# Patient Record
Sex: MALE | Race: BLACK OR AFRICAN AMERICAN | Employment: OTHER | URBAN - METROPOLITAN AREA
[De-identification: names, ages, dates, MRNs, and addresses within clinical notes are randomized per-mention and may not be internally consistent; named-entity substitution may affect disease eponyms.]

---

## 2018-01-01 ENCOUNTER — APPOINTMENT (OUTPATIENT)
Dept: GENERAL RADIOLOGY | Age: 78
DRG: 070 | End: 2018-01-01
Attending: INTERNAL MEDICINE
Payer: MEDICARE

## 2018-01-01 ENCOUNTER — APPOINTMENT (OUTPATIENT)
Dept: GENERAL RADIOLOGY | Age: 78
DRG: 056 | End: 2018-01-01
Attending: INTERNAL MEDICINE
Payer: MEDICARE

## 2018-01-01 ENCOUNTER — APPOINTMENT (OUTPATIENT)
Dept: CT IMAGING | Age: 78
DRG: 056 | End: 2018-01-01
Attending: EMERGENCY MEDICINE
Payer: MEDICARE

## 2018-01-01 ENCOUNTER — APPOINTMENT (OUTPATIENT)
Dept: GENERAL RADIOLOGY | Age: 78
End: 2018-01-01
Attending: EMERGENCY MEDICINE
Payer: MEDICARE

## 2018-01-01 ENCOUNTER — HOSPITAL ENCOUNTER (EMERGENCY)
Age: 78
Discharge: HOME | End: 2018-06-10
Attending: EMERGENCY MEDICINE
Payer: MEDICARE

## 2018-01-01 ENCOUNTER — HOSPITAL ENCOUNTER (EMERGENCY)
Age: 78
Discharge: HOME | End: 2018-04-11
Attending: EMERGENCY MEDICINE | Admitting: EMERGENCY MEDICINE
Payer: MEDICARE

## 2018-01-01 ENCOUNTER — APPOINTMENT (OUTPATIENT)
Dept: MRI IMAGING | Age: 78
DRG: 070 | End: 2018-01-01
Attending: NURSE PRACTITIONER
Payer: MEDICARE

## 2018-01-01 ENCOUNTER — HOSPITAL ENCOUNTER (INPATIENT)
Age: 78
LOS: 4 days | Discharge: HOME HEALTH | DRG: 056 | End: 2018-03-01
Attending: EMERGENCY MEDICINE | Admitting: INTERNAL MEDICINE
Payer: MEDICARE

## 2018-01-01 ENCOUNTER — APPOINTMENT (OUTPATIENT)
Dept: MRI IMAGING | Age: 78
DRG: 070 | End: 2018-01-01
Attending: INTERNAL MEDICINE
Payer: MEDICARE

## 2018-01-01 ENCOUNTER — APPOINTMENT (OUTPATIENT)
Dept: GENERAL RADIOLOGY | Age: 78
DRG: 070 | End: 2018-01-01
Attending: PHYSICIAN ASSISTANT
Payer: MEDICARE

## 2018-01-01 ENCOUNTER — HOSPICE ADMISSION (OUTPATIENT)
Dept: HOSPICE | Facility: HOSPICE | Age: 78
End: 2018-01-01
Payer: MEDICARE

## 2018-01-01 ENCOUNTER — HOSPITAL ENCOUNTER (EMERGENCY)
Age: 78
Discharge: HOME | End: 2018-08-27
Attending: EMERGENCY MEDICINE
Payer: MEDICARE

## 2018-01-01 ENCOUNTER — HOSPITAL ENCOUNTER (INPATIENT)
Age: 78
LOS: 14 days | DRG: 951 | End: 2018-12-17
Attending: FAMILY MEDICINE | Admitting: FAMILY MEDICINE
Payer: OTHER MISCELLANEOUS

## 2018-01-01 ENCOUNTER — APPOINTMENT (OUTPATIENT)
Dept: CT IMAGING | Age: 78
DRG: 070 | End: 2018-01-01
Attending: INTERNAL MEDICINE
Payer: MEDICARE

## 2018-01-01 ENCOUNTER — HOME CARE VISIT (OUTPATIENT)
Dept: HOSPICE | Facility: HOSPICE | Age: 78
End: 2018-01-01
Payer: MEDICARE

## 2018-01-01 ENCOUNTER — APPOINTMENT (OUTPATIENT)
Dept: CT IMAGING | Age: 78
End: 2018-01-01
Attending: EMERGENCY MEDICINE
Payer: MEDICARE

## 2018-01-01 ENCOUNTER — HOSPITAL ENCOUNTER (INPATIENT)
Age: 78
LOS: 15 days | Discharge: HOSPICE | DRG: 070 | End: 2018-12-03
Attending: STUDENT IN AN ORGANIZED HEALTH CARE EDUCATION/TRAINING PROGRAM | Admitting: FAMILY MEDICINE
Payer: MEDICARE

## 2018-01-01 ENCOUNTER — APPOINTMENT (OUTPATIENT)
Dept: MRI IMAGING | Age: 78
DRG: 056 | End: 2018-01-01
Attending: INTERNAL MEDICINE
Payer: MEDICARE

## 2018-01-01 ENCOUNTER — APPOINTMENT (OUTPATIENT)
Dept: GENERAL RADIOLOGY | Age: 78
DRG: 056 | End: 2018-01-01
Attending: EMERGENCY MEDICINE
Payer: MEDICARE

## 2018-01-01 ENCOUNTER — HOSPITAL ENCOUNTER (EMERGENCY)
Age: 78
Discharge: HOME | End: 2018-08-15
Attending: EMERGENCY MEDICINE
Payer: MEDICARE

## 2018-01-01 ENCOUNTER — HOSPITAL ENCOUNTER (EMERGENCY)
Age: 78
Discharge: HOME | End: 2018-07-01
Attending: EMERGENCY MEDICINE | Admitting: EMERGENCY MEDICINE
Payer: MEDICARE

## 2018-01-01 ENCOUNTER — APPOINTMENT (OUTPATIENT)
Dept: CT IMAGING | Age: 78
DRG: 070 | End: 2018-01-01
Attending: PHYSICIAN ASSISTANT
Payer: MEDICARE

## 2018-01-01 VITALS
DIASTOLIC BLOOD PRESSURE: 65 MMHG | OXYGEN SATURATION: 96 % | BODY MASS INDEX: 17.58 KG/M2 | HEART RATE: 75 BPM | SYSTOLIC BLOOD PRESSURE: 125 MMHG | TEMPERATURE: 98.9 F | WEIGHT: 122.8 LBS | RESPIRATION RATE: 16 BRPM | HEIGHT: 70 IN

## 2018-01-01 VITALS
HEART RATE: 71 BPM | TEMPERATURE: 97.6 F | DIASTOLIC BLOOD PRESSURE: 87 MMHG | RESPIRATION RATE: 13 BRPM | WEIGHT: 122 LBS | OXYGEN SATURATION: 96 % | SYSTOLIC BLOOD PRESSURE: 145 MMHG | BODY MASS INDEX: 19.61 KG/M2 | HEIGHT: 66 IN

## 2018-01-01 VITALS
HEART RATE: 87 BPM | HEIGHT: 66 IN | TEMPERATURE: 97.6 F | RESPIRATION RATE: 20 BRPM | WEIGHT: 132 LBS | BODY MASS INDEX: 21.21 KG/M2 | DIASTOLIC BLOOD PRESSURE: 93 MMHG | OXYGEN SATURATION: 99 % | SYSTOLIC BLOOD PRESSURE: 177 MMHG

## 2018-01-01 VITALS
DIASTOLIC BLOOD PRESSURE: 80 MMHG | BODY MASS INDEX: 19.61 KG/M2 | HEIGHT: 66 IN | SYSTOLIC BLOOD PRESSURE: 151 MMHG | HEART RATE: 61 BPM | WEIGHT: 122 LBS | OXYGEN SATURATION: 96 % | RESPIRATION RATE: 16 BRPM | TEMPERATURE: 97.4 F

## 2018-01-01 VITALS
HEART RATE: 73 BPM | SYSTOLIC BLOOD PRESSURE: 160 MMHG | TEMPERATURE: 98.6 F | OXYGEN SATURATION: 93 % | HEIGHT: 70 IN | DIASTOLIC BLOOD PRESSURE: 86 MMHG | WEIGHT: 143 LBS | BODY MASS INDEX: 20.47 KG/M2 | RESPIRATION RATE: 20 BRPM

## 2018-01-01 VITALS
HEART RATE: 65 BPM | TEMPERATURE: 97.5 F | SYSTOLIC BLOOD PRESSURE: 140 MMHG | BODY MASS INDEX: 19.69 KG/M2 | DIASTOLIC BLOOD PRESSURE: 75 MMHG | OXYGEN SATURATION: 92 % | WEIGHT: 122 LBS | RESPIRATION RATE: 16 BRPM

## 2018-01-01 VITALS
BODY MASS INDEX: 21.21 KG/M2 | OXYGEN SATURATION: 97 % | DIASTOLIC BLOOD PRESSURE: 106 MMHG | TEMPERATURE: 97.8 F | SYSTOLIC BLOOD PRESSURE: 158 MMHG | HEART RATE: 79 BPM | WEIGHT: 132 LBS | HEIGHT: 66 IN | RESPIRATION RATE: 12 BRPM

## 2018-01-01 VITALS — RESPIRATION RATE: 14 BRPM | SYSTOLIC BLOOD PRESSURE: 132 MMHG | DIASTOLIC BLOOD PRESSURE: 72 MMHG | HEART RATE: 88 BPM

## 2018-01-01 DIAGNOSIS — N39.0 URINARY TRACT INFECTION WITHOUT HEMATURIA, SITE UNSPECIFIED: ICD-10-CM

## 2018-01-01 DIAGNOSIS — E86.0 DEHYDRATION: ICD-10-CM

## 2018-01-01 DIAGNOSIS — Z71.89 GOALS OF CARE, COUNSELING/DISCUSSION: ICD-10-CM

## 2018-01-01 DIAGNOSIS — J39.8 INCREASED TRACHEAL SECRETIONS: ICD-10-CM

## 2018-01-01 DIAGNOSIS — R41.82 ALTERED MENTAL STATUS, UNSPECIFIED ALTERED MENTAL STATUS TYPE: Primary | ICD-10-CM

## 2018-01-01 DIAGNOSIS — R13.10 DYSPHAGIA, UNSPECIFIED TYPE: ICD-10-CM

## 2018-01-01 DIAGNOSIS — R41.0 DELIRIUM: ICD-10-CM

## 2018-01-01 DIAGNOSIS — I63.9 CEREBROVASCULAR ACCIDENT (CVA), UNSPECIFIED MECHANISM (HCC): Primary | ICD-10-CM

## 2018-01-01 DIAGNOSIS — G93.40 ACUTE ENCEPHALOPATHY: ICD-10-CM

## 2018-01-01 DIAGNOSIS — R53.81 DEBILITY: ICD-10-CM

## 2018-01-01 DIAGNOSIS — J18.9 COMMUNITY ACQUIRED PNEUMONIA OF LEFT LUNG, UNSPECIFIED PART OF LUNG: Primary | ICD-10-CM

## 2018-01-01 DIAGNOSIS — R41.0 CONFUSION: Primary | ICD-10-CM

## 2018-01-01 DIAGNOSIS — F02.80 LATE ONSET ALZHEIMER'S DISEASE WITHOUT BEHAVIORAL DISTURBANCE (HCC): Chronic | ICD-10-CM

## 2018-01-01 DIAGNOSIS — G20 PARKINSON DISEASE (HCC): Chronic | ICD-10-CM

## 2018-01-01 DIAGNOSIS — R06.02 SOB (SHORTNESS OF BREATH): ICD-10-CM

## 2018-01-01 DIAGNOSIS — R31.9 URINARY TRACT INFECTION WITH HEMATURIA, SITE UNSPECIFIED: Primary | ICD-10-CM

## 2018-01-01 DIAGNOSIS — N30.00 ACUTE CYSTITIS WITHOUT HEMATURIA: Primary | ICD-10-CM

## 2018-01-01 DIAGNOSIS — J96.00 ACUTE RESPIRATORY FAILURE, UNSPECIFIED WHETHER WITH HYPOXIA OR HYPERCAPNIA (HCC): ICD-10-CM

## 2018-01-01 DIAGNOSIS — G45.9 TRANSIENT CEREBRAL ISCHEMIA, UNSPECIFIED TYPE: ICD-10-CM

## 2018-01-01 DIAGNOSIS — N39.0 URINARY TRACT INFECTION WITH HEMATURIA, SITE UNSPECIFIED: Primary | ICD-10-CM

## 2018-01-01 DIAGNOSIS — F02.80 LATE ONSET ALZHEIMER'S DISEASE WITHOUT BEHAVIORAL DISTURBANCE (HCC): ICD-10-CM

## 2018-01-01 DIAGNOSIS — R62.51 FAILURE TO THRIVE (0-17): Chronic | ICD-10-CM

## 2018-01-01 DIAGNOSIS — R41.0 DISORIENTATION: ICD-10-CM

## 2018-01-01 DIAGNOSIS — F03.91 DEMENTIA WITH BEHAVIORAL DISTURBANCE, UNSPECIFIED DEMENTIA TYPE: ICD-10-CM

## 2018-01-01 DIAGNOSIS — G30.1 LATE ONSET ALZHEIMER'S DISEASE WITHOUT BEHAVIORAL DISTURBANCE (HCC): ICD-10-CM

## 2018-01-01 DIAGNOSIS — I71.20 THORACIC AORTIC ANEURYSM WITHOUT RUPTURE: ICD-10-CM

## 2018-01-01 DIAGNOSIS — Z71.89 DNR (DO NOT RESUSCITATE) DISCUSSION: ICD-10-CM

## 2018-01-01 DIAGNOSIS — G30.1 LATE ONSET ALZHEIMER'S DISEASE WITHOUT BEHAVIORAL DISTURBANCE (HCC): Chronic | ICD-10-CM

## 2018-01-01 DIAGNOSIS — R45.1 RESTLESSNESS AND AGITATION: ICD-10-CM

## 2018-01-01 LAB
ALBUMIN SERPL-MCNC: 2.2 G/DL (ref 3.5–5)
ALBUMIN SERPL-MCNC: 2.2 G/DL (ref 3.5–5)
ALBUMIN SERPL-MCNC: 2.7 G/DL (ref 3.5–5)
ALBUMIN SERPL-MCNC: 2.9 G/DL (ref 3.5–5)
ALBUMIN SERPL-MCNC: 2.9 G/DL (ref 3.5–5)
ALBUMIN SERPL-MCNC: 3.2 G/DL (ref 3.5–5)
ALBUMIN SERPL-MCNC: 3.3 G/DL (ref 3.5–5)
ALBUMIN SERPL-MCNC: 3.5 G/DL (ref 3.5–5)
ALBUMIN SERPL-MCNC: 3.7 G/DL (ref 3.5–5)
ALBUMIN/GLOB SERPL: 0.6 {RATIO} (ref 1.1–2.2)
ALBUMIN/GLOB SERPL: 0.6 {RATIO} (ref 1.1–2.2)
ALBUMIN/GLOB SERPL: 0.7 {RATIO} (ref 1.1–2.2)
ALBUMIN/GLOB SERPL: 0.9 {RATIO} (ref 1.1–2.2)
ALBUMIN/GLOB SERPL: 1 {RATIO} (ref 1.1–2.2)
ALP SERPL-CCNC: 120 U/L (ref 45–117)
ALP SERPL-CCNC: 122 U/L (ref 45–117)
ALP SERPL-CCNC: 52 U/L (ref 45–117)
ALP SERPL-CCNC: 53 U/L (ref 45–117)
ALP SERPL-CCNC: 66 U/L (ref 45–117)
ALP SERPL-CCNC: 66 U/L (ref 45–117)
ALP SERPL-CCNC: 73 U/L (ref 45–117)
ALP SERPL-CCNC: 77 U/L (ref 45–117)
ALP SERPL-CCNC: 87 U/L (ref 45–117)
ALP SERPL-CCNC: 88 U/L (ref 45–117)
ALP SERPL-CCNC: 92 U/L (ref 45–117)
ALT SERPL-CCNC: 11 U/L (ref 12–78)
ALT SERPL-CCNC: 12 U/L (ref 12–78)
ALT SERPL-CCNC: 6 U/L (ref 12–78)
ALT SERPL-CCNC: 7 U/L (ref 12–78)
ALT SERPL-CCNC: 7 U/L (ref 12–78)
ALT SERPL-CCNC: 8 U/L (ref 12–78)
ALT SERPL-CCNC: 8 U/L (ref 12–78)
ALT SERPL-CCNC: <6 U/L (ref 12–78)
AMMONIA PLAS-SCNC: 11 UMOL/L
AMMONIA PLAS-SCNC: 12 UMOL/L
AMMONIA PLAS-SCNC: 16 UMOL/L
AMMONIA PLAS-SCNC: 18 UMOL/L
AMMONIA PLAS-SCNC: <10 UMOL/L
AMMONIA PLAS-SCNC: <10 UMOL/L
ANA SER QL: NEGATIVE
ANION GAP SERPL CALC-SCNC: 10 MMOL/L (ref 5–15)
ANION GAP SERPL CALC-SCNC: 10 MMOL/L (ref 5–15)
ANION GAP SERPL CALC-SCNC: 11 MMOL/L (ref 5–15)
ANION GAP SERPL CALC-SCNC: 11 MMOL/L (ref 5–15)
ANION GAP SERPL CALC-SCNC: 4 MMOL/L (ref 5–15)
ANION GAP SERPL CALC-SCNC: 4 MMOL/L (ref 5–15)
ANION GAP SERPL CALC-SCNC: 5 MMOL/L (ref 5–15)
ANION GAP SERPL CALC-SCNC: 6 MMOL/L (ref 5–15)
ANION GAP SERPL CALC-SCNC: 7 MMOL/L (ref 5–15)
ANION GAP SERPL CALC-SCNC: 8 MMOL/L (ref 5–15)
ANION GAP SERPL CALC-SCNC: 9 MMOL/L (ref 5–15)
ANION GAP SERPL CALC-SCNC: 9 MMOL/L (ref 5–15)
APPEARANCE UR: ABNORMAL
APPEARANCE UR: CLEAR
AST SERPL-CCNC: 12 U/L (ref 15–37)
AST SERPL-CCNC: 12 U/L (ref 15–37)
AST SERPL-CCNC: 13 U/L (ref 15–37)
AST SERPL-CCNC: 13 U/L (ref 15–37)
AST SERPL-CCNC: 14 U/L (ref 15–37)
AST SERPL-CCNC: 17 U/L (ref 15–37)
AST SERPL-CCNC: 18 U/L (ref 15–37)
AST SERPL-CCNC: 18 U/L (ref 15–37)
AST SERPL-CCNC: 22 U/L (ref 15–37)
ATRIAL RATE: 57 BPM
ATRIAL RATE: 61 BPM
ATRIAL RATE: 62 BPM
ATRIAL RATE: 67 BPM
ATRIAL RATE: 71 BPM
ATRIAL RATE: 83 BPM
BACTERIA SPEC CULT: ABNORMAL
BACTERIA SPEC CULT: NORMAL
BACTERIA URNS QL MICRO: ABNORMAL /HPF
BACTERIA URNS QL MICRO: NEGATIVE /HPF
BASOPHILS # BLD: 0 K/UL (ref 0–0.1)
BASOPHILS NFR BLD: 0 % (ref 0–1)
BASOPHILS NFR BLD: 1 % (ref 0–1)
BASOPHILS NFR BLD: 1 % (ref 0–1)
BILIRUB DIRECT SERPL-MCNC: 0.2 MG/DL (ref 0–0.2)
BILIRUB DIRECT SERPL-MCNC: <0.1 MG/DL (ref 0–0.2)
BILIRUB SERPL-MCNC: 0.6 MG/DL (ref 0.2–1)
BILIRUB SERPL-MCNC: 0.7 MG/DL (ref 0.2–1)
BILIRUB SERPL-MCNC: 0.9 MG/DL (ref 0.2–1)
BILIRUB SERPL-MCNC: 0.9 MG/DL (ref 0.2–1)
BILIRUB SERPL-MCNC: 1 MG/DL (ref 0.2–1)
BILIRUB SERPL-MCNC: 1 MG/DL (ref 0.2–1)
BILIRUB SERPL-MCNC: 1.1 MG/DL (ref 0.2–1)
BILIRUB SERPL-MCNC: 1.4 MG/DL (ref 0.2–1)
BILIRUB SERPL-MCNC: 1.4 MG/DL (ref 0.2–1)
BILIRUB UR QL CFM: NEGATIVE
BILIRUB UR QL: NEGATIVE
BILIRUB UR QL: NEGATIVE
BUN SERPL-MCNC: 10 MG/DL (ref 6–20)
BUN SERPL-MCNC: 12 MG/DL (ref 6–20)
BUN SERPL-MCNC: 12 MG/DL (ref 6–20)
BUN SERPL-MCNC: 13 MG/DL (ref 6–20)
BUN SERPL-MCNC: 14 MG/DL (ref 6–20)
BUN SERPL-MCNC: 14 MG/DL (ref 6–20)
BUN SERPL-MCNC: 15 MG/DL (ref 6–20)
BUN SERPL-MCNC: 17 MG/DL (ref 6–20)
BUN SERPL-MCNC: 18 MG/DL (ref 6–20)
BUN SERPL-MCNC: 19 MG/DL (ref 6–20)
BUN SERPL-MCNC: 19 MG/DL (ref 6–20)
BUN SERPL-MCNC: 5 MG/DL (ref 6–20)
BUN SERPL-MCNC: 7 MG/DL (ref 6–20)
BUN SERPL-MCNC: 8 MG/DL (ref 6–20)
BUN SERPL-MCNC: 9 MG/DL (ref 6–20)
BUN SERPL-MCNC: 9 MG/DL (ref 6–20)
BUN/CREAT SERPL: 12 (ref 12–20)
BUN/CREAT SERPL: 13 (ref 12–20)
BUN/CREAT SERPL: 14 (ref 12–20)
BUN/CREAT SERPL: 14 (ref 12–20)
BUN/CREAT SERPL: 16 (ref 12–20)
BUN/CREAT SERPL: 17 (ref 12–20)
BUN/CREAT SERPL: 17 (ref 12–20)
BUN/CREAT SERPL: 18 (ref 12–20)
BUN/CREAT SERPL: 19 (ref 12–20)
BUN/CREAT SERPL: 21 (ref 12–20)
BUN/CREAT SERPL: 22 (ref 12–20)
BUN/CREAT SERPL: 23 (ref 12–20)
BUN/CREAT SERPL: 23 (ref 12–20)
BUN/CREAT SERPL: 26 (ref 12–20)
BUN/CREAT SERPL: 26 (ref 12–20)
BUN/CREAT SERPL: 28 (ref 12–20)
BUN/CREAT SERPL: 8 (ref 12–20)
BUN/CREAT SERPL: 9 (ref 12–20)
CALCIUM SERPL-MCNC: 7.8 MG/DL (ref 8.5–10.1)
CALCIUM SERPL-MCNC: 7.9 MG/DL (ref 8.5–10.1)
CALCIUM SERPL-MCNC: 8 MG/DL (ref 8.5–10.1)
CALCIUM SERPL-MCNC: 8 MG/DL (ref 8.5–10.1)
CALCIUM SERPL-MCNC: 8.1 MG/DL (ref 8.5–10.1)
CALCIUM SERPL-MCNC: 8.1 MG/DL (ref 8.5–10.1)
CALCIUM SERPL-MCNC: 8.2 MG/DL (ref 8.5–10.1)
CALCIUM SERPL-MCNC: 8.3 MG/DL (ref 8.5–10.1)
CALCIUM SERPL-MCNC: 8.4 MG/DL (ref 8.5–10.1)
CALCIUM SERPL-MCNC: 8.4 MG/DL (ref 8.5–10.1)
CALCIUM SERPL-MCNC: 8.5 MG/DL (ref 8.5–10.1)
CALCIUM SERPL-MCNC: 8.6 MG/DL (ref 8.5–10.1)
CALCIUM SERPL-MCNC: 8.7 MG/DL (ref 8.5–10.1)
CALCIUM SERPL-MCNC: 8.7 MG/DL (ref 8.5–10.1)
CALCIUM SERPL-MCNC: 8.8 MG/DL (ref 8.5–10.1)
CALCIUM SERPL-MCNC: 8.8 MG/DL (ref 8.5–10.1)
CALCIUM SERPL-MCNC: 9.3 MG/DL (ref 8.5–10.1)
CALCULATED P AXIS, ECG09: 15 DEGREES
CALCULATED P AXIS, ECG09: 22 DEGREES
CALCULATED P AXIS, ECG09: 30 DEGREES
CALCULATED P AXIS, ECG09: 30 DEGREES
CALCULATED P AXIS, ECG09: 36 DEGREES
CALCULATED P AXIS, ECG09: 42 DEGREES
CALCULATED R AXIS, ECG10: -10 DEGREES
CALCULATED R AXIS, ECG10: -20 DEGREES
CALCULATED R AXIS, ECG10: -20 DEGREES
CALCULATED R AXIS, ECG10: -23 DEGREES
CALCULATED R AXIS, ECG10: -24 DEGREES
CALCULATED R AXIS, ECG10: -24 DEGREES
CALCULATED T AXIS, ECG11: -155 DEGREES
CALCULATED T AXIS, ECG11: 123 DEGREES
CALCULATED T AXIS, ECG11: 139 DEGREES
CALCULATED T AXIS, ECG11: 140 DEGREES
CALCULATED T AXIS, ECG11: 150 DEGREES
CALCULATED T AXIS, ECG11: 163 DEGREES
CC UR VC: ABNORMAL
CC UR VC: ABNORMAL
CC UR VC: NORMAL
CC UR VC: NORMAL
CHLORIDE SERPL-SCNC: 101 MMOL/L (ref 97–108)
CHLORIDE SERPL-SCNC: 102 MMOL/L (ref 97–108)
CHLORIDE SERPL-SCNC: 103 MMOL/L (ref 97–108)
CHLORIDE SERPL-SCNC: 104 MMOL/L (ref 97–108)
CHLORIDE SERPL-SCNC: 105 MMOL/L (ref 97–108)
CHLORIDE SERPL-SCNC: 106 MMOL/L (ref 97–108)
CHLORIDE SERPL-SCNC: 106 MMOL/L (ref 97–108)
CHLORIDE SERPL-SCNC: 107 MMOL/L (ref 97–108)
CHOLEST SERPL-MCNC: 105 MG/DL
CHOLEST SERPL-MCNC: 153 MG/DL
CO2 SERPL-SCNC: 25 MMOL/L (ref 21–32)
CO2 SERPL-SCNC: 27 MMOL/L (ref 21–32)
CO2 SERPL-SCNC: 28 MMOL/L (ref 21–32)
CO2 SERPL-SCNC: 29 MMOL/L (ref 21–32)
CO2 SERPL-SCNC: 30 MMOL/L (ref 21–32)
CO2 SERPL-SCNC: 31 MMOL/L (ref 21–32)
CO2 SERPL-SCNC: 33 MMOL/L (ref 21–32)
COLOR UR: ABNORMAL
COMMENT, HOLDF: NORMAL
CREAT SERPL-MCNC: 0.57 MG/DL (ref 0.7–1.3)
CREAT SERPL-MCNC: 0.57 MG/DL (ref 0.7–1.3)
CREAT SERPL-MCNC: 0.64 MG/DL (ref 0.7–1.3)
CREAT SERPL-MCNC: 0.66 MG/DL (ref 0.7–1.3)
CREAT SERPL-MCNC: 0.66 MG/DL (ref 0.7–1.3)
CREAT SERPL-MCNC: 0.67 MG/DL (ref 0.7–1.3)
CREAT SERPL-MCNC: 0.68 MG/DL (ref 0.7–1.3)
CREAT SERPL-MCNC: 0.69 MG/DL (ref 0.7–1.3)
CREAT SERPL-MCNC: 0.69 MG/DL (ref 0.7–1.3)
CREAT SERPL-MCNC: 0.72 MG/DL (ref 0.7–1.3)
CREAT SERPL-MCNC: 0.73 MG/DL (ref 0.7–1.3)
CREAT SERPL-MCNC: 0.74 MG/DL (ref 0.7–1.3)
CREAT SERPL-MCNC: 0.76 MG/DL (ref 0.7–1.3)
CREAT SERPL-MCNC: 0.83 MG/DL (ref 0.7–1.3)
CREAT SERPL-MCNC: 0.85 MG/DL (ref 0.7–1.3)
CREAT SERPL-MCNC: 0.86 MG/DL (ref 0.7–1.3)
CREAT SERPL-MCNC: 0.9 MG/DL (ref 0.7–1.3)
CREAT SERPL-MCNC: 0.92 MG/DL (ref 0.7–1.3)
CREAT SERPL-MCNC: 0.95 MG/DL (ref 0.7–1.3)
CRP SERPL HS-MCNC: >9.5 MG/L
DIAGNOSIS, 93000: NORMAL
DIFFERENTIAL METHOD BLD: ABNORMAL
DIFFERENTIAL METHOD BLD: NORMAL
EOSINOPHIL # BLD: 0 K/UL (ref 0–0.4)
EOSINOPHIL # BLD: 0.1 K/UL (ref 0–0.4)
EOSINOPHIL # BLD: 0.2 K/UL (ref 0–0.4)
EOSINOPHIL NFR BLD: 0 % (ref 0–7)
EOSINOPHIL NFR BLD: 0 % (ref 0–7)
EOSINOPHIL NFR BLD: 1 % (ref 0–7)
EOSINOPHIL NFR BLD: 2 % (ref 0–7)
EPITH CASTS URNS QL MICRO: ABNORMAL /LPF
ERYTHROCYTE [DISTWIDTH] IN BLOOD BY AUTOMATED COUNT: 12.9 % (ref 11.5–14.5)
ERYTHROCYTE [DISTWIDTH] IN BLOOD BY AUTOMATED COUNT: 13.2 % (ref 11.5–14.5)
ERYTHROCYTE [DISTWIDTH] IN BLOOD BY AUTOMATED COUNT: 13.3 % (ref 11.5–14.5)
ERYTHROCYTE [DISTWIDTH] IN BLOOD BY AUTOMATED COUNT: 13.4 % (ref 11.5–14.5)
ERYTHROCYTE [DISTWIDTH] IN BLOOD BY AUTOMATED COUNT: 13.6 % (ref 11.5–14.5)
ERYTHROCYTE [DISTWIDTH] IN BLOOD BY AUTOMATED COUNT: 13.6 % (ref 11.5–14.5)
ERYTHROCYTE [DISTWIDTH] IN BLOOD BY AUTOMATED COUNT: 13.7 % (ref 11.5–14.5)
ERYTHROCYTE [DISTWIDTH] IN BLOOD BY AUTOMATED COUNT: 13.7 % (ref 11.5–14.5)
ERYTHROCYTE [DISTWIDTH] IN BLOOD BY AUTOMATED COUNT: 13.8 % (ref 11.5–14.5)
ERYTHROCYTE [SEDIMENTATION RATE] IN BLOOD: 7 MM/HR (ref 0–20)
EST. AVERAGE GLUCOSE BLD GHB EST-MCNC: 108 MG/DL
EST. AVERAGE GLUCOSE BLD GHB EST-MCNC: 134 MG/DL
GLOBULIN SER CALC-MCNC: 3.3 G/DL (ref 2–4)
GLOBULIN SER CALC-MCNC: 3.3 G/DL (ref 2–4)
GLOBULIN SER CALC-MCNC: 3.4 G/DL (ref 2–4)
GLOBULIN SER CALC-MCNC: 3.5 G/DL (ref 2–4)
GLOBULIN SER CALC-MCNC: 3.5 G/DL (ref 2–4)
GLOBULIN SER CALC-MCNC: 3.6 G/DL (ref 2–4)
GLOBULIN SER CALC-MCNC: 3.7 G/DL (ref 2–4)
GLOBULIN SER CALC-MCNC: 3.7 G/DL (ref 2–4)
GLOBULIN SER CALC-MCNC: 3.9 G/DL (ref 2–4)
GLUCOSE BLD STRIP.AUTO-MCNC: 100 MG/DL (ref 65–100)
GLUCOSE BLD STRIP.AUTO-MCNC: 104 MG/DL (ref 65–100)
GLUCOSE BLD STRIP.AUTO-MCNC: 105 MG/DL (ref 65–100)
GLUCOSE BLD STRIP.AUTO-MCNC: 106 MG/DL (ref 65–100)
GLUCOSE BLD STRIP.AUTO-MCNC: 107 MG/DL (ref 65–100)
GLUCOSE BLD STRIP.AUTO-MCNC: 118 MG/DL (ref 65–100)
GLUCOSE BLD STRIP.AUTO-MCNC: 118 MG/DL (ref 65–100)
GLUCOSE BLD STRIP.AUTO-MCNC: 120 MG/DL (ref 65–100)
GLUCOSE BLD STRIP.AUTO-MCNC: 121 MG/DL (ref 65–100)
GLUCOSE BLD STRIP.AUTO-MCNC: 122 MG/DL (ref 65–100)
GLUCOSE BLD STRIP.AUTO-MCNC: 123 MG/DL (ref 65–100)
GLUCOSE BLD STRIP.AUTO-MCNC: 124 MG/DL (ref 65–100)
GLUCOSE BLD STRIP.AUTO-MCNC: 124 MG/DL (ref 65–100)
GLUCOSE BLD STRIP.AUTO-MCNC: 125 MG/DL (ref 65–100)
GLUCOSE BLD STRIP.AUTO-MCNC: 126 MG/DL (ref 65–100)
GLUCOSE BLD STRIP.AUTO-MCNC: 128 MG/DL (ref 65–100)
GLUCOSE BLD STRIP.AUTO-MCNC: 128 MG/DL (ref 65–100)
GLUCOSE BLD STRIP.AUTO-MCNC: 130 MG/DL (ref 65–100)
GLUCOSE BLD STRIP.AUTO-MCNC: 132 MG/DL (ref 65–100)
GLUCOSE BLD STRIP.AUTO-MCNC: 133 MG/DL (ref 65–100)
GLUCOSE BLD STRIP.AUTO-MCNC: 133 MG/DL (ref 65–100)
GLUCOSE BLD STRIP.AUTO-MCNC: 134 MG/DL (ref 65–100)
GLUCOSE BLD STRIP.AUTO-MCNC: 136 MG/DL (ref 65–100)
GLUCOSE BLD STRIP.AUTO-MCNC: 138 MG/DL (ref 65–100)
GLUCOSE BLD STRIP.AUTO-MCNC: 140 MG/DL (ref 65–100)
GLUCOSE BLD STRIP.AUTO-MCNC: 140 MG/DL (ref 65–100)
GLUCOSE BLD STRIP.AUTO-MCNC: 141 MG/DL (ref 65–100)
GLUCOSE BLD STRIP.AUTO-MCNC: 143 MG/DL (ref 65–100)
GLUCOSE BLD STRIP.AUTO-MCNC: 143 MG/DL (ref 65–100)
GLUCOSE BLD STRIP.AUTO-MCNC: 151 MG/DL (ref 65–100)
GLUCOSE BLD STRIP.AUTO-MCNC: 155 MG/DL (ref 65–100)
GLUCOSE BLD STRIP.AUTO-MCNC: 155 MG/DL (ref 65–100)
GLUCOSE BLD STRIP.AUTO-MCNC: 156 MG/DL (ref 65–100)
GLUCOSE BLD STRIP.AUTO-MCNC: 157 MG/DL (ref 65–100)
GLUCOSE BLD STRIP.AUTO-MCNC: 161 MG/DL (ref 65–100)
GLUCOSE BLD STRIP.AUTO-MCNC: 166 MG/DL (ref 65–100)
GLUCOSE BLD STRIP.AUTO-MCNC: 170 MG/DL (ref 65–100)
GLUCOSE BLD STRIP.AUTO-MCNC: 171 MG/DL (ref 65–100)
GLUCOSE BLD STRIP.AUTO-MCNC: 172 MG/DL (ref 65–100)
GLUCOSE BLD STRIP.AUTO-MCNC: 180 MG/DL (ref 65–100)
GLUCOSE BLD STRIP.AUTO-MCNC: 191 MG/DL (ref 65–100)
GLUCOSE BLD STRIP.AUTO-MCNC: 191 MG/DL (ref 65–100)
GLUCOSE BLD STRIP.AUTO-MCNC: 194 MG/DL (ref 65–100)
GLUCOSE BLD STRIP.AUTO-MCNC: 198 MG/DL (ref 65–100)
GLUCOSE BLD STRIP.AUTO-MCNC: 198 MG/DL (ref 65–100)
GLUCOSE BLD STRIP.AUTO-MCNC: 201 MG/DL (ref 65–100)
GLUCOSE BLD STRIP.AUTO-MCNC: 209 MG/DL (ref 65–100)
GLUCOSE BLD STRIP.AUTO-MCNC: 214 MG/DL (ref 65–100)
GLUCOSE BLD STRIP.AUTO-MCNC: 217 MG/DL (ref 65–100)
GLUCOSE BLD STRIP.AUTO-MCNC: 226 MG/DL (ref 65–100)
GLUCOSE BLD STRIP.AUTO-MCNC: 227 MG/DL (ref 65–100)
GLUCOSE BLD STRIP.AUTO-MCNC: 230 MG/DL (ref 65–100)
GLUCOSE BLD STRIP.AUTO-MCNC: 232 MG/DL (ref 65–100)
GLUCOSE BLD STRIP.AUTO-MCNC: 254 MG/DL (ref 65–100)
GLUCOSE BLD STRIP.AUTO-MCNC: 326 MG/DL (ref 65–100)
GLUCOSE BLD STRIP.AUTO-MCNC: 69 MG/DL (ref 65–100)
GLUCOSE BLD STRIP.AUTO-MCNC: 78 MG/DL (ref 65–100)
GLUCOSE BLD STRIP.AUTO-MCNC: 80 MG/DL (ref 65–100)
GLUCOSE BLD STRIP.AUTO-MCNC: 84 MG/DL (ref 65–100)
GLUCOSE BLD STRIP.AUTO-MCNC: 87 MG/DL (ref 65–100)
GLUCOSE BLD STRIP.AUTO-MCNC: 88 MG/DL (ref 65–100)
GLUCOSE BLD STRIP.AUTO-MCNC: 89 MG/DL (ref 65–100)
GLUCOSE BLD STRIP.AUTO-MCNC: 90 MG/DL (ref 65–100)
GLUCOSE BLD STRIP.AUTO-MCNC: 91 MG/DL (ref 65–100)
GLUCOSE BLD STRIP.AUTO-MCNC: 93 MG/DL (ref 65–100)
GLUCOSE BLD STRIP.AUTO-MCNC: 93 MG/DL (ref 65–100)
GLUCOSE BLD STRIP.AUTO-MCNC: 94 MG/DL (ref 65–100)
GLUCOSE BLD STRIP.AUTO-MCNC: 95 MG/DL (ref 65–100)
GLUCOSE BLD STRIP.AUTO-MCNC: 96 MG/DL (ref 65–100)
GLUCOSE BLD STRIP.AUTO-MCNC: 96 MG/DL (ref 65–100)
GLUCOSE BLD STRIP.AUTO-MCNC: 97 MG/DL (ref 65–100)
GLUCOSE BLD STRIP.AUTO-MCNC: 97 MG/DL (ref 65–100)
GLUCOSE BLD STRIP.AUTO-MCNC: 98 MG/DL (ref 65–100)
GLUCOSE BLD STRIP.AUTO-MCNC: 98 MG/DL (ref 65–100)
GLUCOSE BLD STRIP.AUTO-MCNC: 99 MG/DL (ref 65–100)
GLUCOSE SERPL-MCNC: 100 MG/DL (ref 65–100)
GLUCOSE SERPL-MCNC: 109 MG/DL (ref 65–100)
GLUCOSE SERPL-MCNC: 111 MG/DL (ref 65–100)
GLUCOSE SERPL-MCNC: 117 MG/DL (ref 65–100)
GLUCOSE SERPL-MCNC: 127 MG/DL (ref 65–100)
GLUCOSE SERPL-MCNC: 128 MG/DL (ref 65–100)
GLUCOSE SERPL-MCNC: 139 MG/DL (ref 65–100)
GLUCOSE SERPL-MCNC: 144 MG/DL (ref 65–100)
GLUCOSE SERPL-MCNC: 152 MG/DL (ref 65–100)
GLUCOSE SERPL-MCNC: 173 MG/DL (ref 65–100)
GLUCOSE SERPL-MCNC: 220 MG/DL (ref 65–100)
GLUCOSE SERPL-MCNC: 228 MG/DL (ref 65–100)
GLUCOSE SERPL-MCNC: 238 MG/DL (ref 65–100)
GLUCOSE SERPL-MCNC: 246 MG/DL (ref 65–100)
GLUCOSE SERPL-MCNC: 83 MG/DL (ref 65–100)
GLUCOSE SERPL-MCNC: 85 MG/DL (ref 65–100)
GLUCOSE SERPL-MCNC: 93 MG/DL (ref 65–100)
GLUCOSE SERPL-MCNC: 95 MG/DL (ref 65–100)
GLUCOSE SERPL-MCNC: 96 MG/DL (ref 65–100)
GLUCOSE SERPL-MCNC: 97 MG/DL (ref 65–100)
GLUCOSE SERPL-MCNC: 99 MG/DL (ref 65–100)
GLUCOSE UR STRIP.AUTO-MCNC: NEGATIVE MG/DL
HBA1C MFR BLD: 5.4 % (ref 4.2–6.3)
HBA1C MFR BLD: 6.3 % (ref 4.2–6.3)
HCT VFR BLD AUTO: 31.6 % (ref 36.6–50.3)
HCT VFR BLD AUTO: 37.9 % (ref 36.6–50.3)
HCT VFR BLD AUTO: 38.3 % (ref 36.6–50.3)
HCT VFR BLD AUTO: 38.9 % (ref 36.6–50.3)
HCT VFR BLD AUTO: 40.3 % (ref 36.6–50.3)
HCT VFR BLD AUTO: 40.4 % (ref 36.6–50.3)
HCT VFR BLD AUTO: 41.3 % (ref 36.6–50.3)
HCT VFR BLD AUTO: 41.6 % (ref 36.6–50.3)
HCT VFR BLD AUTO: 41.6 % (ref 36.6–50.3)
HCT VFR BLD AUTO: 41.8 % (ref 36.6–50.3)
HCT VFR BLD AUTO: 42.4 % (ref 36.6–50.3)
HCT VFR BLD AUTO: 43.4 % (ref 36.6–50.3)
HCT VFR BLD AUTO: 44.2 % (ref 36.6–50.3)
HDLC SERPL-MCNC: 53 MG/DL
HDLC SERPL-MCNC: 63 MG/DL
HDLC SERPL: 2 {RATIO} (ref 0–5)
HDLC SERPL: 2.4 {RATIO} (ref 0–5)
HGB BLD-MCNC: 10.2 G/DL (ref 12.1–17)
HGB BLD-MCNC: 12.5 G/DL (ref 12.1–17)
HGB BLD-MCNC: 12.7 G/DL (ref 12.1–17)
HGB BLD-MCNC: 12.9 G/DL (ref 12.1–17)
HGB BLD-MCNC: 13 G/DL (ref 12.1–17)
HGB BLD-MCNC: 13.5 G/DL (ref 12.1–17)
HGB BLD-MCNC: 13.8 G/DL (ref 12.1–17)
HGB BLD-MCNC: 13.8 G/DL (ref 12.1–17)
HGB BLD-MCNC: 13.9 G/DL (ref 12.1–17)
HGB BLD-MCNC: 13.9 G/DL (ref 12.1–17)
HGB BLD-MCNC: 14 G/DL (ref 12.1–17)
HGB BLD-MCNC: 14.1 G/DL (ref 12.1–17)
HGB BLD-MCNC: 14.3 G/DL (ref 12.1–17)
HGB UR QL STRIP: ABNORMAL
HGB UR QL STRIP: NEGATIVE
HYALINE CASTS URNS QL MICRO: ABNORMAL /LPF (ref 0–5)
HYALINE CASTS URNS QL MICRO: ABNORMAL /LPF (ref 0–5)
IMM GRANULOCYTES # BLD: 0 K/UL (ref 0–0.04)
IMM GRANULOCYTES NFR BLD AUTO: 0 % (ref 0–0.5)
INR PPP: 1.1 (ref 0.9–1.1)
KETONES UR QL STRIP.AUTO: 15 MG/DL
KETONES UR QL STRIP.AUTO: 15 MG/DL
KETONES UR QL STRIP.AUTO: ABNORMAL MG/DL
KETONES UR QL STRIP.AUTO: NEGATIVE MG/DL
KETONES UR QL STRIP.AUTO: NEGATIVE MG/DL
LACTATE BLD-SCNC: 0.96 MMOL/L (ref 0.4–2)
LACTATE SERPL-SCNC: 1 MMOL/L (ref 0.4–2)
LACTATE SERPL-SCNC: 1.2 MMOL/L (ref 0.4–2)
LDLC SERPL CALC-MCNC: 42.2 MG/DL (ref 0–100)
LDLC SERPL CALC-MCNC: 75.8 MG/DL (ref 0–100)
LEUKOCYTE ESTERASE UR QL STRIP.AUTO: ABNORMAL
LEUKOCYTE ESTERASE UR QL STRIP.AUTO: ABNORMAL
LEUKOCYTE ESTERASE UR QL STRIP.AUTO: NEGATIVE
LIPASE SERPL-CCNC: 197 U/L (ref 73–393)
LIPID PROFILE,FLP: NORMAL
LIPID PROFILE,FLP: NORMAL
LYMPHOCYTES # BLD: 1 K/UL (ref 0.8–3.5)
LYMPHOCYTES # BLD: 1.1 K/UL (ref 0.8–3.5)
LYMPHOCYTES # BLD: 1.4 K/UL (ref 0.8–3.5)
LYMPHOCYTES # BLD: 1.5 K/UL (ref 0.8–3.5)
LYMPHOCYTES # BLD: 1.7 K/UL (ref 0.8–3.5)
LYMPHOCYTES # BLD: 1.9 K/UL (ref 0.8–3.5)
LYMPHOCYTES # BLD: 2 K/UL (ref 0.8–3.5)
LYMPHOCYTES # BLD: 2.4 K/UL (ref 0.8–3.5)
LYMPHOCYTES NFR BLD: 10 % (ref 12–49)
LYMPHOCYTES NFR BLD: 20 % (ref 12–49)
LYMPHOCYTES NFR BLD: 23 % (ref 12–49)
LYMPHOCYTES NFR BLD: 24 % (ref 12–49)
LYMPHOCYTES NFR BLD: 32 % (ref 12–49)
LYMPHOCYTES NFR BLD: 33 % (ref 12–49)
LYMPHOCYTES NFR BLD: 33 % (ref 12–49)
LYMPHOCYTES NFR BLD: 9 % (ref 12–49)
MAGNESIUM SERPL-MCNC: 1.6 MG/DL (ref 1.6–2.4)
MAGNESIUM SERPL-MCNC: 1.6 MG/DL (ref 1.6–2.4)
MAGNESIUM SERPL-MCNC: 1.7 MG/DL (ref 1.6–2.4)
MAGNESIUM SERPL-MCNC: 1.8 MG/DL (ref 1.6–2.4)
MAGNESIUM SERPL-MCNC: 1.9 MG/DL (ref 1.6–2.4)
MAGNESIUM SERPL-MCNC: 1.9 MG/DL (ref 1.6–2.4)
MCH RBC QN AUTO: 30.7 PG (ref 26–34)
MCH RBC QN AUTO: 31.1 PG (ref 26–34)
MCH RBC QN AUTO: 31.3 PG (ref 26–34)
MCH RBC QN AUTO: 31.4 PG (ref 26–34)
MCH RBC QN AUTO: 31.5 PG (ref 26–34)
MCH RBC QN AUTO: 31.7 PG (ref 26–34)
MCH RBC QN AUTO: 31.8 PG (ref 26–34)
MCHC RBC AUTO-ENTMCNC: 31.9 G/DL (ref 30–36.5)
MCHC RBC AUTO-ENTMCNC: 32.3 G/DL (ref 30–36.5)
MCHC RBC AUTO-ENTMCNC: 32.3 G/DL (ref 30–36.5)
MCHC RBC AUTO-ENTMCNC: 32.5 G/DL (ref 30–36.5)
MCHC RBC AUTO-ENTMCNC: 32.9 G/DL (ref 30–36.5)
MCHC RBC AUTO-ENTMCNC: 33 G/DL (ref 30–36.5)
MCHC RBC AUTO-ENTMCNC: 33.2 G/DL (ref 30–36.5)
MCHC RBC AUTO-ENTMCNC: 33.3 G/DL (ref 30–36.5)
MCHC RBC AUTO-ENTMCNC: 33.4 G/DL (ref 30–36.5)
MCHC RBC AUTO-ENTMCNC: 33.7 G/DL (ref 30–36.5)
MCHC RBC AUTO-ENTMCNC: 33.7 G/DL (ref 30–36.5)
MCV RBC AUTO: 92.6 FL (ref 80–99)
MCV RBC AUTO: 92.9 FL (ref 80–99)
MCV RBC AUTO: 93.5 FL (ref 80–99)
MCV RBC AUTO: 93.7 FL (ref 80–99)
MCV RBC AUTO: 94.3 FL (ref 80–99)
MCV RBC AUTO: 94.4 FL (ref 80–99)
MCV RBC AUTO: 94.8 FL (ref 80–99)
MCV RBC AUTO: 95 FL (ref 80–99)
MCV RBC AUTO: 95.1 FL (ref 80–99)
MCV RBC AUTO: 95.2 FL (ref 80–99)
MCV RBC AUTO: 96.2 FL (ref 80–99)
MCV RBC AUTO: 96.3 FL (ref 80–99)
MCV RBC AUTO: 96.9 FL (ref 80–99)
MONOCYTES # BLD: 0.3 K/UL (ref 0–1)
MONOCYTES # BLD: 0.4 K/UL (ref 0–1)
MONOCYTES # BLD: 0.5 K/UL (ref 0–1)
MONOCYTES NFR BLD: 5 % (ref 5–13)
MONOCYTES NFR BLD: 6 % (ref 5–13)
MONOCYTES NFR BLD: 7 % (ref 5–13)
MONOCYTES NFR BLD: 7 % (ref 5–13)
MUCOUS THREADS URNS QL MICRO: ABNORMAL /LPF
NEUTS SEG # BLD: 10 K/UL (ref 1.8–8)
NEUTS SEG # BLD: 3 K/UL (ref 1.8–8)
NEUTS SEG # BLD: 3.8 K/UL (ref 1.8–8)
NEUTS SEG # BLD: 4 K/UL (ref 1.8–8)
NEUTS SEG # BLD: 4.3 K/UL (ref 1.8–8)
NEUTS SEG # BLD: 4.5 K/UL (ref 1.8–8)
NEUTS SEG # BLD: 7.1 K/UL (ref 1.8–8)
NEUTS SEG # BLD: 8.8 K/UL (ref 1.8–8)
NEUTS SEG NFR BLD: 57 % (ref 32–75)
NEUTS SEG NFR BLD: 59 % (ref 32–75)
NEUTS SEG NFR BLD: 59 % (ref 32–75)
NEUTS SEG NFR BLD: 68 % (ref 32–75)
NEUTS SEG NFR BLD: 71 % (ref 32–75)
NEUTS SEG NFR BLD: 74 % (ref 32–75)
NEUTS SEG NFR BLD: 84 % (ref 32–75)
NEUTS SEG NFR BLD: 87 % (ref 32–75)
NITRITE UR QL STRIP.AUTO: NEGATIVE
NRBC # BLD: 0 K/UL (ref 0–0.01)
NRBC BLD-RTO: 0 PER 100 WBC
OTHER,OTHU: ABNORMAL
P-R INTERVAL, ECG05: 162 MS
P-R INTERVAL, ECG05: 164 MS
P-R INTERVAL, ECG05: 164 MS
P-R INTERVAL, ECG05: 168 MS
P-R INTERVAL, ECG05: 170 MS
P-R INTERVAL, ECG05: 174 MS
PH UR STRIP: 6 [PH] (ref 5–8)
PH UR STRIP: 7 [PH] (ref 5–8)
PH UR STRIP: 8 [PH] (ref 5–8)
PHOSPHATE SERPL-MCNC: 2.4 MG/DL (ref 2.6–4.7)
PHOSPHATE SERPL-MCNC: 2.4 MG/DL (ref 2.6–4.7)
PHOSPHATE SERPL-MCNC: 2.5 MG/DL (ref 2.6–4.7)
PHOSPHATE SERPL-MCNC: 2.7 MG/DL (ref 2.6–4.7)
PHOSPHATE SERPL-MCNC: 2.7 MG/DL (ref 2.6–4.7)
PHOSPHATE SERPL-MCNC: 2.8 MG/DL (ref 2.6–4.7)
PHOSPHATE SERPL-MCNC: 2.9 MG/DL (ref 2.6–4.7)
PHOSPHATE SERPL-MCNC: 3.1 MG/DL (ref 2.6–4.7)
PHOSPHATE SERPL-MCNC: 3.1 MG/DL (ref 2.6–4.7)
PHOSPHATE SERPL-MCNC: 3.2 MG/DL (ref 2.6–4.7)
PHOSPHATE SERPL-MCNC: 3.4 MG/DL (ref 2.6–4.7)
PLATELET # BLD AUTO: 163 K/UL (ref 150–400)
PLATELET # BLD AUTO: 172 K/UL (ref 150–400)
PLATELET # BLD AUTO: 174 K/UL (ref 150–400)
PLATELET # BLD AUTO: 178 K/UL (ref 150–400)
PLATELET # BLD AUTO: 189 K/UL (ref 150–400)
PLATELET # BLD AUTO: 193 K/UL (ref 150–400)
PLATELET # BLD AUTO: 198 K/UL (ref 150–400)
PLATELET # BLD AUTO: 199 K/UL (ref 150–400)
PLATELET # BLD AUTO: 204 K/UL (ref 150–400)
PLATELET # BLD AUTO: 205 K/UL (ref 150–400)
PLATELET # BLD AUTO: 212 K/UL (ref 150–400)
PLATELET # BLD AUTO: 237 K/UL (ref 150–400)
PLATELET # BLD AUTO: 243 K/UL (ref 150–400)
PMV BLD AUTO: 10 FL (ref 8.9–12.9)
PMV BLD AUTO: 10 FL (ref 8.9–12.9)
PMV BLD AUTO: 10.1 FL (ref 8.9–12.9)
PMV BLD AUTO: 10.1 FL (ref 8.9–12.9)
PMV BLD AUTO: 10.5 FL (ref 8.9–12.9)
PMV BLD AUTO: 9.1 FL (ref 8.9–12.9)
PMV BLD AUTO: 9.6 FL (ref 8.9–12.9)
PMV BLD AUTO: 9.6 FL (ref 8.9–12.9)
PMV BLD AUTO: 9.7 FL (ref 8.9–12.9)
PMV BLD AUTO: 9.7 FL (ref 8.9–12.9)
PMV BLD AUTO: 9.8 FL (ref 8.9–12.9)
PMV BLD AUTO: 9.8 FL (ref 8.9–12.9)
PMV BLD AUTO: 9.9 FL (ref 8.9–12.9)
POTASSIUM SERPL-SCNC: 3.1 MMOL/L (ref 3.5–5.1)
POTASSIUM SERPL-SCNC: 3.2 MMOL/L (ref 3.5–5.1)
POTASSIUM SERPL-SCNC: 3.2 MMOL/L (ref 3.5–5.1)
POTASSIUM SERPL-SCNC: 3.3 MMOL/L (ref 3.5–5.1)
POTASSIUM SERPL-SCNC: 3.3 MMOL/L (ref 3.5–5.1)
POTASSIUM SERPL-SCNC: 3.4 MMOL/L (ref 3.5–5.1)
POTASSIUM SERPL-SCNC: 3.4 MMOL/L (ref 3.5–5.1)
POTASSIUM SERPL-SCNC: 3.5 MMOL/L (ref 3.5–5.1)
POTASSIUM SERPL-SCNC: 3.6 MMOL/L (ref 3.5–5.1)
POTASSIUM SERPL-SCNC: 3.6 MMOL/L (ref 3.5–5.1)
POTASSIUM SERPL-SCNC: 3.7 MMOL/L (ref 3.5–5.1)
POTASSIUM SERPL-SCNC: 3.7 MMOL/L (ref 3.5–5.1)
POTASSIUM SERPL-SCNC: 3.8 MMOL/L (ref 3.5–5.1)
POTASSIUM SERPL-SCNC: 4 MMOL/L (ref 3.5–5.1)
POTASSIUM SERPL-SCNC: 4.1 MMOL/L (ref 3.5–5.1)
POTASSIUM SERPL-SCNC: 4.2 MMOL/L (ref 3.5–5.1)
POTASSIUM SERPL-SCNC: 4.5 MMOL/L (ref 3.5–5.1)
POTASSIUM SERPL-SCNC: 5 MMOL/L (ref 3.5–5.1)
PREALB SERPL-MCNC: 8 MG/DL (ref 20–40)
PROT SERPL-MCNC: 5.7 G/DL (ref 6.4–8.2)
PROT SERPL-MCNC: 5.7 G/DL (ref 6.4–8.2)
PROT SERPL-MCNC: 6.2 G/DL (ref 6.4–8.2)
PROT SERPL-MCNC: 6.3 G/DL (ref 6.4–8.2)
PROT SERPL-MCNC: 6.6 G/DL (ref 6.4–8.2)
PROT SERPL-MCNC: 6.6 G/DL (ref 6.4–8.2)
PROT SERPL-MCNC: 6.7 G/DL (ref 6.4–8.2)
PROT SERPL-MCNC: 6.9 G/DL (ref 6.4–8.2)
PROT SERPL-MCNC: 6.9 G/DL (ref 6.4–8.2)
PROT SERPL-MCNC: 7 G/DL (ref 6.4–8.2)
PROT SERPL-MCNC: 7.3 G/DL (ref 6.4–8.2)
PROT UR STRIP-MCNC: 100 MG/DL
PROT UR STRIP-MCNC: 30 MG/DL
PROT UR STRIP-MCNC: ABNORMAL MG/DL
PROT UR STRIP-MCNC: NEGATIVE MG/DL
PROT UR STRIP-MCNC: NEGATIVE MG/DL
PROTHROMBIN TIME: 11 SEC (ref 9–11.1)
Q-T INTERVAL, ECG07: 400 MS
Q-T INTERVAL, ECG07: 402 MS
Q-T INTERVAL, ECG07: 448 MS
Q-T INTERVAL, ECG07: 450 MS
Q-T INTERVAL, ECG07: 468 MS
Q-T INTERVAL, ECG07: 474 MS
QRS DURATION, ECG06: 76 MS
QRS DURATION, ECG06: 80 MS
QRS DURATION, ECG06: 82 MS
QRS DURATION, ECG06: 82 MS
QRS DURATION, ECG06: 84 MS
QRS DURATION, ECG06: 84 MS
QTC CALCULATION (BEZET), ECG08: 434 MS
QTC CALCULATION (BEZET), ECG08: 453 MS
QTC CALCULATION (BEZET), ECG08: 461 MS
QTC CALCULATION (BEZET), ECG08: 472 MS
QTC CALCULATION (BEZET), ECG08: 473 MS
QTC CALCULATION (BEZET), ECG08: 475 MS
RBC # BLD AUTO: 3.32 M/UL (ref 4.1–5.7)
RBC # BLD AUTO: 3.94 M/UL (ref 4.1–5.7)
RBC # BLD AUTO: 4.06 M/UL (ref 4.1–5.7)
RBC # BLD AUTO: 4.16 M/UL (ref 4.1–5.7)
RBC # BLD AUTO: 4.2 M/UL (ref 4.1–5.7)
RBC # BLD AUTO: 4.25 M/UL (ref 4.1–5.7)
RBC # BLD AUTO: 4.39 M/UL (ref 4.1–5.7)
RBC # BLD AUTO: 4.41 M/UL (ref 4.1–5.7)
RBC # BLD AUTO: 4.47 M/UL (ref 4.1–5.7)
RBC # BLD AUTO: 4.48 M/UL (ref 4.1–5.7)
RBC # BLD AUTO: 4.49 M/UL (ref 4.1–5.7)
RBC # BLD AUTO: 4.57 M/UL (ref 4.1–5.7)
RBC # BLD AUTO: 4.59 M/UL (ref 4.1–5.7)
RBC #/AREA URNS HPF: ABNORMAL /HPF (ref 0–5)
RBC MORPH BLD: ABNORMAL
SAMPLES BEING HELD,HOLD: NORMAL
SERVICE CMNT-IMP: ABNORMAL
SERVICE CMNT-IMP: NORMAL
SODIUM SERPL-SCNC: 135 MMOL/L (ref 136–145)
SODIUM SERPL-SCNC: 135 MMOL/L (ref 136–145)
SODIUM SERPL-SCNC: 137 MMOL/L (ref 136–145)
SODIUM SERPL-SCNC: 137 MMOL/L (ref 136–145)
SODIUM SERPL-SCNC: 138 MMOL/L (ref 136–145)
SODIUM SERPL-SCNC: 139 MMOL/L (ref 136–145)
SODIUM SERPL-SCNC: 140 MMOL/L (ref 136–145)
SODIUM SERPL-SCNC: 141 MMOL/L (ref 136–145)
SODIUM SERPL-SCNC: 142 MMOL/L (ref 136–145)
SODIUM SERPL-SCNC: 142 MMOL/L (ref 136–145)
SODIUM SERPL-SCNC: 143 MMOL/L (ref 136–145)
SODIUM SERPL-SCNC: 144 MMOL/L (ref 136–145)
SODIUM SERPL-SCNC: 145 MMOL/L (ref 136–145)
SP GR UR REFRACTOMETRY: 1.02 (ref 1–1.03)
SP GR UR REFRACTOMETRY: 1.03 (ref 1–1.03)
TRIGL SERPL-MCNC: 49 MG/DL (ref ?–150)
TRIGL SERPL-MCNC: 69 MG/DL (ref ?–150)
TRIGL SERPL-MCNC: 71 MG/DL (ref ?–150)
TROPONIN I SERPL-MCNC: 0.04 NG/ML
TROPONIN I SERPL-MCNC: 0.05 NG/ML
TROPONIN I SERPL-MCNC: 0.06 NG/ML
TROPONIN I SERPL-MCNC: 0.06 NG/ML
TROPONIN I SERPL-MCNC: <0.04 NG/ML
TROPONIN I SERPL-MCNC: <0.05 NG/ML
TSH SERPL DL<=0.05 MIU/L-ACNC: 1.42 UIU/ML (ref 0.36–3.74)
UA: UC IF INDICATED,UAUC: ABNORMAL
UR CULT HOLD, URHOLD: NORMAL
UROBILINOGEN UR QL STRIP.AUTO: 1 EU/DL (ref 0.2–1)
VENTRICULAR RATE, ECG03: 57 BPM
VENTRICULAR RATE, ECG03: 61 BPM
VENTRICULAR RATE, ECG03: 62 BPM
VENTRICULAR RATE, ECG03: 67 BPM
VENTRICULAR RATE, ECG03: 71 BPM
VENTRICULAR RATE, ECG03: 83 BPM
VIT B12 SERPL-MCNC: 205 PG/ML (ref 193–986)
VLDLC SERPL CALC-MCNC: 14.2 MG/DL
VLDLC SERPL CALC-MCNC: 9.8 MG/DL
WBC # BLD AUTO: 10.5 K/UL (ref 4.1–11.1)
WBC # BLD AUTO: 11.5 K/UL (ref 4.1–11.1)
WBC # BLD AUTO: 12.6 K/UL (ref 4.1–11.1)
WBC # BLD AUTO: 5.1 K/UL (ref 4.1–11.1)
WBC # BLD AUTO: 5.8 K/UL (ref 4.1–11.1)
WBC # BLD AUTO: 6.4 K/UL (ref 4.1–11.1)
WBC # BLD AUTO: 6.4 K/UL (ref 4.1–11.1)
WBC # BLD AUTO: 6.7 K/UL (ref 4.1–11.1)
WBC # BLD AUTO: 7.4 K/UL (ref 4.1–11.1)
WBC # BLD AUTO: 7.5 K/UL (ref 4.1–11.1)
WBC # BLD AUTO: 7.9 K/UL (ref 4.1–11.1)
WBC # BLD AUTO: 9.4 K/UL (ref 4.1–11.1)
WBC # BLD AUTO: 9.6 K/UL (ref 4.1–11.1)
WBC URNS QL MICRO: >100 /HPF (ref 0–4)
WBC URNS QL MICRO: ABNORMAL /HPF (ref 0–4)

## 2018-01-01 PROCEDURE — 81001 URINALYSIS AUTO W/SCOPE: CPT | Performed by: EMERGENCY MEDICINE

## 2018-01-01 PROCEDURE — 65270000029 HC RM PRIVATE

## 2018-01-01 PROCEDURE — 74011000250 HC RX REV CODE- 250: Performed by: FAMILY MEDICINE

## 2018-01-01 PROCEDURE — 80061 LIPID PANEL: CPT

## 2018-01-01 PROCEDURE — 80048 BASIC METABOLIC PNL TOTAL CA: CPT

## 2018-01-01 PROCEDURE — 97168 OT RE-EVAL EST PLAN CARE: CPT

## 2018-01-01 PROCEDURE — 74011250637 HC RX REV CODE- 250/637: Performed by: FAMILY MEDICINE

## 2018-01-01 PROCEDURE — 74011636637 HC RX REV CODE- 636/637: Performed by: INTERNAL MEDICINE

## 2018-01-01 PROCEDURE — 74011250636 HC RX REV CODE- 250/636: Performed by: INTERNAL MEDICINE

## 2018-01-01 PROCEDURE — 65660000000 HC RM CCU STEPDOWN

## 2018-01-01 PROCEDURE — 74011250637 HC RX REV CODE- 250/637: Performed by: INTERNAL MEDICINE

## 2018-01-01 PROCEDURE — 70450 CT HEAD/BRAIN W/O DYE: CPT

## 2018-01-01 PROCEDURE — 87077 CULTURE AEROBIC IDENTIFY: CPT | Performed by: EMERGENCY MEDICINE

## 2018-01-01 PROCEDURE — 94640 AIRWAY INHALATION TREATMENT: CPT

## 2018-01-01 PROCEDURE — 77030020186 HC BOOT HL PROTCT SAGE -B

## 2018-01-01 PROCEDURE — 84100 ASSAY OF PHOSPHORUS: CPT

## 2018-01-01 PROCEDURE — 74011250637 HC RX REV CODE- 250/637: Performed by: PSYCHIATRY & NEUROLOGY

## 2018-01-01 PROCEDURE — 36415 COLL VENOUS BLD VENIPUNCTURE: CPT | Performed by: EMERGENCY MEDICINE

## 2018-01-01 PROCEDURE — 94760 N-INVAS EAR/PLS OXIMETRY 1: CPT

## 2018-01-01 PROCEDURE — 0656 HSPC GENERAL INPATIENT

## 2018-01-01 PROCEDURE — 74011000250 HC RX REV CODE- 250: Performed by: INTERNAL MEDICINE

## 2018-01-01 PROCEDURE — 77010033678 HC OXYGEN DAILY

## 2018-01-01 PROCEDURE — 80053 COMPREHEN METABOLIC PANEL: CPT | Performed by: EMERGENCY MEDICINE

## 2018-01-01 PROCEDURE — 99232 SBSQ HOSP IP/OBS MODERATE 35: CPT | Performed by: FAMILY MEDICINE

## 2018-01-01 PROCEDURE — 77030011943

## 2018-01-01 PROCEDURE — 0651 HSPC ROUTINE HOME CARE

## 2018-01-01 PROCEDURE — 83735 ASSAY OF MAGNESIUM: CPT | Performed by: INTERNAL MEDICINE

## 2018-01-01 PROCEDURE — 74011000258 HC RX REV CODE- 258: Performed by: INTERNAL MEDICINE

## 2018-01-01 PROCEDURE — 71045 X-RAY EXAM CHEST 1 VIEW: CPT

## 2018-01-01 PROCEDURE — 97164 PT RE-EVAL EST PLAN CARE: CPT

## 2018-01-01 PROCEDURE — 82962 GLUCOSE BLOOD TEST: CPT

## 2018-01-01 PROCEDURE — 83735 ASSAY OF MAGNESIUM: CPT

## 2018-01-01 PROCEDURE — 85027 COMPLETE CBC AUTOMATED: CPT | Performed by: INTERNAL MEDICINE

## 2018-01-01 PROCEDURE — 77030011256 HC DRSG MEPILEX <16IN NO BORD MOLN -A

## 2018-01-01 PROCEDURE — 85027 COMPLETE CBC AUTOMATED: CPT

## 2018-01-01 PROCEDURE — 80053 COMPREHEN METABOLIC PANEL: CPT

## 2018-01-01 PROCEDURE — 36415 COLL VENOUS BLD VENIPUNCTURE: CPT

## 2018-01-01 PROCEDURE — 97530 THERAPEUTIC ACTIVITIES: CPT

## 2018-01-01 PROCEDURE — 99285 EMERGENCY DEPT VISIT HI MDM: CPT

## 2018-01-01 PROCEDURE — 74011636320 HC RX REV CODE- 636/320: Performed by: RADIOLOGY

## 2018-01-01 PROCEDURE — 99233 SBSQ HOSP IP/OBS HIGH 50: CPT | Performed by: FAMILY MEDICINE

## 2018-01-01 PROCEDURE — C1758 CATHETER, URETERAL: HCPCS

## 2018-01-01 PROCEDURE — 97167 OT EVAL HIGH COMPLEX 60 MIN: CPT

## 2018-01-01 PROCEDURE — 74011000250 HC RX REV CODE- 250: Performed by: PHYSICIAN ASSISTANT

## 2018-01-01 PROCEDURE — 82140 ASSAY OF AMMONIA: CPT

## 2018-01-01 PROCEDURE — 99231 SBSQ HOSP IP/OBS SF/LOW 25: CPT | Performed by: FAMILY MEDICINE

## 2018-01-01 PROCEDURE — 76450000000

## 2018-01-01 PROCEDURE — 83605 ASSAY OF LACTIC ACID: CPT | Performed by: EMERGENCY MEDICINE

## 2018-01-01 PROCEDURE — 80061 LIPID PANEL: CPT | Performed by: INTERNAL MEDICINE

## 2018-01-01 PROCEDURE — 93005 ELECTROCARDIOGRAM TRACING: CPT

## 2018-01-01 PROCEDURE — 74011250637 HC RX REV CODE- 250/637: Performed by: EMERGENCY MEDICINE

## 2018-01-01 PROCEDURE — 85025 COMPLETE CBC W/AUTO DIFF WBC: CPT | Performed by: EMERGENCY MEDICINE

## 2018-01-01 PROCEDURE — 96375 TX/PRO/DX INJ NEW DRUG ADDON: CPT

## 2018-01-01 PROCEDURE — 83605 ASSAY OF LACTIC ACID: CPT

## 2018-01-01 PROCEDURE — 70551 MRI BRAIN STEM W/O DYE: CPT

## 2018-01-01 PROCEDURE — 74011250636 HC RX REV CODE- 250/636: Performed by: EMERGENCY MEDICINE

## 2018-01-01 PROCEDURE — 74011250636 HC RX REV CODE- 250/636: Performed by: FAMILY MEDICINE

## 2018-01-01 PROCEDURE — 83036 HEMOGLOBIN GLYCOSYLATED A1C: CPT | Performed by: INTERNAL MEDICINE

## 2018-01-01 PROCEDURE — 86038 ANTINUCLEAR ANTIBODIES: CPT

## 2018-01-01 PROCEDURE — 80076 HEPATIC FUNCTION PANEL: CPT

## 2018-01-01 PROCEDURE — 84484 ASSAY OF TROPONIN QUANT: CPT

## 2018-01-01 PROCEDURE — 70496 CT ANGIOGRAPHY HEAD: CPT

## 2018-01-01 PROCEDURE — 92610 EVALUATE SWALLOWING FUNCTION: CPT

## 2018-01-01 PROCEDURE — 82140 ASSAY OF AMMONIA: CPT | Performed by: EMERGENCY MEDICINE

## 2018-01-01 PROCEDURE — 82140 ASSAY OF AMMONIA: CPT | Performed by: INTERNAL MEDICINE

## 2018-01-01 PROCEDURE — C1751 CATH, INF, PER/CENT/MIDLINE: HCPCS

## 2018-01-01 PROCEDURE — 96360 HYDRATION IV INFUSION INIT: CPT

## 2018-01-01 PROCEDURE — 87086 URINE CULTURE/COLONY COUNT: CPT | Performed by: EMERGENCY MEDICINE

## 2018-01-01 PROCEDURE — 70498 CT ANGIOGRAPHY NECK: CPT

## 2018-01-01 PROCEDURE — 92611 MOTION FLUOROSCOPY/SWALLOW: CPT

## 2018-01-01 PROCEDURE — 87186 SC STD MICRODIL/AGAR DIL: CPT | Performed by: EMERGENCY MEDICINE

## 2018-01-01 PROCEDURE — 70544 MR ANGIOGRAPHY HEAD W/O DYE: CPT

## 2018-01-01 PROCEDURE — 77030010547 HC BG URIN W/UMETER -A

## 2018-01-01 PROCEDURE — 02HV33Z INSERTION OF INFUSION DEVICE INTO SUPERIOR VENA CAVA, PERCUTANEOUS APPROACH: ICD-10-PCS | Performed by: INTERNAL MEDICINE

## 2018-01-01 PROCEDURE — 96365 THER/PROPH/DIAG IV INF INIT: CPT

## 2018-01-01 PROCEDURE — 84484 ASSAY OF TROPONIN QUANT: CPT | Performed by: EMERGENCY MEDICINE

## 2018-01-01 PROCEDURE — 77030013140 HC MSK NEB VYRM -A

## 2018-01-01 PROCEDURE — 92526 ORAL FUNCTION THERAPY: CPT

## 2018-01-01 PROCEDURE — 71250 CT THORAX DX C-: CPT

## 2018-01-01 PROCEDURE — 36415 COLL VENOUS BLD VENIPUNCTURE: CPT | Performed by: INTERNAL MEDICINE

## 2018-01-01 PROCEDURE — 87040 BLOOD CULTURE FOR BACTERIA: CPT

## 2018-01-01 PROCEDURE — 80048 BASIC METABOLIC PNL TOTAL CA: CPT | Performed by: INTERNAL MEDICINE

## 2018-01-01 PROCEDURE — 76937 US GUIDE VASCULAR ACCESS: CPT

## 2018-01-01 PROCEDURE — 3336500001 HSPC ELECTION

## 2018-01-01 PROCEDURE — 84443 ASSAY THYROID STIM HORMONE: CPT

## 2018-01-01 PROCEDURE — 85025 COMPLETE CBC W/AUTO DIFF WBC: CPT

## 2018-01-01 PROCEDURE — 99233 SBSQ HOSP IP/OBS HIGH 50: CPT | Performed by: INTERNAL MEDICINE

## 2018-01-01 PROCEDURE — 95816 EEG AWAKE AND DROWSY: CPT | Performed by: NURSE PRACTITIONER

## 2018-01-01 PROCEDURE — 74011250636 HC RX REV CODE- 250/636: Performed by: NURSE PRACTITIONER

## 2018-01-01 PROCEDURE — 36600 WITHDRAWAL OF ARTERIAL BLOOD: CPT

## 2018-01-01 PROCEDURE — G0299 HHS/HOSPICE OF RN EA 15 MIN: HCPCS

## 2018-01-01 PROCEDURE — 77030012856

## 2018-01-01 PROCEDURE — 85610 PROTHROMBIN TIME: CPT | Performed by: EMERGENCY MEDICINE

## 2018-01-01 PROCEDURE — 93880 EXTRACRANIAL BILAT STUDY: CPT

## 2018-01-01 PROCEDURE — 84100 ASSAY OF PHOSPHORUS: CPT | Performed by: INTERNAL MEDICINE

## 2018-01-01 PROCEDURE — 83690 ASSAY OF LIPASE: CPT | Performed by: EMERGENCY MEDICINE

## 2018-01-01 PROCEDURE — 74230 X-RAY XM SWLNG FUNCJ C+: CPT

## 2018-01-01 PROCEDURE — 74011250636 HC RX REV CODE- 250/636: Performed by: PHYSICIAN ASSISTANT

## 2018-01-01 PROCEDURE — 84134 ASSAY OF PREALBUMIN: CPT

## 2018-01-01 PROCEDURE — 93306 TTE W/DOPPLER COMPLETE: CPT

## 2018-01-01 PROCEDURE — 97161 PT EVAL LOW COMPLEX 20 MIN: CPT

## 2018-01-01 PROCEDURE — 87086 URINE CULTURE/COLONY COUNT: CPT

## 2018-01-01 PROCEDURE — 85652 RBC SED RATE AUTOMATED: CPT

## 2018-01-01 PROCEDURE — 97162 PT EVAL MOD COMPLEX 30 MIN: CPT

## 2018-01-01 PROCEDURE — 97165 OT EVAL LOW COMPLEX 30 MIN: CPT

## 2018-01-01 PROCEDURE — 86141 C-REACTIVE PROTEIN HS: CPT

## 2018-01-01 PROCEDURE — 95816 EEG AWAKE AND DROWSY: CPT | Performed by: INTERNAL MEDICINE

## 2018-01-01 PROCEDURE — 81001 URINALYSIS AUTO W/SCOPE: CPT

## 2018-01-01 PROCEDURE — 94762 N-INVAS EAR/PLS OXIMTRY CONT: CPT

## 2018-01-01 PROCEDURE — 97535 SELF CARE MNGMENT TRAINING: CPT

## 2018-01-01 PROCEDURE — 84478 ASSAY OF TRIGLYCERIDES: CPT

## 2018-01-01 PROCEDURE — 99284 EMERGENCY DEPT VISIT MOD MDM: CPT

## 2018-01-01 PROCEDURE — 83036 HEMOGLOBIN GLYCOSYLATED A1C: CPT

## 2018-01-01 PROCEDURE — 77030010545

## 2018-01-01 PROCEDURE — 77030018786 HC NDL GD F/USND BARD -B

## 2018-01-01 PROCEDURE — 36569 INSJ PICC 5 YR+ W/O IMAGING: CPT | Performed by: INTERNAL MEDICINE

## 2018-01-01 PROCEDURE — 71046 X-RAY EXAM CHEST 2 VIEWS: CPT

## 2018-01-01 PROCEDURE — 82607 VITAMIN B-12: CPT

## 2018-01-01 PROCEDURE — 96361 HYDRATE IV INFUSION ADD-ON: CPT

## 2018-01-01 PROCEDURE — 77030018719 HC DRSG PTCH ANTIMIC J&J -A

## 2018-01-01 RX ORDER — FACIAL-BODY WIPES
10 EACH TOPICAL DAILY PRN
Status: DISCONTINUED | OUTPATIENT
Start: 2018-01-01 | End: 2018-12-18 | Stop reason: HOSPADM

## 2018-01-01 RX ORDER — SODIUM CHLORIDE 0.9 % (FLUSH) 0.9 %
10 SYRINGE (ML) INJECTION AS NEEDED
Status: DISCONTINUED | OUTPATIENT
Start: 2018-01-01 | End: 2018-01-01

## 2018-01-01 RX ORDER — LISINOPRIL 20 MG/1
10 TABLET ORAL DAILY
COMMUNITY

## 2018-01-01 RX ORDER — CEPHALEXIN 500 MG/1
500 CAPSULE ORAL 3 TIMES DAILY
Qty: 21 CAP | Refills: 0 | Status: SHIPPED | OUTPATIENT
Start: 2018-01-01 | End: 2018-01-01

## 2018-01-01 RX ORDER — PRAVASTATIN SODIUM 20 MG/1
20 TABLET ORAL
COMMUNITY
End: 2018-01-01

## 2018-01-01 RX ORDER — ASPIRIN 81 MG/1
81 TABLET ORAL DAILY
COMMUNITY

## 2018-01-01 RX ORDER — SODIUM CHLORIDE 9 MG/ML
75 INJECTION, SOLUTION INTRAVENOUS CONTINUOUS
Status: DISCONTINUED | OUTPATIENT
Start: 2018-01-01 | End: 2018-01-01 | Stop reason: HOSPADM

## 2018-01-01 RX ORDER — DEXTROSE MONOHYDRATE AND SODIUM CHLORIDE 5; .9 G/100ML; G/100ML
75 INJECTION, SOLUTION INTRAVENOUS CONTINUOUS
Status: DISCONTINUED | OUTPATIENT
Start: 2018-01-01 | End: 2018-01-01

## 2018-01-01 RX ORDER — KETOROLAC TROMETHAMINE 30 MG/ML
30 INJECTION, SOLUTION INTRAMUSCULAR; INTRAVENOUS
Status: DISCONTINUED | OUTPATIENT
Start: 2018-01-01 | End: 2018-01-01

## 2018-01-01 RX ORDER — DEXTROSE MONOHYDRATE AND SODIUM CHLORIDE 5; .45 G/100ML; G/100ML
50 INJECTION, SOLUTION INTRAVENOUS CONTINUOUS
Status: DISCONTINUED | OUTPATIENT
Start: 2018-01-01 | End: 2018-01-01

## 2018-01-01 RX ORDER — DONEPEZIL HYDROCHLORIDE 5 MG/1
10 TABLET, FILM COATED ORAL
Status: DISCONTINUED | OUTPATIENT
Start: 2018-01-01 | End: 2018-01-01

## 2018-01-01 RX ORDER — ACETAMINOPHEN 325 MG/1
650 TABLET ORAL
Status: DISCONTINUED | OUTPATIENT
Start: 2018-01-01 | End: 2018-01-01 | Stop reason: HOSPADM

## 2018-01-01 RX ORDER — CLONIDINE 0.1 MG/24H
1 PATCH, EXTENDED RELEASE TRANSDERMAL
Status: DISCONTINUED | OUTPATIENT
Start: 2018-01-01 | End: 2018-01-01 | Stop reason: HOSPADM

## 2018-01-01 RX ORDER — AMLODIPINE BESYLATE 5 MG/1
5 TABLET ORAL ONCE
Status: DISCONTINUED | OUTPATIENT
Start: 2018-01-01 | End: 2018-01-01

## 2018-01-01 RX ORDER — SODIUM CHLORIDE 0.9 % (FLUSH) 0.9 %
10-40 SYRINGE (ML) INJECTION EVERY 8 HOURS
Status: DISCONTINUED | OUTPATIENT
Start: 2018-01-01 | End: 2018-01-01

## 2018-01-01 RX ORDER — HYDRALAZINE HYDROCHLORIDE 20 MG/ML
20 INJECTION INTRAMUSCULAR; INTRAVENOUS
Status: DISCONTINUED | OUTPATIENT
Start: 2018-01-01 | End: 2018-01-01

## 2018-01-01 RX ORDER — SCOLOPAMINE TRANSDERMAL SYSTEM 1 MG/1
1 PATCH, EXTENDED RELEASE TRANSDERMAL
Status: DISCONTINUED | OUTPATIENT
Start: 2018-01-01 | End: 2018-01-01 | Stop reason: SDUPTHER

## 2018-01-01 RX ORDER — SODIUM CHLORIDE 9 MG/ML
500 INJECTION, SOLUTION INTRAVENOUS CONTINUOUS
Status: DISPENSED | OUTPATIENT
Start: 2018-01-01 | End: 2018-01-01

## 2018-01-01 RX ORDER — MORPHINE SULFATE 4 MG/ML
1 INJECTION INTRAVENOUS EVERY 8 HOURS
Status: DISCONTINUED | OUTPATIENT
Start: 2018-01-01 | End: 2018-01-01

## 2018-01-01 RX ORDER — ASPIRIN 81 MG/1
81 TABLET ORAL DAILY
Status: DISCONTINUED | OUTPATIENT
Start: 2018-01-01 | End: 2018-01-01

## 2018-01-01 RX ORDER — ACETAMINOPHEN 650 MG/1
650 SUPPOSITORY RECTAL
Status: DISCONTINUED | OUTPATIENT
Start: 2018-01-01 | End: 2018-01-01 | Stop reason: HOSPADM

## 2018-01-01 RX ORDER — SCOLOPAMINE TRANSDERMAL SYSTEM 1 MG/1
1 PATCH, EXTENDED RELEASE TRANSDERMAL
Status: DISCONTINUED | OUTPATIENT
Start: 2018-01-01 | End: 2018-12-18 | Stop reason: HOSPADM

## 2018-01-01 RX ORDER — POTASSIUM CHLORIDE 7.45 MG/ML
10 INJECTION INTRAVENOUS
Status: COMPLETED | OUTPATIENT
Start: 2018-01-01 | End: 2018-01-01

## 2018-01-01 RX ORDER — AMOXICILLIN AND CLAVULANATE POTASSIUM 875; 125 MG/1; MG/1
1 TABLET, FILM COATED ORAL EVERY 12 HOURS
Status: DISCONTINUED | OUTPATIENT
Start: 2018-01-01 | End: 2018-01-01

## 2018-01-01 RX ORDER — CIPROFLOXACIN 500 MG/1
250 TABLET ORAL 2 TIMES DAILY
Qty: 10 TAB | Refills: 0 | Status: SHIPPED | OUTPATIENT
Start: 2018-01-01 | End: 2018-01-01

## 2018-01-01 RX ORDER — MAGNESIUM SULFATE 100 %
4 CRYSTALS MISCELLANEOUS AS NEEDED
Status: DISCONTINUED | OUTPATIENT
Start: 2018-01-01 | End: 2018-01-01 | Stop reason: HOSPADM

## 2018-01-01 RX ORDER — CEPHALEXIN 250 MG/1
250 CAPSULE ORAL 3 TIMES DAILY
Qty: 21 CAP | Refills: 0 | Status: SHIPPED | OUTPATIENT
Start: 2018-01-01 | End: 2018-01-01

## 2018-01-01 RX ORDER — ATROPINE SULFATE 10 MG/ML
2 SOLUTION/ DROPS OPHTHALMIC EVERY 4 HOURS
Status: DISCONTINUED | OUTPATIENT
Start: 2018-01-01 | End: 2018-01-01

## 2018-01-01 RX ORDER — MORPHINE SULFATE 4 MG/ML
2 INJECTION INTRAVENOUS EVERY 6 HOURS
Status: DISCONTINUED | OUTPATIENT
Start: 2018-01-01 | End: 2018-01-01

## 2018-01-01 RX ORDER — MORPHINE SULFATE 4 MG/ML
2 INJECTION INTRAVENOUS
Status: DISCONTINUED | OUTPATIENT
Start: 2018-01-01 | End: 2018-12-18 | Stop reason: HOSPADM

## 2018-01-01 RX ORDER — KETOROLAC TROMETHAMINE 30 MG/ML
30 INJECTION, SOLUTION INTRAMUSCULAR; INTRAVENOUS
Status: DISCONTINUED | OUTPATIENT
Start: 2018-01-01 | End: 2018-01-01 | Stop reason: HOSPADM

## 2018-01-01 RX ORDER — INSULIN LISPRO 100 [IU]/ML
INJECTION, SOLUTION INTRAVENOUS; SUBCUTANEOUS
Status: DISCONTINUED | OUTPATIENT
Start: 2018-01-01 | End: 2018-01-01

## 2018-01-01 RX ORDER — POTASSIUM CHLORIDE 750 MG/1
40 TABLET, FILM COATED, EXTENDED RELEASE ORAL EVERY 4 HOURS
Status: DISPENSED | OUTPATIENT
Start: 2018-01-01 | End: 2018-01-01

## 2018-01-01 RX ORDER — ENOXAPARIN SODIUM 100 MG/ML
40 INJECTION SUBCUTANEOUS EVERY 24 HOURS
Status: DISCONTINUED | OUTPATIENT
Start: 2018-01-01 | End: 2018-01-01

## 2018-01-01 RX ORDER — GLYCOPYRROLATE 0.2 MG/ML
0.2 INJECTION INTRAMUSCULAR; INTRAVENOUS EVERY 4 HOURS
Status: DISCONTINUED | OUTPATIENT
Start: 2018-01-01 | End: 2018-01-01 | Stop reason: HOSPADM

## 2018-01-01 RX ORDER — SODIUM CHLORIDE 0.9 % (FLUSH) 0.9 %
20 SYRINGE (ML) INJECTION EVERY 24 HOURS
Status: DISCONTINUED | OUTPATIENT
Start: 2018-01-01 | End: 2018-01-01

## 2018-01-01 RX ORDER — AMLODIPINE BESYLATE 5 MG/1
5 TABLET ORAL DAILY
Status: DISCONTINUED | OUTPATIENT
Start: 2018-01-01 | End: 2018-01-01

## 2018-01-01 RX ORDER — HYDROCODONE BITARTRATE AND ACETAMINOPHEN 5; 325 MG/1; MG/1
1 TABLET ORAL
COMMUNITY
End: 2018-01-01

## 2018-01-01 RX ORDER — MORPHINE SULFATE 4 MG/ML
1 INJECTION INTRAVENOUS EVERY 6 HOURS
Status: DISCONTINUED | OUTPATIENT
Start: 2018-01-01 | End: 2018-01-01

## 2018-01-01 RX ORDER — SCOLOPAMINE TRANSDERMAL SYSTEM 1 MG/1
1 PATCH, EXTENDED RELEASE TRANSDERMAL
Status: DISCONTINUED | OUTPATIENT
Start: 2018-01-01 | End: 2018-01-01 | Stop reason: HOSPADM

## 2018-01-01 RX ORDER — HYDRALAZINE HYDROCHLORIDE 20 MG/ML
10 INJECTION INTRAMUSCULAR; INTRAVENOUS
Status: DISCONTINUED | OUTPATIENT
Start: 2018-01-01 | End: 2018-01-01

## 2018-01-01 RX ORDER — CARBIDOPA AND LEVODOPA 25; 100 MG/1; MG/1
1.5 TABLET ORAL 2 TIMES DAILY
Status: DISCONTINUED | OUTPATIENT
Start: 2018-01-01 | End: 2018-01-01 | Stop reason: HOSPADM

## 2018-01-01 RX ORDER — KETOROLAC TROMETHAMINE 30 MG/ML
30 INJECTION, SOLUTION INTRAMUSCULAR; INTRAVENOUS
Status: DISPENSED | OUTPATIENT
Start: 2018-01-01 | End: 2018-01-01

## 2018-01-01 RX ORDER — FACIAL-BODY WIPES
10 EACH TOPICAL DAILY PRN
Status: DISCONTINUED | OUTPATIENT
Start: 2018-01-01 | End: 2018-01-01 | Stop reason: HOSPADM

## 2018-01-01 RX ORDER — GLYCOPYRROLATE 0.2 MG/ML
0.1 INJECTION INTRAMUSCULAR; INTRAVENOUS
Status: DISCONTINUED | OUTPATIENT
Start: 2018-01-01 | End: 2018-12-18 | Stop reason: HOSPADM

## 2018-01-01 RX ORDER — IPRATROPIUM BROMIDE AND ALBUTEROL SULFATE 2.5; .5 MG/3ML; MG/3ML
3 SOLUTION RESPIRATORY (INHALATION)
Status: DISCONTINUED | OUTPATIENT
Start: 2018-01-01 | End: 2018-01-01

## 2018-01-01 RX ORDER — SODIUM CHLORIDE 0.9 % (FLUSH) 0.9 %
5-10 SYRINGE (ML) INJECTION EVERY 8 HOURS
Status: DISCONTINUED | OUTPATIENT
Start: 2018-01-01 | End: 2018-01-01 | Stop reason: HOSPADM

## 2018-01-01 RX ORDER — DIPHENHYDRAMINE HYDROCHLORIDE 50 MG/ML
25 INJECTION, SOLUTION INTRAMUSCULAR; INTRAVENOUS
Status: COMPLETED | OUTPATIENT
Start: 2018-01-01 | End: 2018-01-01

## 2018-01-01 RX ORDER — MORPHINE SULFATE 4 MG/ML
2 INJECTION INTRAVENOUS EVERY 8 HOURS
Status: DISCONTINUED | OUTPATIENT
Start: 2018-01-01 | End: 2018-01-01

## 2018-01-01 RX ORDER — PRAVASTATIN SODIUM 20 MG/1
20 TABLET ORAL
Status: DISCONTINUED | OUTPATIENT
Start: 2018-01-01 | End: 2018-01-01

## 2018-01-01 RX ORDER — GUAIFENESIN 600 MG/1
600 TABLET, EXTENDED RELEASE ORAL EVERY 12 HOURS
Status: DISCONTINUED | OUTPATIENT
Start: 2018-01-01 | End: 2018-01-01

## 2018-01-01 RX ORDER — AMLODIPINE BESYLATE 5 MG/1
5 TABLET ORAL DAILY
COMMUNITY

## 2018-01-01 RX ORDER — ATORVASTATIN CALCIUM 20 MG/1
40 TABLET, FILM COATED ORAL DAILY
Status: DISCONTINUED | OUTPATIENT
Start: 2018-01-01 | End: 2018-01-01 | Stop reason: HOSPADM

## 2018-01-01 RX ORDER — MAGNESIUM SULFATE 100 %
4 CRYSTALS MISCELLANEOUS AS NEEDED
Status: DISCONTINUED | OUTPATIENT
Start: 2018-01-01 | End: 2018-01-01

## 2018-01-01 RX ORDER — MEMANTINE HYDROCHLORIDE 10 MG/1
10 TABLET ORAL 2 TIMES DAILY
Status: DISCONTINUED | OUTPATIENT
Start: 2018-01-01 | End: 2018-01-01

## 2018-01-01 RX ORDER — ACETAMINOPHEN 325 MG/1
650 TABLET ORAL
Status: DISCONTINUED | OUTPATIENT
Start: 2018-01-01 | End: 2018-01-01

## 2018-01-01 RX ORDER — CEPHALEXIN 250 MG/1
500 CAPSULE ORAL
Status: COMPLETED | OUTPATIENT
Start: 2018-01-01 | End: 2018-01-01

## 2018-01-01 RX ORDER — LISINOPRIL 5 MG/1
10 TABLET ORAL DAILY
Status: DISCONTINUED | OUTPATIENT
Start: 2018-01-01 | End: 2018-01-01

## 2018-01-01 RX ORDER — ACETAMINOPHEN 650 MG/1
650 SUPPOSITORY RECTAL
Status: DISCONTINUED | OUTPATIENT
Start: 2018-01-01 | End: 2018-12-18 | Stop reason: HOSPADM

## 2018-01-01 RX ORDER — ENOXAPARIN SODIUM 100 MG/ML
30 INJECTION SUBCUTANEOUS EVERY 24 HOURS
Status: DISCONTINUED | OUTPATIENT
Start: 2018-01-01 | End: 2018-01-01

## 2018-01-01 RX ORDER — GLYCOPYRROLATE 0.2 MG/ML
0.2 INJECTION INTRAMUSCULAR; INTRAVENOUS EVERY 4 HOURS
Status: DISCONTINUED | OUTPATIENT
Start: 2018-01-01 | End: 2018-12-18 | Stop reason: HOSPADM

## 2018-01-01 RX ORDER — TIMOLOL MALEATE 5 MG/ML
1 SOLUTION/ DROPS OPHTHALMIC 2 TIMES DAILY
Status: DISCONTINUED | OUTPATIENT
Start: 2018-01-01 | End: 2018-01-01 | Stop reason: HOSPADM

## 2018-01-01 RX ORDER — MORPHINE SULFATE 4 MG/ML
2 INJECTION INTRAVENOUS EVERY 4 HOURS
Status: DISCONTINUED | OUTPATIENT
Start: 2018-01-01 | End: 2018-12-18 | Stop reason: HOSPADM

## 2018-01-01 RX ORDER — DEXTROSE 50 % IN WATER (D50W) INTRAVENOUS SYRINGE
25-50 AS NEEDED
Status: DISCONTINUED | OUTPATIENT
Start: 2018-01-01 | End: 2018-01-01

## 2018-01-01 RX ORDER — GLYCOPYRROLATE 0.2 MG/ML
0.2 INJECTION INTRAMUSCULAR; INTRAVENOUS EVERY 4 HOURS
Status: DISCONTINUED | OUTPATIENT
Start: 2018-01-01 | End: 2018-01-01

## 2018-01-01 RX ORDER — MORPHINE SULFATE 4 MG/ML
2 INJECTION INTRAVENOUS
Status: DISCONTINUED | OUTPATIENT
Start: 2018-01-01 | End: 2018-01-01

## 2018-01-01 RX ORDER — AMLODIPINE BESYLATE 5 MG/1
10 TABLET ORAL DAILY
Status: DISCONTINUED | OUTPATIENT
Start: 2018-01-01 | End: 2018-01-01

## 2018-01-01 RX ORDER — INSULIN LISPRO 100 [IU]/ML
INJECTION, SOLUTION INTRAVENOUS; SUBCUTANEOUS EVERY 6 HOURS
Status: DISCONTINUED | OUTPATIENT
Start: 2018-01-01 | End: 2018-01-01

## 2018-01-01 RX ORDER — AMLODIPINE BESYLATE 5 MG/1
5 TABLET ORAL DAILY
Status: DISCONTINUED | OUTPATIENT
Start: 2018-01-01 | End: 2018-01-01 | Stop reason: HOSPADM

## 2018-01-01 RX ORDER — INSULIN LISPRO 100 [IU]/ML
INJECTION, SOLUTION INTRAVENOUS; SUBCUTANEOUS EVERY 6 HOURS
Status: DISCONTINUED | OUTPATIENT
Start: 2018-01-01 | End: 2018-01-01 | Stop reason: HOSPADM

## 2018-01-01 RX ORDER — LISINOPRIL 20 MG/1
20 TABLET ORAL DAILY
Status: DISCONTINUED | OUTPATIENT
Start: 2018-01-01 | End: 2018-01-01

## 2018-01-01 RX ORDER — CARBIDOPA AND LEVODOPA 25; 100 MG/1; MG/1
1 TABLET ORAL
Status: DISCONTINUED | OUTPATIENT
Start: 2018-01-01 | End: 2018-01-01

## 2018-01-01 RX ORDER — LORAZEPAM 2 MG/ML
1 INJECTION INTRAMUSCULAR
Status: DISCONTINUED | OUTPATIENT
Start: 2018-01-01 | End: 2018-01-01 | Stop reason: HOSPADM

## 2018-01-01 RX ORDER — DIPHENHYDRAMINE HCL 25 MG
25 CAPSULE ORAL
Status: DISCONTINUED | OUTPATIENT
Start: 2018-01-01 | End: 2018-01-01 | Stop reason: HOSPADM

## 2018-01-01 RX ORDER — CIPROFLOXACIN 500 MG/1
250 TABLET ORAL EVERY 12 HOURS
Status: DISCONTINUED | OUTPATIENT
Start: 2018-01-01 | End: 2018-01-01

## 2018-01-01 RX ORDER — MEMANTINE HYDROCHLORIDE 10 MG/1
10 TABLET ORAL 2 TIMES DAILY
COMMUNITY
End: 2018-01-01

## 2018-01-01 RX ORDER — LORAZEPAM 2 MG/ML
1 INJECTION INTRAMUSCULAR
Status: DISCONTINUED | OUTPATIENT
Start: 2018-01-01 | End: 2018-12-18 | Stop reason: HOSPADM

## 2018-01-01 RX ORDER — TIMOLOL MALEATE 5 MG/ML
1 SOLUTION/ DROPS OPHTHALMIC 2 TIMES DAILY
COMMUNITY

## 2018-01-01 RX ORDER — LISINOPRIL 5 MG/1
10 TABLET ORAL 2 TIMES DAILY
Status: DISCONTINUED | OUTPATIENT
Start: 2018-01-01 | End: 2018-01-01 | Stop reason: HOSPADM

## 2018-01-01 RX ORDER — DEXTROSE 50 % IN WATER (D50W) INTRAVENOUS SYRINGE
12.5-25 AS NEEDED
Status: DISCONTINUED | OUTPATIENT
Start: 2018-01-01 | End: 2018-01-01 | Stop reason: HOSPADM

## 2018-01-01 RX ORDER — CARBIDOPA AND LEVODOPA 25; 100 MG/1; MG/1
2 TABLET ORAL
Status: DISCONTINUED | OUTPATIENT
Start: 2018-01-01 | End: 2018-01-01

## 2018-01-01 RX ORDER — ASPIRIN 300 MG/1
300 SUPPOSITORY RECTAL DAILY
Status: DISCONTINUED | OUTPATIENT
Start: 2018-01-01 | End: 2018-01-01

## 2018-01-01 RX ORDER — LISINOPRIL 5 MG/1
10 TABLET ORAL ONCE
Status: DISCONTINUED | OUTPATIENT
Start: 2018-01-01 | End: 2018-01-01

## 2018-01-01 RX ORDER — POTASSIUM CHLORIDE AND SODIUM CHLORIDE 450; 150 MG/100ML; MG/100ML
INJECTION, SOLUTION INTRAVENOUS CONTINUOUS
Status: DISCONTINUED | OUTPATIENT
Start: 2018-01-01 | End: 2018-01-01

## 2018-01-01 RX ORDER — CARBIDOPA AND LEVODOPA 25; 100 MG/1; MG/1
2 TABLET ORAL
COMMUNITY

## 2018-01-01 RX ORDER — MEMANTINE HYDROCHLORIDE 10 MG/1
TABLET ORAL DAILY
COMMUNITY
End: 2018-01-01

## 2018-01-01 RX ORDER — POTASSIUM CHLORIDE 1.5 G/1.77G
40 POWDER, FOR SOLUTION ORAL EVERY 4 HOURS
Status: DISPENSED | OUTPATIENT
Start: 2018-01-01 | End: 2018-01-01

## 2018-01-01 RX ORDER — SODIUM CHLORIDE 0.9 % (FLUSH) 0.9 %
5 SYRINGE (ML) INJECTION AS NEEDED
Status: DISCONTINUED | OUTPATIENT
Start: 2018-01-01 | End: 2018-12-18 | Stop reason: HOSPADM

## 2018-01-01 RX ORDER — DIPHENHYDRAMINE HYDROCHLORIDE 50 MG/ML
12.5 INJECTION, SOLUTION INTRAMUSCULAR; INTRAVENOUS
Status: DISCONTINUED | OUTPATIENT
Start: 2018-01-01 | End: 2018-01-01

## 2018-01-01 RX ORDER — ONDANSETRON 2 MG/ML
4 INJECTION INTRAMUSCULAR; INTRAVENOUS
Status: DISCONTINUED | OUTPATIENT
Start: 2018-01-01 | End: 2018-01-01 | Stop reason: HOSPADM

## 2018-01-01 RX ORDER — CIPROFLOXACIN 250 MG/1
250 TABLET, FILM COATED ORAL EVERY 12 HOURS
Qty: 14 TAB | Refills: 0 | Status: SHIPPED | OUTPATIENT
Start: 2018-01-01 | End: 2018-01-01

## 2018-01-01 RX ORDER — ENOXAPARIN SODIUM 100 MG/ML
40 INJECTION SUBCUTANEOUS EVERY 24 HOURS
Status: DISCONTINUED | OUTPATIENT
Start: 2018-01-01 | End: 2018-01-01 | Stop reason: HOSPADM

## 2018-01-01 RX ORDER — CYANOCOBALAMIN 1000 UG/ML
1000 INJECTION, SOLUTION INTRAMUSCULAR; SUBCUTANEOUS ONCE
Status: COMPLETED | OUTPATIENT
Start: 2018-01-01 | End: 2018-01-01

## 2018-01-01 RX ORDER — GLYCOPYRROLATE 0.2 MG/ML
0.1 INJECTION INTRAMUSCULAR; INTRAVENOUS 3 TIMES DAILY
Status: DISCONTINUED | OUTPATIENT
Start: 2018-01-01 | End: 2018-01-01 | Stop reason: HOSPADM

## 2018-01-01 RX ORDER — SODIUM CHLORIDE 0.9 % (FLUSH) 0.9 %
10 SYRINGE (ML) INJECTION EVERY 24 HOURS
Status: DISCONTINUED | OUTPATIENT
Start: 2018-01-01 | End: 2018-01-01

## 2018-01-01 RX ORDER — DONEPEZIL HYDROCHLORIDE 10 MG/1
10 TABLET, FILM COATED ORAL
COMMUNITY

## 2018-01-01 RX ORDER — PRAVASTATIN SODIUM 20 MG/1
20 TABLET ORAL
COMMUNITY

## 2018-01-01 RX ORDER — ATROPINE SULFATE 10 MG/ML
2 SOLUTION/ DROPS OPHTHALMIC
Status: DISCONTINUED | OUTPATIENT
Start: 2018-01-01 | End: 2018-12-18 | Stop reason: HOSPADM

## 2018-01-01 RX ORDER — LABETALOL HYDROCHLORIDE 5 MG/ML
5 INJECTION, SOLUTION INTRAVENOUS
Status: DISCONTINUED | OUTPATIENT
Start: 2018-01-01 | End: 2018-01-01 | Stop reason: HOSPADM

## 2018-01-01 RX ORDER — SODIUM CHLORIDE 0.9 % (FLUSH) 0.9 %
5-10 SYRINGE (ML) INJECTION AS NEEDED
Status: DISCONTINUED | OUTPATIENT
Start: 2018-01-01 | End: 2018-01-01 | Stop reason: HOSPADM

## 2018-01-01 RX ORDER — ATORVASTATIN CALCIUM 40 MG/1
40 TABLET, FILM COATED ORAL DAILY
Qty: 30 TAB | Refills: 0 | Status: SHIPPED | OUTPATIENT
Start: 2018-01-01 | End: 2018-01-01

## 2018-01-01 RX ORDER — MEMANTINE HYDROCHLORIDE 10 MG/1
10 TABLET ORAL 2 TIMES DAILY
Status: DISCONTINUED | OUTPATIENT
Start: 2018-01-01 | End: 2018-01-01 | Stop reason: HOSPADM

## 2018-01-01 RX ORDER — METFORMIN HYDROCHLORIDE 500 MG/1
500 TABLET ORAL 2 TIMES DAILY WITH MEALS
COMMUNITY

## 2018-01-01 RX ORDER — SODIUM CHLORIDE 9 MG/ML
75 INJECTION, SOLUTION INTRAVENOUS CONTINUOUS
Status: DISCONTINUED | OUTPATIENT
Start: 2018-01-01 | End: 2018-01-01

## 2018-01-01 RX ORDER — ACETAMINOPHEN 650 MG/1
650 SUPPOSITORY RECTAL
Status: DISCONTINUED | OUTPATIENT
Start: 2018-01-01 | End: 2018-01-01

## 2018-01-01 RX ORDER — POTASSIUM CHLORIDE 750 MG/1
40 TABLET, FILM COATED, EXTENDED RELEASE ORAL EVERY 4 HOURS
Status: DISCONTINUED | OUTPATIENT
Start: 2018-01-01 | End: 2018-01-01 | Stop reason: SDUPTHER

## 2018-01-01 RX ORDER — MORPHINE SULFATE 4 MG/ML
2 INJECTION INTRAVENOUS
Status: DISCONTINUED | OUTPATIENT
Start: 2018-01-01 | End: 2018-01-01 | Stop reason: HOSPADM

## 2018-01-01 RX ORDER — SODIUM CHLORIDE 0.9 % (FLUSH) 0.9 %
10-30 SYRINGE (ML) INJECTION AS NEEDED
Status: DISCONTINUED | OUTPATIENT
Start: 2018-01-01 | End: 2018-01-01

## 2018-01-01 RX ORDER — CIPROFLOXACIN 500 MG/1
500 TABLET ORAL
Status: DISCONTINUED | OUTPATIENT
Start: 2018-01-01 | End: 2018-01-01

## 2018-01-01 RX ORDER — DONEPEZIL HYDROCHLORIDE 5 MG/1
10 TABLET, FILM COATED ORAL
Status: DISCONTINUED | OUTPATIENT
Start: 2018-01-01 | End: 2018-01-01 | Stop reason: HOSPADM

## 2018-01-01 RX ORDER — CARBIDOPA AND LEVODOPA 10; 100 MG/1; MG/1
1 TABLET ORAL 3 TIMES DAILY
COMMUNITY
End: 2018-01-01

## 2018-01-01 RX ORDER — POTASSIUM CHLORIDE AND SODIUM CHLORIDE 900; 300 MG/100ML; MG/100ML
INJECTION, SOLUTION INTRAVENOUS CONTINUOUS
Status: DISCONTINUED | OUTPATIENT
Start: 2018-01-01 | End: 2018-01-01

## 2018-01-01 RX ORDER — CIPROFLOXACIN 500 MG/1
250 TABLET ORAL
Status: COMPLETED | OUTPATIENT
Start: 2018-01-01 | End: 2018-01-01

## 2018-01-01 RX ORDER — ASPIRIN 325 MG
325 TABLET ORAL DAILY
Status: DISCONTINUED | OUTPATIENT
Start: 2018-01-01 | End: 2018-01-01 | Stop reason: HOSPADM

## 2018-01-01 RX ADMIN — Medication 10 ML: at 09:36

## 2018-01-01 RX ADMIN — ASPIRIN 300 MG: 300 SUPPOSITORY RECTAL at 09:25

## 2018-01-01 RX ADMIN — ENOXAPARIN SODIUM 40 MG: 40 INJECTION SUBCUTANEOUS at 21:27

## 2018-01-01 RX ADMIN — POTASSIUM CHLORIDE 10 MEQ: 10 INJECTION, SOLUTION INTRAVENOUS at 18:12

## 2018-01-01 RX ADMIN — ATROPINE SULFATE 2 DROP: 10 SOLUTION OPHTHALMIC at 18:52

## 2018-01-01 RX ADMIN — GLYCOPYRROLATE 0.2 MG: 0.2 INJECTION, SOLUTION INTRAMUSCULAR; INTRAVENOUS at 12:34

## 2018-01-01 RX ADMIN — IPRATROPIUM BROMIDE AND ALBUTEROL SULFATE 3 ML: .5; 3 SOLUTION RESPIRATORY (INHALATION) at 23:44

## 2018-01-01 RX ADMIN — GLYCOPYRROLATE 0.2 MG: 0.2 INJECTION, SOLUTION INTRAMUSCULAR; INTRAVENOUS at 20:18

## 2018-01-01 RX ADMIN — Medication 10 ML: at 21:27

## 2018-01-01 RX ADMIN — CARBIDOPA AND LEVODOPA 1.5 TABLET: 25; 100 TABLET ORAL at 10:30

## 2018-01-01 RX ADMIN — ATROPINE SULFATE 2 DROP: 10 SOLUTION OPHTHALMIC at 08:36

## 2018-01-01 RX ADMIN — GLYCOPYRROLATE 0.2 MG: 0.2 INJECTION, SOLUTION INTRAMUSCULAR; INTRAVENOUS at 20:39

## 2018-01-01 RX ADMIN — ATROPINE SULFATE 2 DROP: 10 SOLUTION OPHTHALMIC at 01:21

## 2018-01-01 RX ADMIN — AMLODIPINE BESYLATE 5 MG: 5 TABLET ORAL at 09:26

## 2018-01-01 RX ADMIN — AMOXICILLIN AND CLAVULANATE POTASSIUM 1 TABLET: 875; 125 TABLET, FILM COATED ORAL at 09:26

## 2018-01-01 RX ADMIN — POTASSIUM CHLORIDE 10 MEQ: 7.46 INJECTION, SOLUTION INTRAVENOUS at 09:03

## 2018-01-01 RX ADMIN — GLYCOPYRROLATE 0.2 MG: 0.2 INJECTION, SOLUTION INTRAMUSCULAR; INTRAVENOUS at 07:17

## 2018-01-01 RX ADMIN — IPRATROPIUM BROMIDE AND ALBUTEROL SULFATE 3 ML: .5; 3 SOLUTION RESPIRATORY (INHALATION) at 13:37

## 2018-01-01 RX ADMIN — ACETAMINOPHEN 650 MG: 325 TABLET ORAL at 03:55

## 2018-01-01 RX ADMIN — ATROPINE SULFATE 2 DROP: 10 SOLUTION OPHTHALMIC at 00:50

## 2018-01-01 RX ADMIN — PIPERACILLIN SODIUM AND TAZOBACTAM SODIUM 3.38 G: 3; .375 INJECTION, POWDER, LYOPHILIZED, FOR SOLUTION INTRAVENOUS at 00:10

## 2018-01-01 RX ADMIN — POTASSIUM CHLORIDE 10 MEQ: 7.46 INJECTION, SOLUTION INTRAVENOUS at 07:52

## 2018-01-01 RX ADMIN — ENOXAPARIN SODIUM 30 MG: 100 INJECTION SUBCUTANEOUS at 21:44

## 2018-01-01 RX ADMIN — INSULIN LISPRO 4 UNITS: 100 INJECTION, SOLUTION INTRAVENOUS; SUBCUTANEOUS at 12:04

## 2018-01-01 RX ADMIN — TIMOLOL MALEATE 1 DROP: 5 SOLUTION/ DROPS OPHTHALMIC at 08:31

## 2018-01-01 RX ADMIN — PIPERACILLIN SODIUM AND TAZOBACTAM SODIUM 3.38 G: 3; .375 INJECTION, POWDER, LYOPHILIZED, FOR SOLUTION INTRAVENOUS at 07:40

## 2018-01-01 RX ADMIN — TIMOLOL MALEATE 1 DROP: 5 SOLUTION/ DROPS OPHTHALMIC at 21:13

## 2018-01-01 RX ADMIN — ATROPINE SULFATE 2 DROP: 10 SOLUTION OPHTHALMIC at 13:27

## 2018-01-01 RX ADMIN — TIMOLOL MALEATE 1 DROP: 5 SOLUTION OPHTHALMIC at 18:10

## 2018-01-01 RX ADMIN — GLYCOPYRROLATE 0.2 MG: 0.2 INJECTION, SOLUTION INTRAMUSCULAR; INTRAVENOUS at 12:53

## 2018-01-01 RX ADMIN — MORPHINE SULFATE 2 MG: 4 INJECTION, SOLUTION INTRAMUSCULAR; INTRAVENOUS at 05:32

## 2018-01-01 RX ADMIN — IPRATROPIUM BROMIDE AND ALBUTEROL SULFATE 3 ML: .5; 3 SOLUTION RESPIRATORY (INHALATION) at 01:15

## 2018-01-01 RX ADMIN — AMOXICILLIN AND CLAVULANATE POTASSIUM 1 TABLET: 875; 125 TABLET, FILM COATED ORAL at 21:12

## 2018-01-01 RX ADMIN — PIPERACILLIN SODIUM AND TAZOBACTAM SODIUM 3.38 G: 3; .375 INJECTION, POWDER, LYOPHILIZED, FOR SOLUTION INTRAVENOUS at 20:51

## 2018-01-01 RX ADMIN — MORPHINE SULFATE 2 MG: 4 INJECTION, SOLUTION INTRAMUSCULAR; INTRAVENOUS at 15:05

## 2018-01-01 RX ADMIN — Medication 10 ML: at 15:35

## 2018-01-01 RX ADMIN — SODIUM CHLORIDE: 900 INJECTION, SOLUTION INTRAVENOUS at 17:07

## 2018-01-01 RX ADMIN — GLYCOPYRROLATE 0.2 MG: 0.2 INJECTION, SOLUTION INTRAMUSCULAR; INTRAVENOUS at 17:16

## 2018-01-01 RX ADMIN — IPRATROPIUM BROMIDE AND ALBUTEROL SULFATE 3 ML: .5; 3 SOLUTION RESPIRATORY (INHALATION) at 07:45

## 2018-01-01 RX ADMIN — TIMOLOL MALEATE 1 DROP: 5 SOLUTION/ DROPS OPHTHALMIC at 10:02

## 2018-01-01 RX ADMIN — MORPHINE SULFATE 2 MG: 4 INJECTION, SOLUTION INTRAMUSCULAR; INTRAVENOUS at 13:59

## 2018-01-01 RX ADMIN — MEMANTINE 10 MG: 10 TABLET ORAL at 08:57

## 2018-01-01 RX ADMIN — ASCORBIC ACID, VITAMIN A PALMITATE, CHOLECALCIFEROL, THIAMINE HYDROCHLORIDE, RIBOFLAVIN-5 PHOSPHATE SODIUM, PYRIDOXINE HYDROCHLORIDE, NIACINAMIDE, DEXPANTHENOL, ALPHA-TOCOPHEROL ACETATE, VITAMIN K1, FOLIC ACID, BIOTIN, CYANOCOBALAMIN: 200; 3300; 200; 6; 3.6; 6; 40; 15; 10; 150; 600; 60; 5 INJECTION, SOLUTION INTRAVENOUS at 17:45

## 2018-01-01 RX ADMIN — ATROPINE SULFATE 2 DROP: 10 SOLUTION OPHTHALMIC at 08:59

## 2018-01-01 RX ADMIN — Medication 10 ML: at 05:58

## 2018-01-01 RX ADMIN — SODIUM CHLORIDE 500 ML: 900 INJECTION, SOLUTION INTRAVENOUS at 14:35

## 2018-01-01 RX ADMIN — GLYCOPYRROLATE 0.1 MG: 0.2 INJECTION, SOLUTION INTRAMUSCULAR; INTRAVENOUS at 15:32

## 2018-01-01 RX ADMIN — HYDRALAZINE HYDROCHLORIDE 20 MG: 20 INJECTION INTRAMUSCULAR; INTRAVENOUS at 00:10

## 2018-01-01 RX ADMIN — Medication 20 ML: at 18:12

## 2018-01-01 RX ADMIN — TIMOLOL MALEATE 1 DROP: 5 SOLUTION/ DROPS OPHTHALMIC at 11:35

## 2018-01-01 RX ADMIN — GLYCOPYRROLATE 0.1 MG: 0.2 INJECTION, SOLUTION INTRAMUSCULAR; INTRAVENOUS at 22:31

## 2018-01-01 RX ADMIN — ASPIRIN 81 MG: 81 TABLET, COATED ORAL at 08:57

## 2018-01-01 RX ADMIN — Medication 10 ML: at 21:12

## 2018-01-01 RX ADMIN — PRAVASTATIN SODIUM 20 MG: 20 TABLET ORAL at 21:12

## 2018-01-01 RX ADMIN — Medication 10 ML: at 16:00

## 2018-01-01 RX ADMIN — GLYCOPYRROLATE 0.2 MG: 0.2 INJECTION, SOLUTION INTRAMUSCULAR; INTRAVENOUS at 17:18

## 2018-01-01 RX ADMIN — IPRATROPIUM BROMIDE AND ALBUTEROL SULFATE 3 ML: .5; 3 SOLUTION RESPIRATORY (INHALATION) at 00:21

## 2018-01-01 RX ADMIN — TIMOLOL MALEATE 1 DROP: 5 SOLUTION/ DROPS OPHTHALMIC at 21:51

## 2018-01-01 RX ADMIN — GLYCOPYRROLATE 0.2 MG: 0.2 INJECTION, SOLUTION INTRAMUSCULAR; INTRAVENOUS at 19:45

## 2018-01-01 RX ADMIN — Medication 10 ML: at 06:03

## 2018-01-01 RX ADMIN — CARBIDOPA AND LEVODOPA 1.5 TABLET: 25; 100 TABLET ORAL at 10:06

## 2018-01-01 RX ADMIN — MORPHINE SULFATE 2 MG: 4 INJECTION, SOLUTION INTRAMUSCULAR; INTRAVENOUS at 17:29

## 2018-01-01 RX ADMIN — ENOXAPARIN SODIUM 30 MG: 100 INJECTION SUBCUTANEOUS at 20:53

## 2018-01-01 RX ADMIN — ATROPINE SULFATE 2 DROP: 10 SOLUTION OPHTHALMIC at 08:47

## 2018-01-01 RX ADMIN — IPRATROPIUM BROMIDE AND ALBUTEROL SULFATE 3 ML: .5; 3 SOLUTION RESPIRATORY (INHALATION) at 01:25

## 2018-01-01 RX ADMIN — ENOXAPARIN SODIUM 40 MG: 40 INJECTION SUBCUTANEOUS at 19:59

## 2018-01-01 RX ADMIN — MORPHINE SULFATE 1 MG: 4 INJECTION, SOLUTION INTRAMUSCULAR; INTRAVENOUS at 22:14

## 2018-01-01 RX ADMIN — ASCORBIC ACID, VITAMIN A PALMITATE, CHOLECALCIFEROL, THIAMINE HYDROCHLORIDE, RIBOFLAVIN-5 PHOSPHATE SODIUM, PYRIDOXINE HYDROCHLORIDE, NIACINAMIDE, DEXPANTHENOL, ALPHA-TOCOPHEROL ACETATE, VITAMIN K1, FOLIC ACID, BIOTIN, CYANOCOBALAMIN: 200; 3300; 200; 6; 3.6; 6; 40; 15; 10; 150; 600; 60; 5 INJECTION, SOLUTION INTRAVENOUS at 18:13

## 2018-01-01 RX ADMIN — SODIUM CHLORIDE AND POTASSIUM CHLORIDE: 4.5; 1.49 INJECTION, SOLUTION INTRAVENOUS at 20:28

## 2018-01-01 RX ADMIN — AMOXICILLIN AND CLAVULANATE POTASSIUM 1 TABLET: 875; 125 TABLET, FILM COATED ORAL at 08:57

## 2018-01-01 RX ADMIN — SODIUM CHLORIDE 75 ML/HR: 900 INJECTION, SOLUTION INTRAVENOUS at 05:05

## 2018-01-01 RX ADMIN — MORPHINE SULFATE 1 MG: 4 INJECTION, SOLUTION INTRAMUSCULAR; INTRAVENOUS at 06:13

## 2018-01-01 RX ADMIN — Medication 10 ML: at 22:00

## 2018-01-01 RX ADMIN — MORPHINE SULFATE 1 MG: 4 INJECTION, SOLUTION INTRAMUSCULAR; INTRAVENOUS at 05:44

## 2018-01-01 RX ADMIN — ATORVASTATIN CALCIUM 40 MG: 20 TABLET, FILM COATED ORAL at 10:07

## 2018-01-01 RX ADMIN — CEPHALEXIN 500 MG: 250 CAPSULE ORAL at 21:39

## 2018-01-01 RX ADMIN — GLYCOPYRROLATE 0.2 MG: 0.2 INJECTION, SOLUTION INTRAMUSCULAR; INTRAVENOUS at 07:49

## 2018-01-01 RX ADMIN — ASPIRIN 325 MG: 325 TABLET, COATED ORAL at 10:26

## 2018-01-01 RX ADMIN — IOPAMIDOL 100 ML: 755 INJECTION, SOLUTION INTRAVENOUS at 12:48

## 2018-01-01 RX ADMIN — ATROPINE SULFATE 2 DROP: 10 SOLUTION OPHTHALMIC at 17:16

## 2018-01-01 RX ADMIN — Medication 10 ML: at 22:30

## 2018-01-01 RX ADMIN — HYDRALAZINE HYDROCHLORIDE 10 MG: 20 INJECTION INTRAMUSCULAR; INTRAVENOUS at 12:07

## 2018-01-01 RX ADMIN — GLYCOPYRROLATE 0.1 MG: 0.2 INJECTION, SOLUTION INTRAMUSCULAR; INTRAVENOUS at 08:29

## 2018-01-01 RX ADMIN — DEXTROSE MONOHYDRATE AND SODIUM CHLORIDE 50 ML/HR: 5; .45 INJECTION, SOLUTION INTRAVENOUS at 17:17

## 2018-01-01 RX ADMIN — AMLODIPINE BESYLATE 5 MG: 5 TABLET ORAL at 08:02

## 2018-01-01 RX ADMIN — MORPHINE SULFATE 2 MG: 4 INJECTION, SOLUTION INTRAMUSCULAR; INTRAVENOUS at 17:34

## 2018-01-01 RX ADMIN — POTASSIUM CHLORIDE 10 MEQ: 7.46 INJECTION, SOLUTION INTRAVENOUS at 12:06

## 2018-01-01 RX ADMIN — IPRATROPIUM BROMIDE AND ALBUTEROL SULFATE 3 ML: .5; 3 SOLUTION RESPIRATORY (INHALATION) at 13:36

## 2018-01-01 RX ADMIN — Medication 10 ML: at 00:14

## 2018-01-01 RX ADMIN — DONEPEZIL HYDROCHLORIDE 10 MG: 5 TABLET, FILM COATED ORAL at 21:12

## 2018-01-01 RX ADMIN — GLYCOPYRROLATE 0.2 MG: 0.2 INJECTION, SOLUTION INTRAMUSCULAR; INTRAVENOUS at 20:14

## 2018-01-01 RX ADMIN — IPRATROPIUM BROMIDE AND ALBUTEROL SULFATE 3 ML: .5; 3 SOLUTION RESPIRATORY (INHALATION) at 07:16

## 2018-01-01 RX ADMIN — GLYCOPYRROLATE 0.1 MG: 0.2 INJECTION, SOLUTION INTRAMUSCULAR; INTRAVENOUS at 04:16

## 2018-01-01 RX ADMIN — CARBIDOPA AND LEVODOPA 1 TABLET: 25; 100 TABLET ORAL at 17:42

## 2018-01-01 RX ADMIN — MEMANTINE HYDROCHLORIDE 10 MG: 10 TABLET, FILM COATED ORAL at 08:01

## 2018-01-01 RX ADMIN — MORPHINE SULFATE 1 MG: 4 INJECTION, SOLUTION INTRAMUSCULAR; INTRAVENOUS at 11:07

## 2018-01-01 RX ADMIN — MORPHINE SULFATE 2 MG: 4 INJECTION, SOLUTION INTRAMUSCULAR; INTRAVENOUS at 04:15

## 2018-01-01 RX ADMIN — IPRATROPIUM BROMIDE AND ALBUTEROL SULFATE 3 ML: .5; 3 SOLUTION RESPIRATORY (INHALATION) at 20:16

## 2018-01-01 RX ADMIN — PIPERACILLIN SODIUM AND TAZOBACTAM SODIUM 3.38 G: 3; .375 INJECTION, POWDER, LYOPHILIZED, FOR SOLUTION INTRAVENOUS at 23:48

## 2018-01-01 RX ADMIN — ATROPINE SULFATE 2 DROP: 10 SOLUTION OPHTHALMIC at 08:52

## 2018-01-01 RX ADMIN — ATROPINE SULFATE 2 DROP: 10 SOLUTION OPHTHALMIC at 04:53

## 2018-01-01 RX ADMIN — GLYCOPYRROLATE 0.2 MG: 0.2 INJECTION, SOLUTION INTRAMUSCULAR; INTRAVENOUS at 08:36

## 2018-01-01 RX ADMIN — Medication 10 ML: at 21:13

## 2018-01-01 RX ADMIN — MORPHINE SULFATE 2 MG: 4 INJECTION, SOLUTION INTRAMUSCULAR; INTRAVENOUS at 00:00

## 2018-01-01 RX ADMIN — IPRATROPIUM BROMIDE AND ALBUTEROL SULFATE 3 ML: .5; 3 SOLUTION RESPIRATORY (INHALATION) at 07:27

## 2018-01-01 RX ADMIN — TIMOLOL MALEATE 1 DROP: 5 SOLUTION/ DROPS OPHTHALMIC at 10:00

## 2018-01-01 RX ADMIN — TIMOLOL MALEATE 1 DROP: 5 SOLUTION/ DROPS OPHTHALMIC at 08:06

## 2018-01-01 RX ADMIN — DIPHENHYDRAMINE HYDROCHLORIDE 25 MG: 50 INJECTION, SOLUTION INTRAMUSCULAR; INTRAVENOUS at 19:33

## 2018-01-01 RX ADMIN — TIMOLOL MALEATE 1 DROP: 5 SOLUTION/ DROPS OPHTHALMIC at 22:48

## 2018-01-01 RX ADMIN — MORPHINE SULFATE 1 MG: 4 INJECTION, SOLUTION INTRAMUSCULAR; INTRAVENOUS at 12:34

## 2018-01-01 RX ADMIN — GLYCOPYRROLATE 0.2 MG: 0.2 INJECTION, SOLUTION INTRAMUSCULAR; INTRAVENOUS at 20:34

## 2018-01-01 RX ADMIN — ATROPINE SULFATE 2 DROP: 10 SOLUTION OPHTHALMIC at 21:22

## 2018-01-01 RX ADMIN — Medication 20 ML: at 15:35

## 2018-01-01 RX ADMIN — HYDRALAZINE HYDROCHLORIDE 10 MG: 20 INJECTION INTRAMUSCULAR; INTRAVENOUS at 17:19

## 2018-01-01 RX ADMIN — SODIUM CHLORIDE AND POTASSIUM CHLORIDE: 4.5; 1.49 INJECTION, SOLUTION INTRAVENOUS at 06:20

## 2018-01-01 RX ADMIN — Medication 10 ML: at 17:06

## 2018-01-01 RX ADMIN — MORPHINE SULFATE 1 MG: 4 INJECTION, SOLUTION INTRAMUSCULAR; INTRAVENOUS at 17:23

## 2018-01-01 RX ADMIN — DEXTROSE MONOHYDRATE AND SODIUM CHLORIDE 50 ML/HR: 5; .45 INJECTION, SOLUTION INTRAVENOUS at 09:11

## 2018-01-01 RX ADMIN — ATORVASTATIN CALCIUM 40 MG: 20 TABLET, FILM COATED ORAL at 10:41

## 2018-01-01 RX ADMIN — ATROPINE SULFATE 2 DROP: 10 SOLUTION OPHTHALMIC at 23:19

## 2018-01-01 RX ADMIN — INSULIN LISPRO 4 UNITS: 100 INJECTION, SOLUTION INTRAVENOUS; SUBCUTANEOUS at 12:41

## 2018-01-01 RX ADMIN — GLYCOPYRROLATE 0.2 MG: 0.2 INJECTION, SOLUTION INTRAMUSCULAR; INTRAVENOUS at 15:13

## 2018-01-01 RX ADMIN — ASPIRIN 300 MG: 300 SUPPOSITORY RECTAL at 08:29

## 2018-01-01 RX ADMIN — IPRATROPIUM BROMIDE AND ALBUTEROL SULFATE 3 ML: .5; 3 SOLUTION RESPIRATORY (INHALATION) at 21:17

## 2018-01-01 RX ADMIN — ATROPINE SULFATE 2 DROP: 10 SOLUTION OPHTHALMIC at 17:35

## 2018-01-01 RX ADMIN — PIPERACILLIN SODIUM AND TAZOBACTAM SODIUM 3.38 G: 3; .375 INJECTION, POWDER, LYOPHILIZED, FOR SOLUTION INTRAVENOUS at 00:02

## 2018-01-01 RX ADMIN — GLYCOPYRROLATE 0.2 MG: 0.2 INJECTION, SOLUTION INTRAMUSCULAR; INTRAVENOUS at 20:08

## 2018-01-01 RX ADMIN — PIPERACILLIN SODIUM AND TAZOBACTAM SODIUM 3.38 G: 3; .375 INJECTION, POWDER, LYOPHILIZED, FOR SOLUTION INTRAVENOUS at 09:05

## 2018-01-01 RX ADMIN — ATROPINE SULFATE 2 DROP: 10 SOLUTION OPHTHALMIC at 12:09

## 2018-01-01 RX ADMIN — MORPHINE SULFATE 2 MG: 4 INJECTION, SOLUTION INTRAMUSCULAR; INTRAVENOUS at 18:23

## 2018-01-01 RX ADMIN — ASPIRIN 325 MG: 325 TABLET, COATED ORAL at 08:01

## 2018-01-01 RX ADMIN — HYDRALAZINE HYDROCHLORIDE 10 MG: 20 INJECTION INTRAMUSCULAR; INTRAVENOUS at 23:22

## 2018-01-01 RX ADMIN — Medication 10 ML: at 21:44

## 2018-01-01 RX ADMIN — MEMANTINE HYDROCHLORIDE 10 MG: 10 TABLET, FILM COATED ORAL at 18:10

## 2018-01-01 RX ADMIN — GLYCOPYRROLATE 0.2 MG: 0.2 INJECTION, SOLUTION INTRAMUSCULAR; INTRAVENOUS at 05:23

## 2018-01-01 RX ADMIN — LISINOPRIL 10 MG: 5 TABLET ORAL at 18:02

## 2018-01-01 RX ADMIN — HYDRALAZINE HYDROCHLORIDE 10 MG: 20 INJECTION INTRAMUSCULAR; INTRAVENOUS at 16:30

## 2018-01-01 RX ADMIN — GLYCOPYRROLATE 0.2 MG: 0.2 INJECTION, SOLUTION INTRAMUSCULAR; INTRAVENOUS at 04:20

## 2018-01-01 RX ADMIN — Medication 20 ML: at 17:06

## 2018-01-01 RX ADMIN — POTASSIUM CHLORIDE 40 MEQ: 1.5 POWDER, FOR SOLUTION ORAL at 19:06

## 2018-01-01 RX ADMIN — MORPHINE SULFATE 2 MG: 4 INJECTION, SOLUTION INTRAMUSCULAR; INTRAVENOUS at 00:50

## 2018-01-01 RX ADMIN — IPRATROPIUM BROMIDE AND ALBUTEROL SULFATE 3 ML: .5; 3 SOLUTION RESPIRATORY (INHALATION) at 07:35

## 2018-01-01 RX ADMIN — ATROPINE SULFATE 2 DROP: 10 SOLUTION OPHTHALMIC at 20:18

## 2018-01-01 RX ADMIN — ENOXAPARIN SODIUM 30 MG: 100 INJECTION SUBCUTANEOUS at 21:27

## 2018-01-01 RX ADMIN — MORPHINE SULFATE 1 MG: 4 INJECTION, SOLUTION INTRAMUSCULAR; INTRAVENOUS at 00:58

## 2018-01-01 RX ADMIN — Medication 10 ML: at 06:00

## 2018-01-01 RX ADMIN — PIPERACILLIN SODIUM,TAZOBACTAM SODIUM 3.38 G: 3; .375 INJECTION, POWDER, FOR SOLUTION INTRAVENOUS at 08:29

## 2018-01-01 RX ADMIN — GLYCOPYRROLATE 0.2 MG: 0.2 INJECTION, SOLUTION INTRAMUSCULAR; INTRAVENOUS at 15:35

## 2018-01-01 RX ADMIN — ATROPINE SULFATE 2 DROP: 10 SOLUTION OPHTHALMIC at 14:47

## 2018-01-01 RX ADMIN — TIMOLOL MALEATE 1 DROP: 5 SOLUTION/ DROPS OPHTHALMIC at 21:00

## 2018-01-01 RX ADMIN — MORPHINE SULFATE 2 MG: 4 INJECTION, SOLUTION INTRAMUSCULAR; INTRAVENOUS at 20:13

## 2018-01-01 RX ADMIN — Medication 20 ML: at 21:50

## 2018-01-01 RX ADMIN — TIMOLOL MALEATE 1 DROP: 5 SOLUTION/ DROPS OPHTHALMIC at 09:26

## 2018-01-01 RX ADMIN — PRAVASTATIN SODIUM 20 MG: 20 TABLET ORAL at 21:29

## 2018-01-01 RX ADMIN — Medication 10 ML: at 14:00

## 2018-01-01 RX ADMIN — ATROPINE SULFATE 2 DROP: 10 SOLUTION OPHTHALMIC at 00:35

## 2018-01-01 RX ADMIN — ENOXAPARIN SODIUM 40 MG: 40 INJECTION SUBCUTANEOUS at 21:12

## 2018-01-01 RX ADMIN — ATROPINE SULFATE 2 DROP: 10 SOLUTION OPHTHALMIC at 11:49

## 2018-01-01 RX ADMIN — Medication 10 ML: at 17:56

## 2018-01-01 RX ADMIN — MORPHINE SULFATE 2 MG: 4 INJECTION, SOLUTION INTRAMUSCULAR; INTRAVENOUS at 17:28

## 2018-01-01 RX ADMIN — POTASSIUM CHLORIDE 10 MEQ: 10 INJECTION, SOLUTION INTRAVENOUS at 21:17

## 2018-01-01 RX ADMIN — GLYCOPYRROLATE 0.2 MG: 0.2 INJECTION, SOLUTION INTRAMUSCULAR; INTRAVENOUS at 07:48

## 2018-01-01 RX ADMIN — GLYCOPYRROLATE 0.2 MG: 0.2 INJECTION, SOLUTION INTRAMUSCULAR; INTRAVENOUS at 14:31

## 2018-01-01 RX ADMIN — POTASSIUM CHLORIDE 10 MEQ: 10 INJECTION, SOLUTION INTRAVENOUS at 22:24

## 2018-01-01 RX ADMIN — PIPERACILLIN SODIUM AND TAZOBACTAM SODIUM 3.38 G: 3; .375 INJECTION, POWDER, LYOPHILIZED, FOR SOLUTION INTRAVENOUS at 17:44

## 2018-01-01 RX ADMIN — PIPERACILLIN SODIUM AND TAZOBACTAM SODIUM 3.38 G: 3; .375 INJECTION, POWDER, LYOPHILIZED, FOR SOLUTION INTRAVENOUS at 08:29

## 2018-01-01 RX ADMIN — POTASSIUM CHLORIDE 10 MEQ: 7.46 INJECTION, SOLUTION INTRAVENOUS at 11:17

## 2018-01-01 RX ADMIN — GLYCOPYRROLATE 0.2 MG: 0.2 INJECTION, SOLUTION INTRAMUSCULAR; INTRAVENOUS at 20:45

## 2018-01-01 RX ADMIN — KETOROLAC TROMETHAMINE 30 MG: 30 INJECTION, SOLUTION INTRAMUSCULAR at 03:59

## 2018-01-01 RX ADMIN — Medication 10 ML: at 04:17

## 2018-01-01 RX ADMIN — DEXTROSE MONOHYDRATE AND SODIUM CHLORIDE 50 ML/HR: 5; .45 INJECTION, SOLUTION INTRAVENOUS at 11:07

## 2018-01-01 RX ADMIN — TIMOLOL MALEATE 1 DROP: 5 SOLUTION OPHTHALMIC at 09:00

## 2018-01-01 RX ADMIN — POTASSIUM CHLORIDE 10 MEQ: 10 INJECTION, SOLUTION INTRAVENOUS at 19:17

## 2018-01-01 RX ADMIN — GLYCOPYRROLATE 0.2 MG: 0.2 INJECTION, SOLUTION INTRAMUSCULAR; INTRAVENOUS at 00:19

## 2018-01-01 RX ADMIN — ASPIRIN 300 MG: 300 SUPPOSITORY RECTAL at 08:58

## 2018-01-01 RX ADMIN — ENOXAPARIN SODIUM 40 MG: 40 INJECTION SUBCUTANEOUS at 10:42

## 2018-01-01 RX ADMIN — CARBIDOPA AND LEVODOPA 1.5 TABLET: 25; 100 TABLET ORAL at 08:01

## 2018-01-01 RX ADMIN — CARBIDOPA AND LEVODOPA 1 TABLET: 25; 100 TABLET ORAL at 22:00

## 2018-01-01 RX ADMIN — ATROPINE SULFATE 2 DROP: 10 SOLUTION OPHTHALMIC at 04:00

## 2018-01-01 RX ADMIN — POTASSIUM CHLORIDE 10 MEQ: 7.46 INJECTION, SOLUTION INTRAVENOUS at 13:23

## 2018-01-01 RX ADMIN — GLYCOPYRROLATE 0.2 MG: 0.2 INJECTION, SOLUTION INTRAMUSCULAR; INTRAVENOUS at 09:21

## 2018-01-01 RX ADMIN — INSULIN LISPRO 3 UNITS: 100 INJECTION, SOLUTION INTRAVENOUS; SUBCUTANEOUS at 11:54

## 2018-01-01 RX ADMIN — ASPIRIN 300 MG: 300 SUPPOSITORY RECTAL at 08:13

## 2018-01-01 RX ADMIN — MORPHINE SULFATE 2 MG: 4 INJECTION, SOLUTION INTRAMUSCULAR; INTRAVENOUS at 00:35

## 2018-01-01 RX ADMIN — AZITHROMYCIN MONOHYDRATE 500 MG: 500 INJECTION, POWDER, LYOPHILIZED, FOR SOLUTION INTRAVENOUS at 17:09

## 2018-01-01 RX ADMIN — DONEPEZIL HYDROCHLORIDE 10 MG: 5 TABLET, FILM COATED ORAL at 21:13

## 2018-01-01 RX ADMIN — ATROPINE SULFATE 2 DROP: 10 SOLUTION OPHTHALMIC at 11:34

## 2018-01-01 RX ADMIN — GLYCOPYRROLATE 0.2 MG: 0.2 INJECTION, SOLUTION INTRAMUSCULAR; INTRAVENOUS at 20:56

## 2018-01-01 RX ADMIN — AMLODIPINE BESYLATE 5 MG: 5 TABLET ORAL at 10:41

## 2018-01-01 RX ADMIN — MEMANTINE 10 MG: 10 TABLET ORAL at 17:15

## 2018-01-01 RX ADMIN — Medication 1 CAPSULE: at 08:57

## 2018-01-01 RX ADMIN — ATROPINE SULFATE 2 DROP: 10 SOLUTION OPHTHALMIC at 00:26

## 2018-01-01 RX ADMIN — TIMOLOL MALEATE 1 DROP: 5 SOLUTION/ DROPS OPHTHALMIC at 09:24

## 2018-01-01 RX ADMIN — PIPERACILLIN SODIUM AND TAZOBACTAM SODIUM 3.38 G: 3; .375 INJECTION, POWDER, LYOPHILIZED, FOR SOLUTION INTRAVENOUS at 07:49

## 2018-01-01 RX ADMIN — CARBIDOPA AND LEVODOPA 1.5 TABLET: 25; 100 TABLET ORAL at 19:07

## 2018-01-01 RX ADMIN — POTASSIUM CHLORIDE 40 MEQ: 1.5 POWDER, FOR SOLUTION ORAL at 16:27

## 2018-01-01 RX ADMIN — IPRATROPIUM BROMIDE AND ALBUTEROL SULFATE 3 ML: .5; 3 SOLUTION RESPIRATORY (INHALATION) at 20:33

## 2018-01-01 RX ADMIN — CARBIDOPA AND LEVODOPA 1 TABLET: 25; 100 TABLET ORAL at 21:29

## 2018-01-01 RX ADMIN — Medication 10 ML: at 07:41

## 2018-01-01 RX ADMIN — SODIUM CHLORIDE AND POTASSIUM CHLORIDE: 4.5; 1.49 INJECTION, SOLUTION INTRAVENOUS at 19:00

## 2018-01-01 RX ADMIN — TIMOLOL MALEATE 1 DROP: 5 SOLUTION OPHTHALMIC at 08:49

## 2018-01-01 RX ADMIN — IPRATROPIUM BROMIDE AND ALBUTEROL SULFATE 3 ML: .5; 3 SOLUTION RESPIRATORY (INHALATION) at 03:54

## 2018-01-01 RX ADMIN — MEMANTINE HYDROCHLORIDE 10 MG: 10 TABLET, FILM COATED ORAL at 10:41

## 2018-01-01 RX ADMIN — GLYCOPYRROLATE 0.2 MG: 0.2 INJECTION, SOLUTION INTRAMUSCULAR; INTRAVENOUS at 00:00

## 2018-01-01 RX ADMIN — INSULIN LISPRO 7 UNITS: 100 INJECTION, SOLUTION INTRAVENOUS; SUBCUTANEOUS at 18:15

## 2018-01-01 RX ADMIN — GLYCOPYRROLATE 0.2 MG: 0.2 INJECTION, SOLUTION INTRAMUSCULAR; INTRAVENOUS at 04:53

## 2018-01-01 RX ADMIN — MORPHINE SULFATE 1 MG: 4 INJECTION, SOLUTION INTRAMUSCULAR; INTRAVENOUS at 14:18

## 2018-01-01 RX ADMIN — IPRATROPIUM BROMIDE AND ALBUTEROL SULFATE 3 ML: .5; 3 SOLUTION RESPIRATORY (INHALATION) at 15:52

## 2018-01-01 RX ADMIN — TIMOLOL MALEATE 1 DROP: 5 SOLUTION/ DROPS OPHTHALMIC at 08:00

## 2018-01-01 RX ADMIN — INSULIN LISPRO 3 UNITS: 100 INJECTION, SOLUTION INTRAVENOUS; SUBCUTANEOUS at 21:48

## 2018-01-01 RX ADMIN — IPRATROPIUM BROMIDE AND ALBUTEROL SULFATE 3 ML: .5; 3 SOLUTION RESPIRATORY (INHALATION) at 19:47

## 2018-01-01 RX ADMIN — ACETAMINOPHEN 650 MG: 650 SUPPOSITORY RECTAL at 23:19

## 2018-01-01 RX ADMIN — Medication 10 ML: at 13:20

## 2018-01-01 RX ADMIN — KETOROLAC TROMETHAMINE 30 MG: 30 INJECTION, SOLUTION INTRAMUSCULAR at 07:57

## 2018-01-01 RX ADMIN — ENOXAPARIN SODIUM 40 MG: 40 INJECTION SUBCUTANEOUS at 10:26

## 2018-01-01 RX ADMIN — MORPHINE SULFATE 1 MG: 4 INJECTION, SOLUTION INTRAMUSCULAR; INTRAVENOUS at 22:00

## 2018-01-01 RX ADMIN — TIMOLOL MALEATE 1 DROP: 5 SOLUTION/ DROPS OPHTHALMIC at 21:48

## 2018-01-01 RX ADMIN — INSULIN LISPRO 4 UNITS: 100 INJECTION, SOLUTION INTRAVENOUS; SUBCUTANEOUS at 06:00

## 2018-01-01 RX ADMIN — IPRATROPIUM BROMIDE AND ALBUTEROL SULFATE 3 ML: .5; 3 SOLUTION RESPIRATORY (INHALATION) at 13:58

## 2018-01-01 RX ADMIN — ASPIRIN 325 MG: 325 TABLET, COATED ORAL at 10:06

## 2018-01-01 RX ADMIN — SODIUM CHLORIDE 500 ML: 900 INJECTION, SOLUTION INTRAVENOUS at 05:03

## 2018-01-01 RX ADMIN — IPRATROPIUM BROMIDE AND ALBUTEROL SULFATE 3 ML: .5; 3 SOLUTION RESPIRATORY (INHALATION) at 14:04

## 2018-01-01 RX ADMIN — ATROPINE SULFATE 2 DROP: 10 SOLUTION OPHTHALMIC at 12:39

## 2018-01-01 RX ADMIN — POTASSIUM CHLORIDE 10 MEQ: 7.46 INJECTION, SOLUTION INTRAVENOUS at 10:18

## 2018-01-01 RX ADMIN — INSULIN LISPRO 1 UNITS: 100 INJECTION, SOLUTION INTRAVENOUS; SUBCUTANEOUS at 21:27

## 2018-01-01 RX ADMIN — INSULIN LISPRO 2 UNITS: 100 INJECTION, SOLUTION INTRAVENOUS; SUBCUTANEOUS at 18:10

## 2018-01-01 RX ADMIN — ENOXAPARIN SODIUM 30 MG: 100 INJECTION SUBCUTANEOUS at 21:51

## 2018-01-01 RX ADMIN — IPRATROPIUM BROMIDE AND ALBUTEROL SULFATE 3 ML: .5; 3 SOLUTION RESPIRATORY (INHALATION) at 20:38

## 2018-01-01 RX ADMIN — ATROPINE SULFATE 2 DROP: 10 SOLUTION OPHTHALMIC at 20:55

## 2018-01-01 RX ADMIN — ATROPINE SULFATE 2 DROP: 10 SOLUTION OPHTHALMIC at 03:56

## 2018-01-01 RX ADMIN — MEMANTINE HYDROCHLORIDE 10 MG: 10 TABLET, FILM COATED ORAL at 19:07

## 2018-01-01 RX ADMIN — ATROPINE SULFATE 2 DROP: 10 SOLUTION OPHTHALMIC at 10:17

## 2018-01-01 RX ADMIN — SODIUM CHLORIDE 75 ML/HR: 900 INJECTION, SOLUTION INTRAVENOUS at 12:05

## 2018-01-01 RX ADMIN — GLYCOPYRROLATE 0.2 MG: 0.2 INJECTION, SOLUTION INTRAMUSCULAR; INTRAVENOUS at 16:01

## 2018-01-01 RX ADMIN — PIPERACILLIN SODIUM AND TAZOBACTAM SODIUM 3.38 G: 3; .375 INJECTION, POWDER, LYOPHILIZED, FOR SOLUTION INTRAVENOUS at 17:08

## 2018-01-01 RX ADMIN — GLYCOPYRROLATE 0.2 MG: 0.2 INJECTION, SOLUTION INTRAMUSCULAR; INTRAVENOUS at 13:28

## 2018-01-01 RX ADMIN — MORPHINE SULFATE 1 MG: 4 INJECTION, SOLUTION INTRAMUSCULAR; INTRAVENOUS at 11:29

## 2018-01-01 RX ADMIN — GLYCOPYRROLATE 0.1 MG: 0.2 INJECTION, SOLUTION INTRAMUSCULAR; INTRAVENOUS at 08:25

## 2018-01-01 RX ADMIN — GLYCOPYRROLATE 0.2 MG: 0.2 INJECTION, SOLUTION INTRAMUSCULAR; INTRAVENOUS at 20:01

## 2018-01-01 RX ADMIN — MEMANTINE HYDROCHLORIDE 10 MG: 10 TABLET, FILM COATED ORAL at 10:07

## 2018-01-01 RX ADMIN — IPRATROPIUM BROMIDE AND ALBUTEROL SULFATE 3 ML: .5; 3 SOLUTION RESPIRATORY (INHALATION) at 07:36

## 2018-01-01 RX ADMIN — GLYCOPYRROLATE 0.2 MG: 0.2 INJECTION, SOLUTION INTRAMUSCULAR; INTRAVENOUS at 17:57

## 2018-01-01 RX ADMIN — MORPHINE SULFATE 2 MG: 4 INJECTION, SOLUTION INTRAMUSCULAR; INTRAVENOUS at 06:27

## 2018-01-01 RX ADMIN — GLYCOPYRROLATE 0.2 MG: 0.2 INJECTION, SOLUTION INTRAMUSCULAR; INTRAVENOUS at 04:31

## 2018-01-01 RX ADMIN — ASCORBIC ACID, VITAMIN A PALMITATE, CHOLECALCIFEROL, THIAMINE HYDROCHLORIDE, RIBOFLAVIN-5 PHOSPHATE SODIUM, PYRIDOXINE HYDROCHLORIDE, NIACINAMIDE, DEXPANTHENOL, ALPHA-TOCOPHEROL ACETATE, VITAMIN K1, FOLIC ACID, BIOTIN, CYANOCOBALAMIN: 200; 3300; 200; 6; 3.6; 6; 40; 15; 10; 150; 600; 60; 5 INJECTION, SOLUTION INTRAVENOUS at 18:32

## 2018-01-01 RX ADMIN — TIMOLOL MALEATE 1 DROP: 5 SOLUTION OPHTHALMIC at 18:00

## 2018-01-01 RX ADMIN — AMOXICILLIN AND CLAVULANATE POTASSIUM 1 TABLET: 875; 125 TABLET, FILM COATED ORAL at 08:26

## 2018-01-01 RX ADMIN — TIMOLOL MALEATE 1 DROP: 5 SOLUTION/ DROPS OPHTHALMIC at 21:35

## 2018-01-01 RX ADMIN — ASPIRIN 81 MG: 81 TABLET, COATED ORAL at 09:26

## 2018-01-01 RX ADMIN — KETOROLAC TROMETHAMINE 30 MG: 30 INJECTION, SOLUTION INTRAMUSCULAR at 23:39

## 2018-01-01 RX ADMIN — IOPAMIDOL 100 ML: 755 INJECTION, SOLUTION INTRAVENOUS at 22:26

## 2018-01-01 RX ADMIN — MORPHINE SULFATE 1 MG: 4 INJECTION, SOLUTION INTRAMUSCULAR; INTRAVENOUS at 06:20

## 2018-01-01 RX ADMIN — INSULIN LISPRO 4 UNITS: 100 INJECTION, SOLUTION INTRAVENOUS; SUBCUTANEOUS at 06:36

## 2018-01-01 RX ADMIN — MORPHINE SULFATE 2 MG: 4 INJECTION, SOLUTION INTRAMUSCULAR; INTRAVENOUS at 22:17

## 2018-01-01 RX ADMIN — ATROPINE SULFATE 2 DROP: 10 SOLUTION OPHTHALMIC at 20:01

## 2018-01-01 RX ADMIN — IPRATROPIUM BROMIDE AND ALBUTEROL SULFATE 3 ML: .5; 3 SOLUTION RESPIRATORY (INHALATION) at 13:16

## 2018-01-01 RX ADMIN — IPRATROPIUM BROMIDE AND ALBUTEROL SULFATE 3 ML: .5; 3 SOLUTION RESPIRATORY (INHALATION) at 07:34

## 2018-01-01 RX ADMIN — GLYCOPYRROLATE 0.2 MG: 0.2 INJECTION, SOLUTION INTRAMUSCULAR; INTRAVENOUS at 18:43

## 2018-01-01 RX ADMIN — KETOROLAC TROMETHAMINE 30 MG: 30 INJECTION, SOLUTION INTRAMUSCULAR at 14:31

## 2018-01-01 RX ADMIN — KETOROLAC TROMETHAMINE 30 MG: 30 INJECTION, SOLUTION INTRAMUSCULAR at 17:27

## 2018-01-01 RX ADMIN — SODIUM CHLORIDE 1000 ML: 900 INJECTION, SOLUTION INTRAVENOUS at 15:44

## 2018-01-01 RX ADMIN — INSULIN LISPRO 2 UNITS: 100 INJECTION, SOLUTION INTRAVENOUS; SUBCUTANEOUS at 09:25

## 2018-01-01 RX ADMIN — SODIUM CHLORIDE AND POTASSIUM CHLORIDE: 4.5; 1.49 INJECTION, SOLUTION INTRAVENOUS at 08:57

## 2018-01-01 RX ADMIN — INSULIN LISPRO 100 UNITS: 100 INJECTION, SOLUTION INTRAVENOUS; SUBCUTANEOUS at 00:02

## 2018-01-01 RX ADMIN — ENOXAPARIN SODIUM 30 MG: 100 INJECTION SUBCUTANEOUS at 21:48

## 2018-01-01 RX ADMIN — IPRATROPIUM BROMIDE AND ALBUTEROL SULFATE 3 ML: .5; 3 SOLUTION RESPIRATORY (INHALATION) at 08:14

## 2018-01-01 RX ADMIN — ATROPINE SULFATE 2 DROP: 10 SOLUTION OPHTHALMIC at 21:41

## 2018-01-01 RX ADMIN — ASCORBIC ACID, VITAMIN A PALMITATE, CHOLECALCIFEROL, THIAMINE HYDROCHLORIDE, RIBOFLAVIN-5 PHOSPHATE SODIUM, PYRIDOXINE HYDROCHLORIDE, NIACINAMIDE, DEXPANTHENOL, ALPHA-TOCOPHEROL ACETATE, VITAMIN K1, FOLIC ACID, BIOTIN, CYANOCOBALAMIN: 200; 3300; 200; 6; 3.6; 6; 40; 15; 10; 150; 600; 60; 5 INJECTION, SOLUTION INTRAVENOUS at 18:15

## 2018-01-01 RX ADMIN — TIMOLOL MALEATE 1 DROP: 5 SOLUTION/ DROPS OPHTHALMIC at 21:32

## 2018-01-01 RX ADMIN — DEXTROSE MONOHYDRATE AND SODIUM CHLORIDE 50 ML/HR: 5; .45 INJECTION, SOLUTION INTRAVENOUS at 02:22

## 2018-01-01 RX ADMIN — IPRATROPIUM BROMIDE AND ALBUTEROL SULFATE 3 ML: .5; 3 SOLUTION RESPIRATORY (INHALATION) at 07:39

## 2018-01-01 RX ADMIN — PIPERACILLIN SODIUM AND TAZOBACTAM SODIUM 3.38 G: 3; .375 INJECTION, POWDER, LYOPHILIZED, FOR SOLUTION INTRAVENOUS at 00:00

## 2018-01-01 RX ADMIN — MORPHINE SULFATE 2 MG: 4 INJECTION, SOLUTION INTRAMUSCULAR; INTRAVENOUS at 12:53

## 2018-01-01 RX ADMIN — GLYCOPYRROLATE 0.2 MG: 0.2 INJECTION, SOLUTION INTRAMUSCULAR; INTRAVENOUS at 01:09

## 2018-01-01 RX ADMIN — HYDRALAZINE HYDROCHLORIDE 20 MG: 20 INJECTION INTRAMUSCULAR; INTRAVENOUS at 18:17

## 2018-01-01 RX ADMIN — IPRATROPIUM BROMIDE AND ALBUTEROL SULFATE 3 ML: .5; 3 SOLUTION RESPIRATORY (INHALATION) at 12:00

## 2018-01-01 RX ADMIN — ASCORBIC ACID, VITAMIN A PALMITATE, CHOLECALCIFEROL, THIAMINE HYDROCHLORIDE, RIBOFLAVIN-5 PHOSPHATE SODIUM, PYRIDOXINE HYDROCHLORIDE, NIACINAMIDE, DEXPANTHENOL, ALPHA-TOCOPHEROL ACETATE, VITAMIN K1, FOLIC ACID, BIOTIN, CYANOCOBALAMIN: 200; 3300; 200; 6; 3.6; 6; 40; 15; 10; 150; 600; 60; 5 INJECTION, SOLUTION INTRAVENOUS at 20:00

## 2018-01-01 RX ADMIN — CARBIDOPA AND LEVODOPA 1 TABLET: 25; 100 TABLET ORAL at 21:01

## 2018-01-01 RX ADMIN — IPRATROPIUM BROMIDE AND ALBUTEROL SULFATE 3 ML: .5; 3 SOLUTION RESPIRATORY (INHALATION) at 13:11

## 2018-01-01 RX ADMIN — KETOROLAC TROMETHAMINE 30 MG: 30 INJECTION, SOLUTION INTRAMUSCULAR at 09:59

## 2018-01-01 RX ADMIN — ATROPINE SULFATE 2 DROP: 10 SOLUTION OPHTHALMIC at 05:07

## 2018-01-01 RX ADMIN — IPRATROPIUM BROMIDE AND ALBUTEROL SULFATE 3 ML: .5; 3 SOLUTION RESPIRATORY (INHALATION) at 20:29

## 2018-01-01 RX ADMIN — MORPHINE SULFATE 1 MG: 4 INJECTION, SOLUTION INTRAMUSCULAR; INTRAVENOUS at 06:42

## 2018-01-01 RX ADMIN — PIPERACILLIN SODIUM AND TAZOBACTAM SODIUM 3.38 G: 3; .375 INJECTION, POWDER, LYOPHILIZED, FOR SOLUTION INTRAVENOUS at 01:21

## 2018-01-01 RX ADMIN — ATROPINE SULFATE 2 DROP: 10 SOLUTION OPHTHALMIC at 16:30

## 2018-01-01 RX ADMIN — ATROPINE SULFATE 2 DROP: 10 SOLUTION OPHTHALMIC at 00:00

## 2018-01-01 RX ADMIN — MORPHINE SULFATE 1 MG: 4 INJECTION, SOLUTION INTRAMUSCULAR; INTRAVENOUS at 14:31

## 2018-01-01 RX ADMIN — GLYCOPYRROLATE 0.2 MG: 0.2 INJECTION, SOLUTION INTRAMUSCULAR; INTRAVENOUS at 09:49

## 2018-01-01 RX ADMIN — PIPERACILLIN SODIUM AND TAZOBACTAM SODIUM 3.38 G: 3; .375 INJECTION, POWDER, LYOPHILIZED, FOR SOLUTION INTRAVENOUS at 09:03

## 2018-01-01 RX ADMIN — ATROPINE SULFATE 2 DROP: 10 SOLUTION OPHTHALMIC at 09:49

## 2018-01-01 RX ADMIN — PIPERACILLIN SODIUM AND TAZOBACTAM SODIUM 3.38 G: 3; .375 INJECTION, POWDER, LYOPHILIZED, FOR SOLUTION INTRAVENOUS at 01:00

## 2018-01-01 RX ADMIN — ASPIRIN 300 MG: 300 SUPPOSITORY RECTAL at 09:51

## 2018-01-01 RX ADMIN — ATROPINE SULFATE 2 DROP: 10 SOLUTION OPHTHALMIC at 21:39

## 2018-01-01 RX ADMIN — LISINOPRIL 10 MG: 5 TABLET ORAL at 10:41

## 2018-01-01 RX ADMIN — POTASSIUM CHLORIDE 10 MEQ: 7.46 INJECTION, SOLUTION INTRAVENOUS at 11:07

## 2018-01-01 RX ADMIN — CARBIDOPA AND LEVODOPA 1.5 TABLET: 25; 100 TABLET ORAL at 18:10

## 2018-01-01 RX ADMIN — Medication 10 ML: at 04:29

## 2018-01-01 RX ADMIN — DEXTROSE MONOHYDRATE AND SODIUM CHLORIDE 50 ML/HR: 5; .45 INJECTION, SOLUTION INTRAVENOUS at 14:29

## 2018-01-01 RX ADMIN — INSULIN LISPRO 2 UNITS: 100 INJECTION, SOLUTION INTRAVENOUS; SUBCUTANEOUS at 12:03

## 2018-01-01 RX ADMIN — GLYCOPYRROLATE 0.2 MG: 0.2 INJECTION, SOLUTION INTRAMUSCULAR; INTRAVENOUS at 15:05

## 2018-01-01 RX ADMIN — PIPERACILLIN SODIUM AND TAZOBACTAM SODIUM 3.38 G: 3; .375 INJECTION, POWDER, LYOPHILIZED, FOR SOLUTION INTRAVENOUS at 07:52

## 2018-01-01 RX ADMIN — GLYCOPYRROLATE 0.2 MG: 0.2 INJECTION, SOLUTION INTRAMUSCULAR; INTRAVENOUS at 00:58

## 2018-01-01 RX ADMIN — ASCORBIC ACID, VITAMIN A PALMITATE, CHOLECALCIFEROL, THIAMINE HYDROCHLORIDE, RIBOFLAVIN-5 PHOSPHATE SODIUM, PYRIDOXINE HYDROCHLORIDE, NIACINAMIDE, DEXPANTHENOL, ALPHA-TOCOPHEROL ACETATE, VITAMIN K1, FOLIC ACID, BIOTIN, CYANOCOBALAMIN: 200; 3300; 200; 6; 3.6; 6; 40; 15; 10; 150; 600; 60; 5 INJECTION, SOLUTION INTRAVENOUS at 18:14

## 2018-01-01 RX ADMIN — ENOXAPARIN SODIUM 30 MG: 100 INJECTION SUBCUTANEOUS at 22:31

## 2018-01-01 RX ADMIN — MORPHINE SULFATE 2 MG: 4 INJECTION, SOLUTION INTRAMUSCULAR; INTRAVENOUS at 11:34

## 2018-01-01 RX ADMIN — ASPIRIN 325 MG: 325 TABLET, COATED ORAL at 10:41

## 2018-01-01 RX ADMIN — GLYCOPYRROLATE 0.2 MG: 0.2 INJECTION, SOLUTION INTRAMUSCULAR; INTRAVENOUS at 03:41

## 2018-01-01 RX ADMIN — CARBIDOPA AND LEVODOPA 1.5 TABLET: 25; 100 TABLET ORAL at 18:02

## 2018-01-01 RX ADMIN — IPRATROPIUM BROMIDE AND ALBUTEROL SULFATE 3 ML: .5; 3 SOLUTION RESPIRATORY (INHALATION) at 19:30

## 2018-01-01 RX ADMIN — PIPERACILLIN SODIUM AND TAZOBACTAM SODIUM 3.38 G: 3; .375 INJECTION, POWDER, LYOPHILIZED, FOR SOLUTION INTRAVENOUS at 17:12

## 2018-01-01 RX ADMIN — MORPHINE SULFATE 2 MG: 4 INJECTION, SOLUTION INTRAMUSCULAR; INTRAVENOUS at 12:38

## 2018-01-01 RX ADMIN — GLYCOPYRROLATE 0.2 MG: 0.2 INJECTION, SOLUTION INTRAMUSCULAR; INTRAVENOUS at 01:05

## 2018-01-01 RX ADMIN — DEXTROSE MONOHYDRATE AND SODIUM CHLORIDE 50 ML/HR: 5; .45 INJECTION, SOLUTION INTRAVENOUS at 10:51

## 2018-01-01 RX ADMIN — MORPHINE SULFATE 2 MG: 4 INJECTION, SOLUTION INTRAMUSCULAR; INTRAVENOUS at 23:19

## 2018-01-01 RX ADMIN — MEMANTINE 10 MG: 10 TABLET ORAL at 17:42

## 2018-01-01 RX ADMIN — ASPIRIN 300 MG: 300 SUPPOSITORY RECTAL at 10:02

## 2018-01-01 RX ADMIN — TIMOLOL MALEATE 1 DROP: 5 SOLUTION/ DROPS OPHTHALMIC at 09:05

## 2018-01-01 RX ADMIN — MORPHINE SULFATE 2 MG: 4 INJECTION, SOLUTION INTRAMUSCULAR; INTRAVENOUS at 10:16

## 2018-01-01 RX ADMIN — CIPROFLOXACIN 250 MG: 500 TABLET, FILM COATED ORAL at 17:52

## 2018-01-01 RX ADMIN — MORPHINE SULFATE 1 MG: 4 INJECTION, SOLUTION INTRAMUSCULAR; INTRAVENOUS at 07:17

## 2018-01-01 RX ADMIN — IPRATROPIUM BROMIDE AND ALBUTEROL SULFATE 3 ML: .5; 3 SOLUTION RESPIRATORY (INHALATION) at 01:38

## 2018-01-01 RX ADMIN — SODIUM CHLORIDE AND POTASSIUM CHLORIDE: 4.5; 1.49 INJECTION, SOLUTION INTRAVENOUS at 03:02

## 2018-01-01 RX ADMIN — IPRATROPIUM BROMIDE AND ALBUTEROL SULFATE 3 ML: .5; 3 SOLUTION RESPIRATORY (INHALATION) at 02:00

## 2018-01-01 RX ADMIN — KETOROLAC TROMETHAMINE 30 MG: 30 INJECTION, SOLUTION INTRAMUSCULAR at 07:17

## 2018-01-01 RX ADMIN — PIPERACILLIN SODIUM AND TAZOBACTAM SODIUM 3.38 G: 3; .375 INJECTION, POWDER, LYOPHILIZED, FOR SOLUTION INTRAVENOUS at 16:41

## 2018-01-01 RX ADMIN — GLYCOPYRROLATE 0.2 MG: 0.2 INJECTION, SOLUTION INTRAMUSCULAR; INTRAVENOUS at 03:59

## 2018-01-01 RX ADMIN — Medication 20 ML: at 16:00

## 2018-01-01 RX ADMIN — LISINOPRIL 10 MG: 5 TABLET ORAL at 10:26

## 2018-01-01 RX ADMIN — IPRATROPIUM BROMIDE AND ALBUTEROL SULFATE 3 ML: .5; 3 SOLUTION RESPIRATORY (INHALATION) at 08:46

## 2018-01-01 RX ADMIN — GLYCOPYRROLATE 0.2 MG: 0.2 INJECTION, SOLUTION INTRAMUSCULAR; INTRAVENOUS at 11:41

## 2018-01-01 RX ADMIN — ATROPINE SULFATE 2 DROP: 10 SOLUTION OPHTHALMIC at 17:09

## 2018-01-01 RX ADMIN — GLYCOPYRROLATE 0.2 MG: 0.2 INJECTION, SOLUTION INTRAMUSCULAR; INTRAVENOUS at 08:52

## 2018-01-01 RX ADMIN — IPRATROPIUM BROMIDE AND ALBUTEROL SULFATE 3 ML: .5; 3 SOLUTION RESPIRATORY (INHALATION) at 02:06

## 2018-01-01 RX ADMIN — PIPERACILLIN SODIUM AND TAZOBACTAM SODIUM 3.38 G: 3; .375 INJECTION, POWDER, LYOPHILIZED, FOR SOLUTION INTRAVENOUS at 00:53

## 2018-01-01 RX ADMIN — MEMANTINE 10 MG: 10 TABLET ORAL at 21:27

## 2018-01-01 RX ADMIN — PIPERACILLIN SODIUM AND TAZOBACTAM SODIUM 3.38 G: 3; .375 INJECTION, POWDER, LYOPHILIZED, FOR SOLUTION INTRAVENOUS at 17:15

## 2018-01-01 RX ADMIN — IPRATROPIUM BROMIDE AND ALBUTEROL SULFATE 3 ML: .5; 3 SOLUTION RESPIRATORY (INHALATION) at 13:15

## 2018-01-01 RX ADMIN — ACETAMINOPHEN 650 MG: 650 SUPPOSITORY RECTAL at 18:37

## 2018-01-01 RX ADMIN — MORPHINE SULFATE 2 MG: 4 INJECTION, SOLUTION INTRAMUSCULAR; INTRAVENOUS at 05:07

## 2018-01-01 RX ADMIN — MORPHINE SULFATE 2 MG: 4 INJECTION, SOLUTION INTRAMUSCULAR; INTRAVENOUS at 13:13

## 2018-01-01 RX ADMIN — Medication 5 ML: at 21:59

## 2018-01-01 RX ADMIN — TIMOLOL MALEATE 1 DROP: 5 SOLUTION/ DROPS OPHTHALMIC at 21:45

## 2018-01-01 RX ADMIN — TIMOLOL MALEATE 1 DROP: 5 SOLUTION/ DROPS OPHTHALMIC at 20:04

## 2018-01-01 RX ADMIN — PIPERACILLIN SODIUM AND TAZOBACTAM SODIUM 3.38 G: 3; .375 INJECTION, POWDER, LYOPHILIZED, FOR SOLUTION INTRAVENOUS at 08:25

## 2018-01-01 RX ADMIN — MORPHINE SULFATE 1 MG: 4 INJECTION, SOLUTION INTRAMUSCULAR; INTRAVENOUS at 21:58

## 2018-01-01 RX ADMIN — POTASSIUM CHLORIDE 10 MEQ: 7.46 INJECTION, SOLUTION INTRAVENOUS at 10:03

## 2018-01-01 RX ADMIN — ENOXAPARIN SODIUM 30 MG: 100 INJECTION SUBCUTANEOUS at 21:02

## 2018-01-01 RX ADMIN — CARBIDOPA AND LEVODOPA 2 TABLET: 25; 100 TABLET ORAL at 06:15

## 2018-01-01 RX ADMIN — GLYCOPYRROLATE 0.2 MG: 0.2 INJECTION, SOLUTION INTRAMUSCULAR; INTRAVENOUS at 01:21

## 2018-01-01 RX ADMIN — GLYCOPYRROLATE 0.2 MG: 0.2 INJECTION, SOLUTION INTRAMUSCULAR; INTRAVENOUS at 23:52

## 2018-01-01 RX ADMIN — IPRATROPIUM BROMIDE AND ALBUTEROL SULFATE 3 ML: .5; 3 SOLUTION RESPIRATORY (INHALATION) at 07:50

## 2018-01-01 RX ADMIN — GLYCOPYRROLATE 0.2 MG: 0.2 INJECTION, SOLUTION INTRAMUSCULAR; INTRAVENOUS at 00:26

## 2018-01-01 RX ADMIN — GLYCOPYRROLATE 0.2 MG: 0.2 INJECTION, SOLUTION INTRAMUSCULAR; INTRAVENOUS at 00:50

## 2018-01-01 RX ADMIN — IPRATROPIUM BROMIDE AND ALBUTEROL SULFATE 3 ML: .5; 3 SOLUTION RESPIRATORY (INHALATION) at 01:08

## 2018-01-01 RX ADMIN — CARBIDOPA AND LEVODOPA 1 TABLET: 25; 100 TABLET ORAL at 17:15

## 2018-01-01 RX ADMIN — MORPHINE SULFATE 2 MG: 4 INJECTION, SOLUTION INTRAMUSCULAR; INTRAVENOUS at 06:20

## 2018-01-01 RX ADMIN — ASCORBIC ACID, VITAMIN A PALMITATE, CHOLECALCIFEROL, THIAMINE HYDROCHLORIDE, RIBOFLAVIN-5 PHOSPHATE SODIUM, PYRIDOXINE HYDROCHLORIDE, NIACINAMIDE, DEXPANTHENOL, ALPHA-TOCOPHEROL ACETATE, VITAMIN K1, FOLIC ACID, BIOTIN, CYANOCOBALAMIN: 200; 3300; 200; 6; 3.6; 6; 40; 15; 10; 150; 600; 60; 5 INJECTION, SOLUTION INTRAVENOUS at 18:00

## 2018-01-01 RX ADMIN — MORPHINE SULFATE 1 MG: 4 INJECTION, SOLUTION INTRAMUSCULAR; INTRAVENOUS at 06:21

## 2018-01-01 RX ADMIN — DEXTROSE MONOHYDRATE AND SODIUM CHLORIDE 50 ML/HR: 5; .45 INJECTION, SOLUTION INTRAVENOUS at 13:02

## 2018-01-01 RX ADMIN — GLYCOPYRROLATE 0.2 MG: 0.2 INJECTION, SOLUTION INTRAMUSCULAR; INTRAVENOUS at 04:58

## 2018-01-01 RX ADMIN — PIPERACILLIN SODIUM AND TAZOBACTAM SODIUM 3.38 G: 3; .375 INJECTION, POWDER, LYOPHILIZED, FOR SOLUTION INTRAVENOUS at 08:07

## 2018-01-01 RX ADMIN — ATROPINE SULFATE 2 DROP: 10 SOLUTION OPHTHALMIC at 01:08

## 2018-01-01 RX ADMIN — ENOXAPARIN SODIUM 30 MG: 100 INJECTION SUBCUTANEOUS at 21:16

## 2018-01-01 RX ADMIN — PIPERACILLIN SODIUM AND TAZOBACTAM SODIUM 3.38 G: 3; .375 INJECTION, POWDER, LYOPHILIZED, FOR SOLUTION INTRAVENOUS at 00:32

## 2018-01-01 RX ADMIN — CEPHALEXIN 500 MG: 250 CAPSULE ORAL at 15:37

## 2018-01-01 RX ADMIN — ENOXAPARIN SODIUM 40 MG: 40 INJECTION SUBCUTANEOUS at 21:29

## 2018-01-01 RX ADMIN — TIMOLOL MALEATE 1 DROP: 5 SOLUTION/ DROPS OPHTHALMIC at 10:08

## 2018-01-01 RX ADMIN — INSULIN LISPRO 3 UNITS: 100 INJECTION, SOLUTION INTRAVENOUS; SUBCUTANEOUS at 17:45

## 2018-01-01 RX ADMIN — PIPERACILLIN SODIUM AND TAZOBACTAM SODIUM 3.38 G: 3; .375 INJECTION, POWDER, LYOPHILIZED, FOR SOLUTION INTRAVENOUS at 23:22

## 2018-01-01 RX ADMIN — DONEPEZIL HYDROCHLORIDE 10 MG: 5 TABLET, FILM COATED ORAL at 21:00

## 2018-01-01 RX ADMIN — POTASSIUM CHLORIDE 10 MEQ: 7.46 INJECTION, SOLUTION INTRAVENOUS at 12:18

## 2018-01-01 RX ADMIN — PIPERACILLIN SODIUM AND TAZOBACTAM SODIUM 3.38 G: 3; .375 INJECTION, POWDER, LYOPHILIZED, FOR SOLUTION INTRAVENOUS at 16:08

## 2018-01-01 RX ADMIN — PIPERACILLIN SODIUM AND TAZOBACTAM SODIUM 3.38 G: 3; .375 INJECTION, POWDER, LYOPHILIZED, FOR SOLUTION INTRAVENOUS at 00:55

## 2018-01-01 RX ADMIN — PIPERACILLIN SODIUM AND TAZOBACTAM SODIUM 3.38 G: 3; .375 INJECTION, POWDER, LYOPHILIZED, FOR SOLUTION INTRAVENOUS at 16:39

## 2018-01-01 RX ADMIN — ENOXAPARIN SODIUM 40 MG: 40 INJECTION SUBCUTANEOUS at 10:07

## 2018-01-01 RX ADMIN — INSULIN LISPRO 3 UNITS: 100 INJECTION, SOLUTION INTRAVENOUS; SUBCUTANEOUS at 12:20

## 2018-01-01 RX ADMIN — ATROPINE SULFATE 2 DROP: 10 SOLUTION OPHTHALMIC at 00:20

## 2018-01-01 RX ADMIN — ENOXAPARIN SODIUM 40 MG: 40 INJECTION SUBCUTANEOUS at 19:48

## 2018-01-01 RX ADMIN — ENOXAPARIN SODIUM 40 MG: 40 INJECTION SUBCUTANEOUS at 08:02

## 2018-01-01 RX ADMIN — DONEPEZIL HYDROCHLORIDE 10 MG: 5 TABLET, FILM COATED ORAL at 22:29

## 2018-01-01 RX ADMIN — INSULIN LISPRO 3 UNITS: 100 INJECTION, SOLUTION INTRAVENOUS; SUBCUTANEOUS at 12:11

## 2018-01-01 RX ADMIN — PIPERACILLIN SODIUM AND TAZOBACTAM SODIUM 3.38 G: 3; .375 INJECTION, POWDER, LYOPHILIZED, FOR SOLUTION INTRAVENOUS at 10:08

## 2018-01-01 RX ADMIN — MEMANTINE HYDROCHLORIDE 10 MG: 10 TABLET, FILM COATED ORAL at 18:02

## 2018-01-01 RX ADMIN — MEMANTINE HYDROCHLORIDE 10 MG: 10 TABLET, FILM COATED ORAL at 10:30

## 2018-01-01 RX ADMIN — INSULIN LISPRO 4 UNITS: 100 INJECTION, SOLUTION INTRAVENOUS; SUBCUTANEOUS at 07:45

## 2018-01-01 RX ADMIN — KETOROLAC TROMETHAMINE 30 MG: 30 INJECTION, SOLUTION INTRAMUSCULAR at 12:24

## 2018-01-01 RX ADMIN — GLYCOPYRROLATE 0.1 MG: 0.2 INJECTION, SOLUTION INTRAMUSCULAR; INTRAVENOUS at 08:47

## 2018-01-01 RX ADMIN — Medication 1 CAPSULE: at 09:26

## 2018-01-01 RX ADMIN — Medication 10 ML: at 04:16

## 2018-01-01 RX ADMIN — ATROPINE SULFATE 2 DROP: 10 SOLUTION OPHTHALMIC at 03:51

## 2018-01-01 RX ADMIN — INSULIN LISPRO 3 UNITS: 100 INJECTION, SOLUTION INTRAVENOUS; SUBCUTANEOUS at 12:21

## 2018-01-01 RX ADMIN — I.V. FAT EMULSION 500 ML: 20 EMULSION INTRAVENOUS at 18:51

## 2018-01-01 RX ADMIN — Medication 40 ML: at 13:33

## 2018-01-01 RX ADMIN — AMOXICILLIN AND CLAVULANATE POTASSIUM 1 TABLET: 875; 125 TABLET, FILM COATED ORAL at 21:29

## 2018-01-01 RX ADMIN — ENOXAPARIN SODIUM 40 MG: 40 INJECTION SUBCUTANEOUS at 21:00

## 2018-01-01 RX ADMIN — POTASSIUM CHLORIDE 40 MEQ: 750 TABLET, FILM COATED, EXTENDED RELEASE ORAL at 21:37

## 2018-01-01 RX ADMIN — Medication 10 ML: at 05:39

## 2018-01-01 RX ADMIN — GLYCOPYRROLATE 0.1 MG: 0.2 INJECTION, SOLUTION INTRAMUSCULAR; INTRAVENOUS at 05:07

## 2018-01-01 RX ADMIN — IPRATROPIUM BROMIDE AND ALBUTEROL SULFATE 3 ML: .5; 3 SOLUTION RESPIRATORY (INHALATION) at 08:26

## 2018-01-01 RX ADMIN — PIPERACILLIN SODIUM AND TAZOBACTAM SODIUM 3.38 G: 3; .375 INJECTION, POWDER, LYOPHILIZED, FOR SOLUTION INTRAVENOUS at 16:25

## 2018-01-01 RX ADMIN — AMLODIPINE BESYLATE 5 MG: 5 TABLET ORAL at 10:26

## 2018-01-01 RX ADMIN — CARBIDOPA AND LEVODOPA 2 TABLET: 25; 100 TABLET ORAL at 06:30

## 2018-01-01 RX ADMIN — PRAVASTATIN SODIUM 20 MG: 20 TABLET ORAL at 23:51

## 2018-01-01 RX ADMIN — TIMOLOL MALEATE 1 DROP: 5 SOLUTION/ DROPS OPHTHALMIC at 07:55

## 2018-01-01 RX ADMIN — GLYCOPYRROLATE 0.2 MG: 0.2 INJECTION, SOLUTION INTRAMUSCULAR; INTRAVENOUS at 16:30

## 2018-01-01 RX ADMIN — GLYCOPYRROLATE 0.2 MG: 0.2 INJECTION, SOLUTION INTRAMUSCULAR; INTRAVENOUS at 00:35

## 2018-01-01 RX ADMIN — PIPERACILLIN SODIUM AND TAZOBACTAM SODIUM 3.38 G: 3; .375 INJECTION, POWDER, LYOPHILIZED, FOR SOLUTION INTRAVENOUS at 00:44

## 2018-01-01 RX ADMIN — ATROPINE SULFATE 2 DROP: 10 SOLUTION OPHTHALMIC at 04:17

## 2018-01-01 RX ADMIN — TIMOLOL MALEATE 1 DROP: 5 SOLUTION/ DROPS OPHTHALMIC at 21:50

## 2018-01-01 RX ADMIN — TIMOLOL MALEATE 1 DROP: 5 SOLUTION/ DROPS OPHTHALMIC at 22:31

## 2018-01-01 RX ADMIN — Medication 10 ML: at 14:36

## 2018-01-01 RX ADMIN — DONEPEZIL HYDROCHLORIDE 10 MG: 5 TABLET, FILM COATED ORAL at 21:29

## 2018-01-01 RX ADMIN — CEFTRIAXONE SODIUM 1 G: 1 INJECTION, POWDER, FOR SOLUTION INTRAMUSCULAR; INTRAVENOUS at 17:09

## 2018-01-01 RX ADMIN — ATROPINE SULFATE 2 DROP: 10 SOLUTION OPHTHALMIC at 14:18

## 2018-01-01 RX ADMIN — ATORVASTATIN CALCIUM 40 MG: 20 TABLET, FILM COATED ORAL at 08:00

## 2018-01-01 RX ADMIN — IPRATROPIUM BROMIDE AND ALBUTEROL SULFATE 3 ML: .5; 3 SOLUTION RESPIRATORY (INHALATION) at 13:40

## 2018-01-01 RX ADMIN — CARBIDOPA AND LEVODOPA 1.5 TABLET: 25; 100 TABLET ORAL at 10:41

## 2018-01-01 RX ADMIN — GLYCOPYRROLATE 0.2 MG: 0.2 INJECTION, SOLUTION INTRAMUSCULAR; INTRAVENOUS at 03:51

## 2018-01-01 RX ADMIN — Medication 10 ML: at 06:36

## 2018-01-01 RX ADMIN — ATROPINE SULFATE 2 DROP: 10 SOLUTION OPHTHALMIC at 10:03

## 2018-01-01 RX ADMIN — INSULIN LISPRO 3 UNITS: 100 INJECTION, SOLUTION INTRAVENOUS; SUBCUTANEOUS at 00:18

## 2018-01-01 RX ADMIN — MEMANTINE 10 MG: 10 TABLET ORAL at 09:26

## 2018-01-01 RX ADMIN — ASPIRIN 300 MG: 300 SUPPOSITORY RECTAL at 10:08

## 2018-01-01 RX ADMIN — PIPERACILLIN SODIUM AND TAZOBACTAM SODIUM 3.38 G: 3; .375 INJECTION, POWDER, LYOPHILIZED, FOR SOLUTION INTRAVENOUS at 17:05

## 2018-01-01 RX ADMIN — INSULIN LISPRO 2 UNITS: 100 INJECTION, SOLUTION INTRAVENOUS; SUBCUTANEOUS at 11:12

## 2018-01-01 RX ADMIN — MORPHINE SULFATE 2 MG: 4 INJECTION, SOLUTION INTRAMUSCULAR; INTRAVENOUS at 17:16

## 2018-01-01 RX ADMIN — Medication 10 MG: at 08:54

## 2018-01-01 RX ADMIN — Medication 10 ML: at 16:35

## 2018-01-01 RX ADMIN — AMLODIPINE BESYLATE 5 MG: 5 TABLET ORAL at 10:07

## 2018-01-01 RX ADMIN — IPRATROPIUM BROMIDE AND ALBUTEROL SULFATE 3 ML: .5; 3 SOLUTION RESPIRATORY (INHALATION) at 21:21

## 2018-01-01 RX ADMIN — HYDRALAZINE HYDROCHLORIDE 10 MG: 20 INJECTION INTRAMUSCULAR; INTRAVENOUS at 04:25

## 2018-01-01 RX ADMIN — ATROPINE SULFATE 2 DROP: 10 SOLUTION OPHTHALMIC at 20:00

## 2018-01-01 RX ADMIN — POTASSIUM CHLORIDE 10 MEQ: 7.46 INJECTION, SOLUTION INTRAVENOUS at 09:52

## 2018-01-01 RX ADMIN — GLYCOPYRROLATE 0.2 MG: 0.2 INJECTION, SOLUTION INTRAMUSCULAR; INTRAVENOUS at 10:40

## 2018-01-01 RX ADMIN — LISINOPRIL 10 MG: 5 TABLET ORAL at 08:00

## 2018-01-01 RX ADMIN — SODIUM CHLORIDE 1000 ML: 900 INJECTION, SOLUTION INTRAVENOUS at 16:33

## 2018-01-01 RX ADMIN — MORPHINE SULFATE 2 MG: 4 INJECTION, SOLUTION INTRAMUSCULAR; INTRAVENOUS at 05:54

## 2018-01-01 RX ADMIN — POTASSIUM CHLORIDE 10 MEQ: 7.46 INJECTION, SOLUTION INTRAVENOUS at 13:13

## 2018-01-01 RX ADMIN — TIMOLOL MALEATE 1 DROP: 5 SOLUTION/ DROPS OPHTHALMIC at 22:02

## 2018-01-01 RX ADMIN — TIMOLOL MALEATE 1 DROP: 5 SOLUTION OPHTHALMIC at 08:02

## 2018-01-01 RX ADMIN — MORPHINE SULFATE 1 MG: 4 INJECTION, SOLUTION INTRAMUSCULAR; INTRAVENOUS at 17:18

## 2018-01-01 RX ADMIN — GLYCOPYRROLATE 0.2 MG: 0.2 INJECTION, SOLUTION INTRAMUSCULAR; INTRAVENOUS at 19:56

## 2018-01-01 RX ADMIN — DONEPEZIL HYDROCHLORIDE 10 MG: 5 TABLET, FILM COATED ORAL at 23:51

## 2018-01-01 RX ADMIN — IPRATROPIUM BROMIDE AND ALBUTEROL SULFATE 3 ML: .5; 3 SOLUTION RESPIRATORY (INHALATION) at 14:02

## 2018-01-01 RX ADMIN — ATROPINE SULFATE 2 DROP: 10 SOLUTION OPHTHALMIC at 10:16

## 2018-01-01 RX ADMIN — IPRATROPIUM BROMIDE AND ALBUTEROL SULFATE 3 ML: .5; 3 SOLUTION RESPIRATORY (INHALATION) at 01:20

## 2018-01-01 RX ADMIN — SODIUM CHLORIDE 75 ML/HR: 900 INJECTION, SOLUTION INTRAVENOUS at 02:35

## 2018-01-01 RX ADMIN — MORPHINE SULFATE 1 MG: 4 INJECTION, SOLUTION INTRAMUSCULAR; INTRAVENOUS at 13:11

## 2018-01-01 RX ADMIN — PIPERACILLIN SODIUM AND TAZOBACTAM SODIUM 3.38 G: 3; .375 INJECTION, POWDER, LYOPHILIZED, FOR SOLUTION INTRAVENOUS at 09:52

## 2018-01-01 RX ADMIN — TIMOLOL MALEATE 1 DROP: 5 SOLUTION/ DROPS OPHTHALMIC at 11:25

## 2018-01-01 RX ADMIN — Medication 10 ML: at 05:44

## 2018-01-01 RX ADMIN — ATROPINE SULFATE 2 DROP: 10 SOLUTION OPHTHALMIC at 12:53

## 2018-01-01 RX ADMIN — I.V. FAT EMULSION 500 ML: 20 EMULSION INTRAVENOUS at 18:20

## 2018-01-01 RX ADMIN — MORPHINE SULFATE 1 MG: 4 INJECTION, SOLUTION INTRAMUSCULAR; INTRAVENOUS at 18:33

## 2018-01-01 RX ADMIN — PRAVASTATIN SODIUM 20 MG: 20 TABLET ORAL at 21:01

## 2018-01-01 RX ADMIN — LISINOPRIL 10 MG: 5 TABLET ORAL at 10:07

## 2018-01-01 RX ADMIN — CYANOCOBALAMIN 1000 MCG: 1000 INJECTION, SOLUTION INTRAMUSCULAR at 10:24

## 2018-01-01 RX ADMIN — MORPHINE SULFATE 1 MG: 4 INJECTION, SOLUTION INTRAMUSCULAR; INTRAVENOUS at 22:47

## 2018-01-01 RX ADMIN — MORPHINE SULFATE 2 MG: 4 INJECTION, SOLUTION INTRAMUSCULAR; INTRAVENOUS at 11:49

## 2018-01-01 RX ADMIN — TIMOLOL MALEATE 1 DROP: 5 SOLUTION/ DROPS OPHTHALMIC at 21:28

## 2018-01-01 RX ADMIN — DONEPEZIL HYDROCHLORIDE 10 MG: 5 TABLET, FILM COATED ORAL at 21:27

## 2018-01-01 RX ADMIN — GLYCOPYRROLATE 0.2 MG: 0.2 INJECTION, SOLUTION INTRAMUSCULAR; INTRAVENOUS at 15:09

## 2018-01-01 RX ADMIN — Medication 10 ML: at 05:06

## 2018-01-01 RX ADMIN — PIPERACILLIN SODIUM AND TAZOBACTAM SODIUM 3.38 G: 3; .375 INJECTION, POWDER, LYOPHILIZED, FOR SOLUTION INTRAVENOUS at 09:35

## 2018-01-01 RX ADMIN — GLYCOPYRROLATE 0.2 MG: 0.2 INJECTION, SOLUTION INTRAMUSCULAR; INTRAVENOUS at 11:29

## 2018-01-01 RX ADMIN — SODIUM CHLORIDE 75 ML/HR: 900 INJECTION, SOLUTION INTRAVENOUS at 23:42

## 2018-01-01 RX ADMIN — PIPERACILLIN SODIUM AND TAZOBACTAM SODIUM 3.38 G: 3; .375 INJECTION, POWDER, LYOPHILIZED, FOR SOLUTION INTRAVENOUS at 17:52

## 2018-01-01 RX ADMIN — GLYCOPYRROLATE 0.2 MG: 0.2 INJECTION, SOLUTION INTRAMUSCULAR; INTRAVENOUS at 04:22

## 2018-01-01 RX ADMIN — TIMOLOL MALEATE 1 DROP: 5 SOLUTION/ DROPS OPHTHALMIC at 08:41

## 2018-01-01 RX ADMIN — ATROPINE SULFATE 2 DROP: 10 SOLUTION OPHTHALMIC at 05:23

## 2018-01-01 RX ADMIN — MORPHINE SULFATE 2 MG: 4 INJECTION, SOLUTION INTRAMUSCULAR; INTRAVENOUS at 05:23

## 2018-01-01 RX ADMIN — SODIUM CHLORIDE 75 ML/HR: 900 INJECTION, SOLUTION INTRAVENOUS at 18:22

## 2018-01-01 RX ADMIN — ACETAMINOPHEN 650 MG: 650 SUPPOSITORY RECTAL at 17:40

## 2018-01-01 RX ADMIN — Medication 20 ML: at 16:35

## 2018-01-01 RX ADMIN — TIMOLOL MALEATE 1 DROP: 5 SOLUTION/ DROPS OPHTHALMIC at 20:53

## 2018-01-01 RX ADMIN — TIMOLOL MALEATE 1 DROP: 5 SOLUTION/ DROPS OPHTHALMIC at 21:02

## 2018-01-01 RX ADMIN — MORPHINE SULFATE 1 MG: 4 INJECTION, SOLUTION INTRAMUSCULAR; INTRAVENOUS at 23:39

## 2018-01-01 RX ADMIN — MORPHINE SULFATE 2 MG: 4 INJECTION, SOLUTION INTRAMUSCULAR; INTRAVENOUS at 08:47

## 2018-01-01 RX ADMIN — GLYCOPYRROLATE 0.2 MG: 0.2 INJECTION, SOLUTION INTRAMUSCULAR; INTRAVENOUS at 10:03

## 2018-01-01 RX ADMIN — ENOXAPARIN SODIUM 30 MG: 100 INJECTION SUBCUTANEOUS at 22:01

## 2018-01-01 RX ADMIN — GLYCOPYRROLATE 0.1 MG: 0.2 INJECTION, SOLUTION INTRAMUSCULAR; INTRAVENOUS at 21:22

## 2018-01-01 RX ADMIN — ATROPINE SULFATE 2 DROP: 10 SOLUTION OPHTHALMIC at 17:28

## 2018-01-01 RX ADMIN — TIMOLOL MALEATE 1 DROP: 5 SOLUTION/ DROPS OPHTHALMIC at 10:30

## 2018-01-01 RX ADMIN — AMOXICILLIN AND CLAVULANATE POTASSIUM 1 TABLET: 875; 125 TABLET, FILM COATED ORAL at 21:00

## 2018-01-01 RX ADMIN — ATROPINE SULFATE 2 DROP: 10 SOLUTION OPHTHALMIC at 04:21

## 2018-01-01 RX ADMIN — IPRATROPIUM BROMIDE AND ALBUTEROL SULFATE 3 ML: .5; 3 SOLUTION RESPIRATORY (INHALATION) at 21:19

## 2018-01-01 RX ADMIN — POTASSIUM CHLORIDE 10 MEQ: 7.46 INJECTION, SOLUTION INTRAVENOUS at 11:51

## 2018-01-01 RX ADMIN — DEXTROSE MONOHYDRATE AND SODIUM CHLORIDE 75 ML/HR: 5; .9 INJECTION, SOLUTION INTRAVENOUS at 09:12

## 2018-01-01 RX ADMIN — GLYCOPYRROLATE 0.2 MG: 0.2 INJECTION, SOLUTION INTRAMUSCULAR; INTRAVENOUS at 12:38

## 2018-01-01 RX ADMIN — GLYCOPYRROLATE 0.2 MG: 0.2 INJECTION, SOLUTION INTRAMUSCULAR; INTRAVENOUS at 11:07

## 2018-01-01 RX ADMIN — ATROPINE SULFATE 2 DROP: 10 SOLUTION OPHTHALMIC at 15:35

## 2018-01-01 RX ADMIN — IPRATROPIUM BROMIDE AND ALBUTEROL SULFATE 3 ML: .5; 3 SOLUTION RESPIRATORY (INHALATION) at 20:13

## 2018-01-01 RX ADMIN — GLYCOPYRROLATE 0.2 MG: 0.2 INJECTION, SOLUTION INTRAMUSCULAR; INTRAVENOUS at 04:46

## 2018-01-01 RX ADMIN — Medication 40 ML: at 22:00

## 2018-01-01 RX ADMIN — ASPIRIN 300 MG: 300 SUPPOSITORY RECTAL at 08:06

## 2018-01-01 RX ADMIN — GLYCOPYRROLATE 0.2 MG: 0.2 INJECTION, SOLUTION INTRAMUSCULAR; INTRAVENOUS at 08:10

## 2018-02-25 PROBLEM — G20 PARKINSON DISEASE (HCC): Status: ACTIVE | Noted: 2018-01-01

## 2018-02-25 PROBLEM — F32.A DEPRESSION: Status: ACTIVE | Noted: 2018-01-01

## 2018-02-25 PROBLEM — I63.9 CVA (CEREBRAL VASCULAR ACCIDENT) (HCC): Status: ACTIVE | Noted: 2018-01-01

## 2018-02-25 NOTE — Clinical Note
Status[de-identified] Inpatient [101] Type of Bed: Telemetry [19] Inpatient Hospitalization Certified Necessary for the Following Reasons: 3. Patient receiving treatment that can only be provided in an inpatient setting (further clarification in H&P documentation) Admitting Diagnosis: CVA (cerebral vascular accident) [facility]) [[id number]] Admitting Physician: Felipa Mayer Attending Physician: Felipa Mayer Estimated Length of Stay: 3-4 Midnights Discharge Plan[de-identified] Extended Care Facility (e.g. Adult Home, Nursing Home, etc.)

## 2018-02-25 NOTE — IP AVS SNAPSHOT
[address] 
[phone] Patient: Tarun Luong MRN: [id number] TMC:1/6/5625 About your hospitalization You were admitted on:  February 25, 2018 You last received care in the:  [facility] 5M1 MED SURG 1 You were discharged on:  March 1, 2018 Why you were hospitalized Your primary diagnosis was:  Dysphagia Your diagnoses also included:  Htn (Hypertension), Dementia, Encephalopathy Acute, Parkinson Disease (Hcc), Debility, Type 2 Diabetes Mellitus Without Complication (Hcc) Follow-up Information Follow up With Details Comments Contact Info Paula Pelayo MD In 2 weeks  [address] [facility] 97 [address] [place] [address] 
[phone] [address]  PT/OT and nursing [facility] 40 [facility] [address] 
[phone] Discharge Orders None A check radha indicates which time of day the medication should be taken. My Medications START taking these medications Instructions Each Dose to Equal  
 Morning Noon Evening Bedtime  
 atorvastatin 40 mg tablet Commonly known as:  LIPITOR Your last dose was:  3/1/2018 10:41 AM  
Your next dose is:  Tomorrow morning (3/2) Take 1 Tab by mouth daily. 40 mg CONTINUE taking these medications Instructions Each Dose to Equal  
 Morning Noon Evening Bedtime ARICEPT 10 mg tablet Generic drug:  donepezil Your last dose was:  2/28/2018  9:12 PM  
Your next dose is: Tonight at bedtime (3/1) Take 10 mg by mouth nightly. 10 mg  
    
   
   
   
  
  
 aspirin delayed-release 81 mg tablet Your last dose was:  3/1/2018 at 09:00AM  
Your next dose is:  Tomorrow morning (3/2) Take 81 mg by mouth daily. 81 mg  
    
  
   
   
   
  
 lisinopril 20 mg tablet Commonly known as:  [employer]  
 Your last dose was:  3/1/2018 10:41 AM  
Your next dose is:  Tomorrow morning (3/2) Take 10 mg by mouth daily. 10 mg  
    
  
   
   
   
  
 metFORMIN 500 mg tablet Commonly known as:  GLUCOPHAGE Take 500 mg by mouth two (2) times daily (with meals). 500 mg NAMENDA 10 mg tablet Generic drug:  memantine Your last dose was:  3/1/2018 10:41 AM  
Your next dose is: This evening (3/1 at 6:00pm) Take 10 mg by mouth two (2) times a day. 10 mg NORCO 5-325 mg per tablet Generic drug:  HYDROcodone-acetaminophen Take 1 Tab by mouth three (3) times daily as needed for Pain. 1 Tab NORVASC 5 mg tablet Generic drug:  amLODIPine Your last dose was:  3/1/2018 10:41 AM  
Your next dose is:  Tomorrow morning (3/2) Take 5 mg by mouth daily. 5 mg SINEMET  mg per tablet Generic drug:  carbidopa-levodopa Your last dose was:  3/1/2018 10:41 AM  
Your next dose is: This evening at bedtime (3/1) Take 1.5 Tabs by mouth two (2) times a day. AM and HS  
 1.5 Tab  
    
  
   
   
   
  
  
 timolol 0.5 % ophthalmic solution Commonly known as:  TIMOPTIC Your last dose was:  3/1/2018  9:00 AM  
Your next dose is: This evening (3/1 6:00pm) Administer 1 Drop to both eyes two (2) times a day. 1 Drop STOP taking these medications   
 pravastatin 20 mg tablet Commonly known as:  PRAVACHOL Where to Get Your Medications These medications were sent to [facility]/pharmacy #[id number]- [address] [address] Hours:  24-hours Phone:  [phone]  
  atorvastatin 40 mg tablet Discharge Instructions HOSPITALIST DISCHARGE INSTRUCTIONS 
NAME: Amitachao Selby :  1940 MRN:  [id number] Date/Time:  3/1/2018 8:36 AM 
 
ADMIT DATE: 2/25/2018 DISCHARGE DATE: 3/1/2018 DISCHARGE DIAGNOSIS: 
TIA MEDICATIONS: 
· It is important that you take the medication exactly as they are prescribed. · Keep your medication in the bottles provided by the pharmacist and keep a list of the medication names, dosages, and times to be taken in your wallet. · Do not take other medications without consulting your doctor. Pain Management: per above medications What to do at [facility] Recommended diet:  Pureed diet Recommended activity: Activity as tolerated If you experience any of the following symptoms then please call your primary care physician or return to the emergency room if you cannot get hold of your doctor: 
Fever, chills, nausea, vomiting, diarrhea, change in mentation, falling, bleeding, shortness of breath Follow Up: Follow-up Information Follow up With Details Comments Contact Info Tamiko Gao MD In 2 weeks  [address] [place] [address] 
[phone] Information obtained by : 
I understand that if any problems occur once I am at home I am to contact my physician. I understand and acknowledge receipt of the instructions indicated above. Physician's or R.N.'s Signature                                                                  Date/Time Patient or Representative Signature                                                          Date/Time Introducing [facility] [place] [facility]! [employer] introduces [employer] patient portal. Now you can access parts of your medical record, email your doctor's office, and request medication refills online. 1. In your internet browser, go to [email or web address] 2. Click on the First Time User? Click Here link in the Sign In box. You will see the New Member Sign Up page. 3. Enter your [employer] Access Code exactly as it appears below. You will not need to use this code after youve completed the sign-up process. If you do not sign up before the expiration date, you must request a new code. · [employer] Access Code: [email or web address] Expires: 5/30/2018  9:47 AM 
 
4. Enter the last four digits of your Social Security Number (xxxx) and Date of Birth (mm/dd/yyyy) as indicated and click Submit. You will be taken to the next sign-up page. 5. Create a Updatert ID. This will be your [employer] login ID and cannot be changed, so think of one that is secure and easy to remember. 6. Create a [employer] password. You can change your password at any time. 7. Enter your Password Reset Question and Answer. This can be used at a later time if you forget your password. 8. Enter your e-mail address. You will receive e-mail notification when new information is available in [address] 9. Click Sign Up. You can now view and download portions of your medical record. 10. Click the Download Summary menu link to download a portable copy of your medical information. If you have questions, please visit the Frequently Asked Questions section of the [employer] website. Remember, [employer] is NOT to be used for urgent needs. For medical emergencies, dial 911. Now available from your iPhone and Android! Providers Seen During Your Hospitalization Provider Specialty Primary office phone Venkat Montemayor MD Emergency Medicine [phone] Albaro Iglesias MD Internal Medicine [phone] Your Primary Care Physician (PCP) Primary Care Physician Office Phone Office Fax Karan Cortes [phone] You are allergic to the following Allergen Reactions Seroquel (Quetiapine) Anaphylaxis Haldol (Haloperidol Lactate) Other (comments) \"Comatose state\" Lorazepam Other (comments) Alternated mental status to benzos per family, Morphine Other (comments) Change in mental status Recent Documentation Height Weight BMI Smoking Status 1.778 m 64.9 kg 20.52 kg/m2 Never Smoker Emergency Contacts Name Discharge Info Relation Home Work Mobile Tanya Isabela DISCHARGE CAREGIVER [3] Daughter [21] [phone] [facility] DISCHARGE CAREGIVER [3] Son [22]   [phone] Jennifer Chester  Daughter [21] [phone] Patient Belongings The following personal items are in your possession at time of discharge: 
  Dental Appliances: None  Visual Aid: None      Home Medications: None   Jewelry: None  Clothing: Sent home    Other Valuables: None Please provide this summary of care documentation to your next provider. Signatures-by signing, you are acknowledging that this After Visit Summary has been reviewed with you and you have received a copy. Patient Signature:  ____________________________________________________________ Date:  ____________________________________________________________  
  
[facility] Provider Signature:  ____________________________________________________________ Date:  ____________________________________________________________

## 2018-02-25 NOTE — IP AVS SNAPSHOT
[address] 
[phone] Patient: Radha Hui MRN: [id number] EUW:[birth year] A check radha indicates which time of day the medication should be taken. My Medications START taking these medications Instructions Each Dose to Equal  
 Morning Noon Evening Bedtime  
 atorvastatin 40 mg tablet Commonly known as:  LIPITOR Your last dose was:  3/1/2018 10:41 AM  
Your next dose is:  Tomorrow morning (3/2) Take 1 Tab by mouth daily. 40 mg CONTINUE taking these medications Instructions Each Dose to Equal  
 Morning Noon Evening Bedtime ARICEPT 10 mg tablet Generic drug:  donepezil Your last dose was:  2/28/2018  9:12 PM  
Your next dose is: Tonight at bedtime (3/1) Take 10 mg by mouth nightly. 10 mg  
    
   
   
   
  
  
 aspirin delayed-release 81 mg tablet Your last dose was:  3/1/2018 at 09:00AM  
Your next dose is:  Tomorrow morning (3/2) Take 81 mg by mouth daily. 81 mg  
    
  
   
   
   
  
 lisinopril 20 mg tablet Commonly known as:  Mikkileesa Wells Your last dose was:  3/1/2018 10:41 AM  
Your next dose is:  Tomorrow morning (3/2) Take 10 mg by mouth daily. 10 mg  
    
  
   
   
   
  
 metFORMIN 500 mg tablet Commonly known as:  GLUCOPHAGE Take 500 mg by mouth two (2) times daily (with meals). 500 mg NAMENDA 10 mg tablet Generic drug:  memantine Your last dose was:  3/1/2018 10:41 AM  
Your next dose is: This evening (3/1 at 6:00pm) Take 10 mg by mouth two (2) times a day. 10 mg NORCO 5-325 mg per tablet Generic drug:  HYDROcodone-acetaminophen Take 1 Tab by mouth three (3) times daily as needed for Pain. 1 Tab NORVASC 5 mg tablet Generic drug:  amLODIPine Your last dose was:  3/1/2018 10:41 AM  
Your next dose is:  Tomorrow morning (3/2) Take 5 mg by mouth daily. 5 mg SINEMET  mg per tablet Generic drug:  carbidopa-levodopa Your last dose was:  3/1/2018 10:41 AM  
Your next dose is: This evening at bedtime (3/1) Take 1.5 Tabs by mouth two (2) times a day. AM and HS  
 1.5 Tab  
    
  
   
   
   
  
  
 timolol 0.5 % ophthalmic solution Commonly known as:  TIMOPTIC Your last dose was:  3/1/2018  9:00 AM  
Your next dose is: This evening (3/1 6:00pm) Administer 1 Drop to both eyes two (2) times a day. 1 Drop STOP taking these medications   
 pravastatin 20 mg tablet Commonly known as:  PRAVACHOL Where to Get Your Medications These medications were sent to [facility]/pharmacy #[id number]- [place] , [address]  [address] [place] [address] Hours:  24-hours Phone:  [phone]  
  atorvastatin 40 mg tablet

## 2018-02-26 PROBLEM — G20 PARKINSON DISEASE (HCC): Chronic | Status: ACTIVE | Noted: 2018-01-01

## 2018-02-26 PROBLEM — E11.9 TYPE 2 DIABETES MELLITUS WITHOUT COMPLICATION (HCC): Chronic | Status: ACTIVE | Noted: 2018-01-01

## 2018-02-26 PROBLEM — F32.A DEPRESSION: Chronic | Status: ACTIVE | Noted: 2018-01-01

## 2018-02-26 NOTE — ED TRIAGE NOTES
Patient arrives from home. Daughter states patients speech is slurred, unknown what time this started aide states he has has slurred speech all day. Patient has hx of Parkinsons with weakness on left side. New left sided facial droop noted.  Patient is at baseline with mental status per daughter

## 2018-02-26 NOTE — PROGRESS NOTES
Problem: Falls - Risk of  Goal: *Absence of Falls  Document Maria Teresa Fall Risk and appropriate interventions in the flowsheet.    Fall Risk Interventions:       Mentation Interventions: Door open when patient unattended, More frequent rounding, Reorient patient, Room close to nurse's station, Toileting rounds    Medication Interventions: Patient to call before getting OOB    Elimination Interventions: Call light in reach, Patient to call for help with toileting needs, Toileting schedule/hourly rounds, Urinal in reach    History of Falls Interventions: Door open when patient unattended, Room close to nurse's station

## 2018-02-26 NOTE — ED NOTES
0600 hrs, patient blood sugar = 88, NPO maintained. For MRI/MRA of head, 2D echo and Duplex carotid today. Neurology and Case management to see patient. Right arm maintained on ice pack, still swollen, continue to monitor. IV fluids NS in progress via left AC #22 at 75 ml/hr. Patient is continent/incontinent. All needs attended. Resting comfortable in bed. Awaiting transfer to telemetry.

## 2018-02-26 NOTE — ED NOTES
Shift report received from COLLEEN BATEMAN JR. [facility]. Patient care resumed, all orders noted and carried out. Patient is resting on stretcher. RA, Vital signs monitored. For MRI/MRA, 2D echo,duplex scan in am. Consult Neurology and CM in am.  Continue to monitor.

## 2018-02-26 NOTE — ED PROVIDER NOTES
HPI Comments: 69 y.o. male with past medical history significant for DM, Parkinson's dementia, HTN, metabolic enephalopathy, age-related physical disability, and dysphagia who presents from home via EMS with chief complaint of dysarthria. Pt's daughter reports that the pt was noted to have gone to sleep last night at [id number], ~23 hours ago, talking at his baseline, but has had slurred speech all day today. Daughter is in the process of confirming with home caregiver if the pt woke up with this sx or not (pt typically wakes up between 1030 and 1100). Daughter additionally notes that the pt's face is newly \"twisted\" with right-sided droop today and that the pt has a h/o parkinson's with left-sided weakness at baseline, but he is normally better able to move that side than he has been today. Daughter states that the pt is able to answer most questions but is having increased difficulty saying her name. Pt himself states that he feels ok and does not notice appreciate any change in his own speech. Pt additionally denies CP, SOB, Ha, vomiting, diarrhea, and any difficulty swallowing. Daughter states that the pt has been eating ok today. Daughter additionally notes that she heard the pt \"with some cold\" today, noting cough and congestion. There are no other acute medical concerns at this time. PCP: Rodrigo Chen MD    Full history, physical exam, and ROS unable to be obtained due to:  dementia. Note written by Tyrell Zarate, as dictated by Monica Harris MD 8:00 PM     The history is provided by the patient and a relative (daughter). No  was used.         Past Medical History:   Diagnosis Date    Age-related physical debility     Compression fracture of L3 lumbar vertebra (HCC)     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     Diabetes ([address]     Dysphagia dementia related    Fall at home     Hypertension     Metabolic encephalopathy     Stool color black        Past Surgical History:   Procedure Laterality Date    HX HEENT           Family History:   Problem Relation Age of Onset    Stroke Father        Social History     Social History    Marital status:      Spouse name: N/A    Number of children: N/A    Years of education: N/A     Occupational History    Not on file. Social History Main Topics    Smoking status: Never Smoker    Smokeless tobacco: Never Used    Alcohol use No    Drug use: No    Sexual activity: Not Currently     Other Topics Concern    Not on file     Social History Narrative         ALLERGIES: Seroquel [quetiapine] and Lorazepam    Review of Systems   Unable to perform ROS: Dementia       Vitals:    02/25/18 1950   Pulse: 76   Resp: 16   Temp: 98 °F (36.7 °C)   SpO2: 97%   Weight: 64.9 kg (143 lb)   Height: 5' 10\" (1.778 m)            Physical Exam   Constitutional: He is oriented to person, place, and time. He appears well-developed and well-nourished. No distress. HENT:   Head: Normocephalic and atraumatic. Eyes: Conjunctivae are normal. No scleral icterus. Neck: Neck supple. No tracheal deviation present. Cardiovascular: Normal rate, regular rhythm, normal heart sounds and intact distal pulses. Exam reveals no gallop and no friction rub. No murmur heard. Pulmonary/Chest: Effort normal and breath sounds normal. He has no wheezes. He has no rales. Abdominal: Soft. He exhibits no distension. There is no tenderness. There is no rebound and no guarding. Musculoskeletal: He exhibits no edema. Neurological: He is alert and oriented to person, place, and time. Dysarthric speech, right-sided facial droop, left hemiparesis. Skin: Skin is warm and dry. No rash noted. Psychiatric: He has a normal mood and affect. Nursing note and vitals reviewed.      Note written by Tyrell Mckeon, as dictated by Gopal Chandra MD 9:20 PM      [facility]      ED Course       Procedures      CONSULT NOTE:  8:20 PM Rodney Garnica MD spoke with Dr. Bj Montez, Consult for Neurology. Discussed available diagnostic tests and clinical findings. Dr. Idania Barba agrees with CT / CTA and requests that we call back if there is any evidence of a large vessel occlusion or other acute findings. A/P: suspect acute CVA with some dysarthria, facial droop, worsening left-sided weakness - CT/CTA NAP; admit for further management. Rodney Garnica MD     Consult note: Dr. Connell Feeler - will admit.   Rodney Garnica MD  10:47 PM

## 2018-02-26 NOTE — PROGRESS NOTES
Problem: Dysphagia (Adult)  Goal: *Acute Goals and Plan of Care (Insert Text)  Swallowing goals initiated 2-26-18:  1- tolerate mech soft, thins without coughing at meals  By 3-1-18  2- may need MBS to determine severity of dysphagia by 3-1-18  Speech LAnguage Pathology bedside swallow evaluation  Patient: Arabella Mcdonnell (83 y.o. male)  Date: 2/26/2018  Primary Diagnosis: CVA (cerebral vascular accident) [facility])  CVA (cerebral vascular accident) [facility])        Precautions: aspiration  Fall    ASSESSMENT :  Based on the objective data described below, the patient presents with mild-moderate oral-pharyngeal dysphagia. Possible s/s aspiration with PO. No dysarthria, but does have hesitancy of speech. He was admitted with possible new facial droop and dysarthria. He has been seen by SLP in the past and R facial droop was noted in 2015. His swallowing on previous admission in 2015 was intermittant and varied with cognitive status, health, LOC. PMH: Parkinson's disease. HTN, DM, dysphagia. CT/MRI: atrophy only. Patient will benefit from skilled intervention to address the above impairments. Patients rehabilitation potential is considered to be Fair  Factors which may influence rehabilitation potential include:   []            None noted  [x]            Mental ability/status  [x]            Medical condition  []            Home/family situation and support systems  []            Safety awareness  []            Pain tolerance/management  []            Other:      PLAN :  Recommendations and Planned Interventions: In light of no new neuro dx  (x encephalopathy) will try mech soft, thins. If continued coughing noted at meals, consider MBS. Watch closely for increased s/s aspiration. If family states all this coughing, throat clearing is NEW, then NPO x meds and MBS tomorrow. Frequency/Duration: Patient will be followed by speech-language pathology 5 times a week to address goals.   Discharge Recommendations: To Be Determined     SUBJECTIVE:   Patient stated I saw you at [facility]. 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 Cognitive and Communication Status:  Neurologic State: Alert  Orientation Level: Oriented to person, Disoriented to place, Disoriented to situation, Disoriented to time  Cognition: Impaired decision making, Impulsive, Memory loss, Decreased command following        Safety/Judgement: Lack of insight into deficits  Oral Assessment:  Oral Assessment  Labial:  (?R facial droop. this was noted by SLP in 2015. AT this time differential dx of facial droop is complicated by masked facies. )  Dentition: Poor  Oral Hygiene: WFL  Lingual: No impairment  Mandible: No impairment  P.O. Trials:  Patient Position: upright in bed  Vocal quality prior to P.O.: No impairment  Consistency Presented: Solid;Puree; Thin liquid   RN was running dysphagia screen when SLP entered the room. How Presented: Self-fed/presented;SLP-fed/presented;Spoon;Straw;Successive swallows   ORAL PHASE:   Bolus Acceptance: No impairment  Bolus Formation/Control: Impaired (some R lingual holding of pills in purees on tongue.  Eventually cleared with sips of liquids)  Type of Impairment: Anterior;Posterior;Piecemeal  Propulsion: Delayed (# of seconds)  Oral Residue: Less than 10% of bolus; Lingual;Right   PHARYNGEAL PHASE:   Initiation of Swallow: Delayed (# of seconds) (vaired from mild to moderate)  Laryngeal Elevation: Weak (moderate to severe)  Aspiration Signs/Symptoms:  (patient had coughing and throat clearing after applesauce, suspect pharygneal residue  and/or aspiration;penetration)  Pharyngeal Phase Characteristics: Altered vocal quality; Suspected pharyngeal residue      Patient continued to have coughing for 15 min after swallow eval.    If family reports that this is new, then will need to hold PO until MBS can be completed. Notified RN, MD     MBS in 2015 showed mild-moderate delay , especially with liquids and impaired awarness of pharyngeal residue. Original recommendation was for dysphagia 1, nectars, but by end of hospitalization, he was on regular, thins. Swallowing competency varied from day to day and meal to meal, based on PD, cognition. NOMS:   The NOMS functional outcome measure was used to quantify this patient's level of swallowing impairment. Based on the NOMS, the patient was determined to be at level 5 for swallow function     G Codes: In compliance with CMSs Claims Based Outcome Reporting, the following G-code set was chosen for this patient based the use of the NOMS functional outcome to quantify this patient's level of swallowing impairment. Using the NOMS, the patient was determined to be at level 5 for swallow function which correlates with the CJ= 20-39% level of severity.     Based on the objective assessment provided within this note, the current, goal, and discharge g-codes are as follows:    Swallow  Swallowing:   Swallow Current Status CJ= 20-39%   Swallow Goal Status CJ= 20-39%      NOMS Swallowing Levels:  Level 1 (CN): NPO  Level 2 (CM): NPO but takes consistency in therapy  Level 3 (CL): Takes less than 50% of nutrition p.o. and continues with nonoral feedings; and/or safe with mod cues; and/or max diet restriction  Level 4 (CK): Safe swallow but needs mod cues; and/or mod diet restriction; and/or still requires some nonoral feeding/supplements  Level 5 (CJ): Safe swallow with min diet restriction; and/or needs min cues  Level 6 (CI): Independent with p.o.; rare cues; usually self cues; may need to avoid some foods or needs extra time  Level 7 ([address] [place] [address]): Independent for all p.o.  JOYCE. (2003). National Outcomes Measurement System (NOMS): Adult Speech-Language Pathology User's Guide. Pain:  Pain Scale 1: Numeric (0 - 10)  Pain Intensity 1: 0     After treatment:   []            Patient left in no apparent distress sitting up in chair  []            Patient left in no apparent distress in bed  []            Call bell left within reach  []            Nursing notified  []            Caregiver present  []            Bed alarm activated    COMMUNICATION/EDUCATION:   The patients plan of care including recommendations, planned interventions, and recommended diet changes were discussed with: Registered Nurse. Patient was educated regarding His deficit(s) of dysphagia  as this relates to His diagnosis of encephalopathy. He demonstrated Guarded understanding as evidenced by dementia, memory loss. []            Posted safety precautions in patient's room. [x]            Patient/family have participated as able in goal setting and plan of care. [x]            Patient/family agree to work toward stated goals and plan of care. []            Patient understands intent and goals of therapy, but is neutral about his/her participation. []            Patient is unable to participate in goal setting and plan of care.     Thank you for this referral.  Hannah Fernandez, SLP  Time Calculation: 15 mins

## 2018-02-26 NOTE — PROCEDURES
[address]  *** FINAL REPORT ***    Name: David Beth  MRN: [id number]    Inpatient  : 1940  HIS Order #: [id number]  [id number] [facility] Visit #: [id number]  Date: 2018    TYPE OF TEST: Cerebrovascular Duplex    REASON FOR TEST  Cerebrovascular accident    Right Carotid:-             Proximal               Mid                 Distal  cm/s  Systolic  Diastolic  Systolic  Diastolic  Systolic  Diastolic  CCA:     99.5       6.2                            45.7       7.3  Bulb:  ECA:     46.8       6.2  ICA:     29.6       5.4       33.9       9.0       42.4      10.4  ICA/CCA:  0.6       0.7    ICA Stenosis: Normal    Right Vertebral:-  Finding: Antegrade  Sys:       51.7  Angelina:        7.5    Right Subclavian:    Left Carotid:-            Proximal                Mid                 Distal  cm/s  Systolic  Diastolic  Systolic  Diastolic  Systolic  Diastolic  CCA:     11.4       7.3                            52.3       7.3  Bulb:  ECA:     44.6       6.2  ICA:     25.5       5.3       43.8      10.4       47.4      10.4  ICA/CCA:  0.5       0.7    ICA Stenosis: <50%    Left Vertebral:-  Finding: Antegrade  Sys:       42.4  Angelina:        7.5    Left Subclavian:    INTERPRETATION/FINDINGS  PROCEDURE:  Evaluation of the extracranial cerebrovascular arteries  with ultrasound (B-mode imaging, pulsed Doppler, color Doppler). Includes the common carotid, internal carotid, external carotid, and  vertebral arteries. FINDINGS: Intimal thickening observed in the CCA bilateally. Mild  heterogeneous plaque noted in the left ICA. IMPRESSION: Findings are consistent with 0-49% stenosis of the right  internal carotid and 0-49% stenosis of the left internal carotid. Vertebrals are patent with antegrade flow. ADDITIONAL COMMENTS    I have personally reviewed the data relevant to the interpretation of  this  study.     TECHNOLOGIST: Nelson Panchal RDCS  Signed: 2018 09:27 AM    PHYSICIAN: Natalie Mosqueda Sammi Cardenas MD  Signed: 02/27/2018 11:13 AM

## 2018-02-26 NOTE — PROGRESS NOTES
CM Note:  Met with pt's daughter for d/c planning. PTA pt was able to feed himself at times. He and his wife have 24 hr care aides. He uses a cane and caregiver to help him walk. Pt had home health in the past with [facility]. He has been to rehab at [facility] and [place] in the past.  His emergency contact is his daughter, Karolyn Benavides ([phone]). Pt has Rx coverage and gets his medications from [facility] on [address] [place] [address]. Will f/u on PT/OT/SLP evals and recs. Most likely will need snf. DANIELE Robb    Care Management Interventions  PCP Verified by CM:  Yes (PCP is Dr. Naren Hill.)  Palliative Care Criteria Met (RRAT>21 & CHF Dx)?: No  Transition of Care Consult (CM Consult): Discharge Planning  Physical Therapy Consult: Yes  Occupational Therapy Consult: Yes  Speech Therapy Consult: Yes  Current Support Network: Lives with Spouse (Pt lives with his wife in a 1 story house with 4 entry steps.)  Confirm Follow Up Transport: Other (see comment) (to be determined)  Plan discussed with Pt/Family/Caregiver: Yes  Discharge Location  Discharge Placement: Unable to determine at this time

## 2018-02-26 NOTE — PROGRESS NOTES
1300- TRANSFER - OUT REPORT:    Verbal report given to Life (name) on Mario Jacques  being transferred to 5th floor 512(unit) for routine progression of care       Report consisted of patients Situation, Background, Assessment and   Recommendations(SBAR). Information from the following report(s) SBAR, Kardex, ED Summary, Procedure Summary, Intake/Output, MAR and Cardiac Rhythm NSR was reviewed with the receiving nurse. Lines:   Peripheral IV 02/25/18 Left Antecubital (Active)   Site Assessment Clean, dry, & intact 2/26/2018  7:36 AM   Phlebitis Assessment 0 2/26/2018  7:36 AM   Infiltration Assessment 0 2/26/2018  7:36 AM   Dressing Status Clean, dry, & intact 2/26/2018  7:36 AM   Dressing Type Transparent;Tape 2/26/2018  7:36 AM   Hub Color/Line Status Blue;Flushed;Capped 2/26/2018  7:36 AM   Action Taken Open ports on tubing capped 2/25/2018 11:42 PM   Alcohol Cap Used Yes 2/25/2018 11:42 PM       Peripheral IV 02/25/18 Left;Upper Arm (Active)   Site Assessment Clean, dry, & intact 2/26/2018  7:36 AM   Phlebitis Assessment 0 2/26/2018  7:36 AM   Infiltration Assessment 0 2/26/2018  7:36 AM   Dressing Status Clean, dry, & intact 2/26/2018  7:36 AM   Dressing Type Transparent;Tape 2/26/2018  7:36 AM   Hub Color/Line Status Pink;Flushed;Capped 2/26/2018  7:36 AM   Action Taken Open ports on tubing capped 2/25/2018 11:42 PM   Alcohol Cap Used Yes 2/25/2018 11:42 PM        Opportunity for questions and clarification was provided.       Patient transported with:   Patient-specific medications from [facility]

## 2018-02-26 NOTE — PROGRESS NOTES
[facility]: MED RECONCILIATION    Information obtained from: Patient daughter     Allergies: Seroquel [quetiapine]; Haldol [haloperidol lactate]; Lorazepam; and Morphine    Comment:  Sinemet  mg: Daughter reports patient's sinemet dose was 0.5 tab TID which was increased to 1 tab TID. However, patient's son gives him that as 1.5 tab BID. Prior to Admission Medications:     Medication Documentation Review Audit       Reviewed by Alysa Ware PHARMD (Pharmacist) on 02/25/18 at 2309         Medication Sig Documenting Provider Last Dose Status Taking? amLODIPine (NORVASC) 5 mg tablet Take 5 mg by mouth daily. Historical Provider 2/25/2018 Active Yes    aspirin delayed-release 81 mg tablet Take 81 mg by mouth daily. Historical Provider 2/25/2018 Active Yes    carbidopa-levodopa (SINEMET)  mg per tablet Take 1.5 Tabs by mouth two (2) times a day. AM and HS Historical Provider 2/25/2018 AM Active Yes    donepezil (ARICEPT) 10 mg tablet Take 10 mg by mouth nightly. Historical Provider 2/24/2018 Active Yes    HYDROcodone-acetaminophen (NORCO) 5-325 mg per tablet Take 1 Tab by mouth three (3) times daily as needed for Pain. Historical Provider  Active Yes    lisinopril (PRINIVIL, ZESTRIL) 20 mg tablet Take 10 mg by mouth daily. Historical Provider 2/25/2018 Active Yes    memantine (NAMENDA) 10 mg tablet Take 10 mg by mouth two (2) times a day. Historical Provider 2/25/2018 AM Active Yes    metFORMIN (GLUCOPHAGE) 500 mg tablet Take 500 mg by mouth two (2) times daily (with meals). Historical Provider 2/25/2018 AM Active Yes    pravastatin (PRAVACHOL) 20 mg tablet Take 20 mg by mouth nightly. Historical Provider 2/24/2018 Active Yes    timolol (TIMOPTIC) 0.5 % ophthalmic solution Administer 1 Drop to both eyes two (2) times a day.  Historical Provider 2/25/2018 AM Active Yes                  Alysa Ware, PHARMD   Contact: [phone]

## 2018-02-26 NOTE — PROGRESS NOTES
Problem: Mobility Impaired (Adult and Pediatric)  Goal: *Acute Goals and Plan of Care (Insert Text)  Physical Therapy Goals  Initiated 2/26/2018  1. Patient will move from supine to sit and sit to supine  in bed with minimal assistance/contact guard assist within 7 day(s). 2.  Patient will transfer from bed to chair and chair to bed with moderate assist using the least restrictive device 4/4 trials within 7 day(s). 3.  Patient will perform sit to stand with minimal assistance/contact guard assist within 7 day(s). 4.  Patient will ambulate with minimal assistance/contact guard assist for 50 feet with the least restrictive device within 7 day(s). 5.  Patient will increase Nettles Balance score by at least 6 points indicating improved balance within 7 days. physical Therapy EVALUATION- neuro population    Patient: Maite Hill (89 y.o. male)  Date: 2/26/2018  Primary Diagnosis: CVA (cerebral vascular accident) [facility])  CVA (cerebral vascular accident) [facility])        Precautions:   Fall    ASSESSMENT :  Based on the objective data described below, the patient presents with R facial droop, impaired coordination, slow processing, impaired balance, and limited functional mobility on hospital day 1 of admission with slurred speech, facial droop, and L-sided weakness. Per daughter via RN pt ambulatory for brief household distances using rolling walker and assist of 1 person at home. Pt requiring moderate to maximum assistance for flat surface mobility as below. He follows most commands when offered increased time and offers good participation. Patient will benefit from skilled intervention to address the above impairments.   Patients rehabilitation potential is considered to be Fair  Factors which may influence rehabilitation potential include:   []           None noted  [x]           Mental ability/status  [x]           Medical condition  []           Home/family situation and support systems  []           Safety awareness  []           Pain tolerance/management  []           Other:      PLAN :  Recommendations and Planned Interventions:  [x]             Bed Mobility Training             []      Neuromuscular Re-Education  [x]             Transfer Training                   []      Orthotic/Prosthetic Training  [x]             Gait Training                         []      Modalities  [x]             Therapeutic Exercises           []      Edema Management/Control  [x]             Therapeutic Activities            [x]      Patient and Family Training/Education  []             Other (comment):  Frequency/Duration: Patient will be followed by physical therapy 5 times a week to address goals. Discharge Recommendations: Rehab  Further Equipment Recommendations for Discharge: defer to rehab     SUBJECTIVE:   Patient stated My arm's sore.  Pt identifying RUE at site of previously infiltrated IV. 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 Per RN who spoke with daughter earlier today pt ambulatory from bed to/from restroom using rolling walker and assist x 1. Personal factors and/or comorbidities impacting plan of care: DM, dementia    Home Situation  Home Environment: Private residence  Living Alone: No  Support Systems: Family member(s), Home care staff  Current DME Used/Available at Home: [address]  Pt received supine, agreeable to PT and cleared by RN.       EXAMINATION/PRESENTATION/DECISION MAKING: Critical Behavior:  Neurologic State: Alert  Orientation Level: Oriented to place, Disoriented to situation, Disoriented to time  Cognition: Decreased attention/concentration, Decreased command following     Hearing: Auditory  Auditory Impairment: None  Skin:  LE exposed skin intact  Edema: swelling throughout RUE associated with infiltrated IV per RN. Range Of Motion:      AROM grossly decreased BLEs. PROM generally decreased BLEs. Strength:     generally decreased throughout. B hip flexion 3/5. Remainder of LE strength testing limited by pt command following and coordination limitations. Tone & Sensation:          increased tone throughout extremities. Prefers slight right sidelying and head rotation to R at rest. Able to achieve L cervical rotation to just past midline with verbal cues. Coordination:   unable to follow commands for heel to shin or finger opposition. Functional Mobility:  Bed Mobility:     Supine to Sit: Additional time;Maximum assistance;Assist x2  Sit to Supine: Additional time;Minimum assistance  Scooting: Maximum assistance; Additional time  Transfers:  Sit to Stand: Assist x2; Moderate assistance; Additional time  Stand to Sit: Assist x2; Additional time; Moderate assistance                       Balance:   Sitting: Impaired (Shift to R, flexed trunk)  Sitting - Static: Fair (occasional)  Sitting - Dynamic: Fair (occasional)  Standing: Impaired  Standing - Static: Poor  Standing - Dynamic : Poor  Ambulation/Gait Training:  Distance (ft): 8 Feet (ft)  Assistive Device: Gait belt (hand hold assist x 2)  Ambulation - Level of Assistance: Moderate assistance;Maximum assistance;Assist x2        Gait Abnormalities: Shuffling gait; Step to gait        Base of Support: Narrowed  Stance: Weight shift (posterior)  Speed/Lucille: Pace decreased (<100 feet/min)                     Pt requiring manual assist for UE placement on rolling walker and attempts to lift walker rather than weight bear through it. With HHA pt follows commands for ambulation forward with moderate to max assist x 2, backwards with max assist x 2 and increased posterior lean. Lateral steps with moderate assist x 2. Functional Measure  Nettles Balance Test:    Sitting to Standin  Standing Unsupported: 0  Sitting with Back Unsupported: 2  Standing to Sittin  Transfers: 0  Standing Unsupported with Eyes Closed: 0  Standing Unsupported with Feet Together: 0  Reach Forward with Outstretched Arm: 0   Object: 0  Turn to Look Over Shoulders: 0  Turn 360 Degrees: 0  Alternate Foot on Step/Stool: 0  Standing Unsupported One Foot in Front: 0  Stand on One Le  Total: 2         56=Maximum possible score;   0-20=High fall risk  21-40=Moderate fall risk   41-56=Low fall risk     Nettles Balance Test and G-code impairment scale:  Percentage of Impairment CH    0%   CI    1-19% CJ    20-39% CK    40-59% CL    60-79% CM    80-99% CN     100%   Nettles   Score 0-56 56 45-55 34-44 23-33 12-22 1-11 0       G codes: In compliance with CMSs Claims Based Outcome Reporting, the following G-code set was chosen for this patient based on their primary functional limitation being treated: The outcome measure chosen to determine the severity of the functional limitation was the [place] with a score of 2/56 which was correlated with the impairment scale.     ? Mobility - Walking and Moving Around:     - CURRENT STATUS: CM - 80%-99% impaired, limited or restricted    - GOAL STATUS: CL - 60%-79% impaired, limited or restricted    - D/C STATUS:  ---------------To be determined---------------     Physical Therapy Evaluation Charge Determination   History Examination Presentation Decision-Making   MEDIUM  Complexity : 1-2 comorbidities / personal factors will impact the outcome/ POC  HIGH Complexity : 4+ Standardized tests and measures addressing body structure, function, activity limitation and / or participation in recreation  MEDIUM Complexity : Evolving with changing characteristics  Other outcome measures Nettles  HIGH       Based on the above components, the patient evaluation is determined to be of the following complexity level: MEDIUM      Pain:  Pain Scale 1: Numeric (0 - 10)  Pain Intensity 1: 0              Activity Tolerance:   Limited by elevated BP, reported fatigue. Please refer to the flowsheet for vital signs taken during this treatment. After treatment:   []     Patient left in no apparent distress sitting up in chair  [x]     Patient left in no apparent distress in bed  [x]     Call bell left within reach  [x]     Nursing notified  [x]     Caregiver present (RN)  [x]     Bed alarm activated    COMMUNICATION/EDUCATION:   The patients plan of care was discussed with: Registered Nurse and Rehabilitation Attendant. Patient and/or family was verbally educated regarding signs/symptoms of CVA and TIA. All questions answered with patient indicating fair understanding. [x]  Fall prevention education was provided and the patient/caregiver indicated understanding. [x]  Patient/family have participated as able in goal setting and plan of care. [x]  Patient/family agree to work toward stated goals and plan of care. []  Patient understands intent and goals of therapy, but is neutral about his/her participation. []  Patient is unable to participate in goal setting and plan of care.     Thank you for this referral.  Angelina Garcia, PT, DPT   Time Calculation: 42 mins

## 2018-02-26 NOTE — PROGRESS NOTES
Primary Nurse Jacinda Saldaña RN and Timothy Yancey RN performed a dual skin assessment on this patient. No impairment noted. Mamadou score is 14.

## 2018-02-26 NOTE — PROGRESS NOTES
[facility]  [address] [facility], [address] [place] [address]  [phone]      Medical Progress Note      NAME: Terence Keith   :  1940  MRM:  [id number]    Date/Time: 2018  11:35 AM         Subjective:     Chief Complaint:  Dysphagia:patient cannot really answer me due to confusion, daughter states this persists, as does his facial droop (worse than baseline)    ROS:  (bold if positive, if negative)    Unable to obtain          Objective:       Vitals:          Last 24hrs VS reviewed since prior progress note.  Most recent are:    Visit Vitals    /79    Pulse 66    Temp 97.1 °F (36.2 °C)    Resp 16    Ht 5' 10\" (1.778 m)    Wt 64.9 kg (143 lb)    SpO2 95%    BMI 20.52 kg/m2     SpO2 Readings from Last 6 Encounters:   18 95%   16 92%   12/21/15 97%   11/14/15 98%   10/09/15 97%   03/17/15 95%            Intake/Output Summary (Last 24 hours) at 18 1215  Last data filed at 18 0736   Gross per 24 hour   Intake              464 ml   Output              250 ml   Net              214 ml          Exam:     Physical Exam:    Gen:  Well-developed, thin, frail, elderly, chronically ill-appearing, in no acute distress  HEENT:  Pink conjunctivae, PERRL, hearing intact to voice, moist mucous membranes  Neck:  Supple, without masses, thyroid non-tender  Resp:  No accessory muscle use, clear breath sounds without wheezes rales or rhonchi  Card:  No murmurs, normal S1, S2 without thrills, bruits or peripheral edema  Abd:  Soft, non-tender, non-distended, normoactive bowel sounds are present, no palpable organomegaly and no detectable hernias  Lymph:  No cervical or inguinal adenopathy  Musc:  No cyanosis or clubbing  Skin:  No rashes or ulcers, skin turgor is good  Neuro:  Left facial droop, left hemiparesis, follows some commands appropriately  Psych:  Poor insight, oriented to person, but not place and time, alert       Telemetry reviewed:   normal sinus rhythm    Medications Reviewed: (see below)    Lab Data Reviewed: (see below)    ______________________________________________________________________    Medications:     Current Facility-Administered Medications   Medication Dose Route Frequency    0.9% sodium chloride infusion  75 mL/hr IntraVENous CONTINUOUS    [START ON 2/27/2018] lisinopril (PRINIVIL, ZESTRIL) tablet 10 mg  10 mg Oral DAILY    [START ON 2/27/2018] amLODIPine (NORVASC) tablet 5 mg  5 mg Oral DAILY    sodium chloride (NS) flush 5-10 mL  5-10 mL IntraVENous Q8H    sodium chloride (NS) flush 5-10 mL  5-10 mL IntraVENous PRN    acetaminophen (TYLENOL) tablet 650 mg  650 mg Oral Q4H PRN    Or    acetaminophen (TYLENOL) solution 650 mg  650 mg Per NG tube Q4H PRN    Or    acetaminophen (TYLENOL) suppository 650 mg  650 mg Rectal Q4H PRN    aspirin (ASPIRIN) tablet 325 mg  325 mg Oral DAILY    labetalol (NORMODYNE;TRANDATE) injection 5 mg  5 mg IntraVENous Q10MIN PRN    enoxaparin (LOVENOX) injection 40 mg  40 mg SubCUTAneous Q24H    insulin lispro (HUMALOG) injection   SubCUTAneous Q6H    glucose chewable tablet 16 g  4 Tab Oral PRN    dextrose (D50W) injection syrg 12.5-25 g  12.5-25 g IntraVENous PRN    glucagon (GLUCAGEN) injection 1 mg  1 mg IntraMUSCular PRN    carbidopa-levodopa (SINEMET)  mg per tablet 1.5 Tab  1.5 Tab Oral BID    donepezil (ARICEPT) tablet 10 mg  10 mg Oral QHS    memantine (NAMENDA) tablet 10 mg  10 mg Oral BID    pravastatin (PRAVACHOL) tablet 20 mg  20 mg Oral QHS    timolol (TIMOPTIC) 0.5 % ophthalmic solution 1 Drop  1 Drop Both Eyes BID     Current Outpatient Prescriptions   Medication Sig    amLODIPine (NORVASC) 5 mg tablet Take 5 mg by mouth daily.  aspirin delayed-release 81 mg tablet Take 81 mg by mouth daily.  carbidopa-levodopa (SINEMET)  mg per tablet Take 1.5 Tabs by mouth two (2) times a day.  AM and HS    lisinopril (PRINIVIL, ZESTRIL) 20 mg tablet Take 10 mg by mouth daily.  memantine (NAMENDA) 10 mg tablet Take 10 mg by mouth two (2) times a day.  metFORMIN (GLUCOPHAGE) 500 mg tablet Take 500 mg by mouth two (2) times daily (with meals).  pravastatin (PRAVACHOL) 20 mg tablet Take 20 mg by mouth nightly.  HYDROcodone-acetaminophen (NORCO) 5-325 mg per tablet Take 1 Tab by mouth three (3) times daily as needed for Pain.  donepezil (ARICEPT) 10 mg tablet Take 10 mg by mouth nightly.  timolol (TIMOPTIC) 0.5 % ophthalmic solution Administer 1 Drop to both eyes two (2) times a day. Lab Review:     Recent Labs      02/25/18 2012   WBC  7.4   HGB  13.5   HCT  40.4   PLT  189     Recent Labs      02/25/18 2012   NA  141   K  3.6   CL  105   CO2  25   GLU  96   BUN  15   CREA  0.86   CA  8.6   ALB  3.5   TBILI  0.6   SGOT  14*   ALT  <6*   INR  1.1     Lab Results   Component Value Date/Time    Glucose (POC) 134 (H) 02/26/2018 11:09 AM    Glucose (POC) 88 02/26/2018 06:02 AM    Glucose (POC) 84 02/25/2018 08:21 PM    Glucose (POC) 122 (H) 11/14/2015 06:44 AM    Glucose (POC) 154 (H) 11/13/2015 11:07 PM     No results for input(s): PH, PCO2, PO2, HCO3, FIO2 in the last 72 hours.   Recent Labs      02/25/18 2012   INR  1.1     No results found for: SDES  No results found for: CULT         Assessment:     Principal Problem:    Dysphagia (9/21/2015)    Active Problems:    HTN (hypertension) (9/19/2015)      Dementia (9/20/2015)      Encephalopathy acute (9/20/2015)      Debility (9/21/2015)      Parkinson disease ([facility] [address] [address] (2/25/2018)      Type 2 diabetes mellitus without complication ([facility] [address] (2/60/7346)           Plan:     Principal Problem:    Dysphagia (9/21/2015)   - no evidence of acute CVA on MRI   - Speech notes that facial droop was seen back in 2015, however daughter states it is acutely worse now   - I suspect he has had worsening of previous deficits, possibly due to progression of Parkinsons disease; alternatively, daughter states that he has had hyperammonemia in the past with encephalopathy but has not been taking his lactulose for a few months because it wasn't a problem any more   - check ammonia level   - Neurology to evaluate    Active Problems:    HTN (hypertension) (9/19/2015)   - BP remains elevated, adjusted BP meds as above      Dementia (9/20/2015)/Encephalopathy acute (9/20/2015)   - daughter states he remains confused, checking ammonia       Debility (9/21/2015)   - mobilize with PT/OT, may need rehab      Parkinson disease ([facility] [address] (2/25/2018)   - conitnue Sinemet, may need adjustment of meds which daughter agrees with      Type 2 diabetes mellitus without complication ([facility] [address] (4/36/1132)   - BS look good, follow on current meds      Total time spent with patient: 35 minutes                  Care Plan discussed with: Patient, Family and Nursing Staff    Discussed:  Code Status, Care Plan and D/C Planning    Prophylaxis:  Lovenox    Disposition:  TBD           ___________________________________________________    Attending Physician: Mal Velazquez MD

## 2018-02-26 NOTE — CONSULTS
[facility]: [id number] [facility] [address] [facility] Neurology  [address] [place] [address] [facility]      Name:   Tarun Luong   Medical record #: [id number]  Admission Date: 2/25/2018   Who Consulted: Dr. Thor Servin  Reason for Consult:  Eval and treat TIA/CVA     HPI  Ms. Mel Moncada is a 69year old AAM who was brought to the ED by his daughter after he was found to have difficulty talking which started around 5pm on 2/25/2018. Upon arrival he was noted by the ED physician to have have RT side facial droop and LT side weakness. He was evaluated by tele neurology and not found to be a TPA candidate. Past Medical History:   Diagnosis Date    Age-related physical debility     Compression fracture of L3 lumbar vertebra (HCC)     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     Diabetes ([facility] [address]     Dysphagia dementia related    Fall at home     Hypertension     Stool color black      Social History     Social History    Marital status:      Spouse name: N/A    Number of children: N/A    Years of education: N/A     Occupational History    Not on file.      Social History Main Topics    Smoking status: Never Smoker    Smokeless tobacco: Never Used    Alcohol use No    Drug use: No    Sexual activity: Not Currently     Other Topics Concern    Not on file     Social History Narrative     Family History   Problem Relation Age of Onset    Stroke Father      Allergies   Allergen Reactions    Seroquel [Quetiapine] Anaphylaxis    Haldol [Haloperidol Lactate] Other (comments)     \"Comatose state\"    Lorazepam Other (comments)     Alternated mental status to benzos per family,     Morphine Other (comments)     Change in mental status      Current Facility-Administered Medications   Medication Dose Route Frequency Provider Last Rate Last Dose    0.9% sodium chloride infusion  75 mL/hr IntraVENous CONTINUOUS Renzo Joyce MD 75 mL/hr at 02/26/18 1205 75 mL/hr at 02/26/18 1205    [START ON 2/27/2018] lisinopril (PRINIVIL, ZESTRIL) tablet 10 mg  10 mg Oral DAILY Mena Mojica MD        [START ON 2/27/2018] amLODIPine (NORVASC) tablet 5 mg  5 mg Oral DAILY Mena Mojica MD        sodium chloride (NS) flush 5-10 mL  5-10 mL IntraVENous Q8H Bryson Conway MD   10 mL at 02/26/18 0603    sodium chloride (NS) flush 5-10 mL  5-10 mL IntraVENous PRN Bryson Conway MD        acetaminophen (TYLENOL) tablet 650 mg  650 mg Oral Q4H PRN Bryson Conway MD        Or    acetaminophen (TYLENOL) solution 650 mg  650 mg Per NG tube Q4H PRN Bryson Conway MD        Or    acetaminophen (TYLENOL) suppository 650 mg  650 mg Rectal Q4H PRN Bryson Conway MD        aspirin (ASPIRIN) tablet 325 mg  325 mg Oral DAILY Bryson Conway MD   325 mg at 02/26/18 1026    labetalol (NORMODYNE;TRANDATE) injection 5 mg  5 mg IntraVENous Q10MIN PRN Bryson Conway MD        enoxaparin (LOVENOX) injection 40 mg  40 mg SubCUTAneous Q24H Bryson Conway MD   40 mg at 02/26/18 1026    insulin lispro (HUMALOG) injection   SubCUTAneous Q6H Candace Romeo MD   Stopped at 02/26/18 0000    glucose chewable tablet 16 g  4 Tab Oral PRN Bryson Conway MD        dextrose (D50W) injection syrg 12.5-25 g  12.5-25 g IntraVENous PRN Bryson Conway MD        glucagon (GLUCAGEN) injection 1 mg  1 mg IntraMUSCular PRN Bryson Conway MD        carbidopa-levodopa (SINEMET)  mg per tablet 1.5 Tab  1.5 Tab Oral BID Bryson Conway MD   1.5 Tab at 02/26/18 1030    donepezil (ARICEPT) tablet 10 mg  10 mg Oral QHS Bryson Conway MD   10 mg at 02/25/18 2351    memantine (NAMENDA) tablet 10 mg  10 mg Oral BID Bryson Conway MD   10 mg at 02/26/18 1030    pravastatin (PRAVACHOL) tablet 20 mg  20 mg Oral QHS Bryson Conway MD   20 mg at 02/25/18 3359    timolol (TIMOPTIC) 0.5 % ophthalmic solution 1 Drop  1 Drop Both Eyes BID Candace Romeo MD   1 Drop at 02/26/18 0795       Review of Systems - History obtained from chart review and the patient  General ROS: negative  Neurological ROS: negative for - gait disturbance    Clinical Data:  Imaging review:   · Carotid Duplex:  Findings are consistent with 0-49% stenosis of the right internal carotid and 0-49% stenosis of the left internal carotid. Vertebrals are patent with antegrade flow. · MRI/MRA brain:  Carotid vessels open, MRI not indicative of stroke. Current rhythm: SR to SB      Recent Results (from the past 24 hour(s))   CBC WITH AUTOMATED DIFF    Collection Time: 02/25/18  8:12 PM   Result Value Ref Range    WBC 7.4 4.1 - 11.1 K/uL    RBC 4.25 4. 10 - 5.70 M/uL    HGB 13.5 12.1 - 17.0 g/dL    HCT 40.4 36.6 - 50.3 %    MCV 95.1 80.0 - 99.0 FL    MCH 31.8 26.0 - 34.0 PG    MCHC 33.4 30.0 - 36.5 g/dL    RDW 13.7 11.5 - 14.5 %    PLATELET 217 878 - 698 K/uL    MPV 10.0 8.9 - 12.9 FL    NRBC 0.0 0  WBC    ABSOLUTE NRBC 0.00 0.00 - 0.01 K/uL    NEUTROPHILS 57 32 - 75 %    LYMPHOCYTES 33 12 - 49 %    MONOCYTES 7 5 - 13 %    EOSINOPHILS 2 0 - 7 %    BASOPHILS 0 0 - 1 %    IMMATURE GRANULOCYTES 0 0.0 - 0.5 %    ABS. NEUTROPHILS 4.3 1.8 - 8.0 K/UL    ABS. LYMPHOCYTES 2.4 0.8 - 3.5 K/UL    ABS. MONOCYTES 0.5 0.0 - 1.0 K/UL    ABS. EOSINOPHILS 0.2 0.0 - 0.4 K/UL    ABS. BASOPHILS 0.0 0.0 - 0.1 K/UL    ABS. IMM.  GRANS. 0.0 0.00 - 0.04 K/UL    DF AUTOMATED     PROTHROMBIN TIME + INR    Collection Time: 02/25/18  8:12 PM   Result Value Ref Range    INR 1.1 0.9 - 1.1      Prothrombin time 11.0 9.0 - 82.4 sec   METABOLIC PANEL, COMPREHENSIVE    Collection Time: 02/25/18  8:12 PM   Result Value Ref Range    Sodium 141 136 - 145 mmol/L    Potassium 3.6 3.5 - 5.1 mmol/L    Chloride 105 97 - 108 mmol/L    CO2 25 21 - 32 mmol/L    Anion gap 11 5 - 15 mmol/L    Glucose 96 65 - 100 mg/dL    BUN 15 6 - 20 MG/DL    Creatinine 0.86 0.70 - 1.30 MG/DL    BUN/Creatinine ratio 17 12 - 20      GFR est AA >60 >60 ml/min/1.73m2    GFR est non-AA >60 >60 ml/min/1.73m2    Calcium 8.6 8.5 - 10.1 MG/DL    Bilirubin, total 0.6 0.2 - 1.0 MG/DL    ALT (SGPT) <6 (L) 12 - 78 U/L    AST (SGOT) 14 (L) 15 - 37 U/L    Alk.  phosphatase 87 45 - 117 U/L    Protein, total 6.9 6.4 - 8.2 g/dL    Albumin 3.5 3.5 - 5.0 g/dL    Globulin 3.4 2.0 - 4.0 g/dL    A-G Ratio 1.0 (L) 1.1 - 2.2     TROPONIN I    Collection Time: 02/25/18  8:12 PM   Result Value Ref Range    Troponin-I, Qt. <0.04 <0.05 ng/mL   GLUCOSE, POC    Collection Time: 02/25/18  8:21 PM   Result Value Ref Range    Glucose (POC) 84 65 - 100 mg/dL    Performed by Raul Dee    URINALYSIS W/ REFLEX CULTURE    Collection Time: 02/25/18  8:41 PM   Result Value Ref Range    Color DARK YELLOW      Appearance CLEAR CLEAR      Specific gravity 1.029 1.003 - 1.030      pH (UA) 6.0 5.0 - 8.0      Protein TRACE (A) NEG mg/dL    Glucose NEGATIVE  NEG mg/dL    Ketone TRACE (A) NEG mg/dL    Blood NEGATIVE  NEG      Urobilinogen 1.0 0.2 - 1.0 EU/dL    Nitrites NEGATIVE  NEG      Leukocyte Esterase NEGATIVE  NEG      WBC 0-4 0 - 4 /hpf    RBC 5-10 0 - 5 /hpf    Epithelial cells FEW FEW /lpf    Bacteria NEGATIVE  NEG /hpf    UA:UC IF INDICATED CULTURE NOT INDICATED BY UA RESULT CNI     BILIRUBIN, CONFIRM    Collection Time: 02/25/18  8:41 PM   Result Value Ref Range    Bilirubin UA, confirm NEGATIVE  NEG     LIPID PANEL    Collection Time: 02/26/18  3:49 AM   Result Value Ref Range    LIPID PROFILE          Cholesterol, total 153 <200 MG/DL    Triglyceride 71 <150 MG/DL    HDL Cholesterol 63 MG/DL    LDL, calculated 75.8 0 - 100 MG/DL    VLDL, calculated 14.2 MG/DL    CHOL/HDL Ratio 2.4 0 - 5.0     HEMOGLOBIN A1C WITH EAG    Collection Time: 02/26/18  3:49 AM   Result Value Ref Range    Hemoglobin A1c 5.4 4.2 - 6.3 %    Est. average glucose 108 mg/dL   GLUCOSE, POC    Collection Time: 02/26/18  6:02 AM   Result Value Ref Range    Glucose (POC) 88 65 - 100 mg/dL    Performed by Deepti Jewellmickey Weber, POC    Collection Time: 02/26/18 11:09 AM   Result Value Ref Range    Glucose (POC) 134 (H) 65 - 100 mg/dL    Performed by [employer]    AMMONIA    Collection Time: 18 12:06 PM   Result Value Ref Range    Ammonia 18 <32 UMOL/L       Physical Exam:     Visit Vitals    BP (!) 155/97 (BP 1 Location: Right arm, BP Patient Position: At rest;Supine)  Comment: Jazmyne GARCIA, RN present in the room and aware of BP    Pulse 62    Temp 97.4 °F (36.3 °C)    Resp 18    Ht 5' 10\" (1.778 m)    Wt 64.9 kg (143 lb)    SpO2 98%    BMI 20.52 kg/m2      O2 Device: Room air    Temp (24hrs), Av.7 °F (36.5 °C), Min:97.1 °F (36.2 °C), Max:98.1 °F (36.7 °C)        1901 -  0700  In: 464 [P.O.:30; I.V.:434]  Out: 250 [Urine:250]        Neuro exam from Dr. Jaquan Valencia  Appearance: The patient is well developed, well nourished, AND UNABLE TO  Provide a coherent history and is in no acute distress. Mental Status: Oriented to NAME ONLY. Mood and affect appropriate TO BASELINE. General:  Alert, agitated. Lungs:   Clear to auscultation bilaterally. No crackles/wheezes. Heart:  Abdominal:  Regular rate and rhythm, No murmur, click, rub or gallop. Soft and nondistended   Skin: Skin color, texture, turgor normal.        NEUROLOGICAL EXAM:    Appearance:  Well developed, well nourished, and is in no acute distress. Mental Status: Oriented to person only. Inattentive. No aphasia. Full fund of knowledge. Poor recent and remote memory. (does not know who President is, cannot remember: blue, unicorn, cortez)   Cranial Nerves:   Intact visual fields. PERRL, EOM's full, no nystagmus, no ptosis. Facial sensation is normal. Facial movement is symmetric. Palate is midline. Normal sternocleidomastoid strength. Tongue is midline. Motor:  5/5 strength in upper and lower proximal and distal muscles. Normal bulk and tone. Reflexes:   Deep tendon reflexes 2+/4 and symmetrical.   Sensory:   Normal to temperature and vibration. Gait:  Pt standing steady at bedside. Tremor:   No tremor noted. Cerebellar:  No cerebellar signs present. Neurovascular:  Normal heart sounds and regular rhythm. No carotid bruits. Assessment/Plan:   1. Rule out acute ischemic stroke:    · Continue home ASA  · BP Goal: Less than 160  · Neurochecks:  Every 4 hours until  · Telemetry for at least 24 hours  · Current rhythm: SR to SB    Stroke work up  · Last A1C: 5.4  · Statin if LDL less than 70    · Last TTE:  Pending read  · Follow up MRI/CT to be done at    Risk factors for stroke include:  HTN,   · Discussed with patient  · Discussed signs/symptoms of stroke and when to call 911    3. Mobility:   · Has been OOB. · PT/OT to eval for rehab    4. Diet:    · Working with SLP   · Now on mechanical soft  ·   5. VTE Prophylaxes:   · Lovenox, SCD's     This plan may be modified by Dr. Ana Yates, we will continue to follow       Attending Attestation:     I have reviewed the documentation provided by the nurse practitioner, Ms. Kacy Bhatia, and we have discussed her findings and the clinical impression. I have formulated with her the proposed management plans for this patient. Additionally,  I have personally evaluated the patient to verify the history and to confirm physical findings. Below are my additional comments:  Chart reviewed  Studies reviewed--MRI without CVA  Dopplers fine  Echo without embolic source  LDL elevated above goal and he is only on Pravachol 20  pleasantly demented  Playing with activity blanket  Says month is march  Year is 33  Says everyone asks me that  Tracks  Right face drooped--present on prev exams  Will move all 4 extrems    Demented  No evidence of acute CNS process ie CVA  ?  TIA vs part of dementia   Droop appears old  Cont ASA  Change to lipitor and push LDL below 70  Will return venita Hook MD                           Care Plan discussed with:  Patient x   Family    RN    Care Manager    Consultant/Specialist:       Delisa Brewer NP

## 2018-02-26 NOTE — PROGRESS NOTES
[facility]: MED RECONCILIATION    Information obtained from: Patient daughter     Allergies: Seroquel [quetiapine]; Haldol [haloperidol lactate]; Lorazepam; and Morphine    Comment:  Sinemet  mg: Daughter reports patient's sinemet dose was 0.5 tab TID which was increased to 1 tab TID. However, patient's son gives him that as 1.5 tab BID. Prior to Admission Medications:     Medication Documentation Review Audit       Reviewed by Lam Ferreira PHARMD (Pharmacist) on 02/25/18 at 2309         Medication Sig Documenting Provider Last Dose Status Taking? amLODIPine (NORVASC) 5 mg tablet Take 5 mg by mouth daily. Historical Provider 2/25/2018 Active Yes    aspirin delayed-release 81 mg tablet Take 81 mg by mouth daily. Historical Provider 2/25/2018 Active Yes    carbidopa-levodopa (SINEMET)  mg per tablet Take 1.5 Tabs by mouth two (2) times a day. AM and HS Historical Provider 2/25/2018 AM Active Yes    donepezil (ARICEPT) 10 mg tablet Take 10 mg by mouth nightly. Historical Provider 2/24/2018 Active Yes    HYDROcodone-acetaminophen (NORCO) 5-325 mg per tablet Take 1 Tab by mouth three (3) times daily as needed for Pain. Historical Provider  Active Yes    lisinopril (PRINIVIL, ZESTRIL) 20 mg tablet Take 10 mg by mouth daily. Historical Provider 2/25/2018 Active Yes    memantine (NAMENDA) 10 mg tablet Take 10 mg by mouth two (2) times a day. Historical Provider 2/25/2018 AM Active Yes    metFORMIN (GLUCOPHAGE) 500 mg tablet Take 500 mg by mouth two (2) times daily (with meals). Historical Provider 2/25/2018 AM Active Yes    pravastatin (PRAVACHOL) 20 mg tablet Take 20 mg by mouth nightly. Historical Provider 2/24/2018 Active Yes    timolol (TIMOPTIC) 0.5 % ophthalmic solution Administer 1 Drop to both eyes two (2) times a day.  Historical Provider 2/25/2018 AM Active Yes                  Lam Ferreira, PHARMD   Contact: [phone]

## 2018-02-27 NOTE — PROGRESS NOTES
Problem: Dysphagia (Adult)  Goal: *Acute Goals and Plan of Care (Insert Text)  Swallowing goals initiated 2-26-18:  1- tolerate mech soft, thins without coughing at meals  By 3-1-18  2- may need MBS to determine severity of dysphagia by 3-1-18 -met    Speech Pathology Modified barium swallow Study  Patient: Edmond Phillips (25 y.o. male)  Date: 2/27/2018  Primary Diagnosis: CVA (cerebral vascular accident) [facility])  CVA (cerebral vascular accident) [facility])        Precautions: aspiration  Fall    ASSESSMENT :  Based on the objective data described below, the patient presents with mild-moderate oral-pharyngeal dysphagia. He has oral-pharyngeal residue after the swallow, of which he was completely unaware. Sometimes, the lower pharyngeal residue entered the airway. He has a reduced reaction to this: sometimes throat clearing, sometimes silent aspiration. There is not any 1 consistency that is safer than another and thickening liquids would only add to the residue. Compensatory strategies may reduce aspiration risk, but he will need max cues. .  Moderate aspiration and nutrition risks noted, especially based on his intermittant bouts of poor LOC. Patient will benefit from skilled intervention to address the above impairments. Patients rehabilitation potential is considered to be Fair  Factors which may influence rehabilitation potential include:   []              None noted  [x]              Mental ability/status  [x]              Medical condition  []              Home/family situation and support systems  []              Safety awareness  []              Pain tolerance/management  []              Other:      PLAN :  Recommendations and Planned Interventions:  Dysphagia 1, thins. Pills will probably be very problematic due to cognitive issues. Continue to try. Crush if able: check with pharmacy. Feed only when awake and alert. Alternate drinks and foods. SLP will follow for diet upgrade. Frequency/Duration: Patient will be followed by speech-language pathology 3 times a week to address goals. Discharge Recommendations: Skilled Nursing Facility     SUBJECTIVE:   Patient alert and cooperative in eval, but not opening mouth much. 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     Diabetes ([facility] [address]     Dysphagia dementia related    Fall at home     Hypertension     Stool color black      Past Surgical History:   Procedure Laterality Date    HX HEENT       Prior Level of Function/Home Situation:   Home Situation  Home Environment: Private residence  # Steps to Enter: 4  One/Two Story Residence: One story  Living Alone: No  Support Systems: Family member(s)  Patient Expects to be Discharged to[de-identified] Private residence  Current DME Used/Available at Home: Cane, straight  Diet prior to admission: soft, thins  Current Diet:  NPO   Radiologist: Teofilo Villafana  Film Views: Lateral  Patient Position: upright in hausted chair    Trial 1: Trial 2:   Consistency Presented: Thin liquid;Puree; Solid;Pill/Tablet     How Presented: SLP-fed/presented;Spoon;Straw;Successive swallows      ORAL PHASE:   Unable to propel pill a-p. Had to expectorate     Bolus Acceptance:  (reduced oral opening)     Bolus Formation/Control: Impaired (reduced chew, premature spillage into pharynx): Mastication; Anterior;Posterior;Piecemeal (piecemeal with liquids)  :     Propulsion: Delayed (# of seconds)     Oral Residue: Lingual;Palatal (coating; which spilled to pharynx after the swallow; poor awareness of residue.)   PHARYNGEAL PHASE:      Initiation of Swallow:  (triggered at [facility]ae for purees, solids. Triggered at [facility] for thins. 3-5 second delay)     Timing: Pooling 1-5 sec     Penetration: Trace; After swallow;From residual (from pyriforms usually.  occasioally from valleculae)     Aspiration/Timing: Trace;Repeated; After;From residual; He attempted throat clear reaction, but sometimes aspiration was silent. Pharyngeal Clearance: Pyriform residue  (worse with thicker consistencies; poor sensation of oral and pharyngeal residue)     Attempted Modifications: Alternate liquids/solids     Effective Modifications: Alternate liquids/solids                   Trial 3: Trial 4:                            :    :                                                                        Decreased Tongue Base Retraction?: Yes  Laryngeal Elevation: Reduced excursion with laryngeal vestibule gap (mild)  Aspiration/Penetration Score: 8 (Aspiration-Contrast passes cords/glottis with no effort to eject, ie/silent aspiration) (from pyriform residue)  Pharyngeal Symmetry: Not assessed  Pharyngeal-Esophageal Segment: No impairment             NOMS:   The NOMS functional outcome measure was used to quantify this patient's level of swallowing impairment. Based on the NOMS, the patient was determined to be at level 4 for swallow function     G Codes: In compliance with CMSs Claims Based Outcome Reporting, the following G-code set was chosen for this patient based the use of the NOMS functional outcome to quantify this patient's level of swallowing impairment. Using the NOMS, the patient was determined to be at level 4 for swallow function which correlates with the CK= 40-59% level of severity. Based on the objective assessment provided within this note, the current, goal, and discharge g-codes are as follows:    Swallow  Swallowing:   Swallow Current Status CK= 40-59%   Swallow Goal Status CJ= 20-39%      NOMS Swallowing Levels:  Level 1 (CN): NPO  Level 2 (CM): NPO but takes consistency in therapy  Level 3 (CL): Takes less than 50% of nutrition p.o. and continues with nonoral feedings; and/or safe with mod cues; and/or max diet restriction  Level 4 (CK):  Safe swallow but needs mod cues; and/or mod diet restriction; and/or still requires some nonoral feeding/supplements  Level 5 (CJ): Safe swallow with min diet restriction; and/or needs min cues  Level 6 (CI): Independent with p.o.; rare cues; usually self cues; may need to avoid some foods or needs extra time  Level 7 ([address] [place] [address]): Independent for all p.o.  JOYCE. (2003). National Outcomes Measurement System (NOMS): Adult Speech-Language Pathology User's Guide. COMMUNICATION/EDUCATION:   Patient was educated regarding His deficit(s) of dysphagia  as this relates to His diagnosis of dysphagia, AMS. He demonstrated Guarded understanding as evidenced by dementia. The patients plan of care including findings from MBS, recommendations, planned interventions, and recommended diet changes were discussed with: none. []  Posted safety precautions in patient's room. []  Patient/family have participated as able in goal setting and plan of care. []  Patient/family agree to work toward stated goals and plan of care. []  Patient understands intent and goals of therapy, but is neutral about his/her participation. []  Patient is unable to participate in goal setting and plan of care.     Thank you for this referral.  Anna Chou, SLP  Time Calculation: 15 mins

## 2018-02-27 NOTE — PROGRESS NOTES
Occupational therapy note:  Orders received, chart reviewed. Patient cleared by nursing staff for OT evaluation. Patient received in bed, HOB elevated. Therapist assisted patient yesterday when attempting to climb out of bed, reporting he was looking for his sock, and patient seen standing at EOB, no assistance, though unsteady. Verbal cues to have patient return to sitting EOB. Today, patent in bed, no clear verbalizations to therapist bilateral knees flexed to chest with feet on bed. Patient left arm under left leg. Therapist attempted to engage patient, encouraged OOB, and patient only grunting. Therapist attempted to extend LEs and reposition LUE, however patient resisting movement at this time, and pulling against therapist attempt. Per nurse patient daughter to visit after noon. Will attempt to follow up with patient at later time. Leatha Paz MS OTR/L

## 2018-02-27 NOTE — CDMP QUERY
1.    Please clarify if this patient is/was being treated for acute encephalopathy in the setting of dysphagia and confusion or if presenting symptoms were a manifestation of worsening Parkinson's and dementia without acute encephalopathy.     => Acute encephalopathy presumed POA in the setting of mild confusion and slow to respond treated with neuro consult, CT, VANGIE and lab studies (ammonia level)  => Other explanation of clinical findings  => Unable to determine (no explanation for clinical findings)    The medical record reflects the following clinical findings, treatment, and risk factors. 68 y/o male presents with slurred speech and dysphagia. Has a history of Parkinson's with L sided weakness, daughter states L facial droop is new, she also states on arrival that his mental status is at his baseline. The diagnosis of dementia/acute encephalopathy is rendered. Initially felt due to acute CVA which was ruled out on MRI and CT scan. Ammonia level wnl at 18. Neuro consult renders ? TIA vs dementia. Please clarify and document your clinical opinion in the progress notes and discharge summary including the definitive and/or presumptive diagnosis, (suspected or probable), related to the above clinical findings. Please include clinical findings sPlupporting your diagnosis. Thanks for your time.     Екатерина Bowden RN, BSN  [phone]

## 2018-02-27 NOTE — PROGRESS NOTES
NUTRITION COMPLETE ASSESSMENT    RECOMMENDATIONS:   1. Diet per SLP  2. Consult RD for nutrition support needs as appropriate  3. Obtain weight (accurate bed scale or juancarlos lift)      Interventions/Plan:   Food/Nutrient Delivery:            Please Consult RD for nutrition support recs if needed  Nutrition Education:     Coordination of Care: Collaboration with other providers  Nutrition Counseling:        Assessment:   Reason for Assessment:   [] Provider Consult  []BPA/MST Referral   []LOS   []Reassessment   []NPO/Clear Liquid   [x]At Nutrition Risk  []Other    Diet: NPO (Meds only per SLP )  Supplements: n/a  Nutritionally Significant Medications: [x] Reviewed & Includes: Klor-Con IV, norvasc, lipitor, SSI, Sinemet      Meal Intake: Patient Vitals for the past 100 hrs:   % Diet Eaten   02/26/18 0736 0 %       Subjective:  Pt mostly staring without immediate response to questions  SLP was able to get patient to say a few words    Objective:  Pt was brought to RD attention by friend visiting in room. Friend asked to help feed the patient lunch however, she was concerned because he seemed to not be very alert. RD, RN and SLP checked on patient and determined he needed further evaluation. Lunch tray held, SLP completed evaluation and made NPO except required meds for safety. SLP will continue to work with patient to see if he returns to baseline abilities for PO. Pt is DNR and it is unclear about his wishes for nutrition support at this time. If patient/family agrees to NG/PEG please consult RD for recommendations. Estimated Nutrition Needs:   Kcals/day: 5951 Kcals/day  Protein: 78 g (1.2 g/kg of admit wt)  Fluid: 1625 ml (25 mL/kg of admit wt)     Based On: Rosalina Luz (AF 1.2)  Weight Used: Other (comment) (64.9 kg)    Pt expected to meet estimated nutrient needs:    []   Yes     []  No       [x] Unable to predict at this time      Nutrition Diagnosis:   1.  Swallowing difficulty related to AMS as evidenced by results from SLP evaluation, change in mental status       Goals:     pt to tolerate PO as safe to do so     Monitoring & Evaluation:    - Total energy intake   -     -      Previous Nutrition Goals Met:  N/A    Previous Recommendations:      N/A    Education & Discharge Needs:   [x] None Identified   [] Identified and addressed    [x] Participated in care plan, discharge planning, and/or interdisciplinary rounds        Cultural, Adventist and ethnic food preferences identified:   None    Skin Integrity: [x]Intact  []Other  Edema: []None [x] RUE 2+  Last BM: 2/25  ABD: WDL, Flat, active  Food Allergies: [x]None []Other    Anthropometrics:    Weight Loss Metrics 2/25/2018 6/17/2016 12/21/2015 11/14/2015 9/19/2015 3/17/2015 10/8/2014   Today's Wt 143 lb 143 lb 4.8 oz 144 lb 1.6 oz 125 lb 3.5 oz 150 lb 143 lb 3.2 oz 146 lb 9.6 oz   BMI 20.52 kg/m2 20.56 kg/m2 20.68 kg/m2 17.97 kg/m2 21.52 kg/m2 23.12 kg/m2 23.67 kg/m2      Last 3 Recorded Weights in this Encounter    02/25/18 1950   Weight: 64.9 kg (143 lb)      Weight Source: Patient stated  Height: 5' 10\" (177.8 cm),    Body mass index is 20.52 kg/(m^2).   IBW : 75.3 kg (166 lb),    Usual Body Weight: 65 kg (143 lb 4.8 oz),      Labs:  Lab Results   Component Value Date/Time    Sodium 141 02/27/2018 05:08 AM    Potassium 3.1 (L) 02/27/2018 05:08 AM    Chloride 103 02/27/2018 05:08 AM    CO2 27 02/27/2018 05:08 AM    Glucose 100 02/27/2018 05:08 AM    BUN 9 02/27/2018 05:08 AM    Creatinine 0.76 02/27/2018 05:08 AM    Calcium 8.6 02/27/2018 05:08 AM    Magnesium 1.6 02/27/2018 05:08 AM    Phosphorus 3.4 02/27/2018 05:08 AM    Albumin 3.5 02/25/2018 08:12 PM     Lab Results   Component Value Date/Time    Hemoglobin A1c 5.4 02/26/2018 03:49 AM     Lab Results   Component Value Date/Time    Glucose 100 02/27/2018 05:08 AM    Glucose (POC) 93 02/27/2018 12:52 PM      Lab Results   Component Value Date/Time    ALT (SGPT) <6 (L) 02/25/2018 08:12 PM    AST (SGOT) 14 (L) 02/25/2018 08:12 PM    Alk.  phosphatase 87 02/25/2018 08:12 PM    Bilirubin, total 0.6 02/25/2018 08:12 PM        Emiliano Pina, SHERYL, MS, CDE

## 2018-02-27 NOTE — ROUTINE PROCESS
Bedside and Verbal shift change report given to Kristin English RN (oncoming nurse) by Jazmyne RN (offgoing nurse). Report included the following information SBAR, Kardex, Intake/Output, Recent Results and Cardiac Rhythm NSR.

## 2018-02-27 NOTE — PROGRESS NOTES
Spiritual Care Assessment/Progress Notes    Margaux Galindo [id number]  [id number]    1940  69 y.o.  male    Patient Telephone Number: [phone] (home)   Gnosticism Affiliation: [place]   Language: English   Extended Emergency Contact Information  Primary Emergency Contact: [address] Phone: [phone]  Relation: Daughter  Secondary Emergency Contact: [address]  Mobile Phone: [phone]  Relation: Son   Patient Active Problem List    Diagnosis Date Noted    Type 2 diabetes mellitus without complication ([facility] [address] 25/56/9392    Parkinson disease ([facility] [address] 02/25/2018    Lumbar compression fracture ([facility] [address] 09/22/2015    Dysphagia 09/21/2015    Debility 09/21/2015    Dementia 09/20/2015    Encephalopathy acute 09/20/2015    Failure to thrive (0-17) 09/19/2015    HTN (hypertension) 09/19/2015    Anxiety state, unspecified 08/20/2014    Major depressive disorder, single episode, unspecified 08/20/2014        Date: 2/27/2018       Level of Gnosticism/Spiritual Activity:  []         Involved in mayda tradition/spiritual practice    []         Not involved in mayda tradition/spiritual practice  []         Spiritually oriented    []         Claims no spiritual orientation    []         seeking spiritual identity  []         Feels alienated from Jew practice/tradition  []         Feels angry about Jew practice/tradition  []         Spirituality/Jew tradition is a resource for coping at this time.   [x]         Not able to assess due to medical condition    Services Provided Today:  []         crisis intervention    []         reading Scriptures  []         spiritual assessment    []         prayer  []         empathic listening/emotional support  []         rites and rituals (cite in comments)  []         life review     []         Jew support  []         theological development   []         advocacy  []         ethical dialog     []         blessing  []         bereavement support    []         support to family  []         anticipatory grief support   []         help with AMD  []         spiritual guidance    []         meditation      Spiritual Care Needs  []         Emotional Support  []         Spiritual/Lutheran Care  []         Loss/Adjustment  []         Advocacy/Referral                /Ethics  [x]         No needs expressed at               this time  []         Other: (note in               comments)  [address]  []         Follow up visits with               pt/family  []         Provide materials  []         Schedule sacraments  []         Contact Community               Clergy  [x]         Follow up as needed  []         Other: (note in               comments)     Comments:  is attempting visit with pt in room 505. No family or pt present at the time. Spiritual care will continue to follow as able and as needed.      [id number] M.Div, M.S, Shikha 604 available at [phone])

## 2018-02-27 NOTE — PROGRESS NOTES
Problem: Dysphagia (Adult)  Goal: *Acute Goals and Plan of Care (Insert Text)  Swallowing goals initiated 2-26-18:  1- tolerate mech soft, thins without coughing at meals  By 3-1-18  2- may need MBS to determine severity of dysphagia by 3-1-18   Speech language pathology dysphagia treatment  Patient: Elver Bunch (30 y.o. male)  Date: 2/27/2018  Diagnosis: CVA (cerebral vascular accident) [facility])  CVA (cerebral vascular accident) ([facility] [address] Dysphagia       Precautions: aspiration Fall    ASSESSMENT:  Patient with decline in status: decreased secretion management, reduced responsiveness, unable to process food orally with cognitive component. Still needs MBS, but not appropriate for this today at this time. Unsafe for PO. Progression toward goals:  []         Improving appropriately and progressing toward goals  []         Improving slowly and progressing toward goals  [x]         Not making progress toward goals and plan of care will be adjusted     PLAN:  Recommendations and Planned Interventions:  NPO x meds b/c he needs his sinemet  Patient continues to benefit from skilled intervention to address the above impairments. Continue treatment per established plan of care. Discharge Recommendations:  Skilled Nursing Facility     SUBJECTIVE:   Patient stated Jessica Felton? .    OBJECTIVE:   Cognitive and Communication Status:  Neurologic State: Alert, Drowsy, Confused  Orientation Level: Unable to verbalize  Cognition: Decreased attention/concentration, Decreased command following, Impaired decision making, No command following        Safety/Judgement: Lack of insight into deficits  Dysphagia Treatment:  Oral Assessment:     P.O. Trials:  Patient Position: upright in bed  Vocal quality prior to P.O.: Wet  Consistency Presented: Puree; Thin liquid  How Presented: Straw;Spoon;SLP-fed/presented   ORAL PHASEs:   Bolus Acceptance:  (not undestanding how to use straw, spoon.  reduced oral opening)  Bolus Formation/Control: Impaired (oral holding in front of mouth. RN reports max cues and time to give 3 tsp of appleasuce this am)  Type of Impairment: Posterior; Anterior  Propulsion: Delayed (# of seconds)   PHARYNGEAL PHASE:   Initiation of Swallow: Delayed (# of seconds) (moderate)  Laryngeal Elevation: Weak (mild-moderate)  Aspiration Signs/Symptoms: Change vocal quality;Clear throat;Weak cough         attempted to see patient x2 today. He is minimally responsive. An occasional sentence of low content was produced. Usually no response. He was not processing food in the mouth. Appeared to be having secretion management issues. Decline in status from yesterday. Exercises:  Laryngeal Exercises:                                                                                                                                   Pain:  Pain Scale 1: Numeric (0 - 10)  Pain Intensity 1: 0     After treatment:   []              Patient left in no apparent distress sitting up in chair  []              Patient left in no apparent distress in bed  []              Call bell left within reach  []              Nursing notified  []              Caregiver present  []              Bed alarm activated    COMMUNICATION/EDUCATION:   Patient was educated regarding His deficit(s) of dysphagia  as this relates to His diagnosis of dysphagia . He demonstrated Guarded understanding as evidenced by confusion. Family friend present. .    The patients plan of care including recommendations, planned interventions, and recommended diet changes were discussed with: RN, MD.  []              Posted safety precautions in patient's room.     Tru Ugalde, SLP  Time Calculation: 20 mins

## 2018-02-27 NOTE — PROGRESS NOTES
Ace Gonzalez aramis [address] 80  [address]  [phone]      Medical Progress Note      NAME: Cinthia Lilly   :  1940  MRM:  [id number]    Date/Time: 2018  9:47 AM          Subjective:     Chief Complaint:  Dysphagia:patient cannot really answer me due to confusion, daughter not present    ROS:  (bold if positive, if negative)    Unable to obtain          Objective:       Vitals:          Last 24hrs VS reviewed since prior progress note.  Most recent are:    Visit Vitals    /80 (BP 1 Location: Left arm, BP Patient Position: At rest)    Pulse 70    Temp 98.3 °F (36.8 °C)    Resp 17    Ht 5' 10\" (1.778 m)    Wt 64.9 kg (143 lb)    SpO2 96%    BMI 20.52 kg/m2     SpO2 Readings from Last 6 Encounters:   18 96%   16 92%   12/21/15 97%   11/14/15 98%   10/09/15 97%   03/17/15 95%          No intake or output data in the 24 hours ending 18 0946       Exam:     Physical Exam:    Gen:  Well-developed, thin, frail, elderly, chronically ill-appearing, in no acute distress  HEENT:  Pink conjunctivae, PERRL, hearing intact to voice, moist mucous membranes  Neck:  Supple, without masses, thyroid non-tender  Resp:  No accessory muscle use, clear breath sounds without wheezes rales or rhonchi  Card:  No murmurs, normal S1, S2 without thrills, bruits or peripheral edema  Abd:  Soft, non-tender, non-distended, normoactive bowel sounds are present, no palpable organomegaly and no detectable hernias  Lymph:  No cervical or inguinal adenopathy  Musc:  No cyanosis or clubbing  Skin:  No rashes or ulcers, skin turgor is good  Neuro:  Left facial droop, left hemiparesis, follows some commands appropriately  Psych:  Poor insight, oriented to person, but not place and time, alert       Telemetry reviewed:   normal sinus rhythm    Medications Reviewed: (see below)    Lab Data Reviewed: (see below)    ______________________________________________________________________    Medications:     Current Facility-Administered Medications   Medication Dose Route Frequency    potassium chloride SR (KLOR-CON 10) tablet 40 mEq  40 mEq Oral Q4H    0.9% sodium chloride infusion  75 mL/hr IntraVENous CONTINUOUS    lisinopril (PRINIVIL, ZESTRIL) tablet 10 mg  10 mg Oral DAILY    amLODIPine (NORVASC) tablet 5 mg  5 mg Oral DAILY    atorvastatin (LIPITOR) tablet 40 mg  40 mg Oral DAILY    sodium chloride (NS) flush 5-10 mL  5-10 mL IntraVENous Q8H    sodium chloride (NS) flush 5-10 mL  5-10 mL IntraVENous PRN    acetaminophen (TYLENOL) tablet 650 mg  650 mg Oral Q4H PRN    Or    acetaminophen (TYLENOL) solution 650 mg  650 mg Per NG tube Q4H PRN    Or    acetaminophen (TYLENOL) suppository 650 mg  650 mg Rectal Q4H PRN    aspirin (ASPIRIN) tablet 325 mg  325 mg Oral DAILY    labetalol (NORMODYNE;TRANDATE) injection 5 mg  5 mg IntraVENous Q10MIN PRN    enoxaparin (LOVENOX) injection 40 mg  40 mg SubCUTAneous Q24H    insulin lispro (HUMALOG) injection   SubCUTAneous Q6H    glucose chewable tablet 16 g  4 Tab Oral PRN    dextrose (D50W) injection syrg 12.5-25 g  12.5-25 g IntraVENous PRN    glucagon (GLUCAGEN) injection 1 mg  1 mg IntraMUSCular PRN    carbidopa-levodopa (SINEMET)  mg per tablet 1.5 Tab  1.5 Tab Oral BID    donepezil (ARICEPT) tablet 10 mg  10 mg Oral QHS    memantine (NAMENDA) tablet 10 mg  10 mg Oral BID    timolol (TIMOPTIC) 0.5 % ophthalmic solution 1 Drop  1 Drop Both Eyes BID            Lab Review:     Recent Labs      02/25/18 2012   WBC  7.4   HGB  13.5   HCT  40.4   PLT  189     Recent Labs      02/27/18   0508  02/25/18 2012   NA  141  141   K  3.1*  3.6   CL  103  105   CO2  27  25   GLU  100  96   BUN  9  15   CREA  0.76  0.86   CA  8.6  8.6   MG  1.6   --    PHOS  3.4   --    ALB   --   3.5   TBILI   --   0.6   SGOT   --   14*   ALT   --   <6*   INR   --   1.1 Lab Results   Component Value Date/Time    Glucose (POC) 90 02/27/2018 06:14 AM    Glucose (POC) 96 02/27/2018 12:45 AM    Glucose (POC) 140 (H) 02/26/2018 06:03 PM    Glucose (POC) 134 (H) 02/26/2018 11:09 AM    Glucose (POC) 88 02/26/2018 06:02 AM     No results for input(s): PH, PCO2, PO2, HCO3, FIO2 in the last 72 hours.   Recent Labs      02/25/18 2012   INR  1.1     No results found for: SDES  No results found for: CULT         Assessment:     Principal Problem:    Dysphagia (9/21/2015)    Active Problems:    HTN (hypertension) (9/19/2015)      Dementia (9/20/2015)      Encephalopathy acute (9/20/2015)      Debility (9/21/2015)      Parkinson disease ([address] (2/25/2018)      Type 2 diabetes mellitus without complication ([facility] (4/14/1196)           Plan:     Principal Problem:    Dysphagia (9/21/2015)   - no evidence of acute CVA on MRI   - Neurology input appreciated   - ammonia level normal   - not clear as to why he may have had worsening of his baseline symptoms    Active Problems:    HTN (hypertension) (9/19/2015)   - BP remains elevated, adjusted BP meds as above      Dementia (9/20/2015)/Encephalopathy acute (9/20/2015)   - daughter states he remains confused, checking ammonia       Debility (9/21/2015)   - continue with PT/OT, recommending rehab, CM to work on this      Parkinson disease ([facility] (2/25/2018)   - continue Sinemet, Neurology did not recommend increasing      Type 2 diabetes mellitus without complication ([facility] (1/24/6805)   - BS look good, follow on current meds      Total time spent with patient: 25 minutes                  Care Plan discussed with: Patient, Care Manager and Nursing Staff    Discussed:  Code Status, Care Plan and D/C Planning    Prophylaxis:  Lovenox    Disposition:  TBD           ___________________________________________________    Attending Physician: Bernarda Coughlin MD

## 2018-02-27 NOTE — PROGRESS NOTES
Physical Therapy - Patient pulling bed sheets back toward him as PT attempted to persuade him to get OOB and sit edge of bed. Patient grunting and does not make eye contact. PT will follow up tomorrow.    ROSALIE NamT

## 2018-02-27 NOTE — PROGRESS NOTES
Bedside and Verbal shift change report given to Catarino Tapia RN (oncoming nurse) by Sergio Zapata RN (offgoing nurse). Report included the following information SBAR, Kardex, MAR, Accordion and Recent Results.

## 2018-02-28 NOTE — PROGRESS NOTES
Occupational Therapy EVALUATION/discharge  Patient: Feng Tyler (58 y.o. male)  Date: 2/28/2018  Primary Diagnosis: CVA (cerebral vascular accident) [facility])  CVA (cerebral vascular accident) [facility])        Precautions:  Fall    ASSESSMENT:   Based on the objective data described below, the patient presents with confusion with inability to follow commands, impaired functional mobility and activity tolerance necessary in ADL tasks. Admitted with L facial droop. Patient oriented to self only. Poor command follow < 25%. Unable to give PLOF, per nursing patient lives at home with wife and they have 24 hour caregiver and support from daughter. Patient amb with support from home staff. Received incontinent of urine, total A for rolling, total A for hygiene. Encouraged to wash face however patient kindly saying \"you can do it for me. \"  Nursing reports he is able to hold cup to drink once handed it and hold finger food but needs assistance with silverwear-total A. Unable to complete Margret Zaldivar or vision assessment secondary to poor command follow. Patient not appropriate for therapy at this time as he is unable to follow commands and requires total A. Recommend home with 24 supervision vs LTC depending on families ability to provide care at home. Further skilled acute occupational therapy is not indicated at this time. Discharge Recommendations: Home Health with 24 hour care vs LTC  Further Equipment Recommendations for Discharge: TBD depending on dc plans-  If home hospital bed/wc      SUBJECTIVE:   Patient stated [facility] .     OBJECTIVE DATA SUMMARY:   HISTORY:   Past Medical History:   Diagnosis Date    Age-related physical debility     Compression fracture of L3 lumbar vertebra (HCC)     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     Diabetes ([facility]     Dysphagia dementia related    Fall at home     Hypertension     Stool color black      Past Surgical History:   Procedure Laterality Date    HX HEENT         Prior Level of Function/Environment/Context: Per nursing- patient was home with family with 24 hour supervision from private aides with assistance for ADL tasks. He was able to feed self at home. Daughter supportive per nursing. Occupations in which the patient is/was successful, what are the barriers preventing that success:   Performance Patterns (routines, roles, habits, and rituals):   Personal Interests and/or values:   Expanded or extensive additional review of patient history:     Home Situation  Home Environment: Private residence  # Steps to Enter: 4  One/Two Story Residence: One story  Living Alone: No  Support Systems: Family member(s)  Patient Expects to be Discharged to[de-identified] Private residence  Current DME Used/Available at Home: [address] [facility], [place], straight  []  Right hand dominant   []  Left hand dominant    EXAMINATION OF PERFORMANCE DEFICITS:  Cognitive/Behavioral Status:  Neurologic State: Alert;Confused  Orientation Level: Oriented to person  Cognition: Decreased attention/concentration;Decreased command following             Skin: BUE visible intact    Edema: uppers none    Hearing: Auditory  Auditory Impairment: None    Vision/Perceptual:       Acuity:  (unable to assess secondaryt o confusion)         Range of Motion:  BUE: PROM mild impairment, functional                            Strength:  Unable for formal assessment secondary to confusion, poor command follow             Coordination:     Fine Motor Skills-Upper: Left Intact; Right Intact (to pull up covers)    Gross Motor Skills-Upper:  (unable to assess secondary to confusion)    Balance:       Functional Mobility and Transfers for ADLs:  Bed Mobility:  Rolling: Total assistance    Transfers:       ADL Assessment:  Feeding: Supervision;Setup (for hand held food/cup per nursing observation; total A silv)    Oral Facial Hygiene/Grooming:  (refused)    Bathing:  Total assistance (inferred poor participation)    Upper Body Dressing: Total assistance (inferred- poor participation)    Lower Body Dressing: Total assistance (inferred- confusion- poor particiaption)    Toileting: Total assistance (poor participation- confusion)       Functional Measure:  Barthel Index:    Bathin  Bladder: 0  Bowels: 0  Groomin  Dressin  Feedin  Mobility: 0  Stairs: 0  Toilet Use: 0  Transfer (Bed to Chair and Back): 0  Total: 0       Barthel and G-code impairment scale:  Percentage of impairment CH  0% CI  1-19% CJ  20-39% CK  40-59% CL  60-79% CM  80-99% CN  100%   Barthel Score 0-100 100 99-80 79-60 59-40 20-39 1-19   0   Barthel Score 0-20 20 17-19 13-16 9-12 5-8 1-4 0      The Barthel ADL Index: Guidelines  1. The index should be used as a record of what a patient does, not as a record of what a patient could do. 2. The main aim is to establish degree of independence from any help, physical or verbal, however minor and for whatever reason. 3. The need for supervision renders the patient not independent. 4. A patient's performance should be established using the best available evidence. Asking the patient, friends/relatives and nurses are the usual sources, but direct observation and common sense are also important. However direct testing is not needed. 5. Usually the patient's performance over the preceding 24-48 hours is important, but occasionally longer periods will be relevant. 6. Middle categories imply that the patient supplies over 50 per cent of the effort. 7. Use of aids to be independent is allowed. Yusuf Gonsalves., Barthel, D.W. (3781). Functional evaluation: the Barthel Index. [facility] (14)2. Lakia Willis, TROY, Charlotte Oseguera., Herb Huang., Ezequiel, [address] [place] [address] (). Measuring the change indisability after inpatient rehabilitation; comparison of the responsiveness of the Barthel Index and Functional [place] Measure. Journal of Neurology, Neurosurgery, and Psychiatry, 66(4), 522-131.   IVETTE Perez, Edwina Jimenez M.A. (2004.) Assessment of post-stroke quality of life in cost-effectiveness studies: The usefulness of the Barthel Index and the EuroQoL-5D. Quality of Life Research, 13, 473-13         G codes: In compliance with CMSs Claims Based Outcome Reporting, the following G-code set was chosen for this patient based on their primary functional limitation being treated: The outcome measure chosen to determine the severity of the functional limitation was the Barthel Index with a score of 0/100 which was correlated with the impairment scale. ? Self Care:     - CURRENT STATUS: CN - 100% impaired, limited or restricted    - GOAL STATUS: CN - 100% impaired, limited or restricted    - D/C STATUS:  CN - 100% impaired, limited or restricted     Occupational Therapy Evaluation Charge Determination   History Examination Decision-Making   HIGH Complexity : Extensive review of history including physical, cognitive and psychosocial history  HIGH Complexity : 5 or more performance deficits relating to physical, cognitive , or psychosocial skils that result in activity limitations and / or participation restrictions HIGH Complexity : Patient presents with comorbidities that affect occupational performance.  Signifigant modification of tasks or assistance (eg, physical or verbal) with assessment (s) is necessary to enable patient to complete evaluation       Based on the above components, the patient evaluation is determined to be of the following complexity level: HIGH   Pain:  Pain Scale 1: FACES  Pain Intensity 1: 0              Activity Tolerance:   Bed level tasks only    After treatment:   []  Patient left in no apparent distress sitting up in chair  [x]  Patient left in no apparent distress in bed  [x]  Call bell left within reach  [x]  Nursing notified  []  Caregiver present  [x]  Bed alarm activated    COMMUNICATION/EDUCATION:   Communication/Collaboration:  [] Home safety education was provided and the patient/caregiver indicated understanding. []      Patient/family have participated as able and agree with findings and recommendations. [x]      Patient is unable to participate in plan of care at this time.   Findings and recommendations were discussed with: Registered Nurse and Lois Shone, OT  Time Calculation: 14 mins

## 2018-02-28 NOTE — PROGRESS NOTES
Pt refused to work with therapist, perseverating about how his wife had left him ( nurse states wife has dementia and lives at home with him with aides to assist). When therapist tried to assist with LE exercises, Pt rigidly refused and told therapist to \"Get out of my truck\" when bed rail was lowered. Gently tried to encourage Pt to participate, as did my tech, but Pt adamantly refused. Will follow.

## 2018-02-28 NOTE — PROGRESS NOTES
Spiritual Care Assessment/Progress Note  [address] [place] [address] Dr      NAME: Hilario Holder      MRN: [id number]  AGE: 69 y.o. SEX: male  Church Affiliation: Yazdanism   Language: English     2/28/2018     Total Time (in minutes): 10     Spiritual Assessment begun in [facility] 5M1 MED SURG 1 through conversation with:         [x]Patient        [] Family    [] Friend(s)        Reason for Consult: Request by staff     Spiritual beliefs: (Please include comment if needed)     [x] Involved in a mayda tradition/spiritual practice:     [] Supported by a mayda community:      [] Claims no spiritual orientation:      [] Seeking spiritual identity:           [] Adheres to an individual form of spirituality:      [] Not able to assess:                     Identified resources for coping:      [x] Prayer                  [] Devotional reading               [] Music                  [] Guided Imagery     [] Family/friends                 [] Pet visits     [] Other:        Interventions offered during this visit: (See comments for more details)    Patient Interventions: Affirmation of mayda, Iconic (affirming the presence of God/Higher Power), Initial/Spiritual assessment, patient floor, Prayer (assurance of), Prayer (actual)           Plan of Care:     [] Discuss Spiritual/Cultural needs    [] Support AMD and/or advance care planning process      [] Support grieving process   [] Coordinate Rites/Rituals    [] Coordination with community clergy   [] No spiritual needs identified at this time   [] Detailed Plan of Care below (See Comments)  [] Make referral to Music Therapy  [] Make referral to Pet Therapy     [] Make referral to Addiction services  [] Make referral to [facility]  [] Make referral to Spiritual Care Partner  [] No future visits requested          x   Follow as needed     Comments: Responded to spiritual Care Consult order for a  visit on 5 Med Surg. No family was present however, Mr. Doraine Spurling was alert. He responded that he has belief in God. He indicated it would be fine with him to bel kept in prayer. He appeared to say he needed some sleep though I was not as clear for understanding about that. Provided Spiritual presence and prayer. He provided a very clear thank you. Chaplains available as able and/or needed.   Visited by: Elma Navarro [id number] [facility] Juan Ramon ([id number])

## 2018-02-28 NOTE — PROGRESS NOTES
Bedside and Verbal shift change report given to [place], RN (oncoming nurse) by Chase Cortez RN (offgoing nurse). Report included the following information SBAR, Kardex, MAR, Accordion and Recent Results.

## 2018-02-28 NOTE — PROGRESS NOTES
Ace Gonzalez aramis [place] [address]  [address] [facility], [address] [place] [address]  [phone]      Medical Progress Note      NAME: Mima Bautista   :  1940  MRM:  [id number]    Date/Time: 2018  11:24 AM          Subjective:     Chief Complaint:  Dysphagia:patient cannot really answer me due to confusion, daughter not present    ROS:  (bold if positive, if negative)    Unable to obtain          Objective:       Vitals:          Last 24hrs VS reviewed since prior progress note.  Most recent are:    Visit Vitals    BP 99/64 (BP 1 Location: Right arm, BP Patient Position: At rest)    Pulse 62    Temp 97.6 °F (36.4 °C)    Resp 18    Ht 5' 10\" (1.778 m)    Wt 64.9 kg (143 lb)    SpO2 96%    BMI 20.52 kg/m2     SpO2 Readings from Last 6 Encounters:   18 96%   16 92%   12/21/15 97%   11/14/15 98%   10/09/15 97%   03/17/15 95%            Intake/Output Summary (Last 24 hours) at 18 1124  Last data filed at 18 1020   Gross per 24 hour   Intake              120 ml   Output                1 ml   Net              119 ml          Exam:     Physical Exam:    Gen:  Well-developed, thin, frail, elderly, chronically ill-appearing, in no acute distress  HEENT:  Pink conjunctivae, PERRL, hearing intact to voice, moist mucous membranes  Neck:  Supple, without masses, thyroid non-tender  Resp:  No accessory muscle use, clear breath sounds without wheezes rales or rhonchi  Card:  No murmurs, normal S1, S2 without thrills, bruits or peripheral edema  Abd:  Soft, non-tender, non-distended, normoactive bowel sounds are present, no palpable organomegaly and no detectable hernias  Lymph:  No cervical or inguinal adenopathy  Musc:  No cyanosis or clubbing  Skin:  No rashes or ulcers, skin turgor is good  Neuro:  Left facial droop, left hemiparesis, follows some commands appropriately  Psych:  Poor insight, oriented to person, but not place and time, alert       Telemetry reviewed:   normal sinus rhythm    Medications Reviewed: (see below)    Lab Data Reviewed: (see below)    ______________________________________________________________________    Medications:     Current Facility-Administered Medications   Medication Dose Route Frequency    lisinopril (PRINIVIL, ZESTRIL) tablet 10 mg  10 mg Oral BID    0.9% sodium chloride infusion  75 mL/hr IntraVENous CONTINUOUS    amLODIPine (NORVASC) tablet 5 mg  5 mg Oral DAILY    atorvastatin (LIPITOR) tablet 40 mg  40 mg Oral DAILY    sodium chloride (NS) flush 5-10 mL  5-10 mL IntraVENous Q8H    sodium chloride (NS) flush 5-10 mL  5-10 mL IntraVENous PRN    acetaminophen (TYLENOL) tablet 650 mg  650 mg Oral Q4H PRN    Or    acetaminophen (TYLENOL) solution 650 mg  650 mg Per NG tube Q4H PRN    Or    acetaminophen (TYLENOL) suppository 650 mg  650 mg Rectal Q4H PRN    aspirin (ASPIRIN) tablet 325 mg  325 mg Oral DAILY    labetalol (NORMODYNE;TRANDATE) injection 5 mg  5 mg IntraVENous Q10MIN PRN    enoxaparin (LOVENOX) injection 40 mg  40 mg SubCUTAneous Q24H    insulin lispro (HUMALOG) injection   SubCUTAneous Q6H    glucose chewable tablet 16 g  4 Tab Oral PRN    dextrose (D50W) injection syrg 12.5-25 g  12.5-25 g IntraVENous PRN    glucagon (GLUCAGEN) injection 1 mg  1 mg IntraMUSCular PRN    carbidopa-levodopa (SINEMET)  mg per tablet 1.5 Tab  1.5 Tab Oral BID    donepezil (ARICEPT) tablet 10 mg  10 mg Oral QHS    memantine (NAMENDA) tablet 10 mg  10 mg Oral BID    timolol (TIMOPTIC) 0.5 % ophthalmic solution 1 Drop  1 Drop Both Eyes BID            Lab Review:     Recent Labs      02/28/18   0335  02/25/18 2012   WBC  7.9  7.4   HGB  14.3  13.5   HCT  43.4  40.4   PLT  172  189     Recent Labs      02/28/18   0335  02/27/18   0508  02/25/18 2012   NA  145  141  141   K  3.7  3.1*  3.6   CL  107  103  105   CO2  29  27  25   GLU  95  100  96   BUN  7  9  15   CREA  0.74  0.76  0.86   CA  8.8  8.6  8.6   MG  1.7  1.6   --    PHOS 3.1  3.4   --    ALB   --    --   3.5   TBILI   --    --   0.6   SGOT   --    --   14*   ALT   --    --   <6*   INR   --    --   1.1     Lab Results   Component Value Date/Time    Glucose (POC) 198 (H) 02/28/2018 11:02 AM    Glucose (POC) 87 02/28/2018 05:10 AM    Glucose (POC) 96 02/27/2018 11:50 PM    Glucose (POC) 97 02/27/2018 04:50 PM    Glucose (POC) 93 02/27/2018 12:52 PM     No results for input(s): PH, PCO2, PO2, HCO3, FIO2 in the last 72 hours.   Recent Labs      02/25/18 2012   INR  1.1     No results found for: SDES  No results found for: CULT         Assessment:     Principal Problem:    Dysphagia (9/21/2015)    Active Problems:    HTN (hypertension) (9/19/2015)      Dementia (9/20/2015)      Encephalopathy acute (9/20/2015)      Debility (9/21/2015)      Parkinson disease ([address] (2/25/2018)      Type 2 diabetes mellitus without complication ([address] (8/58/6079)           Plan:     Principal Problem:    Dysphagia (9/21/2015)   - no evidence of acute CVA on MRI   - Neurology input appreciated   - ammonia level normal   - not clear as to why he may have had worsening of his baseline symptoms   - tolerating pureed diet    Active Problems:    HTN (hypertension) (9/19/2015)   - BP remains elevated, adjusted BP meds as above      Dementia (9/20/2015)/Encephalopathy acute (9/20/2015)   - daughter states he remains confused, checking ammonia       Debility (9/21/2015)   - continue with PT/OT, recommending rehab, CM to work on this      Parkinson disease ([address] (2/25/2018)   - continue Sinemet, Neurology did not recommend increasing      Type 2 diabetes mellitus without complication ([address] (6/39/2501)   - BS look good, follow on current meds      Total time spent with patient: 25 minutes                  Care Plan discussed with: Patient, Care Manager and Nursing Staff    Discussed:  Code Status, Care Plan and D/C Planning    Prophylaxis:  Lovenox    Disposition: TBD           ___________________________________________________    Attending Physician: Mary Moore MD

## 2018-02-28 NOTE — PROGRESS NOTES
CM Note:  Pt's daughter, Manohar Todd, plans to take her father home. He has had home health before and his daughter would like to have [facility] again. She was given information to check on the status of Medicaid application. Transport to be set up with [facility] w/isela Don.   DANIELE Robb

## 2018-03-01 NOTE — PROGRESS NOTES
Bedside and Verbal shift change report given to Lavern (oncoming nurse) by Severo Beets (offgoing nurse). Report included the following information SBAR, Kardex, Intake/Output, MAR, Accordion and Recent Results.

## 2018-03-01 NOTE — DISCHARGE INSTRUCTIONS
HOSPITALIST DISCHARGE INSTRUCTIONS  NAME: Nicholas Aguilar   :  [birth year]   MRN:  [id number]     Date/Time:  3/1/2018 8:36 AM    ADMIT DATE: 2018     DISCHARGE DATE: 3/1/2018     DISCHARGE DIAGNOSIS:  TIA    MEDICATIONS:  · It is important that you take the medication exactly as they are prescribed. · Keep your medication in the bottles provided by the pharmacist and keep a list of the medication names, dosages, and times to be taken in your wallet. · Do not take other medications without consulting your doctor. Pain Management: per above medications    What to do at Home    Recommended diet:  Pureed diet    Recommended activity: Activity as tolerated    If you experience any of the following symptoms then please call your primary care physician or return to the emergency room if you cannot get hold of your doctor:  Fever, chills, nausea, vomiting, diarrhea, change in mentation, falling, bleeding, shortness of breath    Follow Up: Follow-up Information     Follow up With Details Comments [facility] [address] [place], MD In 2 weeks  [address] [place] [address]  [phone]              Information obtained by :  I understand that if any problems occur once I am at home I am to contact my physician. I understand and acknowledge receipt of the instructions indicated above.                                                                                                                                            Physician's or R.N.'s Signature                                                                  Date/Time                                                                                                                                              Patient or Representative Signature                                                          Date/Time

## 2018-03-01 NOTE — PROGRESS NOTES
Pharmacist Discharge Medication Reconciliation    Discharge Provider:  Darryl Rothman       Discharge Medications:      My Medications        START taking these medications         Instructions Each Dose to Equal   Morning Noon Evening Bedtime      atorvastatin 40 mg tablet   Commonly known as:  LIPITOR   Your last dose was:  3/1/2018 10:41 AM   Your next dose is:  Tomorrow morning (3/2)        Take 1 Tab by mouth daily. 40 mg                                 CONTINUE taking these medications         Instructions Each Dose to Equal   Morning Noon Evening Bedtime      ARICEPT 10 mg tablet   Generic drug:  donepezil   Your last dose was:  2/28/2018  9:12 PM   Your next dose is: Tonight at bedtime (3/1)        Take 10 mg by mouth nightly. 10 mg                           aspirin delayed-release 81 mg tablet   Your last dose was:  3/1/2018 at 09:00AM   Your next dose is:  Tomorrow morning (3/2)        Take 81 mg by mouth daily. 81 mg                           lisinopril 20 mg tablet   Commonly known as:  Birdena John   Your last dose was:  3/1/2018 10:41 AM   Your next dose is:  Tomorrow morning (3/2)        Take 10 mg by mouth daily. 10 mg                           metFORMIN 500 mg tablet   Commonly known as:  GLUCOPHAGE        Take 500 mg by mouth two (2) times daily (with meals). 500 mg                              NAMENDA 10 mg tablet   Generic drug:  memantine   Your last dose was:  3/1/2018 10:41 AM   Your next dose is: This evening (3/1 at 6:00pm)        Take 10 mg by mouth two (2) times a day. 10 mg                        NORCO 5-325 mg per tablet   Generic drug:  HYDROcodone-acetaminophen        Take 1 Tab by mouth three (3) times daily as needed for Pain. 1 Tab                        NORVASC 5 mg tablet   Generic drug:  amLODIPine   Your last dose was:  3/1/2018 10:41 AM   Your next dose is:  Tomorrow morning (3/2)        Take 5 mg by mouth daily.     5 mg                           SINEMET  mg per tablet   Generic drug:  carbidopa-levodopa   Your last dose was:  3/1/2018 10:41 AM   Your next dose is: This evening at bedtime (3/1)        Take 1.5 Tabs by mouth two (2) times a day. AM and HS    1.5 Tab                              timolol 0.5 % ophthalmic solution   Commonly known as:  TIMOPTIC   Your last dose was:  3/1/2018  9:00 AM   Your next dose is: This evening (3/1 6:00pm)        Administer 1 Drop to both eyes two (2) times a day.     1 Drop                                 STOP taking these medications              pravastatin 20 mg tablet   Commonly known as:  PRAVACHOL                    Where to Get Your Medications        These medications were sent to [facility]/pharmacy #[id number], [address] [place] [address], [address] [place] [address]      Hours:  24-hours Phone:  [phone]     atorvastatin 40 mg tablet             The patient's chart, MAR, and AVS were reviewed by   Tricia Maradiaga, [address]  Contact: [phone]

## 2018-03-01 NOTE — PROGRESS NOTES
CM Note:  Order for home health noted. A referral was sent in [facility] to [facility]. Called [facility] for w/c transport. They will  at 12 noon. Cost is $110 for a 2-man crew. Pt's daughter was called and informed of time and charge. She will be in around 11:30. YANET Montesinos

## 2018-03-01 NOTE — PROGRESS NOTES
09:45  Patient's daughter Carloyn Hamilton called for update on patient. Patient remains resting comfortable in his bed and will receive morning medication shortly. Daughter informed that patient will be discharging today once transportation is arranged. Daughter requested a time, case management is working on transportation and daughter will be contacted once a time is arranged.

## 2018-03-01 NOTE — PROGRESS NOTES
I have reviewed discharge instructions with the daughter. The daughter verbalized understanding. Discharge medications reviewed with daughter and appropriate educational materials and side effects teaching were provided.

## 2018-03-01 NOTE — DISCHARGE SUMMARY
Physician Discharge Summary     Patient ID:  Andrea Matthews  [id number]  78 y.o.  1940    Admit date: 2/25/2018    Discharge date: 3/1/2018    Admission Diagnoses: CVA (cerebral vascular accident) [facility])  CVA (cerebral vascular accident) [facility])    Discharge Diagnoses:  Principal Diagnosis Dysphagia                                            Principal Problem:    Dysphagia (9/21/2015)    Active Problems:    HTN (hypertension) (9/19/2015)      Dementia (9/20/2015)      Encephalopathy acute (9/20/2015)      Debility (9/21/2015)      Parkinson disease ([facility] (2/25/2018)      Type 2 diabetes mellitus without complication ([facility] (7/34/0074)         Resolved Problems:  Problem List as of 3/1/2018  Date Reviewed: 2/25/2018          Codes Class Noted - Resolved    Type 2 diabetes mellitus without complication ([facility] (Chronic) ICD-10-CM: E11.9  ICD-9-CM: 250.00  2/26/2018 - Present        Parkinson disease ([facility] (Chronic) ICD-10-CM: G20  ICD-9-CM: 332.0  2/25/2018 - Present        Lumbar compression fracture (HCC) (Chronic) ICD-10-CM: S32.000A  ICD-9-CM: 805.4  9/22/2015 - Present        * (Principal)Dysphagia ICD-10-CM: R13.10  ICD-9-CM: 787.20  9/21/2015 - Present        Debility ICD-10-CM: R53.81  ICD-9-CM: 799.3  9/21/2015 - Present        Dementia (Chronic) ICD-10-CM: F03.90  ICD-9-CM: 294.20  9/20/2015 - Present        Encephalopathy acute ICD-10-CM: G93.40  ICD-9-CM: 348.30  9/20/2015 - Present        Failure to thrive (0-17) (Chronic) ICD-10-CM: R62.51  ICD-9-CM: 783.41  9/19/2015 - Present        HTN (hypertension) (Chronic) ICD-10-CM: I10  ICD-9-CM: 401.9  9/19/2015 - Present        Anxiety state, unspecified (Chronic) ICD-10-CM: F41.1  ICD-9-CM: 300.00  8/20/2014 - Present        Major depressive disorder, single episode, unspecified (Chronic) ICD-10-CM: F32.9  ICD-9-CM: 296.20  8/20/2014 - Present        RESOLVED: Altered mental state ICD-10-CM: R41.82  ICD-9-CM: 780.97  10/29/2015 - 2/26/2018        RESOLVED: Hypokalemia ICD-10-CM: E87.6  ICD-9-CM: 276.8  9/19/2015 - 9/23/2015        RESOLVED: Dementia in conditions classified elsewhere without behavioral disturbance ICD-10-CM: F02.80  ICD-9-CM: 294.10  8/20/2014 - 9/20/2015        RESOLVED: Memory loss ICD-10-CM: R41.3  ICD-9-CM: 780.93  7/1/2014 - 9/20/2015                Hospital Course:   Mr. Dannielle Yoon was admitted to the Hospitalist Service on the 5th floor for treatment of possible CVA. Initially there was concern for possible CVA due to reported neurodeficits per his daughter. Neurology was consulted and MRI/MRA did not reveal any acute events. It appeared that he may have had a mild worsening of his chronic deficits but no source was found. He was mobilized as able by PT/OT but did not participate in therapy much. He was evaluated by Speech and placed on a pureed diet. His daughter declined any rehab and home health was arranged. He was discharged home with home health on 3/1/2018 in improved condition. PCP: Winston Lyons MD    Consults: Neurology    Discharge Exam:  See my Progress Note from today. Disposition: home    Patient Instructions:   Current Discharge Medication List      START taking these medications    Details   atorvastatin (LIPITOR) 40 mg tablet Take 1 Tab by mouth daily. Qty: 30 Tab, Refills: 0         CONTINUE these medications which have NOT CHANGED    Details   amLODIPine (NORVASC) 5 mg tablet Take 5 mg by mouth daily. aspirin delayed-release 81 mg tablet Take 81 mg by mouth daily. carbidopa-levodopa (SINEMET)  mg per tablet Take 1.5 Tabs by mouth two (2) times a day. AM and HS      lisinopril (PRINIVIL, ZESTRIL) 20 mg tablet Take 10 mg by mouth daily. memantine (NAMENDA) 10 mg tablet Take 10 mg by mouth two (2) times a day. metFORMIN (GLUCOPHAGE) 500 mg tablet Take 500 mg by mouth two (2) times daily (with meals).       HYDROcodone-acetaminophen (NORCO) 5-325 mg per tablet Take 1 Tab by mouth three (3) times daily as needed for Pain. donepezil (ARICEPT) 10 mg tablet Take 10 mg by mouth nightly. timolol (TIMOPTIC) 0.5 % ophthalmic solution Administer 1 Drop to both eyes two (2) times a day. STOP taking these medications       pravastatin (PRAVACHOL) 20 mg tablet Comments:   Reason for Stopping:              Activity: Activity as tolerated  Diet: Dysphagia diet-pureed  Wound Care: None needed    Follow-up Information     Follow up With Details Comments [address] [address] [place], MD In 2 weeks  [address] [place] [address] [address]  PT/OT and nursing [address] [place] [address] [phone]          35 minutes were spent on this discharge.     Signed:  Iesha Encinas MD  3/1/2018  12:45 PM

## 2018-03-01 NOTE — PROGRESS NOTES
[facility] [address] [facility]  [address] [facility], [address] [place] [address]  [phone]      Medical Progress Note      NAME: Kenia Sterling   :  1940  MRM:  [id number]    Date/Time: 3/1/2018  9:28 AM           Subjective:     Chief Complaint:  Dysphagia: patient cannot really answer me due to confusion, daughter not present    ROS:  (bold if positive, if negative)    Unable to obtain          Objective:       Vitals:          Last 24hrs VS reviewed since prior progress note.  Most recent are:    Visit Vitals    /86 (BP 1 Location: Left arm, BP Patient Position: At rest)    Pulse 73    Temp 98.6 °F (37 °C)    Resp 20    Ht 5' 10\" (1.778 m)    Wt 64.9 kg (143 lb)    SpO2 93%    BMI 20.52 kg/m2     SpO2 Readings from Last 6 Encounters:   18 93%   16 92%   12/21/15 97%   11/14/15 98%   10/09/15 97%   03/17/15 95%            Intake/Output Summary (Last 24 hours) at 18 1294  Last data filed at 18 1020   Gross per 24 hour   Intake              120 ml   Output                0 ml   Net              120 ml          Exam:     Physical Exam:    Gen:  Well-developed, thin, frail, elderly, chronically ill-appearing, in no acute distress  HEENT:  Pink conjunctivae, PERRL, hearing intact to voice, moist mucous membranes  Neck:  Supple, without masses, thyroid non-tender  Resp:  No accessory muscle use, clear breath sounds without wheezes rales or rhonchi  Card:  No murmurs, normal S1, S2 without thrills, bruits or peripheral edema  Abd:  Soft, non-tender, non-distended, normoactive bowel sounds are present, no palpable organomegaly and no detectable hernias  Lymph:  No cervical or inguinal adenopathy  Musc:  No cyanosis or clubbing  Skin:  No rashes or ulcers, skin turgor is good  Neuro:  Left facial droop, left hemiparesis, follows some commands appropriately  Psych:  Poor insight, oriented to person, but not place and time, alert       Telemetry reviewed:   normal sinus rhythm    Medications Reviewed: (see below)    Lab Data Reviewed: (see below)    ______________________________________________________________________    Medications:     Current Facility-Administered Medications   Medication Dose Route Frequency    lisinopril (PRINIVIL, ZESTRIL) tablet 10 mg  10 mg Oral BID    0.9% sodium chloride infusion  75 mL/hr IntraVENous CONTINUOUS    amLODIPine (NORVASC) tablet 5 mg  5 mg Oral DAILY    atorvastatin (LIPITOR) tablet 40 mg  40 mg Oral DAILY    sodium chloride (NS) flush 5-10 mL  5-10 mL IntraVENous Q8H    sodium chloride (NS) flush 5-10 mL  5-10 mL IntraVENous PRN    acetaminophen (TYLENOL) tablet 650 mg  650 mg Oral Q4H PRN    Or    acetaminophen (TYLENOL) solution 650 mg  650 mg Per NG tube Q4H PRN    Or    acetaminophen (TYLENOL) suppository 650 mg  650 mg Rectal Q4H PRN    aspirin (ASPIRIN) tablet 325 mg  325 mg Oral DAILY    labetalol (NORMODYNE;TRANDATE) injection 5 mg  5 mg IntraVENous Q10MIN PRN    enoxaparin (LOVENOX) injection 40 mg  40 mg SubCUTAneous Q24H    insulin lispro (HUMALOG) injection   SubCUTAneous Q6H    glucose chewable tablet 16 g  4 Tab Oral PRN    dextrose (D50W) injection syrg 12.5-25 g  12.5-25 g IntraVENous PRN    glucagon (GLUCAGEN) injection 1 mg  1 mg IntraMUSCular PRN    carbidopa-levodopa (SINEMET)  mg per tablet 1.5 Tab  1.5 Tab Oral BID    donepezil (ARICEPT) tablet 10 mg  10 mg Oral QHS    memantine (NAMENDA) tablet 10 mg  10 mg Oral BID    timolol (TIMOPTIC) 0.5 % ophthalmic solution 1 Drop  1 Drop Both Eyes BID            Lab Review:     Recent Labs      02/28/18   0335   WBC  7.9   HGB  14.3   HCT  43.4   PLT  172     Recent Labs      02/28/18   0335  02/27/18   0508   NA  145  141   K  3.7  3.1*   CL  107  103   CO2  29  27   GLU  95  100   BUN  7  9   CREA  0.74  0.76   CA  8.8  8.6   MG  1.7  1.6   PHOS  3.1  3.4     Lab Results   Component Value Date/Time    Glucose (POC) 80 03/01/2018 05:26 AM Glucose (POC) 93 03/01/2018 12:43 AM    Glucose (POC) 98 02/28/2018 06:05 PM    Glucose (POC) 198 (H) 02/28/2018 11:02 AM    Glucose (POC) 87 02/28/2018 05:10 AM     No results for input(s): PH, PCO2, PO2, HCO3, FIO2 in the last 72 hours. No results for input(s): INR in the last 72 hours.     No lab exists for component: INREXT, INREXT  No results found for: SDES  No results found for: CULT         Assessment:     Principal Problem:    Dysphagia (9/21/2015)    Active Problems:    HTN (hypertension) (9/19/2015)      Dementia (9/20/2015)      Encephalopathy acute (9/20/2015)      Debility (9/21/2015)      Parkinson disease ([facility] (2/25/2018)      Type 2 diabetes mellitus without complication ([facility] (8/33/1314)           Plan:     Principal Problem:    Dysphagia (9/21/2015)   - no evidence of acute CVA on MRI   - Neurology input appreciated   - ammonia level normal   - not clear as to why he may have had worsening of his baseline symptoms   - tolerating pureed diet   - home today    Active Problems:    HTN (hypertension) (9/19/2015)   - BP remains elevated, adjusted BP meds as above      Dementia (9/20/2015)/Encephalopathy acute (9/20/2015)   - seems to be at baseline mental status as far as I can determine, may have had encephalopathy POA, not clear to me at this point       Debility (9/21/2015)   - continue with PT/OT here, daughter declining SNF for rehab   - home with [facility]      Parkinson disease ([facility] (2/25/2018)   - continue Sinemet, Neurology did not recommend increasing      Type 2 diabetes mellitus without complication ([facility] (9/46/1405)   - BS look good, follow on current meds      Total time spent with patient: 35 minutes                  Care Plan discussed with: Patient, Care Manager and Nursing Staff    Discussed:  Code Status, Care Plan and D/C Planning    Prophylaxis:  Lovenox    Disposition:  TBD           ___________________________________________________    Attending Physician: Lulu Gonzalez MD

## 2018-04-11 NOTE — ED TRIAGE NOTES
PT arrives via EMS for AMS. Last known well at 2000 4/10. [facility] 0 PTA. Pts daughter states pt leaning to right, drooling and unable to walk.  Pt mumbling during MD assessment

## 2018-04-12 NOTE — ED NOTES
Patient discharged by MD. Papers given and questions answered. Pt waiting for transport.  Daughter at bedside

## 2018-04-12 NOTE — DISCHARGE INSTRUCTIONS
Urinary Tract Infections in Men: Care Instructions  Your Care Instructions    A urinary tract infection, or UTI, is a general term for an infection anywhere between the kidneys and the tip of the penis. UTIs can also be a result of a prostate problem. Most cause pain or burning when you urinate. Most UTIs are caused by bacteria and can be cured with antibiotics. It is important to complete your treatment so that the infection does not get worse. Follow-up care is a key part of your treatment and safety. Be sure to make and go to all appointments, and call your doctor if you are having problems. It's also a good idea to know your test results and keep a list of the medicines you take. How can you care for yourself at home? · Take your antibiotics as prescribed. Do not stop taking them just because you feel better. You need to take the full course of antibiotics. · Take your medicines exactly as prescribed. Your doctor may have prescribed a medicine, such as phenazopyridine (Pyridium), to help relieve pain when you urinate. This turns your urine orange. You may stop taking it when your symptoms get better. But be sure to take all of your antibiotics, which treat the infection. · Drink extra water for the next day or two. This will help make the urine less concentrated and help wash out the bacteria causing the infection. (If you have kidney, heart, or liver disease and have to limit your fluids, talk with your doctor before you increase your fluid intake.)  · Avoid drinks that are carbonated or have caffeine. They can irritate the bladder. · Urinate often. Try to empty your bladder each time. · To relieve pain, take a hot bath or lay a heating pad (set on low) over your lower belly or genital area. Never go to sleep with a heating pad in place. To help prevent UTIs  · Drink plenty of fluids, enough so that your urine is light yellow or clear like water.  If you have kidney, heart, or liver disease and have to limit fluids, talk with your doctor before you increase the amount of fluids you drink. · Urinate when you have the urge. Do not hold your urine for a long time. Urinate before you go to sleep. · Keep your penis clean. Catheter care  If you have a drainage tube (catheter) in place, the following steps will help you care for it. · Always wash your hands before and after touching your catheter. · Check the area around the urethra for inflammation or signs of infection. Signs of infection include irritated, swollen, red, or tender skin, or pus around the catheter. · Clean the area around the catheter with soap and water two times a day. Dry with a clean towel afterward. · Do not apply powder or lotion to the skin around the catheter. To empty the urine collection bag  · Wash your hands with soap and water. · Without touching the drain spout, remove the spout from its sleeve at the bottom of the collection bag. Open the valve on the spout. · Let the urine flow out of the bag and into the toilet or a container. Do not let the tubing or drain spout touch anything. · After you empty the bag, clean the end of the drain spout with tissue and water. Close the valve and put the drain spout back into its sleeve at the bottom of the collection bag. · Wash your hands with soap and water. When should you call for help? Call your doctor now or seek immediate medical care if:  ? · Symptoms such as a fever, chills, nausea, or vomiting get worse or happen for the first time. ? · You have new pain in your back just below your rib cage. This is called flank pain. ? · There is new blood or pus in your urine. ? · You are not able to take or keep down your antibiotics. ? Watch closely for changes in your health, and be sure to contact your doctor if:  ? · You are not getting better after taking an antibiotic for 2 days. ? · Your symptoms go away but then come back. Where can you learn more?   Go to http://lenny-brady.info/. Enter V494 in the search box to learn more about \"Urinary Tract Infections in Men: Care Instructions. \"  Current as of: May 12, 2017  Content Version: 11.4  © 7705-5684 [employer]. Care instructions adapted under license by [employer] (which disclaims liability or warranty for this information). If you have questions about a medical condition or this instruction, always ask your healthcare professional. [address] any warranty or liability for your use of this information. Altered Mental Status: Care Instructions  Your Care Instructions  Altered mental status is a change in how well your brain is working. As a result, you may be confused, be less alert than usual, or act in odd ways. This may include seeing or hearing things that aren't really there (hallucinations). A mental status change has many possible causes. For example, it may be the result of an infection, an imbalance of chemicals in the body, or a chronic disease such as diabetes or COPD. It can also be caused by things such as a head injury, taking certain medicines, or using alcohol or drugs. The doctor may do tests to look for the cause. These tests may include urine tests, blood tests, and imaging tests such as a CT scan. Sometimes a clear cause isn't found. But tests can help the doctor rule out a serious cause of your symptoms. A change in mental status can be scary. But mental status will often return to normal when the cause is treated. So it is important to get any follow-up testing or treatment the doctor has suggested. The doctor has checked you carefully, but problems can develop later. If you notice any problems or new symptoms, get medical treatment right away. Follow-up care is a key part of your treatment and safety. Be sure to make and go to all appointments, and call your doctor if you are having problems.  It's also a good idea to know your test results and keep a list of the medicines you take. How can you care for yourself at home? · Be safe with medicines. Take your medicines exactly as prescribed. Call your doctor if you think you are having a problem with your medicine. · Have another adult stay with you until you are better. This can help keep you safe. Ask that person to watch for signs that your mental status is getting worse. When should you call for help? Call 911 anytime you think you may need emergency care. For example, call if:  · You passed out (lost consciousness). Call your doctor now or seek immediate medical care if:  · Your mental status is getting worse. · You have new symptoms, such as a fever, chills, or shortness of breath. · You do not feel safe. Watch closely for changes in your health, and be sure to contact your doctor if:  · You do not get better as expected. Where can you learn more? Go to Skout.be  Enter J452 in the search box to learn more about \"Altered Mental Status: Care Instructions. \"   © 8360-0501 Healthwise, Incorporated. Care instructions adapted under license by [employer] (which disclaims liability or warranty for this information). This care instruction is for use with your licensed healthcare professional. If you have questions about a medical condition or this instruction, always ask your healthcare professional. [address] any warranty or liability for your use of this information.   Content Version: 75.3.014419; Current as of: November 20, 2015

## 2018-04-12 NOTE — ED PROVIDER NOTES
Patient is a 69 y.o. male presenting with altered mental status. The history is provided by the patient, a relative and the EMS personnel. Altered mental status    This is a new problem. Episode onset: just PTA. The problem has been resolved (by time of arrival to the ED). Associated symptoms include unresponsiveness. Mental status baseline is moderate dementia. His past medical history is significant for dementia. Past Medical History:   Diagnosis Date    Age-related physical debility     Compression fracture of L3 lumbar vertebra (HCC)     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     Diabetes ([facility] [address]     Dysphagia dementia related    Fall at home     Hypertension     Stool color black        Past Surgical History:   Procedure Laterality Date    HX HEENT           Family History:   Problem Relation Age of Onset    Stroke Father        Social History     Social History    Marital status:      Spouse name: N/A    Number of children: N/A    Years of education: N/A     Occupational History    Not on file. Social History Main Topics    Smoking status: Never Smoker    Smokeless tobacco: Never Used    Alcohol use No    Drug use: No    Sexual activity: Not Currently     Other Topics Concern    Not on file     Social History Narrative         ALLERGIES: Seroquel [quetiapine]; Haldol [haloperidol lactate]; Lorazepam; and Morphine    Review of Systems   Unable to perform ROS: Dementia   Constitutional: Negative for chills and fever. Respiratory: Negative for chest tightness and shortness of breath. Vitals:    04/11/18 2045 04/11/18 2100 04/11/18 2115 04/11/18 2139   BP: 153/80 164/81 162/76 151/80   Pulse:       Resp:       Temp:       SpO2: 94% 97% 96% 96%   Weight:       Height:                Physical Exam   Constitutional: He appears well-nourished. No distress. HENT:   Head: Normocephalic and atraumatic.    Cardiovascular: Normal rate, regular rhythm and intact distal pulses. Pulmonary/Chest: Effort normal. No respiratory distress. Abdominal: Soft. Bowel sounds are normal. There is no tenderness. Musculoskeletal: He exhibits no edema or deformity. Neurological: He is alert. Following simple commands   Nursing note and vitals reviewed.        MDM  Number of Diagnoses or Management Options  Altered mental status, unspecified altered mental status type:   Urinary tract infection without hematuria, site unspecified:   Diagnosis management comments: Daughter concerned for patient period of unresponsiveness to her  Patient following commands here - check urine and CXR       Will treat uti and d/c home       Amount and/or Complexity of Data Reviewed  Clinical lab tests: reviewed and ordered  Tests in the radiology section of CPT®: ordered and reviewed          ED Course       Procedures

## 2018-06-10 NOTE — ED TRIAGE NOTES
Patient arrives from home by EMS for periods of confusion. Per patient's daughter, patient has since returned to baseline.

## 2018-06-10 NOTE — ED NOTES
7:00 PM  Change of shift. Care of patient taken over from Dr. Akers Head; H&P reviewed, bedside handoff complete. Awaiting repeat labs. 7:45 PM  Second trop is lower. Discussed results and plan with patient. Patient will be discharged home with PCP followup. Patient instructed to return to the emergency room for any worsening symptoms or any other concerns.

## 2018-06-10 NOTE — DISCHARGE INSTRUCTIONS
We hope that we have addressed all of your medical concerns. The examination and treatment you received in the Emergency Department were for an emergent problem and were not intended as complete care. It is important that you follow up with your healthcare provider(s) for ongoing care. If your symptoms worsen or do not improve as expected, and you are unable to reach your usual health care provider(s), you should return to the Emergency Department. Today's healthcare is undergoing tremendous change, and patient satisfaction surveys are one of the many tools to assess the quality of medical care. You may receive a survey from the howsimple regarding your experience in the Emergency Department. I hope that your experience has been completely positive, particularly the medical care that I provided. As such, please participate in the survey; anything less than excellent does not meet my expectations or intentions. [facility] and [facility] participate in nationally recognized quality of care measures. If your blood pressure is greater than 120/80, as reported below, we urge that you seek medical care to address the potential of high blood pressure, commonly known as hypertension. Hypertension can be hereditary or can be caused by certain medical conditions, pain, stress, or \"white coat syndrome. \"       Please make an appointment with your health care provider(s) for follow up of your Emergency Department visit. VITALS:   Patient Vitals for the past 8 hrs:   Temp Pulse Resp BP SpO2   06/10/18 1730 - - - 140/75 92 %   06/10/18 1615 - - - 145/78 95 %   06/10/18 1554 97.5 °F (36.4 °C) 65 16 148/75 96 %          Thank you for allowing us to provide you with medical care today. We realize that you have many choices for your emergency care needs. Please choose us in the future for any continued health care needs.       Regards, Rena Diggs Dunn, [facility].   Office: [phone]            Recent Results (from the past 24 hour(s))   EKG, 12 LEAD, INITIAL    Collection Time: 06/10/18  4:40 PM   Result Value Ref Range    Ventricular Rate 71 BPM    Atrial Rate 71 BPM    P-R Interval 170 ms    QRS Duration 82 ms    Q-T Interval 400 ms    QTC Calculation (Bezet) 434 ms    Calculated P Axis 22 degrees    Calculated R Axis -20 degrees    Calculated T Axis 150 degrees    Diagnosis       Normal sinus rhythm  Possible Left atrial enlargement  Left ventricular hypertrophy  Cannot rule out Septal infarct , age undetermined  ST & T wave abnormality, consider lateral ischemia  Abnormal ECG  When compared with ECG of 29-OCT-2015 10:53,  Minimal criteria for Septal infarct are now present  Nonspecific T wave abnormality has replaced inverted T waves in Inferior   leads     URINALYSIS W/MICROSCOPIC    Collection Time: 06/10/18  5:13 PM   Result Value Ref Range    Color YELLOW/STRAW      Appearance CLEAR CLEAR      Specific gravity 1.019 1.003 - 1.030      pH (UA) 6.0 5.0 - 8.0      Protein NEGATIVE  NEG mg/dL    Glucose NEGATIVE  NEG mg/dL    Ketone 15 (A) NEG mg/dL    Blood NEGATIVE  NEG      Urobilinogen 1.0 0.2 - 1.0 EU/dL    Nitrites NEGATIVE  NEG      Leukocyte Esterase NEGATIVE  NEG      WBC 0-4 0 - 4 /hpf    RBC 0-5 0 - 5 /hpf    Epithelial cells FEW FEW /lpf    Bacteria NEGATIVE  NEG /hpf    Mucus 1+ (A) NEG /lpf   URINE CULTURE HOLD SAMPLE    Collection Time: 06/10/18  5:13 PM   Result Value Ref Range    Urine culture hold        URINE ON HOLD IN MICROBIOLOGY DEPT FOR 3 DAYS. IF UNPRESERVED URINE IS SUBMITTED, IT CANNOT BE USED FOR ADDITIONAL TESTING AFTER 24 HRS, RECOLLECTION WILL BE REQUIRED.    BILIRUBIN, CONFIRM    Collection Time: 06/10/18  5:13 PM   Result Value Ref Range    Bilirubin UA, confirm NEGATIVE  NEG     CBC WITH AUTOMATED DIFF    Collection Time: 06/10/18  5:25 PM   Result Value Ref Range    WBC 6.4 4.1 - 11.1 K/uL    RBC 4.48 4.10 - 5.70 M/uL    HGB 14.0 12.1 - 17.0 g/dL    HCT 41.6 36.6 - 50.3 %    MCV 92.9 80.0 - 99.0 FL    MCH 31.3 26.0 - 34.0 PG    MCHC 33.7 30.0 - 36.5 g/dL    RDW 12.9 11.5 - 14.5 %    PLATELET 629 307 - 137 K/uL    MPV 9.6 8.9 - 12.9 FL    NRBC 0.0 0  WBC    ABSOLUTE NRBC 0.00 0.00 - 0.01 K/uL    NEUTROPHILS 71 32 - 75 %    LYMPHOCYTES 23 12 - 49 %    MONOCYTES 6 5 - 13 %    EOSINOPHILS 1 0 - 7 %    BASOPHILS 0 0 - 1 %    IMMATURE GRANULOCYTES 0 0.0 - 0.5 %    ABS. NEUTROPHILS 4.5 1.8 - 8.0 K/UL    ABS. LYMPHOCYTES 1.5 0.8 - 3.5 K/UL    ABS. MONOCYTES 0.4 0.0 - 1.0 K/UL    ABS. EOSINOPHILS 0.0 0.0 - 0.4 K/UL    ABS. BASOPHILS 0.0 0.0 - 0.1 K/UL    ABS. IMM. GRANS. 0.0 0.00 - 0.04 K/UL    DF AUTOMATED     METABOLIC PANEL, COMPREHENSIVE    Collection Time: 06/10/18  5:25 PM   Result Value Ref Range    Sodium 139 136 - 145 mmol/L    Potassium 3.8 3.5 - 5.1 mmol/L    Chloride 102 97 - 108 mmol/L    CO2 27 21 - 32 mmol/L    Anion gap 10 5 - 15 mmol/L    Glucose 99 65 - 100 mg/dL    BUN 14 6 - 20 MG/DL    Creatinine 0.83 0.70 - 1.30 MG/DL    BUN/Creatinine ratio 17 12 - 20      GFR est AA >60 >60 ml/min/1.73m2    GFR est non-AA >60 >60 ml/min/1.73m2    Calcium 9.3 8.5 - 10.1 MG/DL    Bilirubin, total 0.9 0.2 - 1.0 MG/DL    ALT (SGPT) 6 (L) 12 - 78 U/L    AST (SGOT) 14 (L) 15 - 37 U/L    Alk.  phosphatase 92 45 - 117 U/L    Protein, total 7.3 6.4 - 8.2 g/dL    Albumin 3.7 3.5 - 5.0 g/dL    Globulin 3.6 2.0 - 4.0 g/dL    A-G Ratio 1.0 (L) 1.1 - 2.2     AMMONIA    Collection Time: 06/10/18  5:25 PM   Result Value Ref Range    Ammonia <10 <32 UMOL/L   TROPONIN I    Collection Time: 06/10/18  5:25 PM   Result Value Ref Range    Troponin-I, Qt. 0.05 (H) <0.05 ng/mL   TROPONIN I    Collection Time: 06/10/18  7:11 PM   Result Value Ref Range    Troponin-I, Qt. 0.04 <0.05 ng/mL       Ct Head Wo Cont    Result Date: 6/10/2018  EXAM:  CT HEAD WO CONT INDICATION: Confusion/delirium, altered LOC, unexplained COMPARISON: 2015. CONTRAST:  None. TECHNIQUE: Unenhanced CT of the head was performed using 5 mm images. Brain and bone windows were generated. CT dose reduction was achieved through use of a standardized protocol tailored for this examination and automatic exposure control for dose modulation. FINDINGS: There is slight prominence of the ventricles and sulci. There are slight changes small vessel disease periventricular white matter. No hemorrhage mass or acute infarction is identified. Bony structures are intact. IMPRESSION: No acute intracranial process identified              Altered Mental Status: Care Instructions  Your Care Instructions    Altered mental status is a change in how well your brain is working. As a result, you may be confused, be less alert than usual, or act in odd ways. This may include seeing or hearing things that aren't really there (hallucinations). A mental status change has many possible causes. For example, it may be the result of an infection, an imbalance of chemicals in the body, or a chronic disease such as diabetes or COPD. It can also be caused by things such as a head injury, taking certain medicines, or using alcohol or drugs. The doctor may do tests to look for the cause. These tests may include urine tests, blood tests, and imaging tests such as a CT scan. Sometimes a clear cause isn't found. But tests can help the doctor rule out a serious cause of your symptoms. A change in mental status can be scary. But mental status will often return to normal when the cause is treated. So it is important to get any follow-up testing or treatment the doctor has suggested. The doctor has checked you carefully, but problems can develop later. If you notice any problems or new symptoms, get medical treatment right away. Follow-up care is a key part of your treatment and safety.  Be sure to make and go to all appointments, and call your doctor if you are having problems. It's also a good idea to know your test results and keep a list of the medicines you take. How can you care for yourself at home? · Be safe with medicines. Take your medicines exactly as prescribed. Call your doctor if you think you are having a problem with your medicine. · Have another adult stay with you until you are better. This can help keep you safe. Ask that person to watch for signs that your mental status is getting worse. When should you call for help? Call 911 anytime you think you may need emergency care. For example, call if:  ? · You passed out (lost consciousness). ?Call your doctor now or seek immediate medical care if:  ? · Your mental status is getting worse. ? · You have new symptoms, such as a fever, chills, or shortness of breath. ? · You do not feel safe. ? Watch closely for changes in your health, and be sure to contact your doctor if:  ? · You do not get better as expected. Where can you learn more? Go to http://lenny-brady.info/. Enter A980 in the search box to learn more about \"Altered Mental Status: Care Instructions. \"  Current as of: October 14, 2016  Content Version: 11.4  © 6974-6306 [employer], Incorporated. Care instructions adapted under license by [employer] (which disclaims liability or warranty for this information). If you have questions about a medical condition or this instruction, always ask your healthcare professional. [place] [address] any warranty or liability for your use of this information.

## 2018-06-10 NOTE — ED PROVIDER NOTES
HPI Comments: 69 y.o. male with past medical history significant for dementia and parkinson's who presents via EMS with daughter with chief complaint of altered mental status. Per daughter pt's condition has been declining for 6 days. Daughter explains she noticed 6 days ago that pt was unusually stiff and immobile after which is Parkinson's medication was increased, with relief. In addition, daughter reports pt fell out of a chair 3 days ago and since has progressively become more lethargic and confused. Daughters states that when she visited pt today he was slumped in his chair and his speech was weak and unclear. Daughter also notes decreased appetite in pt. Daughter explains pt has previous hx of UTI, dehydration and increased levels of ammonium, all of which present with similar sx to pt's current sx. Daughter explains pt is usually confused later in the day but it is unusual for him to be as confused as he is currently during the day. Daughter also notes pt has chronic back pain which remains unchanged today. Daughter denies pt has hx of cirrhosis and liver problems. Pt denies SOB and cough. There are no other acute medical concerns at this time. Social hx: , lives with wife who also has Alzheimer's, has 24/7 care at home  PCP: Irma Garza MD    Old Chart Review: pt was admitted from 2/25/2018-3/1/2018 after CVA. He was also seen 4/11/2018 for altered mental status, had UTI at that time. Full history, physical exam, and ROS unable to be obtained due to:  dementia. Note written by Tyrell Paulson, as dictated by Eriberto Bain. Delphine Melchor MD 3:57 PM      The history is provided by the patient and a relative (clausr).         Past Medical History:   Diagnosis Date    Age-related physical debility     Compression fracture of L3 lumbar vertebra (HCC)     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     Diabetes ([facility] [address]     Dysphagia dementia related    Fall at home  Hypertension     Stool color black        Past Surgical History:   Procedure Laterality Date    HX HEENT           Family History:   Problem Relation Age of Onset    Stroke Father        Social History     Social History    Marital status:      Spouse name: N/A    Number of children: N/A    Years of education: N/A     Occupational History    Not on file. Social History Main Topics    Smoking status: Never Smoker    Smokeless tobacco: Never Used    Alcohol use No    Drug use: No    Sexual activity: Not Currently     Other Topics Concern    Not on file     Social History Narrative         ALLERGIES: Seroquel [quetiapine]; Haldol [haloperidol lactate]; Lorazepam; and Morphine    Review of Systems   Unable to perform ROS: Dementia       Vitals:    06/10/18 1554   BP: 148/75   Pulse: 65   Resp: 16   Temp: 97.5 °F (36.4 °C)   SpO2: 96%   Weight: 55.3 kg (122 lb)            Physical Exam   Constitutional: No distress. Elderly and chronically ill appearing   HENT:   Head: Normocephalic and atraumatic. Eyes: No scleral icterus. Neck: No tracheal deviation present. Cardiovascular: Normal rate and regular rhythm. Pulmonary/Chest: Effort normal. No respiratory distress. Abdominal: He exhibits no distension. Musculoskeletal: He exhibits no deformity. Spine non tender throughout   Neurological: He is alert. Skin: Skin is warm and dry. Psychiatric: He has a normal mood and affect. Nursing note and vitals reviewed. Note written by Tyrell Arredondo, as dictated by Slime Morales. Arabella Wiley MD 3:57 PM    [facility]      ED Course   7:07 PM  Change of shift. Care of patient signed over to Dr. Stephania Yu. Repeat troponin pending. Bedside handoff complete.       Procedures

## 2018-07-01 NOTE — PROGRESS NOTES
[facility]: MED RECONCILIATION    Information obtained from: Patient's daughter who had medication list on her phone. Allergies: Seroquel [quetiapine]; Haldol [haloperidol lactate]; Lorazepam; and Morphine    Prior to Admission Medications:     Medication Documentation Review Audit       Reviewed by Jim Garnica, HAND (Pharmacist) on 07/01/18 at 1420         Medication Sig Documenting Provider Last Dose Status Taking? amLODIPine (NORVASC) 5 mg tablet Take 5 mg by mouth daily. Historical Provider 7/1/2018 AM Active Yes    aspirin delayed-release 81 mg tablet Take 81 mg by mouth daily. Historical Provider 7/1/2018 AM Active Yes    carbidopa-levodopa (SINEMET)  mg per tablet Take 2 Tabs by mouth two (2) times a day. AM and HS  Historical Provider 7/1/2018 AM Active Yes    donepezil (ARICEPT) 10 mg tablet Take 10 mg by mouth nightly. Historical Provider 6/30/2018 Active Yes    lisinopril (PRINIVIL, ZESTRIL) 20 mg tablet Take 10 mg by mouth daily. Historical Provider 7/1/2018 Active Yes    memantine (NAMENDA) 10 mg tablet Take 10 mg by mouth two (2) times a day. Historical Provider 7/1/2018 AM Active Yes    metFORMIN (GLUCOPHAGE) 500 mg tablet Take 500 mg by mouth two (2) times daily (with meals). Historical Provider 7/1/2018 AM Active Yes    pravastatin (PRAVACHOL) 20 mg tablet Take 20 mg by mouth nightly. Historical Provider 6/30/2018 Active Yes    timolol (TIMOPTIC) 0.5 % ophthalmic solution Administer 1 Drop to both eyes two (2) times a day.  Historical Provider 7/1/2018 AM Active Yes                  Jim Garnica, HNAD   Contact: [phone]

## 2018-07-01 NOTE — ED PROVIDER NOTES
HPI Comments: 69 y.o. male with past medical history significant for DM, HTN, Parkinson disease, Alzheimer disease, compression fracture of L3, and dysphagia who presents from home with chief complaint of AMS. EMS reports Pt's daughter noticed possible new facial droop and slurred speech 30 minutes ago. Per Pt's daughter, Pt usually says \"no I don't want to go to the hospital\" when she asks him but he said \"yes, take me\" today. In addition, Pt's daughter states Pt fell this morning while putting on shoes but he did not hit his head at that time. Per daughter, Pt usually ambulates with a walker. She states Pt ate lunch and took his midday medications today. Per EMS, Pt's vital signs were stable on arrival. BP was 146/77, blood glucose was 209 (usually ), and HR was 65. Per Pt's daughter, Pt \"has not taken his lactulose for some time for elevated ammonia\". EMS states Pt has frequency UTI, dehydration, and elevated ammonia. There are no other acute medical concerns at this time. PCP: Abelino Voss MD    Full history, physical exam, and ROS unable to be obtained due to:  dementia. Note written by Jannell Severe, Scribe, as dictated by Román Vincent MD 1:34 PM    The history is provided by the patient. Past Medical History:   Diagnosis Date    Age-related physical debility     Compression fracture of L3 lumbar vertebra (HCC)     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     Diabetes ([facility] [address]     Dysphagia dementia related    Fall at home     Hypertension     Stool color black        Past Surgical History:   Procedure Laterality Date    HX HEENT           Family History:   Problem Relation Age of Onset    Stroke Father        Social History     Social History    Marital status:      Spouse name: N/A    Number of children: N/A    Years of education: N/A     Occupational History    Not on file.      Social History Main Topics    Smoking status: Never Smoker    Smokeless tobacco: Never Used    Alcohol use No    Drug use: No    Sexual activity: Not Currently     Other Topics Concern    Not on file     Social History Narrative         ALLERGIES: Seroquel [quetiapine]; Haldol [haloperidol lactate]; Lorazepam; and Morphine    Review of Systems   Unable to perform ROS: Dementia       There were no vitals filed for this visit. Physical Exam   Constitutional: He appears well-developed and well-nourished. No distress. HENT:   Head: Normocephalic and atraumatic. Eyes: Conjunctivae and EOM are normal. Pupils are equal, round, and reactive to light. Neck: Normal range of motion. Neck supple. Cardiovascular: Normal rate, regular rhythm, normal heart sounds and intact distal pulses. Exam reveals no friction rub. No murmur heard. Pulmonary/Chest: Effort normal and breath sounds normal. No respiratory distress. He has no wheezes. He has no rales. He exhibits no tenderness. Abdominal: Soft. Bowel sounds are normal. He exhibits no distension. There is no tenderness. There is no rebound and no guarding. Musculoskeletal: Normal range of motion. He exhibits no edema or tenderness. Neurological: He is alert. No cranial nerve deficit. Coordination normal.   Oriented to self; sluggish and slow to respond but answers some questions appropriately   Skin: Skin is warm and dry. He is not diaphoretic. No pallor. Psychiatric: He has a normal mood and affect. His behavior is normal.   Nursing note and vitals reviewed. MDM  Number of Diagnoses or Management Options  Acute cystitis without hematuria:   Dehydration:   Dementia with behavioral disturbance, unspecified dementia type:   Diagnosis management comments: DDX- Dehydration, uti, aspiration PNA, hyperammonemia, mi,  Ich vs encephalopathy associated with his dementia    Pt with no facial droop and MES reports  Once they sat him up as he was leaning over it seemed to resolve.  This is not c/w cva Amount and/or Complexity of Data Reviewed  Clinical lab tests: ordered and reviewed  Tests in the radiology section of CPT®: ordered and reviewed  Obtain history from someone other than the patient: yes (daughter)  Review and summarize past medical records: yes (Last urine culture negative)  Independent visualization of images, tracings, or specimens: yes (ekg)    Patient Progress  Patient progress: stable        ED Course       Procedures    ED EKG interpretation:  Rhythm: normal sinus rhythm; and regular . Rate (approx.): 65; Axis: normal; P wave: normal; QRS interval: normal ; ST/T wave: T wave inverted; in  Lead: {1,avl, v4-v6 unchanged from 6/10/18  EKG documented by Nataliia Hurst MD, scribe, as interpreted by Nataliia Hurst MD, ED MD.      ED EKG interpretation:  Rhythm: normal sinus rhythm; and regular . Rate (approx.): 80; Axis: normal; P wave: normal; QRS interval: normal ; ST/T wave: T wave inverted; in  Lead:1, avl, v5-v6  EKG documented by Nataliia Hurst MD, manuelibsocorro, as interpreted by Nataliia Hurst MD, ED MD.       Spoke with daughter at length. Pt alert and interactive here though does not answer questions appropriately I think this is likely dementia. Pt mildly dehydrated and will treat uti.  Daughter agrees with plan copy of results given

## 2018-07-01 NOTE — ED NOTES
Daughter wants to take patient home in her vehicle with family to help at home. THis RN has given number to daughter for non-emergent assistance for lift help at home.

## 2018-07-01 NOTE — ED NOTES
Bedside and Verbal shift change report given to Analilia Brown and Marsha Jaimes RN (oncoming nurse) by Sathya Gould  (offgoing nurse). Report included the following information SBAR.

## 2018-07-01 NOTE — ED NOTES
Pt. Daughter given discharge instructions, requesting that we get her father dressed at this time. Pt. Assisted with clothing and removed from monitor. IV d'cd upon request. Pt. Awaiting transport by [facility], 90 minute ETA.

## 2018-07-01 NOTE — ED NOTES
Per patient daughter, patient uses applesauce to take pills, daughter states patient wants some \"nabs\". Will repeat stand tool and po challenge patient.

## 2018-07-01 NOTE — ED TRIAGE NOTES
Confusion onset 45-90 minutes ago at home. Arrives via EMS from home. Daughter reports that he also had a fall at home but did not hit his head. Has also had some change in speech, less talkative per daughter.

## 2018-07-03 NOTE — PROGRESS NOTES
Spoke with patient's nurse at home. Informed of culture result.  Informed to stop keflex  ERX for cipro 250 mg bid x 7 d to [facility] and gwendolyn peters

## 2018-08-15 NOTE — DISCHARGE INSTRUCTIONS
Oral Rehydration: Care Instructions  Your Care Instructions    Dehydration occurs when your body loses too much water. This can happen if you do not drink enough fluids or lose a lot of fluid due to diarrhea, vomiting, or sweating. Being dehydrated can cause health problems and can even be life-threatening. To replace lost fluids, you need to drink liquid that contains special chemicals called electrolytes. Electrolytes keep your body working well. Plain water does not have electrolytes. You also need to rest to prevent more fluid loss. Replacing water and electrolytes (oral rehydration) completely takes about 36 hours. But you should feel better within a few hours. Follow-up care is a key part of your treatment and safety. Be sure to make and go to all appointments, and call your doctor if you are having problems. It's also a good idea to know your test results and keep a list of the medicines you take. How can you care for yourself at home? · Take frequent sips of a drink such as Gatorade, Powerade, or other rehydration drinks that your doctor suggests. These replace both fluid and important chemicals (electrolytes) you need for balance in your blood. · Drink 2 quarts of cool liquid over 2 to 4 hours. You should have at least 10 glasses of liquid a day to replace lost fluid. If you have kidney, heart, or liver disease and have to limit fluids, talk with your doctor before you increase the amount of fluids you drink. · Make your own drink. Measure everything carefully. The drink may not work well or may even be harmful if the amounts are off. ¨ 1 quart water  ¨ ½ teaspoon salt  ¨ 6 teaspoons sugar  · Do not drink liquid with caffeine, such as coffee and isauro. · Do not drink any alcohol. It can make you dehydrated. · Drink plenty of fluids, enough so that your urine is light yellow or clear like water.  If you have kidney, heart, or liver disease and have to limit fluids, talk with your doctor before you increase the amount of fluids you drink. When should you call for help? Call 911 anytime you think you may need emergency care. For example, call if:    · You have signs of severe dehydration, such as:  ¨ You are confused or unable to stay awake. ¨ You passed out (lost consciousness).    Call your doctor now or seek immediate medical care if:    · You still have signs of dehydration. You have sunken eyes and a dry mouth, and you pass only a little dark urine.     · You are dizzy or lightheaded, or you feel like you may faint.     · You are not able to keep down fluids.    Watch closely for changes in your health, and be sure to contact your doctor if:    · You do not get better as expected. Where can you learn more? Go to http://lenny-brady.info/. Enter I040 in the search box to learn more about \"Oral Rehydration: Care Instructions. \"  Current as of: November 20, 2017  Content Version: 11.7  © 1344-2218 [employer]. Care instructions adapted under license by [employer] (which disclaims liability or warranty for this information). If you have questions about a medical condition or this instruction, always ask your healthcare professional. [place] [address] any warranty or liability for your use of this information. Dehydration: Care Instructions  Your Care Instructions  Dehydration happens when your body loses too much fluid. This might happen when you do not drink enough water or you lose large amounts of fluids from your body because of diarrhea, vomiting, or sweating. Severe dehydration can be life-threatening. Water and minerals called electrolytes help put your body fluids back in balance. Learn the early signs of fluid loss, and drink more fluids to prevent dehydration. Follow-up care is a key part of your treatment and safety. Be sure to make and go to all appointments, and call your doctor if you are having problems.  It's also a good idea to know your test results and keep a list of the medicines you take. How can you care for yourself at home? · To prevent dehydration, drink plenty of fluids, enough so that your urine is light yellow or clear like water. Choose water and other caffeine-free clear liquids until you feel better. If you have kidney, heart, or liver disease and have to limit fluids, talk with your doctor before you increase the amount of fluids you drink. · If you do not feel like eating or drinking, try taking small sips of water, sports drinks, or other rehydration drinks. · Get plenty of rest.  To prevent dehydration  · Add more fluids to your diet and daily routine, unless your doctor has told you not to. · During hot weather, drink more fluids. Drink even more fluids if you exercise a lot. Stay away from drinks with alcohol or caffeine. · Watch for the symptoms of dehydration. These include:  ¨ A dry, sticky mouth. ¨ Dark yellow urine, and not much of it. ¨ Dry and sunken eyes. ¨ Feeling very tired. · Learn what problems can lead to dehydration. These include:  ¨ Diarrhea, fever, and vomiting. ¨ Any illness with a fever, such as pneumonia or the flu. ¨ Activities that cause heavy sweating, such as endurance races and heavy outdoor work in hot or humid weather. ¨ Alcohol or drug abuse or withdrawal.  ¨ Certain medicines, such as cold and allergy pills (antihistamines), diet pills (diuretics), and laxatives. ¨ Certain diseases, such as diabetes, cancer, and heart or kidney disease. When should you call for help? Call 911 anytime you think you may need emergency care. For example, call if:    · You passed out (lost consciousness).    Call your doctor now or seek immediate medical care if:    · You are confused and cannot think clearly.     · You are dizzy or lightheaded, or you feel like you may faint.     · You have signs of needing more fluids.  You have sunken eyes and a dry mouth, and you pass only a little dark urine.     · You cannot keep fluids down.    Watch closely for changes in your health, and be sure to contact your doctor if:    · You are not making tears.     · Your skin is very dry and sags slowly back into place after you pinch it.     · Your mouth and eyes are very dry. Where can you learn more? Go to http://lenny-brady.info/. Enter S819 in the search box to learn more about \"Dehydration: Care Instructions. \"  Current as of: November 20, 2017  Content Version: 11.7  © 3946-2260 [employer]. Care instructions adapted under license by [employer] (which disclaims liability or warranty for this information). If you have questions about a medical condition or this instruction, always ask your healthcare professional. [address] any warranty or liability for your use of this information. Learning About Delirium  What is delirium? Delirium is a sudden change in mental condition. It leads to confusion and unusual behavior. Delirium is also called acute confusional state. Delirium affects all age groups. It can result from problems that affect the brain, such as stroke. It can also happen after an infection or when using certain medicines. Pain may also cause the problem. Seeing delirium in a loved one can be scary and sad. But it will go away most of the time. It usually lasts hours to days. The doctor will look for a cause and take steps to treat it and keep your loved one comfortable. What are the symptoms? Symptoms of delirium usually develop over several hours to a few days. Symptoms may change and be more or less severe. Symptoms include:  · A short attention span. · Confusion. This is not knowing where you are, what time it is, or who others are. · Hallucinations. This usually is seeing or hearing things that are not really there. · Delusions. This is believing things that aren't true. · Illusions.  This is making a mistake in what you think is real. For example, you think a child is crying, but it's a pillow. · Disorganized thinking. How is delirium treated? The doctor may:  · Find and treat the cause. This could be:  ¨ Not getting enough fluids. ¨ An infection. ¨ A medicine or combination of medicines. ¨ Another medical problem. · Prescribe a medicine. · Make the hospital room as quiet as possible. You may be able to help your loved one by being present and talking to and touching him or her. Follow-up care is a key part of your treatment and safety. Be sure to make and go to all appointments, and call your doctor if you are having problems. It's also a good idea to know your test results and keep a list of the medicines you take. Where can you learn more? Go to http://lenny-brady.info/. Enter Z295 in the search box to learn more about \"Learning About Delirium. \"  Current as of: December 7, 2017  Content Version: 11.7  © 5238-1506 [employer]. Care instructions adapted under license by [employer] (which disclaims liability or warranty for this information). If you have questions about a medical condition or this instruction, always ask your healthcare professional. [address] any warranty or liability for your use of this information.

## 2018-08-15 NOTE — ED TRIAGE NOTES
Pt here for lethargy. Daughter reports that when he behaves like this he is usually dehydrated, uti, bladder infection, or ammonia levels. Pt appetite is poor. Reports that urine smelled strong starting Monday and Tuesday he started to decline.

## 2018-08-15 NOTE — PROGRESS NOTES
[facility]: MED RECONCILIATION      Information obtained from: Medication list (kept on daughter's phone), RxQuery, previous medical records      Allergies: Seroquel [quetiapine]; Haldol [haloperidol lactate]; Lorazepam; and Morphine    Prior to Admission Medications:     Medication Documentation Review Audit       Reviewed by Manuel Cardozo. Sylvia Phan (Pharmacist) on 08/15/18 at [phone]         Medication Sig Documenting Provider Last Dose Status Taking? amLODIPine (NORVASC) 5 mg tablet Take 5 mg by mouth daily. Historical Provider 8/14/2018 AM Active Yes    aspirin delayed-release 81 mg tablet Take 81 mg by mouth daily. Historical Provider 8/14/2018 AM Active Yes    carbidopa-levodopa (SINEMET)  mg per tablet Take 2 Tabs by mouth every morning. Indications: Parkinsonism Historical Provider 8/14/2018 AM Active Yes    carbidopa-levodopa (SINEMET)  mg per tablet Take 1 Tab by mouth daily (with dinner). Cliff Blankenship MD 8/14/2018 PM Active Yes    carbidopa-levodopa (SINEMET)  mg per tablet Take 1 Tab by mouth nightly. Cliff Blankenship MD 8/14/2018 PM Active Yes    donepezil (ARICEPT) 10 mg tablet Take 10 mg by mouth nightly. Historical Provider 8/14/2018 PM Active Yes    lisinopril (PRINIVIL, ZESTRIL) 20 mg tablet Take 10 mg by mouth daily. Historical Provider 8/14/2018 AM Active Yes    memantine (NAMENDA) 10 mg tablet Take 10 mg by mouth two (2) times a day. Historical Provider 8/14/2018 PM Active Yes    metFORMIN (GLUCOPHAGE) 500 mg tablet Take 500 mg by mouth two (2) times daily (with meals). Historical Provider 8/14/2018 PM Active Yes    pravastatin (PRAVACHOL) 20 mg tablet Take 20 mg by mouth nightly. Historical Provider 8/14/2018 PM Active Yes    timolol (TIMOPTIC) 0.5 % ophthalmic solution Administer 1 Drop to both eyes two (2) times a day. Historical Provider 8/14/2018 PM Active Yes                    Thank you,  Faby Davidson, Pharm. D.

## 2018-08-15 NOTE — ED PROVIDER NOTES
HPI Comments: 69 y.o. male with past medical history significant for DM, dementia, HTN, Parkinson's, and dysphagia who presents via EMS with chief complaint of lethargy. Pt's daughter states pt has been lethargic since yesterday, says he has been less active yesterday and today and is oversleeping until about 1 pm. Per daughter, pt has some confusion at baseline but was noted to more altered than his baseline yesterday and states pt is \" in a daze\". Pt has been minimally responsive to daughter and \"does not want to talk\" when he is normally conversant. Pt has hx of Parkinson's and has not been on time with Sinemet doses the past 2 days (first dose of the day at about 1300). Pt's daughter states pt complained about lower abdominal pain and had a \"strong urine smell. \" Pt's daughter states pt has also had a loss in appetite. Pt's daughter states pt has hx of similar sx with UTI, dehydration, and elevated ammonia level in the past. Pt's daughter states pt is able to empty his bladder but is borderline incontinent. Pt's daughter states pt has to be encouraged to drink fluids. Pt's daughter states pt has an aide during the day. Per EMS, pt's vitals were stable en route. Pt denies fever, vomiting, or diarrhea. There are no other acute medical concerns at this time. Full history, physical exam, and ROS unable to be obtained due to: Dementia. Chart Review: Last ED visit July 1st. Pt was dx with UTI and was discharged home on keflex. On urine culture 2 bacteria grew: pseudomonas, and proteus. Pt switched from keflex to ciprofloxacin after final culture results. Social hx: Never Smoker. Denies EtOH Use. PCP: Vicky Lynne MD    Note written by Tyrell Harry, as dictated by Sherman Mcghee DO 3:32 PM      The history is provided by the patient, a relative, medical records and the EMS personnel. The history is limited by the condition of the patient (Dementia).         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 Social History Main Topics    Smoking status: Never Smoker    Smokeless tobacco: Never Used    Alcohol use No    Drug use: No    Sexual activity: Not Currently     Other Topics Concern    Not on file     Social History Narrative         ALLERGIES: Seroquel [quetiapine]; Haldol [haloperidol lactate]; Lorazepam; and Morphine    Review of Systems   Unable to perform ROS: Dementia       Vitals:    08/15/18 1519   BP: 154/83   Pulse: 66   Resp: 16   Temp: 97.6 °F (36.4 °C)   SpO2: 96%   Weight: 59.9 kg (132 lb)   Height: 5' 6\" (1.676 m)            Physical Exam   Nursing note and vitals reviewed. Constitutional: Pt is awake and alert. Pt appears frail, elderly. NAD. HENT:   Head: Normocephalic and atraumatic. Nose: Nose normal.   Mouth/Throat: Oropharynx is clear and moist. No oropharyngeal exudate. Moist mucous membranes. Eyes: Conjunctivae and extraocular motions are normal. Pupils are equal, round, and reactive to light. Right eye exhibits no discharge. Left eye exhibits no discharge. No scleral icterus. Neck: No tracheal deviation present. Supple neck. Cardiovascular: Normal rate, regular rhythm, normal heart sounds and intact distal pulses. Exam reveals no gallop and no friction rub. No murmur heard. Pulmonary/Chest: Effort normal and breath sounds normal.  Pt  has no wheezes.   Pt has no rales. Abdominal: Soft. Pt  exhibits no distension and no mass. No tenderness. Pt  has no rebound and no guarding. Musculoskeletal:  Pt  exhibits no edema and no tenderness. Ext: Normal ROM in all four extremities; not tender to palpation; distal pulses are normal, no edema. Neurological:  Pt is awake and alert. Follows commands. No sensory or gross motor deficit currently. Skin: Skin is warm and dry. Pt  is not diaphoretic. Psychiatric:  Pt  has a normal mood and affect. Behavior is normal.     Note written by Tyrell Schneider, as dictated by Kristian Barrera DO 3:33 PM    [facility]      ED Course       Procedures    ED EKG interpretation:  Rhythm: normal sinus rhythm; and regular . Rate (approx.): 61; Axis: normal; ST/T wave: lateral T wave inversion; LVH. When compared to July 1st, 2018 EKG there is no acute change. Note written by Tyrell Schneider, as dictated by Kristian Barrera DO 3:31 PM    PROGRESS NOTE:  5:16 PM  Pt has had 1 liter of IV fluids and perked up basically back to normal per daughter. Pt is wide awake and more coherent. Talked about head CT with patient and daughter, discussed how there will most likely be no change from previous CT scans and it would not be helpful for today's visit and daughter agrees. Daughter want to take pt home without head CT. Gave daughter return precaution if pt's condition gets worse. 6:14 PM  Patient's results have been reviewed with them. Patient and/or family have verbally conveyed their understanding and agreement of the patient's signs, symptoms, diagnosis, treatment and prognosis and additionally agree to follow up as recommended or return to the Emergency Room should their condition change prior to follow-up.   Discharge instructions have also been provided to the patient with some educational information regarding their diagnosis as well a list of reasons why they would want to return to the ER prior to their follow-up appointment should their condition change. Labs Reviewed   URINALYSIS W/MICROSCOPIC - Abnormal; Notable for the following:        Result Value    Protein TRACE (*)     All other components within normal limits   METABOLIC PANEL, COMPREHENSIVE - Abnormal; Notable for the following:     Creatinine 0.66 (*)     BUN/Creatinine ratio 23 (*)     Calcium 8.0 (*)     Bilirubin, total 1.1 (*)     ALT (SGPT) <6 (*)     AST (SGOT) 12 (*)     Alk.  phosphatase 122 (*)     Albumin 3.3 (*)     A-G Ratio 1.0 (*)     All other components within normal limits   CBC WITH AUTOMATED DIFF - Abnormal; Notable for the following:     RBC 3.94 (*)     All other components within normal limits   URINE CULTURE HOLD SAMPLE   URINE CULTURE HOLD SAMPLE   SAMPLES BEING HELD   TROPONIN I   LIPASE   AMMONIA

## 2018-08-15 NOTE — ED NOTES
EMS transport set up for pt to return home, transportation to be here within the hour; pt given crackers and gingerale, pt eating and acting to baseline per daughter.

## 2018-08-27 NOTE — DISCHARGE INSTRUCTIONS
Thoracic Aortic Aneurysm: Care Instructions  Your Care Instructions  A thoracic aortic aneurysm is a bulge in a blood vessel (aorta) in the chest. The bulge occurs in a weak spot in the vessel. A large aneurysm can be very dangerous. If it bursts, it can cause bleeding that leads to death. A thoracic aortic aneurysm can be caused by an injury to the chest, hardening of the arteries, or an infection. Sometimes aneurysms run in families. Small aneurysms may not need treatment. But you will need regular checkups to see how fast the aneurysm is growing. A large aneurysm may need surgery to repair the weak area of the aorta. Follow-up care is a key part of your treatment and safety. Be sure to make and go to all appointments, and call your doctor if you are having problems. It's also a good idea to know your test results and keep a list of the medicines you take. How can you care for yourself at home? · Control high blood pressure. High blood pressure increases the chance of an aneurysm bursting. A healthy lifestyle along with medicines may help you lower your blood pressure. · Manage cholesterol to help keep your blood vessels healthy. A healthy lifestyle along with medicines may help you manage cholesterol. · Do not smoke. Smoking can make the aneurysm grow faster. If you need help quitting, talk to your doctor about stop-smoking programs and medicines. These can increase your chances of quitting for good. · Stay at a healthy weight. Being overweight may not make the aneurysm any worse. But being at a healthy weight can reduce your risks from surgery if you need it. · Eat heart-healthy foods. These include fruits, vegetables, whole grains, fish, and low-fat or nonfat dairy foods. Limit sodium, alcohol, and sweets. · If your doctor recommends it, get more exercise. Walking is a good choice. Bit by bit, increase the amount you walk every day. Try for at least 30 minutes on most days of the week.  You also may want to swim, bike, or do other activities. When should you call for help? Call 911 anytime you think you may need emergency care. For example, call if:    · You have sudden chest pain and shortness of breath, or you cough up blood.     · You passed out (lost consciousness).    Call your doctor now or seek immediate medical care if:    · You have any new chest pain.    Watch closely for changes in your health, and be sure to contact your doctor if:    · You have any problems making your doctor visits.     · You do not get better as expected. Where can you learn more? Go to http://lennyLaunchBitbrady.info/. Enter 21  in the search box to learn more about \"Thoracic Aortic Aneurysm: Care Instructions. \"  Current as of: November 21, 2017  Content Version: 11.7  © 3724-3513 [employer]. Care instructions adapted under license by [employer] (which disclaims liability or warranty for this information). If you have questions about a medical condition or this instruction, always ask your healthcare professional. [place] [address] any warranty or liability for your use of this information. Learning About Delirium  What is delirium? Delirium is a sudden change in mental condition. It leads to confusion and unusual behavior. Delirium is also called acute confusional state. Delirium affects all age groups. It can result from problems that affect the brain, such as stroke. It can also happen after an infection or when using certain medicines. Pain may also cause the problem. Seeing delirium in a loved one can be scary and sad. But it will go away most of the time. It usually lasts hours to days. The doctor will look for a cause and take steps to treat it and keep your loved one comfortable. What are the symptoms? Symptoms of delirium usually develop over several hours to a few days. Symptoms may change and be more or less severe.   Symptoms include:  · A short attention span. · Confusion. This is not knowing where you are, what time it is, or who others are. · Hallucinations. This usually is seeing or hearing things that are not really there. · Delusions. This is believing things that aren't true. · Illusions. This is making a mistake in what you think is real. For example, you think a child is crying, but it's a pillow. · Disorganized thinking. How is delirium treated? The doctor may:  · Find and treat the cause. This could be:  ¨ Not getting enough fluids. ¨ An infection. ¨ A medicine or combination of medicines. ¨ Another medical problem. · Prescribe a medicine. · Make the hospital room as quiet as possible. You may be able to help your loved one by being present and talking to and touching him or her. Follow-up care is a key part of your treatment and safety. Be sure to make and go to all appointments, and call your doctor if you are having problems. It's also a good idea to know your test results and keep a list of the medicines you take. Where can you learn more? Go to http://lennyAllen [facility] for Brain Sciencebrady.info/. Enter M916 in the search box to learn more about \"Learning About Delirium. \"  Current as of: December 7, 2017  Content Version: 11.7  © 9078-1892 [employer]. Care instructions adapted under license by [employer] (which disclaims liability or warranty for this information). If you have questions about a medical condition or this instruction, always ask your healthcare professional. [place] [address] any warranty or liability for your use of this information. Urinary Tract Infections in Men: Care Instructions  Your Care Instructions    A urinary tract infection, or UTI, is a general term for an infection anywhere between the kidneys and the tip of the penis. UTIs can also be a result of a prostate problem. Most cause pain or burning when you urinate.   Most UTIs are caused by bacteria and can be cured with antibiotics. It is important to complete your treatment so that the infection does not get worse. Follow-up care is a key part of your treatment and safety. Be sure to make and go to all appointments, and call your doctor if you are having problems. It's also a good idea to know your test results and keep a list of the medicines you take. How can you care for yourself at home? · Take your antibiotics as prescribed. Do not stop taking them just because you feel better. You need to take the full course of antibiotics. · Take your medicines exactly as prescribed. Your doctor may have prescribed a medicine, such as phenazopyridine (Pyridium), to help relieve pain when you urinate. This turns your urine orange. You may stop taking it when your symptoms get better. But be sure to take all of your antibiotics, which treat the infection. · Drink extra water for the next day or two. This will help make the urine less concentrated and help wash out the bacteria causing the infection. (If you have kidney, heart, or liver disease and have to limit your fluids, talk with your doctor before you increase your fluid intake.)  · Avoid drinks that are carbonated or have caffeine. They can irritate the bladder. · Urinate often. Try to empty your bladder each time. · To relieve pain, take a hot bath or lay a heating pad (set on low) over your lower belly or genital area. Never go to sleep with a heating pad in place. To help prevent UTIs  · Drink plenty of fluids, enough so that your urine is light yellow or clear like water. If you have kidney, heart, or liver disease and have to limit fluids, talk with your doctor before you increase the amount of fluids you drink. · Urinate when you have the urge. Do not hold your urine for a long time. Urinate before you go to sleep. · Keep your penis clean.   Catheter care  If you have a drainage tube (catheter) in place, the following steps will help you care for it. · Always wash your hands before and after touching your catheter. · Check the area around the urethra for inflammation or signs of infection. Signs of infection include irritated, swollen, red, or tender skin, or pus around the catheter. · Clean the area around the catheter with soap and water two times a day. Dry with a clean towel afterward. · Do not apply powder or lotion to the skin around the catheter. To empty the urine collection bag  · Wash your hands with soap and water. · Without touching the drain spout, remove the spout from its sleeve at the bottom of the collection bag. Open the valve on the spout. · Let the urine flow out of the bag and into the toilet or a container. Do not let the tubing or drain spout touch anything. · After you empty the bag, clean the end of the drain spout with tissue and water. Close the valve and put the drain spout back into its sleeve at the bottom of the collection bag. · Wash your hands with soap and water. When should you call for help? Call your doctor now or seek immediate medical care if:    · Symptoms such as a fever, chills, nausea, or vomiting get worse or happen for the first time.     · You have new pain in your back just below your rib cage. This is called flank pain.     · There is new blood or pus in your urine.     · You are not able to take or keep down your antibiotics.    Watch closely for changes in your health, and be sure to contact your doctor if:    · You are not getting better after taking an antibiotic for 2 days.     · Your symptoms go away but then come back. Where can you learn more? Go to http://lenny-brady.info/. Enter H159 in the search box to learn more about \"Urinary Tract Infections in Men: Care Instructions. \"  Current as of: May 12, 2017  Content Version: 11.7  © 3770-5403 [employer].  Care instructions adapted under license by [employer] (which disclaims liability or warranty for this information). If you have questions about a medical condition or this instruction, always ask your healthcare professional. [place] [address] any warranty or liability for your use of this information.

## 2018-08-27 NOTE — ED NOTES
Pt's daughter reported pt needed use restroom, assisted pt with urinal. No urine voided, bladder scanning performed and revealed approximately 150 mL of fluid.

## 2018-08-27 NOTE — PROGRESS NOTES
[facility]: MED RECONCILIATION      Information obtained from: Child, RxQuery, recent medical records      Allergies: Seroquel [quetiapine]; Haldol [haloperidol lactate]; Lorazepam; and Morphine    Prior to Admission Medications:     Medication Documentation Review Audit       Reviewed by Brandy Hall. Ignacia Butts (Pharmacist) on 08/27/18 at 1725         Medication Sig Documenting Provider Last Dose Status Taking? amLODIPine (NORVASC) 5 mg tablet Take 5 mg by mouth daily. Historical Provider 8/27/2018 AM Active Yes    aspirin delayed-release 81 mg tablet Take 81 mg by mouth daily. Historical Provider 8/27/2018 AM Active Yes    carbidopa-levodopa (SINEMET)  mg per tablet Take 2 Tabs by mouth every morning. Indications: Parkinsonism Historical Provider 8/27/2018 AM Active Yes    carbidopa-levodopa (SINEMET)  mg per tablet Take 1 Tab by mouth daily (with dinner). Cliff Blankenship MD 8/27/2018 PM Active Yes    carbidopa-levodopa (SINEMET)  mg per tablet Take 1 Tab by mouth nightly. Cliff Blankenship MD 8/26/2018 PM Active Yes    donepezil (ARICEPT) 10 mg tablet Take 10 mg by mouth nightly. Historical Provider 8/26/2018 PM Active Yes    lisinopril (PRINIVIL, ZESTRIL) 20 mg tablet Take 10 mg by mouth daily. Historical Provider 8/27/2018 AM Active Yes    memantine (NAMENDA) 10 mg tablet Take 10 mg by mouth two (2) times a day. Historical Provider 8/27/2018 AM Active Yes    metFORMIN (GLUCOPHAGE) 500 mg tablet Take 500 mg by mouth two (2) times daily (with meals). Historical Provider 8/27/2018 AM Active Yes    pravastatin (PRAVACHOL) 20 mg tablet Take 20 mg by mouth nightly. Historical Provider 8/26/2018 PM Active Yes    timolol (TIMOPTIC) 0.5 % ophthalmic solution Administer 1 Drop to both eyes two (2) times a day. Historical Provider 8/27/2018 AM Active Yes                    Thank you,  Candelario Reed, Pharm. D.

## 2018-08-27 NOTE — ED TRIAGE NOTES
Pt presents via EMS from home for AMS. Pt's home health nurse got him out of bed around 1200, gave the pt his Sinemet, pt spit out other meds, not eating, and altered, Pt's daughter reports hx of UTIs, dehydration, and elevated ammonia levels which have caused similar symptoms. EMS reports initial BP 90/50, gave approximately 400 mL of NS and BP improved to 117/74.

## 2018-08-27 NOTE — ED PROVIDER NOTES
HPI Comments: 69 y.o. male with past medical history significant for DM, Alzheimer's, Dementia, HTN, compression fracture of L3 lumbar vertebra, dysphagia, and age-related physical debility who presents from home via EMS  with chief complaint of AMS. Per daughter, the pt's aid called EMS today because he has been experiencing AMS, sluggish behavior, and decreased appetite. She states that he seemed \"sluggish\" 5 days ago but then perked up the next day and was fine until today. He only ate a few bites of his lunch today and spit out some of his medications. The daughter notes that Shari Sky he gets like this it is one of four things either a UTI, dehydration, bladder infection, or increased ammonia levels. \" Pt is incontinent of urine. Daughter notes that the pt is more interactive at baseline. Daughter denies any known fever, chills, N/V/D, dysuria, or cough. There are no other acute medical concerns at this time. Full history, physical exam, and ROS unable to be obtained due to:  dementia. Social hx: no tobacco use; no EtOH use   PCP: Raisa Simpson MD    Note written by Tyrell Neri, as dictated by Salvador Wiley MD 2:40 PM    The history is provided by the patient. No  was used. Past Medical History:   Diagnosis Date    Age-related physical debility     Compression fracture of L3 lumbar vertebra (HCC)     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     Diabetes ([facility] [address]     Dysphagia dementia related    Fall at home     Hypertension     Stool color black        Past Surgical History:   Procedure Laterality Date    HX HEENT           Family History:   Problem Relation Age of Onset    Stroke Father        Social History     Social History    Marital status:      Spouse name: N/A    Number of children: N/A    Years of education: N/A     Occupational History    Not on file.      Social History Main Topics    Smoking status: Never Smoker    Smokeless tobacco: Never Used    Alcohol use No    Drug use: No    Sexual activity: Not Currently     Other Topics Concern    Not on file     Social History Narrative         ALLERGIES: Seroquel [quetiapine]; Haldol [haloperidol lactate]; Lorazepam; and Morphine    Review of Systems   Unable to perform ROS: Dementia       Vitals:    08/27/18 1439   BP: (!) 159/92   Pulse: 60   Resp: 17   Temp: 97.8 °F (36.6 °C)   SpO2: 97%   Weight: 59.9 kg (132 lb)   Height: 5' 6\" (1.676 m)            Physical Exam   Constitutional: He is oriented to person, place, and time. He appears well-developed and well-nourished. He appears cachectic. No distress. Sluggish to respond   HENT:   Head: Normocephalic and atraumatic. Eyes: Pupils are equal, round, and reactive to light. Neck: Normal range of motion. Neck supple. Cardiovascular: Normal rate, regular rhythm and normal heart sounds. Exam reveals no gallop and no friction rub. No murmur heard. Pulmonary/Chest: Effort normal and breath sounds normal. No respiratory distress. He has no wheezes. Abdominal: Soft. Bowel sounds are normal. He exhibits no distension. There is no tenderness. There is no rebound and no guarding. Musculoskeletal: Normal range of motion. Neurological: He is alert and oriented to person, place, and time. Skin: Skin is warm. No rash noted. He is not diaphoretic. Psychiatric: He has a normal mood and affect. His behavior is normal. Judgment and thought content normal.   Nursing note and vitals reviewed. Note written by Tyrell Castillo, as dictated by Vic Pardo MD 2:40 PM    [facility]      ED Course     This is a 50-year-old male with past medical history, review of systems, physical exam as above, presenting with complaints of altered mental status, decreased appetite, in the setting of a history of Alzheimer's disease, and frequent urinary tract infections.  His daughter at bedside, states patient was intermittently altered last week, improved over the weekend, and again sluggish, with decreased appetite today. She denies knowledge of fevers, chills, nausea, vomiting, dysuria, diarrhea or productive cough. Upon arrival patient is awake, alert, not oriented, without acute complaints, noted to be cachectic in appearance, daughter states he is typically more interactive, he is clear chest, soft nontender abdomen, slow cardiac rhythm, with arrhythmia on auscultation. Differential is broad, including delirium in the setting of dementia, UTI, CVA, sepsis. Plan to provide IV fluids, obtain CMP, CBC, UA, lactic acid, ammonia level, head CT, chest x-ray. We will reassess, and make a disposition the patient's diagnostics and response to therapy. Procedures    ED EKG interpretation:  Rhythm: sinus bradycardia; and regular . Rate (approx.): 57; T wave inversions in lateral leads; long QT; ST/T wave: no changes. Note written by Tyrell Boyle, as dictated by Johanna Forrest MD 2:39 PM    PROGRESS NOTE:  3:54 PM  Positive for UTI. Previous culture with both proteus and pseudomonas sensitive to Cipro. Will provide Cipro. PROGRESS NOTE:  4:39 PM  Mental status improved after IV fluids. Pending CT chest for disposition. 5:37 PM  Chest CT with incidental findings, discussed with family, will Dc home with oral abx, return precautions, PCP follow up.

## 2018-11-18 PROBLEM — R63.4 WEIGHT LOSS: Status: ACTIVE | Noted: 2018-01-01

## 2018-11-18 PROBLEM — G93.40 ACUTE ENCEPHALOPATHY: Status: ACTIVE | Noted: 2018-01-01

## 2018-11-18 NOTE — H&P
[facility] Hospitalist Admission Note NAME:  Jered Rubin :   1940 MRN:  [id number] PCP:  Rob Seth MD  
 
Date/Time:  2018 6:04 PM 
 
  
  
Subjective: CHIEF COMPLAINT: altered HISTORY OF PRESENT ILLNESS:    
Mr. Tejinder Jerry is a 69 y.o.  male who presented to the Emergency Department complaining of AMS. He provides no reliable hx due to dementia. His daughter is present to provide hx. She is a poor historian and perseverates at first about her own stress level and family issues. She states the patient was not speaking this AM, though he is speaking now. She states he had had weight loss and gradual decline mentally and physically for months. She states neurology follows patient for parkinsons and dementia. She denies he had any fever, dyspnea or cough at home. In our ER his workup is unremarkable. We will admit him for acute encephalopathy. Past Medical History:  
Diagnosis Date  Age-related physical debility  Anxiety and depression  Compression fracture of L3 lumbar vertebra (HCC)  Debilitated patient  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  Diabetes ([address]  Dysphagia dementia related  Hypertension  Malnutrition ([facility] [address]  Parkinson disease ([address] Past Surgical History:  
Procedure Laterality Date  HX HEENT Social History Tobacco Use  Smoking status: Never Smoker  Smokeless tobacco: Never Used Substance Use Topics  Alcohol use: No  
  
 
Family History Problem Relation Age of Onset  Stroke Father Family hx cannot be fully assessed, due to the patient's dementia Allergies Allergen Reactions  Seroquel [Quetiapine] Anaphylaxis  Haldol [Haloperidol Lactate] Other (comments) \"Comatose state\"  Lorazepam Other (comments) Alternated mental status to benzos per family,  Morphine Other (comments) Change in mental status Prior to Admission medications Medication Sig Start Date End Date Taking? Authorizing Provider  
carbidopa-levodopa (SINEMET)  mg per tablet Take 1 Tab by mouth daily (with dinner). Other, MD Cliff  
carbidopa-levodopa (SINEMET)  mg per tablet Take 1 Tab by mouth nightly. Other, MD Cliff  
pravastatin (PRAVACHOL) 20 mg tablet Take 20 mg by mouth nightly. Provider, Historical  
amLODIPine (NORVASC) 5 mg tablet Take 5 mg by mouth daily. Provider, Historical  
aspirin delayed-release 81 mg tablet Take 81 mg by mouth daily. Provider, Historical  
carbidopa-levodopa (SINEMET)  mg per tablet Take 2 Tabs by mouth every morning. Indications: Parkinsonism    Provider, Historical  
lisinopril (PRINIVIL, ZESTRIL) 20 mg tablet Take 10 mg by mouth daily. Provider, Historical  
memantine (NAMENDA) 10 mg tablet Take 10 mg by mouth two (2) times a day. Provider, Historical  
metFORMIN (GLUCOPHAGE) 500 mg tablet Take 500 mg by mouth two (2) times daily (with meals). Provider, Historical  
donepezil (ARICEPT) 10 mg tablet Take 10 mg by mouth nightly. Provider, Historical  
timolol (TIMOPTIC) 0.5 % ophthalmic solution Administer 1 Drop to both eyes two (2) times a day. Provider, Historical  
 
 
Review of Systems: 
(bold if positive, if negative) Gen:   weight lossfatigueEyes:  ENT:  CVS:  Pulm:  GI:   
:   
MS:  Skin:  Psych:  Endo:   
Hem:  Renal:   
Neuro:  weakness Objective: VITALS:   
Vital signs reviewed; most recent are: 
 
Visit Vitals /72 Pulse (!) 58 Temp 99.6 °F (37.6 °C) Resp 15 SpO2 94% SpO2 Readings from Last 6 Encounters:  
11/18/18 94% 08/27/18 97% 08/15/18 99% 07/01/18 96% 06/10/18 92% 04/11/18 96% No intake or output data in the 24 hours ending 11/18/18 180 Exam:  
 
Physical Exam: 
 
Gen:  Thin, frail, in no acute distress HEENT:  Pink conjunctivae, PERRL, hearing intact to voice, moist mucous membranes Neck:  Supple, without masses, thyroid non-tender Resp:  No accessory muscle use, clear breath sounds without wheezes rales or rhonchi 
Card:  No murmurs, normal S1, S2 without thrills, bruits or peripheral edema Abd:  Soft, non-tender, non-distended, normoactive bowel sounds are present, no mass Lymph:  No cervical or inguinal adenopathy Musc:  No cyanosis or clubbing Skin:  No rashes or ulcers, skin turgor is reduced Neuro:  Cranial nerves are grossly intact, general motor weakness, follows commands vaguely Psych:  Poor insight, oriented to person Labs: 
 
Recent Labs 11/18/18 1622 WBC 9.6 HGB 13.8 HCT 41.6  Recent Labs 11/18/18 1622   
K 3.2*  
 CO2 33* GLU 93 BUN 17 CREA 0.90  
CA 8.7 ALB 3.2* TBILI 1.4* SGOT 13* ALT 8* Lab Results Component Value Date/Time Glucose (POC) 97 03/01/2018 11:48 AM  
 Glucose (POC) 80 03/01/2018 05:26 AM  
 
No results for input(s): PH, PCO2, PO2, HCO3, FIO2 in the last 72 hours. No results for input(s): INR in the last 72 hours. No lab exists for component: INREXT All Micro Results Procedure Component Value Units Date/Time CULTURE, URINE [[id number]] Order Status:  Sent Specimen:  Urine from Clean catch CULTURE, BLOOD [[id number]] Collected:  11/18/18 1622 Order Status:  Completed Specimen:  Blood Updated:  11/18/18 1637 CULTURE, BLOOD [[id number]] Collected:  11/18/18 1622 Order Status:  Completed Specimen:  Blood Updated:  11/18/18 1637 URINE CULTURE HOLD SAMPLE [[id number]] Collected:  11/18/18 1622 Order Status:  Completed Specimen:  Urine from Serum Updated:  11/18/18 1628 Urine culture hold URINE ON HOLD IN MICROBIOLOGY DEPT FOR 3 DAYS. IF UNPRESERVED URINE IS SUBMITTED, IT CANNOT BE USED FOR ADDITIONAL TESTING AFTER 24 HRS, RECOLLECTION WILL BE REQUIRED. I have reviewed previous records Assessment and Plan:  
  
Acute encephalopathy / Dementia / Anxiety and depression - AMS POA, unclear etiology. He has NO SIRS criteria at all. I will stop orders for Abx but continue to follow cx. I will check ESR and CRP to assess for hidden infection. Check B12, TSH, ammonia, etc.  Neurology consult. I think this is just normal waxing and waning of dementia approaching end stage. Continue ASA, memantine and donepezil. Parkinson disease / Dysphagia / Debility - POA, likely poor baseline and likely has little rehab potential.  Continue sinemet. Fall precautions. PT/OT eval. 
 
Malnutrition / Weight loss / Failure to thrive - POA, presumed due to age, dementia, parkinson's. Glucerna supplements. Abnormal xray of chest in setting of weight loss and prior pipie use, check CT chest for abnormality. HTN (hypertension) - Hold norvasc and lisinopril until it is clear he has no infection, or BP goes up. Type 2 diabetes mellitus without complication - Diabetic diet and counseling. SSI per protocol. Hold home since I have ordered CTA chest. Check A1c. Hyperlipidemia - Check panel and continue pravastatin. Hypokalemia - Replete PO and in IVF and check in AM 
 
Glaucoma - continue timolol. Telemetry reviewed:   normal sinus rhythm Risk of deterioration: high Total time spent with patient: 70 Minutes Care Plan discussed with: Patient, Family, Nursing Staff and >50% of time spent in counseling and coordination of care Discussed:  Care Plan      
___________________________________________________ Attending Physician: Evonne Davison MD

## 2018-11-18 NOTE — PROGRESS NOTES
Chest CT was original ordered as without and with contrast.  Exam was set to be done as with only but IV was found to be infiltrated once pt arrived in CT.   Exam done as Chest W/O

## 2018-11-18 NOTE — PROGRESS NOTES
Advance Care Planning Note Name: Cecilia Shay YOB: 1940 MRN: [id number] Admission Date: 11/18/2018  3:35 PM 
 
Date of discussion: 11/18/2018 Active Diagnoses: 
 
Hospital Problems  Date Reviewed: 11/18/2018 Acute encephalopathy Anxiety and depression Age-related physical debility Malnutrition ([facility] [address] Weight loss Type 2 diabetes mellitus without complication (HCC) (Chronic) Parkinson disease ([facility] [address] (Chronic) Dysphagia Debility Dementia (Chronic) Failure to thrive (0-17) (Chronic) HTN (hypertension) (Chronic) These active diagnoses are of sufficient risk that focused discussion on advance care planning is indicated in order to allow the patient to thoughtfully consider personal goals of care, and if situations arise that prevent the ability to personally give input, to ensure appropriate representation of their personal desires for different levels and aggressiveness of care. Discussion:  
 
Persons present and participating in discussion: Max Hawkins MD, daughter Discussion: discussed acute encephalopathy diagnosis and workup in setting of dementia and parkinsons. Daughter states that her brother, patient's son, is spokesperson. She states that the patient is DNR. Time Spent:  
 
Total time spent face-to-face in education and discussion: 20 minutes.   
 
Anabel Vazquez MD 
11/18/2018 
6:16 PM

## 2018-11-18 NOTE — ED PROVIDER NOTES
69 y.o. male with past medical history significant for DM, dementia, and HTN who presents with chief complaint of altered mental status. Per daughter, pt has had a decreased appetite, has not been talking, and is acting \"stiff\" for the past 2-3 days. Daughter states pt baseline has a right sided facial droop, but states it may be worse than normal today. Pt is noted to be oriented to place and name, which is baseline per daughter. Daughter states pt seems congested. Per daughter, pt is baseline able to talk with clear speech, and able to help daughter move him around. Pt denies fever, cough, nausea, vomiting, or rash. There are no other acute medical concerns at this time. Daughter notes that usually when he is acting differently, it is either UTI, dehydration, or elevated ammonia levels - daughter is unsure of reason why pt has had hepatic encephalopathy in the past. 
 
Full history, physical exam, and ROS unable to be obtained due to:  dementia. Social hx: Never Smoker. Denies EtOH Use. PCP: Sanford Kinney MD 
 
Note written by Jeffrey Hood. Marilia Yi, as dictated by DOTTY Singletary 3:53 PM 
 
 
 
The history is provided by the patient and a relative. Altered mental status Past Medical History:  
Diagnosis Date  Age-related physical debility  Compression fracture of L3 lumbar vertebra (HCC)  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  Diabetes ([facility] [address]  Dysphagia dementia related  Fall at home  Hypertension  Stool color black Past Surgical History:  
Procedure Laterality Date  HX HEENT Family History:  
Problem Relation Age of Onset  Stroke Father Social History Socioeconomic History  Marital status:  Spouse name: Not on file  Number of children: Not on file  Years of education: Not on file  Highest education level: Not on file Social Needs  Financial resource strain: Not on file  Food insecurity - worry: Not on file  Food insecurity - inability: Not on file  Transportation needs - medical: Not on file  Transportation needs - non-medical: Not on file Occupational History  Not on file Tobacco Use  Smoking status: Never Smoker  Smokeless tobacco: Never Used Substance and Sexual Activity  Alcohol use: No  
 Drug use: No  
 Sexual activity: Not Currently Other Topics Concern  Not on file Social History Narrative  Not on file ALLERGIES: Seroquel [quetiapine]; Haldol [haloperidol lactate]; Lorazepam; and Morphine Review of Systems Unable to perform ROS: Dementia Vitals:  
 11/18/18 1541 BP: 137/77 Pulse: 60 Resp: 12 Temp: 99.6 °F (37.6 °C) SpO2: 93% Physical Exam  
Constitutional: He appears well-developed. No distress. Elderly AA male. Appears chronically ill, frail. No acute distress. HENT:  
Right Ear: External ear normal.  
Left Ear: External ear normal.  
Dry MM Eyes: Pupils are equal, round, and reactive to light. Neck: Normal range of motion. Neck supple. Cardiovascular: Normal rate, regular rhythm and normal heart sounds. Exam reveals no gallop and no friction rub. No murmur heard. Pulmonary/Chest: Effort normal. No respiratory distress. Exam difficult as pt has trouble following commands, no focal rales noted, + rhonchi Congested sounding cough noted Abdominal: Soft. There is no tenderness. There is no guarding. Musculoskeletal: Normal range of motion. Neurological:  
Pt alert, will follow some commands but will not follow others Slight right sided facial droop noted, daughter notes hx same, thinks may be worse? Oriented to person and place, not time Skin: Skin is warm and dry. Psychiatric: He has a normal mood and affect. Nursing note and vitals reviewed. MDM Number of Diagnoses or Management Options Community acquired pneumonia of left lung, unspecified part of lung:  
Disorientation:  
Diagnosis management comments: 69year old male presenting to the ED for AMS, most hx obtained from daughter at bedside. Pt with hx several ED visits for same. Difficult to examine as pt unable to follow most commands, some congested cough and rhonchi noted. CXR remarkable for left sided PNA, pt lives at home, no recent admissions, CAP. Abx ordered. Normal HR, BP. Extensive work up ordered, care transferred to ED attending pending remaining results. Amount and/or Complexity of Data Reviewed Clinical lab tests: ordered Tests in the radiology section of CPT®: ordered Review and summarize past medical records: yes Discuss the patient with other providers: yes (Dr. Lacey Montenegro, ED attending) Independent visualization of images, tracings, or specimens: yes Risk of Complications, Morbidity, and/or Mortality Presenting problems: moderate Diagnostic procedures: moderate Management options: moderate Patient Progress Patient progress: stable Procedures EKG 1544 NSR. Rate: 62.  Prolonged QT. No STEMI. Interpreted by: Dr. Lacey Montenegro [phone] [facility] [place] 
4:10 PM 
 
I personally saw and examined the patient. I have reviewed and agree with the MLP's findings, including all diagnostic interpretations, and plans as written. I was present during the key portions of separately billed procedures.    
Greg Guan MD

## 2018-11-18 NOTE — ED NOTES
Pt returned from CT. As per tech, IV was infiltrated. IV flushed by Samantha Ferrara (main nurse) and flushed by DANIELE Castro. IV patent and flushing without evidence of infiltration.

## 2018-11-18 NOTE — ROUTINE PROCESS
TRANSFER - IN REPORT: 
 
Verbal report received from Tiffany(name) on Alonso Cassette  being received from ED(unit) for routine progression of care Report consisted of patients Situation, Background, Assessment and  
Recommendations(SBAR). Information from the following report(s) SBAR was reviewed with the receiving nurse. Opportunity for questions and clarification was provided. Assessment completed upon patients arrival to unit and care assumed.

## 2018-11-18 NOTE — ED TRIAGE NOTES
Pt arrived to the ED via EMS oriented to place with a c/c of AMS onset last night. Pt's daughter Cielo Winchester) at bedside states pt has been acting different in the the past three days, pt has missed the last three meals, and the last three dosis of medications. Pt unable to verbalize any complaint at this time. Pt is now in ED room side rail up, bed to lowest position and call light within reach. VS in stable condition, will continue to monitor.

## 2018-11-18 NOTE — ED NOTES
TRANSFER - OUT REPORT: 
 
Verbal report given to DANIELE Maldonado(name) on Elza Ball  being transferred to [facility] Floor(unit) for routine progression of care Report consisted of patients Situation, Background, Assessment and  
Recommendations(SBAR). Information from the following report(s) SBAR, Kardex, ED Summary, Intake/Output, MAR and Recent Results was reviewed with the receiving nurse. Lines:  
Peripheral IV 11/18/18 Right Antecubital (Active) Site Assessment Clean, dry, & intact 11/18/2018  4:19 PM  
Phlebitis Assessment 0 11/18/2018  4:19 PM  
Infiltration Assessment 0 11/18/2018  4:19 PM  
Dressing Status Clean, dry, & intact 11/18/2018  4:19 PM  
Hub Color/Line Status Pink 11/18/2018  4:19 PM  
  
 
Opportunity for questions and clarification was provided. Patient transported with: 
 [employer]

## 2018-11-19 NOTE — CONSULTS
[facility]: [address] [facility] [address] [place] [address] [phone] Name:   Mariaelena Pimentel Medical record #: [id number] Admission Date: 11/18/2018 Who Consulted: Dr. Maria Esther Márquez Reason for Consult:  AMS HISTORY OF PRESENT ILLNESS:  
This is a 69 y.o. male with  has a past medical history of Age-related physical debility, Anxiety and depression, Compression fracture of L3 lumbar vertebra ([address] Debilitated patient, 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, Diabetes ([address] Dysphagia, Hypertension, Malnutrition ([address] and Parkinson disease ([address] who is admitted for AMS. The Neurology Service is asked to evaluate for AMS. Mr. Shantel Fuentes presented to the ED on 11/18/2018 with altered mental status. Per daughter, pt has had a decreased appetite, has not been talking, and is acting \"stiff\" for the past 2-3 days. Daughter states pt baseline has a right sided facial droop, but states it may be worse than normal today. Pt is noted to be oriented to place and name, which is baseline per daughter. He was followed in our clinic by Dr. Abril Hyatt and was last seen in June 2016. At that visit he was oriented to his name only and could not provide any of his medical history. He was treated by Dr. Edin Cisneros and myself in Feb of 2018 for RT side facial droop and LT side weakness. At that time he was only oriented to name. Son is Bakari Strickland ([phone]) reports issues with ambulation. His home neurologist is Iraida Cartagena. His son noticed that his father has been holding medication and medications in his mouth. He is concerned that his father may be aspirating. Clinical Data: 
Imaging review:  
CT Head:  No acute process Current rhythm:  NSR Assessment/Plan: 1. Altered Mental Status: · Neurochecks:  Every 4 hours · CBC with mildly elevated WBC, ammonia, TSH unremarkable · Will supplement B12 
· EEG to rule out subclinical seizures · Stop sedating medications · MRI Brain · Follow up with Dr. Arsenio Boas 2. Mobility:  
· Has not been OOB. · PT/OT to eval for rehab 3. Diet:   
· Failued STAND due to alertness, still has a diet and po medications ordered. 4.  VTE Prophylaxes: · Per primary team 
 
Thank you for allowing the Neurology Service the pleasure of participating in the care of your patient. This patient will be discussed with my collaborating care team physician Dr. Davey Camp and he may have further recommendations regarding this patient's care. Attending Attestation:  
NEUROLOGY NOTE ADDENDUM: 
 
11/19/2018 I have reviewed the documentation provided by the nurse practitioner, discussed her findings, clinical impression, and the proposed management plans with regards to this patient's encounter. I have personally performed a face to face diagnostic evaluation on this patient. My findings are as follows: 
 
 
Mariaelena Pimentel is a 69 y.o. male who  has a past medical history of Age-related physical debility, Anxiety and depression, Compression fracture of L3 lumbar vertebra ([address] Debilitated patient, 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, Diabetes ([address] Dysphagia, Hypertension, Malnutrition ([address] and Parkinson disease ([address] who presents for evaluation of AMS. Patient has history of dementia and Parkinsonism seeing neurologist Dr Iraida Cartagena on Sinemet, Aricept and Namenda. Patient comes in for increased lethargy with R facial weakness. CT chest showing pneumonia Exam: 
Visit Vitals /78 (BP 1 Location: Right arm, BP Patient Position: At rest) Pulse 64 Temp 97.6 °F (36.4 °C) Resp 17 SpO2 96% Gen: Drowsy, opens eyes to name calling Awake, alert, follows commands Know his name, disoriented to president Some slowness of speech No visual field defect on confrontation exam. 
Full eyes movement, with no nystagmus, no diplopia, no ptosis. Normal gag and swallow. All remaining cranial nerves were normal 
Motor function: 5/5 in all extremities 
(+) bradykinesia 
(+) rigidity (-) tremors Sensory: intact to LT, PP and JPS DTRs + in all extremities, (-) Babinski Good FTN and HTS Gait: Deferred Assessment AMS, possible acute encephalopathy due to pneumonia in a patient with baseline dementia and Parkinsonism. Patient should be evaluated for stroke (dysrthria and R facial droop) Low Vit B12 Plan 1) MRI brain to evaluate for stroke 2) Vit B12 IM injection 3) PT/OT 4) Continue Sinemet, Aricept and Namenda Thank you for the consultation. Danna Maldonado MD 
Diplomate, American Board of Psychiatry and Neurology Diplomate, Neuromuscular Medicine Diplomate, [address] [place] [address] Board of Electrodiagnostic Medicine Review of Systems: 10 point ROS was performed. Pertinent positives listed in HPI. Negative ROS is as follows. Pt denies: angina, palpitations, paresthesias, weakness, vision loss, slurred speech, aphasia, confusion, fever, chills, falls, headache, diplopia, back pain, neck pain, prior episodes of vertigo, hallucinations, new medications or dosage changes. PHYSICAL EXAM:  
 
Visit Vitals /78 (BP 1 Location: Right arm, BP Patient Position: At rest) Pulse 64 Temp 97.6 °F (36.4 °C) Resp 17 SpO2 96% O2 Device: Room air Temp (24hrs), Av °F (36.7 °C), Min:97.4 °F (36.3 °C), Max:99.6 °F (37.6 °C) No intake/output data recorded.  1901 -  0700 In: 1250 [I.V.:1250] Out: - General:  Alert, cooperative, no acute distress. Lungs:   Clear to auscultation bilaterally. No crackles/wheezes. Heart: 
Abdominal:  Regular rate and rhythm, No murmur, click, rub or gallop. Soft and nondistended Skin: Skin color, texture, turgor normal.   
NEUROLOGICAL EXAM: 
 
Appearance:  Well developed, well nourished,  and is in no acute distress. Mental Status: Oriented to person. Slighlty inattentive. Speech garbled but able to name. Follows commands Cranial Nerves:   Intact visual fields. PERRL, EOM's full, no nystagmus, left facial ptosis. Facial sensation is normal. Facial movement is symmetric. Palate is midline. Normal sternocleidomastoid strength. Tongue is midline. Reflexes:   Deep tendon reflexes 2+/4 and symmetrical.  
Sensory:   Normal to temperature and vibration. Gait:  Not tested. Tremor:   No tremor noted. Cerebellar:  No cerebellar signs present. Neurovascular:  Normal heart sounds and regular rhythm. No carotid bruits. Motor: No pronator drift of either outstretched arm. Deltoid Biceps Triceps Wrist Extension Finger Abduction L 5 5 5 5 5  
R 5 5 5 5 5 Hip Flexion Hip Extension Knee Flexion Knee Extension Ankle Dorsiflexion Ankle Plantarflexion L 5 5 5 5 5 5  
R 5 5 5 5 5 5 Reflexes:   
 Biceps Triceps Plantar Patellar Achilles L 2 2 2 2 2 R 2 2 2 2 2 Past Medical History:  
Diagnosis Date  Age-related physical debility  Anxiety and depression  Compression fracture of L3 lumbar vertebra (HCC)  Debilitated patient  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  Diabetes [facility]  Dysphagia dementia related  Hypertension  Malnutrition ([facility]  Parkinson disease ([facility] Past Surgical History:  
Procedure Laterality Date  HX HEENT Family History Problem Relation Age of Onset  Stroke Father Social History Socioeconomic History  Marital status:  Spouse name: Not on file  Number of children: Not on file  Years of education: Not on file  Highest education level: Not on file Social Needs  Financial resource strain: Not on file  Food insecurity - worry: Not on file  Food insecurity - inability: Not on file  Transportation needs - medical: Not on file  Transportation needs - non-medical: Not on file Occupational History  Not on file Tobacco Use  Smoking status: Never Smoker  Smokeless tobacco: Never Used Substance and Sexual Activity  Alcohol use: No  
 Drug use: No  
 Sexual activity: Not Currently Other Topics Concern  Not on file Social History Narrative  Not on file Allergies: Allergies Allergen Reactions  Seroquel [Quetiapine] Anaphylaxis  Haldol [Haloperidol Lactate] Other (comments) \"Comatose state\"  Lorazepam Other (comments) Alternated mental status to benzos per family,  Morphine Other (comments) Change in mental status Outpatient Meds No current facility-administered medications on file prior to encounter. Current Outpatient Medications on File Prior to Encounter Medication Sig Dispense Refill  carbidopa-levodopa (SINEMET)  mg per tablet Take 2 Tabs by mouth daily (with dinner).  carbidopa-levodopa (SINEMET)  mg per tablet Take 2 Tabs by mouth nightly.  pravastatin (PRAVACHOL) 20 mg tablet Take 20 mg by mouth nightly.  amLODIPine (NORVASC) 5 mg tablet Take 5 mg by mouth daily.  aspirin delayed-release 81 mg tablet Take 81 mg by mouth daily.  carbidopa-levodopa (SINEMET)  mg per tablet Take 2 Tabs by mouth every morning. Indications: Parkinsonism  lisinopril (PRINIVIL, ZESTRIL) 20 mg tablet Take 10 mg by mouth daily.  metFORMIN (GLUCOPHAGE) 500 mg tablet Take 500 mg by mouth two (2) times daily (with meals).  donepezil (ARICEPT) 10 mg tablet Take 10 mg by mouth nightly.  timolol (TIMOPTIC) 0.5 % ophthalmic solution Administer 1 Drop to both eyes two (2) times a day. Inpatient Meds Current Facility-Administered Medications:  
  amoxicillin-clavulanate (AUGMENTIN) 875-125 mg per tablet 1 Tab, 1 Tab, Oral, Q12H, Roxann Glynn DO 
   lactobac ac& pc-s.therm-b.anim (STACY Q/RISAQUAD), 1 Cap, Oral, DAILY, Nina Cash DO 
  aspirin delayed-release tablet 81 mg, 81 mg, Oral, DAILY, Edu Baca MD 
  carbidopa-levodopa (SINEMET)  mg per tablet 2 Tab, 2 Tab, Oral, 7am, Edu Baca MD 
  carbidopa-levodopa (SINEMET)  mg per tablet 1 Tab, 1 Tab, Oral, DAILY WITH DINNER, Edu Baca MD 
  carbidopa-levodopa (SINEMET)  mg per tablet 1 Tab, 1 Tab, Oral, QHS, Edu Baca MD, 1 Tab at 11/18/18 2129 
  donepezil (ARICEPT) tablet 10 mg, 10 mg, Oral, QHS, Edu Baca MD, 10 mg at 11/18/18 2127   memantine (NAMENDA) tablet 10 mg, 10 mg, Oral, BID, Edu Baca MD, 10 mg at 11/18/18 2127   timolol (TIMOPTIC) 0.5 % ophthalmic solution 1 Drop, 1 Drop, Both Eyes, BID, Edu Baca MD, 1 Drop at 11/18/18 2148   acetaminophen (TYLENOL) tablet 650 mg, 650 mg, Oral, Q4H PRN, Edu Baca MD 
  ondansetron [facility]) injection 4 mg, 4 mg, IntraVENous, Q4H PRN, Edu Baca MD 
  diphenhydrAMINE (BENADRYL) capsule 25 mg, 25 mg, Oral, Q4H PRN, Edu Baca MD 
  enoxaparin (LOVENOX) injection 40 mg, 40 mg, SubCUTAneous, Q24H, Edu Baca MD, 40 mg at 11/18/18 2127 
  glucose chewable tablet 16 g, 4 Tab, Oral, PRN, Edu Baca MD 
  dextrose (D50W) injection syrg 12.5-25 g, 25-50 mL, IntraVENous, PRN, Edu Baca MD 
  glucagon [facility]) injection 1 mg, 1 mg, IntraMUSCular, PRN, Edu Baca MD 
  insulin lispro (HUMALOG) injection, , SubCUTAneous, AC&HS, Edu Baca MD, Stopped at 11/18/18 2200 
  0.45% sodium chloride with KCl 20 mEq/L infusion, , IntraVENous, CONTINUOUS, Edu Baca MD, Last Rate: 75 mL/hr at 11/18/18 2028 Recent Results (from the past 24 hour(s)) EKG, 12 LEAD, INITIAL Collection Time: 11/18/18  3:44 PM  
Result Value Ref Range  Ventricular Rate 62 BPM  
 Atrial Rate 62 BPM  
 P-R Interval 168 ms QRS Duration 84 ms Q-T Interval 468 ms QTC Calculation (Bezet) 475 ms Calculated P Axis 42 degrees Calculated R Axis -10 degrees Calculated T Axis -155 degrees Diagnosis Normal sinus rhythm Minimal voltage criteria for LVH, may be normal variant ST & T wave abnormality, consider inferolateral ischemia Prolonged QT Abnormal ECG When compared with ECG of 27-AUG-2018 14:39, 
premature ventricular complexes are no longer present Confirmed by Ana Lilia Shine MD., Veronica ([id number]) on 11/19/2018 8:23:05 AM 
  
POC LACTIC ACID Collection Time: 11/18/18  4:19 PM  
Result Value Ref Range Lactic Acid (POC) 0.96 0.40 - 2.00 mmol/L  
SAMPLES BEING HELD Collection Time: 11/18/18  4:22 PM  
Result Value Ref Range SAMPLES BEING HELD 1SST, 1BLU   
 COMMENT Add-on orders for these samples will be processed based on acceptable specimen integrity and analyte stability, which may vary by analyte. CULTURE, BLOOD Collection Time: 11/18/18  4:22 PM  
Result Value Ref Range Special Requests: NO SPECIAL REQUESTS Culture result: NO GROWTH AFTER 14 HOURS    
CULTURE, BLOOD Collection Time: 11/18/18  4:22 PM  
Result Value Ref Range Special Requests: NO SPECIAL REQUESTS Culture result: NO GROWTH AFTER 14 HOURS METABOLIC PANEL, COMPREHENSIVE Collection Time: 11/18/18  4:22 PM  
Result Value Ref Range Sodium 143 136 - 145 mmol/L Potassium 3.2 (L) 3.5 - 5.1 mmol/L Chloride 104 97 - 108 mmol/L  
 CO2 33 (H) 21 - 32 mmol/L Anion gap 6 5 - 15 mmol/L Glucose 93 65 - 100 mg/dL BUN 17 6 - 20 MG/DL Creatinine 0.90 0.70 - 1.30 MG/DL  
 BUN/Creatinine ratio 19 12 - 20 GFR est AA >60 >60 ml/min/1.73m2 GFR est non-AA >60 >60 ml/min/1.73m2 Calcium 8.7 8.5 - 10.1 MG/DL Bilirubin, total 1.4 (H) 0.2 - 1.0 MG/DL  
 ALT (SGPT) 8 (L) 12 - 78 U/L  
 AST (SGOT) 13 (L) 15 - 37 U/L Alk.  phosphatase 77 45 - 117 U/L  
 Protein, total 6.9 6.4 - 8.2 g/dL Albumin 3.2 (L) 3.5 - 5.0 g/dL Globulin 3.7 2.0 - 4.0 g/dL A-G Ratio 0.9 (L) 1.1 - 2.2    
CBC WITH AUTOMATED DIFF Collection Time: 11/18/18  4:22 PM  
Result Value Ref Range WBC 9.6 4.1 - 11.1 K/uL  
 RBC 4.39 4. 10 - 5.70 M/uL  
 HGB 13.8 12.1 - 17.0 g/dL HCT 41.6 36.6 - 50.3 % MCV 94.8 80.0 - 99.0 FL  
 MCH 31.4 26.0 - 34.0 PG  
 MCHC 33.2 30.0 - 36.5 g/dL  
 RDW 13.6 11.5 - 14.5 % PLATELET 904 137 - 744 K/uL MPV 9.7 8.9 - 12.9 FL  
 NRBC 0.0 0  WBC ABSOLUTE NRBC 0.00 0.00 - 0.01 K/uL NEUTROPHILS 74 32 - 75 % LYMPHOCYTES 20 12 - 49 % MONOCYTES 5 5 - 13 % EOSINOPHILS 0 0 - 7 % BASOPHILS 0 0 - 1 % IMMATURE GRANULOCYTES 0 0.0 - 0.5 % ABS. NEUTROPHILS 7.1 1.8 - 8.0 K/UL  
 ABS. LYMPHOCYTES 1.9 0.8 - 3.5 K/UL  
 ABS. MONOCYTES 0.5 0.0 - 1.0 K/UL  
 ABS. EOSINOPHILS 0.0 0.0 - 0.4 K/UL  
 ABS. BASOPHILS 0.0 0.0 - 0.1 K/UL  
 ABS. IMM. GRANS. 0.0 0.00 - 0.04 K/UL  
 DF AUTOMATED    
TROPONIN I Collection Time: 11/18/18  4:22 PM  
Result Value Ref Range Troponin-I, Qt. 0.06 (H) <0.05 ng/mL URINALYSIS W/MICROSCOPIC Collection Time: 11/18/18  4:22 PM  
Result Value Ref Range Color DARK YELLOW Appearance CLEAR CLEAR Specific gravity 1.030 1.003 - 1.030    
 pH (UA) 6.0 5.0 - 8.0 Protein 30 (A) NEG mg/dL Glucose NEGATIVE  NEG mg/dL Ketone TRACE (A) NEG mg/dL Blood NEGATIVE  NEG Urobilinogen 1.0 0.2 - 1.0 EU/dL Nitrites NEGATIVE  NEG Leukocyte Esterase NEGATIVE  NEG    
 WBC 0-4 0 - 4 /hpf  
 RBC 0-5 0 - 5 /hpf Epithelial cells FEW FEW /lpf Bacteria NEGATIVE  NEG /hpf Mucus 1+ (A) NEG /lpf Other: Renal Epithelial cells Present URINE CULTURE HOLD SAMPLE Collection Time: 11/18/18  4:22 PM  
Result Value Ref Range Urine culture hold URINE ON HOLD IN MICROBIOLOGY DEPT FOR 3 DAYS.  IF UNPRESERVED URINE IS SUBMITTED, IT CANNOT BE USED FOR ADDITIONAL TESTING AFTER 24 HRS, RECOLLECTION WILL BE REQUIRED. AMMONIA Collection Time: 11/18/18  4:22 PM  
Result Value Ref Range Ammonia <10 <32 UMOL/L  
BILIRUBIN, CONFIRM Collection Time: 11/18/18  4:22 PM  
Result Value Ref Range Bilirubin UA, confirm NEGATIVE  NEG    
HEMOGLOBIN A1C WITH EAG Collection Time: 11/18/18  4:22 PM  
Result Value Ref Range Hemoglobin A1c 6.3 4.2 - 6.3 % Est. average glucose 134 mg/dL GLUCOSE, POC Collection Time: 11/18/18  9:24 PM  
Result Value Ref Range Glucose (POC) 78 65 - 100 mg/dL Performed by Darilyn Brush (PCT) GLUCOSE, POC Collection Time: 11/18/18  9:41 PM  
Result Value Ref Range Glucose (POC) 98 65 - 100 mg/dL Performed by Darilyn Brush (PCT) VITAMIN B12 Collection Time: 11/18/18 10:30 PM  
Result Value Ref Range Vitamin B12 205 193 - 986 pg/mL TSH 3RD GENERATION Collection Time: 11/18/18 10:30 PM  
Result Value Ref Range TSH 1.42 0.36 - 3.74 uIU/mL SED RATE (ESR) Collection Time: 11/18/18 10:30 PM  
Result Value Ref Range Sed rate, automated 7 0 - 20 mm/hr CRP, HIGH SENSITIVITY Collection Time: 11/18/18 10:30 PM  
Result Value Ref Range CRP, High sensitivity >9.5 mg/L  
LIPID PANEL Collection Time: 11/18/18 10:30 PM  
Result Value Ref Range LIPID PROFILE Cholesterol, total 105 <200 MG/DL Triglyceride 49 <150 MG/DL  
 HDL Cholesterol 53 MG/DL  
 LDL, calculated 42.2 0 - 100 MG/DL VLDL, calculated 9.8 MG/DL  
 CHOL/HDL Ratio 2.0 0 - 5.0 METABOLIC PANEL, BASIC Collection Time: 11/19/18  2:39 AM  
Result Value Ref Range Sodium 140 136 - 145 mmol/L Potassium 5.0 3.5 - 5.1 mmol/L Chloride 104 97 - 108 mmol/L  
 CO2 30 21 - 32 mmol/L Anion gap 6 5 - 15 mmol/L Glucose 83 65 - 100 mg/dL BUN 14 6 - 20 MG/DL Creatinine 0.64 (L) 0.70 - 1.30 MG/DL  
 BUN/Creatinine ratio 22 (H) 12 - 20 GFR est AA >60 >60 ml/min/1.73m2 GFR est non-AA >60 >60 ml/min/1.73m2 Calcium 7.9 (L) 8.5 - 10.1 MG/DL MAGNESIUM Collection Time: 11/19/18  2:39 AM  
Result Value Ref Range Magnesium 1.6 1.6 - 2.4 mg/dL GLUCOSE, POC Collection Time: 11/19/18  7:14 AM  
Result Value Ref Range Glucose (POC) 69 65 - 100 mg/dL Performed by Samantha Chen (PCT) GLUCOSE, POC Collection Time: 11/19/18  7:37 AM  
Result Value Ref Range Glucose (POC) 105 (H) 65 - 100 mg/dL Performed by Samantha Chen (PCT) Care Plan discussed with: 
Patient x Family RN Care Manager Consultant/Specialist:    
 
 
Samra Aguilera, IVETHP-BC

## 2018-11-19 NOTE — PROGRESS NOTES
EEG reviewed and dictated Diffuse theta c/w encephalopathy or can also be seen in dementia No lateralized findings or epileptiform abnormalities Corinne Pressman, MD

## 2018-11-19 NOTE — PROGRESS NOTES
BSI: MED RECONCILIATION Comments/Recommendations:  Reviewed PTA medications with patient's daughter who assists her father with his home meds. Patient sedated and with history of Parkinson's and dementia - unable to provide any information about his meds. Medications added:  
 
· NONE Medications removed: · Memantine 10mg PO BID - Daughter reported this had been stopped several weeks ago. Medications adjusted: · Carbidopa/Levodopa  - 2 tabs daily with dinner; Increase from 1 tab daily with dinner ~1 week ago. · Carbidopa/Levodopa  - 2 tabs daily at bedtime; Increase from 1 tabs daily at bedtime ~1 week ago. Allergies: Seroquel [quetiapine]; Haldol [haloperidol lactate]; Lorazepam; and Morphine Prior to Admission Medications:  
Prior to Admission Medications Prescriptions Last Dose Informant Patient Reported? Taking? amLODIPine (NORVASC) 5 mg tablet 11/17/2018 at am Child Yes Yes Sig: Take 5 mg by mouth daily. aspirin delayed-release 81 mg tablet 11/17/2018 at am Child Yes Yes Sig: Take 81 mg by mouth daily. carbidopa-levodopa (SINEMET)  mg per tablet 11/17/2018 at am Child Yes Yes Sig: Take 2 Tabs by mouth every morning. Indications: Parkinsonism  
carbidopa-levodopa (SINEMET)  mg per tablet 11/17/2018 at 1700  Yes Yes Sig: Take 2 Tabs by mouth daily (with dinner). carbidopa-levodopa (SINEMET)  mg per tablet 11/17/2018 at 2200  Yes Yes Sig: Take 2 Tabs by mouth nightly. donepezil (ARICEPT) 10 mg tablet 11/17/2018 at am Child Yes Yes Sig: Take 10 mg by mouth nightly. lisinopril (PRINIVIL, ZESTRIL) 20 mg tablet 11/17/2018 at am Child Yes Yes Sig: Take 10 mg by mouth daily. metFORMIN (GLUCOPHAGE) 500 mg tablet 11/17/2018 at pm Child Yes Yes Sig: Take 500 mg by mouth two (2) times daily (with meals). pravastatin (PRAVACHOL) 20 mg tablet 11/17/2018 at pm Child Yes Yes Sig: Take 20 mg by mouth nightly. timolol (TIMOPTIC) 0.5 % ophthalmic solution 11/17/2018 at pm Child Yes Yes Sig: Administer 1 Drop to both eyes two (2) times a day. Facility-Administered Medications: None Author Franklyn, Pharm. D. [address] [place] [address] Dr [facility]

## 2018-11-19 NOTE — PROGRESS NOTES
NUTRITION 
 
RECOMMENDATIONS:  
 
1. Diet per SLP: currently Dysphagia 1 Diabetic Consistent Carb 2. Offer Ensure High Protein BID 3. Obtain current weight ASSESSMENT:  
11/19: MST referral due to weight loss and eating poorly. Admitted with AMS, hx of dementia, Parkinson's disease, diabetes. Spoke with daughter on the phone who states his last weight taken was 132 lbs, through chart review this was in August.  She feels he has been losing eight, unsure of UBW. She reports he eats well at home, \"regular consistency without signs of aspiration, but eats what he likes. \"  He does not use an ONS at home. Lives with his wife who has Alzheimer's and they have 24 hour aides at the house. Seen by SLP and dx with severe dyshaphia, Pureed diet recommended, RD changed to Diabetic Pureed. BMI indicates normal weight. RD added Ensure High Protein BID to supply an additional 320 kcals and 32 gms protein. Weight Metrics 11/19/2018 8/27/2018 8/15/2018 7/1/2018 6/10/2018 4/11/2018 2/25/2018 Weight 132 lb 132 lb 132 lb 122 lb 122 lb 122 lb 143 lb BMI 18.94 kg/m2 21.31 kg/m2 21.31 kg/m2 19.69 kg/m2 19.69 kg/m2 19.69 kg/m2 20.52 kg/m2 Past Medical History:  
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   
Lab Results Component Value Date/Time  Sodium 140 11/19/2018 02:39 AM  
 Potassium 5.0 11/19/2018 02:39 AM  
 Chloride 104 11/19/2018 02:39 AM  
 CO2 30 11/19/2018 02:39 AM  
 Anion gap 6 11/19/2018 02:39 AM  
 Glucose 83 11/19/2018 02:39 AM  
 BUN 14 11/19/2018 02:39 AM  
 Creatinine 0.64 (L) 11/19/2018 02:39 AM  
 Calcium 7.9 (L) 11/19/2018 02:39 AM  
 Magnesium 1.6 11/19/2018 02:39 AM  
 Phosphorus 3.1 02/28/2018 03:35 AM  
 Albumin 3.2 (L) 11/18/2018 04:22 PM  
 
 
Anthropometrics:  
Weight Source: Other (comment)(8/27/18) Height: 5' 10\" (177.8 cm), Body mass index is 18.94 kg/m². IBW : 75.3 kg (166 lb), % IBW (Calculated): 79.52 %,  , Wt Readings from Last 5 Encounters:  
11/19/18 59.9 kg (132 lb)  
08/27/18 59.9 kg (132 lb) 08/15/18 59.9 kg (132 lb) 07/01/18 55.3 kg (122 lb)  
06/10/18 55.3 kg (122 lb) Estimated Daily Nutrition Requirements:  
Weight Used: Other (comment)(weight from 8/27/18) Kcals: 1850 Kcals/day(to 2100) Based on:[place] St Jeor(BMR x 1.2 + 250- 500) Protein: 66 g(to 75 gms, 1.1-1.25 gms/kg) Fluid: 1850 ml Carbohydrate: AT least 130 gms/day Education & Discharge Needs: 
 [] Pt discussed in ID rounds Nutrition related discharge needs addressed:  
  [x] Supplements (on d/c instruction &/or coupons provided)Will follow toleration to supplement  
  [] Tube Feedings  
  [] Education []No nutrition related discharge needs at this time Cultural, Pentec[employer]al and ethnic food preferences identified  
 [] None   [] Yes NUTRITION DIAGNOSIS:  
 
Swallowing difficulty related to Parkinson's  as evidenced by severe oral dysphagia and moderate pharyngeal dysphagia per SLP Pt is at Nutrition Risk:  [x] No Nutrition Risk Identified:  [] 
 
RD INTERVENTION / PT GOALS:  
 
Food/Nutrient Delivery:  Meals/Snacks: Modify diet/texture/consistency/nutrients(Pureed diet), Supplements: Commercial supplement(Ensure High Protein BID),  ,  ,   
Nutrition Education: ,   
Nutrition Counseling:   
Coordination of Care:   
 
Goal: Pt will cosume 65% or more at meals and Ensure BID within 2-4 days MONITORING & EVALUATION:  
 Food/Nutrient Intake Outcomes: Total energy intake, Liquid meal replacement Previous Nutrition Goals: 
Previous Goal Met: N/A Previous Recommendations:     
Previous Recommendations Implemented: N/A 
 
   Darren Pollard RD

## 2018-11-19 NOTE — PROGRESS NOTES
Please complete MRI History and Safety Screening Form for this patient Using [employer] only under Orders Requiring a Screening Form: 
Example: 
Orders Requiring a Screening Form Procedure                    Order Status                      Form Status MRI EXAM                   Active                                In Progress Click on blue hyperlink as shown above to launch MRI screening form Answer all questions completely including Weight, Surgery, and Implant History. Document MUST be \"eSigned\" using a \"Signature Pad\" by the person completing form.  (patient and RN or MD completing form with the patient) Patient cannot be scanned until this form is completed and reviewed in MRI to ensure patient is SAFE and eligible for MRI. Call MRI when this has been successfully completed at [phone]. This must be done under [employer]. Do not use the Nursing Flowsheet.

## 2018-11-19 NOTE — PROGRESS NOTES
Problem: Dysphagia (Adult) Goal: *Acute Goals and Plan of Care (Insert Text) Swallowing goals initiated 11-19-18: (weekly re-eval 11-26-18) 1) tolerate dysphagia 1, thins without oral holding or s/s aspiration by 11-23-18 Speech LAnguage Pathology bedside swallow evaluation Patient: Genie Chan (61 y.o. male) Date: 11/19/2018 Primary Diagnosis: Acute encephalopathy Precautions: aspiration ASSESSMENT : 
Based on the objective data described below, the patient presents with severe oral dysphagia and moderate pharyngeal dysphagia. Complications of dementia, cognitive issues as well. He was admitted with acute encephalopathy with h/o dementia. AMS and decreased PO for 3 days, congestion. CXR:  L ML PNA, . 
PMH: DM, dementia, HTN, old R facial droop, Parkinson's disease, L3 compression fx, dysphagia. Patient will benefit from skilled intervention to address the above impairments. Patients rehabilitation potential is considered to be Fair Factors which may influence rehabilitation potential include:  
[]            None noted [x]            Mental ability/status [x]            Medical condition []            Home/family situation and support systems 
[]            Safety awareness 
[]            Pain tolerance/management []            Other: PLAN : 
Recommendations and Planned Interventions: Dysphagia 1 diet, thin liquids. Crush meds if able. Watch for oral residue/pocketing. Frequency/Duration: Patient will be followed by speech-language pathology 3 times a week to address goals. Discharge Recommendations: Chevy Amato SUBJECTIVE:  
Patient appeared to be agreeable to PO, but was not opening lips or teeth  With stimulation. . OBJECTIVE:  
 
Past Medical History:  
Diagnosis Date  Age-related physical debility  Anxiety and depression  Compression fracture of L3 lumbar vertebra (HCC)  Debilitated patient  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  Diabetes ([facility] [address]  Dysphagia dementia related  Hypertension  Malnutrition ([facility] [address]  Parkinson disease ([facility] [address] Past Surgical History:  
Procedure Laterality Date  HX HEENT Prior Level of Function/Home Situation:  
Home Situation Home Environment: Private residence One/Two Story Residence: One story Living Alone: No 
Support Systems: Family member(s) Patient Expects to be Discharged to[de-identified] Private residence Current DME Used/Available at Home: Adaptive bathing aides, Shower chair, Hospital bed, Commode, bedside Diet prior to admission: unknown Current Diet:   Regualr, thinsCognitive and Communication Status: 
Neurologic State: Alert, Eyes open to stimulus(AND touch) Orientation Level: Unable to verbalize Cognition: Decreased command following Perception: Appears intact Safety/Judgement: Lack of insight into deficits Oral Assessment: 
Oral Assessment Labial: (mostly lips closed; some lip clench) Dentition: (some teeth clench) Oral Hygiene: difficult to assess Lingual: (CNT) Mandible: Restricted P.O. Trials: 
Patient Position: upright in bed Vocal quality prior to P.O.: (moderate dysarthria) Consistency Presented: Thin liquid;Puree How Presented: SLP-fed/presented;Spoon;Straw ORAL PHASE:  
Reduced lip, teeth, jaw opening. Appeared to be tactilly defensive. Difficulty assessing oral holding and oral residue. He appeared to clear mouth after brief oral hold RN noted oral holding of meds PHARYNGEAL PHASE:  
Mild weakness Mild delay. Difficult to assess pharyngeal phase due to severity of oral phase. 
 he had a MBS in FEB 2018 that showed ; mild-moderate oral-pharyngeal dysphagia. Noted significant oral and pharyngeal residue with all textures with poor awareness and clearance. Aspiration of pyriform residue with occasionatl throat clearing, but mostly silent aspiration. Recommended dysphagia 1, thins. NOMS:  
The NOMS functional outcome measure was used to quantify this patient's level of swallowing impairment. Based on the NOMS, the patient was determined to be at level 3 for swallow function G Codes: In compliance with CMSs Claims Based Outcome Reporting, the following G-code set was chosen for this patient based the use of the NOMS functional outcome to quantify this patient's level of swallowing impairment. Using the NOMS, the patient was determined to be at level 3 for swallow function which correlates with the CL= 60-79% level of severity. Based on the objective assessment provided within this note, the current, goal, and discharge g-codes are as follows: 
 
Swallow  Swallowing: 
 Swallow Current Status CL= 60-79%  Swallow Goal Status CK= 40-59% NOMS Swallowing Levels: 
Level 1 (CN): NPO Level 2 (CM): NPO but takes consistency in therapy Level 3 (CL): Takes less than 50% of nutrition p.o. and continues with nonoral feedings; and/or safe with mod cues; and/or max diet restriction Level 4 (CK): Safe swallow but needs mod cues; and/or mod diet restriction; and/or still requires some nonoral feeding/supplements Level 5 (CJ): Safe swallow with min diet restriction; and/or needs min cues Level 6 (CI): Independent with p.o.; rare cues; usually self cues; may need to avoid some foods or needs extra time Level 7 (CH): Independent for all p.o. JOYCE. (2003). National Outcomes Measurement System (NOMS): Adult Speech-Language Pathology User's Guide. Pain: 
Pain Scale 1: Adult Nonverbal Pain Scale Pain Intensity 1: 0 After treatment:  
[]            Patient left in no apparent distress sitting up in chair 
[]            Patient left in no apparent distress in bed 
[]            Call bell left within reach 
[]            Nursing notified 
[]            Caregiver present 
[]            Bed alarm activated COMMUNICATION/EDUCATION:  
The patients plan of care including recommendations, planned interventions, and recommended diet changes were discussed with: Registered Nurse. Patient was educated regarding His deficit(s) of dysphagia  as this relates to His diagnosis of encephalopathy. He demonstrated Guarded understanding as evidenced by lack of response. . 
[]            Posted safety precautions in patient's room. []            Patient/family have participated as able in goal setting and plan of care. []            Patient/family agree to work toward stated goals and plan of care. []            Patient understands intent and goals of therapy, but is neutral about his/her participation. [x]            Patient is unable to participate in goal setting and plan of care. Thank you for this referral. 
Deandre Montana, SLP Time Calculation: 15 mins

## 2018-11-19 NOTE — PROGRESS NOTES
Problem: Falls - Risk of 
Goal: *Absence of Falls Document Gabriella Ornelas Fall Risk and appropriate interventions in the flowsheet. Outcome: Progressing Towards Goal 
Fall Risk Interventions: 
Mobility Interventions: Bed/chair exit alarm Mentation Interventions: Bed/chair exit alarm Medication Interventions: Bed/chair exit alarm Elimination Interventions: Call light in reach History of Falls Interventions: Bed/chair exit alarm

## 2018-11-19 NOTE — PROGRESS NOTES
Problem: Self Care Deficits Care Plan (Adult) Goal: *Acute Goals and Plan of Care (Insert Text) Occupational Therapy Goals Initiated 11/19/2018 1. Patient will perform self-feeding with supervision/set-up within 7 day(s). 2.  Patient will perform grooming with minimal assistance/contact guard assist within 7 day(s). 3.  Patient will perform upper body dressing with minimal assistance/contact guard assist within 7 day(s). 4.  Patient will perform toilet transfers with moderate assistance  within 7 day(s). 5.  Patient will perform all aspects of toileting with moderate assistance  within 7 day(s). 6.  Patient will participate in upper extremity therapeutic exercise/activities with minimal assistance/contact guard assist for 5 minutes within 7 day(s). Occupational Therapy EVALUATION Patient: Nick Barrera (77 y.o. male) Date: 11/19/2018 Primary Diagnosis: Acute encephalopathy Precautions: fall ASSESSMENT : 
Based on the objective data described below, the patient presents with hospital admission secondary to acute encephalopathy. Patient received in bed, sleeping. Patient aroused to therapist voice, and able to state name and birth date. Patient with with decreased activity tolerance, functional mobility and activity tolerance. Patient reports walking at home, getting up to get meals when needed and able to dress self, though patient likely poor historian. Based on today, patient with rigidity, and required max assist-total assist x 2 for bed mobility. Posterior lean noted in sitting though patient with some righting reactions. Patient with preference for R side gaze though able to turn to left slightly past midline. Patient attempting sit to stand with assist x 2 and unable to come to full standing position. Patient with difficulty initiating tasks but with good attempt to perform with assistance. Patient likely to require rehab setting at discharge. Patient will benefit from skilled intervention to address the above impairments. Patients rehabilitation potential is considered to be Fair Factors which may influence rehabilitation potential include:  
[]             None noted []             Mental ability/status [x]             Medical condition []             Home/family situation and support systems [x]             Safety awareness []             Pain tolerance/management 
[]             Other: PLAN : 
Recommendations and Planned Interventions: 
[x]               Self Care Training                  [x]        Therapeutic Activities [x]               Functional Mobility Training    []        Cognitive Retraining 
[x]               Therapeutic Exercises           [x]        Endurance Activities [x]               Balance Training                   []        Neuromuscular Re-Education []               Visual/Perceptual Training     [x]   Home Safety Training 
[x]               Patient Education                 [x]        Family Training/Education []               Other (comment): Frequency/Duration: Patient will be followed by occupational therapy 5 times a week to address goals. Discharge Recommendations: Rehab Further Equipment Recommendations for Discharge: TBD SUBJECTIVE:  
Patient stated I dress myself.  OBJECTIVE DATA SUMMARY:  
HISTORY:  
Past Medical History:  
Diagnosis Date  Age-related physical debility  Anxiety and depression  Compression fracture of L3 lumbar vertebra (HCC)  Debilitated patient  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  Diabetes ([place] [address]  Dysphagia dementia related  Hypertension  Malnutrition ([facility] [address]  Parkinson disease ([facility] [address] Past Surgical History:  
Procedure Laterality Date  HX HEENT Prior Level of Function/Environment/Context: see above Occupations in which the patient is/was successful, what are the barriers preventing that success:  
Performance Patterns (routines, roles, habits, and rituals):  
Personal Interests and/or values:  
Expanded or extensive additional review of patient history:  
 
Home Situation Home Environment: Private residence One/Two Story Residence: One story Living Alone: No 
Support Systems: Family member(s) Patient Expects to be Discharged to[de-identified] Private residence Current DME Used/Available at Home: Adaptive bathing aides, Shower chair, Hospital bed, Commode, bedside Hand dominance: RightEXAMINATION OF PERFORMANCE DEFICITS: 
Cognitive/Behavioral Status: 
Neurologic State: Alert;Eyes open to stimulus(AND touch) Orientation Level: Unable to verbalize Cognition: Decreased command following Perception: Appears intact Safety/Judgement: Lack of insight into deficits Skin: intact as seen Edema: none noted Hearing: Auditory Auditory Impairment: None Vision/Perceptual:   
    
    
    
  
    
    
  
Range of Motion: 
AROM: Grossly decreased, non-functional 
PROM: Grossly decreased, non-functional 
  
  
  
  
  
  
Strength: 
Strength: Grossly decreased, non-functional 
  
  
  
  
Coordination: 
Coordination: Grossly decreased, non-functional 
    
  
Tone & Sensation: 
 
Tone: Abnormal 
  
  
  
  
  
  
  
Balance: 
Sitting: Impaired Sitting - Static: Poor (constant support) Sitting - Dynamic: Poor (constant support) Standing: Impaired Standing - Static: Constant support;Poor Standing - Dynamic : Poor Functional Mobility and Transfers for ADLs:Bed Mobility: 
Rolling: Total assistance;Assist x2 Supine to Sit: Total assistance;Assist x2 Sit to Supine: Total assistance;Assist x2 Transfers: 
Sit to Stand: Maximum assistance;Assist x2 Stand to Sit: Maximum assistance;Assist x2 Toilet Transfer : Total assistance(not safe at this time) ADL Assessment: 
Feeding: Moderate assistance Oral Facial Hygiene/Grooming: Moderate assistance Bathing: Total assistance Upper Body Dressing: Total assistance Lower Body Dressing: Total assistance Toileting: Total assistance ADL Intervention and task modifications: 
  
 
  
 
  
 
  
 
  
 
  
 
  
 
Cognitive Retraining Safety/Judgement: Lack of insight into deficits Therapeutic Exercise: 
  
Functional Measure: 
Barthel Index: 
 
Bathin Bladder: 0 Bowels: 0 Groomin Dressin Feedin Mobility: 0 Stairs: 0 Toilet Use: 0 Transfer (Bed to Chair and Back): 5 Total: 5 Barthel and G-code impairment scale: 
Percentage of impairment CH 
0% CI 
1-19% CJ 
20-39% CK 
40-59% CL 
60-79% CM 
80-99% CN 
100% Barthel Score 0-100 100 99-80 79-60 59-40 20-39 1-19 
 0 Barthel Score 0-20 20 17-19 13-16 9-12 5-8 1-4 0 The Barthel ADL Index: Guidelines 1. The index should be used as a record of what a patient does, not as a record of what a patient could do. 2. The main aim is to establish degree of independence from any help, physical or verbal, however minor and for whatever reason. 3. The need for supervision renders the patient not independent. 4. A patient's performance should be established using the best available evidence. Asking the patient, friends/relatives and nurses are the usual sources, but direct observation and common sense are also important. However direct testing is not needed. 5. Usually the patient's performance over the preceding 24-48 hours is important, but occasionally longer periods will be relevant. 6. Middle categories imply that the patient supplies over 50 per cent of the effort. 7. Use of aids to be independent is allowed. Kristy Vivas., Barthel, D.W. (2205). Functional evaluation: the Barthel Index. [address] [facility] (14)2. Davonte Barrera edyta TROY Lewis, Rona Owusu., Jeffery Batista., [place], [address] [facility] ().  Measuring the change indisability after inpatient rehabilitation; comparison of the responsiveness of the Barthel Index and Functional Lucinda Measure. Journal of Neurology, Neurosurgery, and Psychiatry, 66(4), 332-837. IVETTE Fitzgerald, SHALINI Cleary, & Elisabeth Ortiz M.A. (2004.) Assessment of post-stroke quality of life in cost-effectiveness studies: The usefulness of the Barthel Index and the EuroQoL-5D. [facility], 13, 392-49 G codes: In compliance with CMSs Claims Based Outcome Reporting, the following G-code set was chosen for this patient based on their primary functional limitation being treated: The outcome measure chosen to determine the severity of the functional limitation was the Barthel Index with a score of 5/100 which was correlated with the impairment scale. ? Self Care:  
  - CURRENT STATUS: CM - 80%-99% impaired, limited or restricted  - GOAL STATUS: CL - 60%-79% impaired, limited or restricted  - D/C STATUS:  ---------------To be determined--------------- Occupational Therapy Evaluation Charge Determination History Examination Decision-Making LOW Complexity : Brief history review  MEDIUM Complexity : 3-5 performance deficits relating to physical, cognitive , or psychosocial skils that result in activity limitations and / or participation restrictions Based on the above components, the patient evaluation is determined to be of the following complexity level: MEDIUM Pain: 
Pain Scale 1: Adult Nonverbal Pain Scale Pain Intensity 1: 0 Activity Tolerance: VSS Please refer to the flowsheet for vital signs taken during this treatment. After treatment:  
[] Patient left in no apparent distress sitting up in chair 
[x] Patient left in no apparent distress in bed 
[x] Call bell left within reach [x] Nursing notified 
[] Caregiver present [x] Bed alarm activated COMMUNICATION/EDUCATION:  
The patients plan of care was discussed with: Physical Therapist and Registered Nurse. [x] Home safety education was provided and the patient/caregiver indicated understanding. [x] Patient/family have participated as able in goal setting and plan of care. [x] Patient/family agree to work toward stated goals and plan of care. [] Patient understands intent and goals of therapy, but is neutral about his/her participation. [] Patient is unable to participate in goal setting and plan of care. This patients plan of care is appropriate for delegation to [facility]. Thank you for this referral. 
Aleah Chairez OTR/L Time Calculation: 24 mins

## 2018-11-19 NOTE — PROGRESS NOTES
[facility] Medical Progress Note NAME: Celestino Taylor :  1940 MRM:  [id number] Date/Time: 2018  1:55 PM 
 
  
Assessment and Plan:  
 
 
Acute encephalopathy / Dementia / Anxiety and depression - AMS POA, unclear etiology. Possibly oversedation v. Early infection v. Advancing dementia. Appreciate neurology consult. Awaiting MRI. Supplmenting B12. EEG to r/o occult seizure. Continue ASA, memantine and donepezil. Abnormal chest imaging / Cough. Breath sounds are coarse. CT scan shows multilobar consolidation concerning for pneumonia. Though he is 0/4 SIRS, normal ESR/CRP, the risk:benefit profile favors treatment over conservative management given AMS, cough. Start augmentin x 7 days. Speech consult. 
  
Parkinson disease / Dysphagia / Debility - POA, likely poor baseline and likely has little rehab potential.  Continue sinemet. Fall precautions. PT/OT eval. 
  
Malnutrition / Weight loss / Failure to thrive - POA, presumed due to age, dementia, parkinson's. Glucerna supplements. Goals of care and dispo planning 
  
HTN (hypertension) - BP up slightly. Restart amlodipine and evaluate for restarting lisinopril tomorrow. 
  
Type 2 diabetes mellitus without complication - Diabetic diet and counseling. SSI per protocol. Holding metformin while acutely ill. Check A1c. 
  
Hyperlipidemia - Continue pravastatin. 
  
Hypokalemia - Replete PO and in IVF 
  
Glaucoma - continue timolol. Subjective: Chief Complaint:  Patient seen and examined. Chart reviewed. Patient remains lethargic and poorly responsive. ROS: 
Unable to obtain due to current clinical condition Objective:  
 
Last 24hrs VS reviewed since prior progress note. Most recent are: 
 
Visit Vitals /81 (BP 1 Location: Right arm, BP Patient Position: At rest) Pulse 61 Temp 98.9 °F (37.2 °C) Resp 17 Ht 5' 10\" (1.778 m) Wt 59.9 kg (132 lb) SpO2 97% BMI 18.94 kg/m² SpO2 Readings from Last 6 Encounters:  
11/19/18 97% 08/27/18 97% 08/15/18 99% 07/01/18 96% 06/10/18 92% 04/11/18 96% Intake/Output Summary (Last 24 hours) at 11/19/2018 1355 Last data filed at 11/18/2018 2028 Gross per 24 hour Intake 1250 ml Output  Net 1250 ml Physical Exam: 
  
Gen:  Thin, frail, in no acute distress HEENT:  Pink conjunctivae, PERRL, hearing intact to voice, moist mucous membranes Neck:  Supple, without masses, thyroid non-tender Resp:  No accessory muscle use, clear breath sounds without wheezes rales or rhonchi 
Card:  No murmurs, normal S1, S2 without thrills, bruits or peripheral edema Abd:  Soft, non-tender, non-distended, normoactive bowel sounds are present, no mass Lymph:  No cervical or inguinal adenopathy Musc:  No cyanosis or clubbing Skin:  No rashes or ulcers, skin turgor is reduced Neuro:  Cranial nerves are grossly intact, general motor weakness, follows commands vaguely Psych:  Poor insight, oriented to person Telemetry reviewed:   normal sinus rhythm 
__________________________________________________________________ Medications Reviewed: (see below) Medications:  
 
Current Facility-Administered Medications Medication Dose Route Frequency  amoxicillin-clavulanate (AUGMENTIN) 875-125 mg per tablet 1 Tab  1 Tab Oral Q12H  
 lactobac ac& pc-s.therm-b.anim (STACY Q/RISAQUAD)  1 Cap Oral DAILY  aspirin delayed-release tablet 81 mg  81 mg Oral DAILY  carbidopa-levodopa (SINEMET)  mg per tablet 2 Tab  2 Tab Oral 7am  
 carbidopa-levodopa (SINEMET)  mg per tablet 1 Tab  1 Tab Oral DAILY WITH DINNER  carbidopa-levodopa (SINEMET)  mg per tablet 1 Tab  1 Tab Oral QHS  donepezil (ARICEPT) tablet 10 mg  10 mg Oral QHS  memantine (NAMENDA) tablet 10 mg  10 mg Oral BID  timolol (TIMOPTIC) 0.5 % ophthalmic solution 1 Drop  1 Drop Both Eyes BID  
  acetaminophen (TYLENOL) tablet 650 mg  650 mg Oral Q4H PRN  
 ondansetron (ZOFRAN) injection 4 mg  4 mg IntraVENous Q4H PRN  
 diphenhydrAMINE (BENADRYL) capsule 25 mg  25 mg Oral Q4H PRN  
 enoxaparin (LOVENOX) injection 40 mg  40 mg SubCUTAneous Q24H  
 glucose chewable tablet 16 g  4 Tab Oral PRN  
 dextrose (D50W) injection syrg 12.5-25 g  25-50 mL IntraVENous PRN  
 glucagon (GLUCAGEN) injection 1 mg  1 mg IntraMUSCular PRN  
 insulin lispro (HUMALOG) injection   SubCUTAneous AC&HS  
 0.45% sodium chloride with KCl 20 mEq/L infusion   IntraVENous CONTINUOUS Lab Data Reviewed: (see below) Lab Review:  
 
Recent Labs 11/18/18 
1622 WBC 9.6 HGB 13.8 HCT 41.6  Recent Labs 11/19/18 
0239 11/18/18 
1622  143  
K 5.0 3.2*  
 104 CO2 30 33* GLU 83 93 BUN 14 17 CREA 0.64* 0.90  
CA 7.9* 8.7 MG 1.6  --   
ALB  --  3.2* TBILI  --  1.4* SGOT  --  13* ALT  --  8* Lab Results Component Value Date/Time Glucose (POC) 89 11/19/2018 11:05 AM  
 Glucose (POC) 105 (H) 11/19/2018 07:37 AM  
 Glucose (POC) 69 11/19/2018 07:14 AM  
 Glucose (POC) 98 11/18/2018 09:41 PM  
 Glucose (POC) 78 11/18/2018 09:24 PM  
 
No results for input(s): PH, PCO2, PO2, HCO3, FIO2 in the last 72 hours. No results for input(s): INR in the last 72 hours. No lab exists for component: INREXT All Micro Results Procedure Component Value Units Date/Time CULTURE, BLOOD [[id number]] Collected:  11/18/18 1622 Order Status:  Completed Specimen:  Blood Updated:  11/19/18 9114 Special Requests: NO SPECIAL REQUESTS Culture result: NO GROWTH AFTER 16 HOURS     
 CULTURE, BLOOD [[id number]] Collected:  11/18/18 1622 Order Status:  Completed Specimen:  Blood Updated:  11/19/18 4600 Special Requests: NO SPECIAL REQUESTS Culture result: NO GROWTH AFTER 16 HOURS     
 CULTURE, URINE [[id number]] Collected:  11/18/18 2957 Order Status:  Completed Specimen:  Urine from Clean catch Updated:  11/19/18 0015 URINE CULTURE HOLD SAMPLE [[id number]] Collected:  11/18/18 1622 Order Status:  Completed Specimen:  Urine from Serum Updated:  11/18/18 1628 Urine culture hold URINE ON HOLD IN MICROBIOLOGY DEPT FOR 3 DAYS. IF UNPRESERVED URINE IS SUBMITTED, IT CANNOT BE USED FOR ADDITIONAL TESTING AFTER 24 HRS, RECOLLECTION WILL BE REQUIRED. I have reviewed notes of prior 24hr. Other pertinent lab: None Total time spent with patient: 40 min Care Plan discussed with: Patient, Nursing Staff and Consultant/Specialist 
 
Discussed:  Care Plan Prophylaxis:  Lovenox Disposition:  SNF/LTC 
        
___________________________________________________ Attending Physician: Esvin Stewart DO

## 2018-11-19 NOTE — PROGRESS NOTES
Problem: Mobility Impaired (Adult and Pediatric) Goal: *Acute Goals and Plan of Care (Insert Text) Physical Therapy Goals Initiated 11/19/2018 1. Patient will move from supine to sit and sit to supine  in bed with moderate assistance  within 7 day(s). 2.  Patient will transfer from bed to chair and chair to bed with minimal assistance using the least restrictive device within 7 day(s). 3.  Patient will perform sit to stand with minimal assistance within 7 day(s). 4.  Patient will ambulate with minimal assistance for 20 feet with the least restrictive device within 7 day(s). physical Therapy EVALUATION Patient: Nickolas Varela (20 y.o. male) Date: 11/19/2018 Primary Diagnosis: Acute encephalopathy Precautions: Universal    
 
ASSESSMENT : 
Based on the objective data described below, the patient presents with weakness, poor initiation of movement, decreased balance, and increased assistance needed with all moblility. Patient has a past medical history of Anxiety and depression, Compression fracture of L3 lumbar vertebra, 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, Diabetes ([address] Dysphagia, Hypertension, Malnutrition, and Parkinson disease. Who was admitted for AMS. He is oriented to himself and birth date, not place. Unsure if reliable historian - he states he lives by himself, does own meal prep, and uses a wheelchair to get around. Performed transfer training for rolling, supine<>sit and sit <> stand. All transfers require total to maximum assist of two. Sitting balance is poor, but he does exhibit righting reactions. Tendency to posterior lean requiring support. Also he keeps his head in right cervical rotation, but is able to actively rotate slightly passed midline to left. Worked on sitting balance, static with positioning and hand postioning.   Performed sit <> stand three times, best with rocking first and up on the count of three.  Full standing not achieved, but able to progressively assist patient to scoot along bedside. Patient is cooperative. Does not initiate conversation or movement but will respond. Does best with assisted movement. At this time patient would benefit from rehab at discharge. Patient will benefit from skilled intervention to address the above impairments. Patients rehabilitation potential is considered to be Good Factors which may influence rehabilitation potential include:  
[]         None noted 
[x]         Mental ability/status [x]         Medical condition 
[x]         Home/family situation and support systems 
[]         Safety awareness 
[]         Pain tolerance/management 
[]         Other: PLAN : 
Recommendations and Planned Interventions: 
[x]           Bed Mobility Training             []    Neuromuscular Re-Education 
[x]           Transfer Training                   []    Orthotic/Prosthetic Training 
[x]           Gait Training                         []    Modalities [x]           Therapeutic Exercises           []    Edema Management/Control 
[x]           Therapeutic Activities            [x]    Patient and Family Training/Education 
[]           Other (comment): Frequency/Duration: Patient will be followed by physical therapy  5 times a week to address goals. Discharge Recommendations: Rehab Further Equipment Recommendations for Discharge: tbd SUBJECTIVE:  
Patient stated I get up and get it. Cullen Grossman asked what he does for meal prep] OBJECTIVE DATA SUMMARY:  
HISTORY:   
Past Medical History:  
Diagnosis Date  Age-related physical debility  Anxiety and depression  Compression fracture of L3 lumbar vertebra (HCC)  Debilitated patient  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  Diabetes ([address]  Dysphagia dementia related  Hypertension  Malnutrition ([facility] [address]  Parkinson disease ([facility] [address] Past Surgical History: Procedure Laterality Date  HX HEENT Prior Level of Function/Home Situation: Unsure if reliable historian - he states he lives by himself, does own meal prep, and uses a wheelchair to get around. Personal factors and/or comorbidities impacting plan of care:  
 
Home Situation Home Environment: Private residence One/Two Story Residence: One story Living Alone: No 
Support Systems: Family member(s) Patient Expects to be Discharged to[de-identified] Private residence Current DME Used/Available at Home: Adaptive bathing aides, Shower chair, Hospital bed, Commode, bedside EXAMINATION/PRESENTATION/DECISION MAKING: Critical Behavior: 
Neurologic State: Alert, Eyes open to stimulus(AND touch) Orientation Level: Unable to verbalize Cognition: Decreased command following Safety/Judgement: Lack of insight into deficits Hearing: Auditory Auditory Impairment: None Range Of Motion: 
AROM: Grossly decreased, non-functional 
  
  
  
PROM: Grossly decreased, non-functional 
  
  
  
Strength:   
Strength: Grossly decreased, non-functional 
  
  
  
  
  
  
Tone & Sensation:  
Tone: Abnormal 
  
  
  
  
  
  
  
  
   
Coordination: 
Coordination: Grossly decreased, non-functional 
Vision:  
  
Functional Mobility: 
Bed Mobility: 
Rolling: Total assistance;Assist x2 Supine to Sit: Total assistance;Assist x2 Sit to Supine: Total assistance;Assist x2 Transfers: 
Sit to Stand: Maximum assistance;Assist x2 Stand to Sit: Maximum assistance;Assist x2 Balance:  
Sitting: Impaired Sitting - Static: Poor (constant support) Sitting - Dynamic: Poor (constant support) Standing: Impaired Standing - Static: Constant support;Poor Standing - Dynamic : PoorAmbulation/Gait Training:  
  
  
  
  
  
  
  
  
  
  
  
  
  
  
  
   
 Stairs: 
  
  
   
 
Functional Measure: 
Barthel Index: 
 
Bathin Bladder: 0 Bowels: 0 Groomin Dressin Feedin Mobility: 0 Stairs: 0 Toilet Use: 0 Transfer (Bed to Chair and Back): 5 Total: 5 Barthel and G-code impairment scale: 
Percentage of impairment CH 
0% CI 
1-19% CJ 
20-39% CK 
40-59% CL 
60-79% CM 
80-99% CN 
100% Barthel Score 0-100 100 99-80 79-60 59-40 20-39 1-19 
 0 Barthel Score 0-20 20 17-19 13-16 9-12 5-8 1-4 0 The Barthel ADL Index: Guidelines 1. The index should be used as a record of what a patient does, not as a record of what a patient could do. 2. The main aim is to establish degree of independence from any help, physical or verbal, however minor and for whatever reason. 3. The need for supervision renders the patient not independent. 4. A patient's performance should be established using the best available evidence. Asking the patient, friends/relatives and nurses are the usual sources, but direct observation and common sense are also important. However direct testing is not needed. 5. Usually the patient's performance over the preceding 24-48 hours is important, but occasionally longer periods will be relevant. 6. Middle categories imply that the patient supplies over 50 per cent of the effort. 7. Use of aids to be independent is allowed. Brodie Para., Barthel, D.W. (3362). Functional evaluation: the Barthel Index. [facility] (14)2. Caity Rush edyta [place], J.J.M.F, Jackelyn Sky., [address] [place], [address] [place] [address] (1999). Measuring the change indisability after inpatient rehabilitation; comparison of the responsiveness of the Barthel Index and Functional [place] Measure. Journal of Neurology, Neurosurgery, and Psychiatry, 66(4), 836-938. Regis Mishra, N.J.A, SHALINI Cleary, & Jose Moore M.A. (2004.) Assessment of post-stroke quality of life in cost-effectiveness studies: The usefulness of the Barthel Index and the EuroQoL-5D. [facility], 13, 076-33 G codes: In compliance with CMSs Claims Based Outcome Reporting, the following G-code set was chosen for this patient based on their primary functional limitation being treated: The outcome measure chosen to determine the severity of the functional limitation was the Barthel with a score of 5/100 which was correlated with the impairment scale. ? Mobility - Walking and Moving Around:  
  - CURRENT STATUS: CM - 80%-99% impaired, limited or restricted  - GOAL STATUS: CL - 60%-79% impaired, limited or restricted  - D/C STATUS:  ---------------To be determined--------------- Physical Therapy Evaluation Charge Determination History Examination Presentation Decision-Making MEDIUM  Complexity : 1-2 comorbidities / personal factors will impact the outcome/ POC  LOW Complexity : 1-2 Standardized tests and measures addressing body structure, function, activity limitation and / or participation in recreation  LOW Complexity : Stable, uncomplicated  LOW Complexity : FOTO score of  Based on the above components, the patient evaluation is determined to be of the following complexity level: LOW Pain: 
Pain Scale 1: Adult Nonverbal Pain Scale Pain Intensity 1: 0 Activity Tolerance:  
Limited by weakness and tone Please refer to the flowsheet for vital signs taken during this treatment. After treatment:  
[]         Patient left in no apparent distress sitting up in chair 
[x]         Patient left in no apparent distress in bed 
[x]         Call bell left within reach [x]         Nursing notified 
[]         Caregiver present [x]         Bed alarm activated COMMUNICATION/EDUCATION:  
The patients plan of care was discussed with: Occupational Therapist and Registered Nurse. [x]         Fall prevention education was provided and the patient/caregiver indicated understanding. []         Patient/family have participated as able in goal setting and plan of care. [x]         Patient/family agree to work toward stated goals and plan of care. []         Patient understands intent and goals of therapy, but is neutral about his/her participation. []         Patient is unable to participate in goal setting and plan of care. Thank you for this referral. 
Josh Chery PT Time Calculation: 24 mins

## 2018-11-19 NOTE — PROGRESS NOTES
Problem: Falls - Risk of 
Goal: *Absence of Falls Document Phoebe Herrera Fall Risk and appropriate interventions in the flowsheet. Outcome: Progressing Towards Goal 
Fall Risk Interventions: 
Mobility Interventions: Bed/chair exit alarm Mentation Interventions: Bed/chair exit alarm, Gait belt with transfers/ambulation Medication Interventions: Teach patient to arise slowly, Patient to call before getting OOB, Bed/chair exit alarm Elimination Interventions: Call light in reach, Bed/chair exit alarm History of Falls Interventions: Door open when patient unattended, Bed/chair exit alarm

## 2018-11-19 NOTE — DIABETES MGMT
DTC Progress Note Recommendations/ Comments: Noted pt hypoglycemic last night and this morning. Pt has diet order, please encourage PO intakes. If intakes remain poor may consider adding D5% to IVF to support BG. Noted consult for assessment of home management, noted pt confused and pmhx of dementia. Will attempt to complete education when family present. Current hospital DM medication: Humalog high sensitivity correction Chart reviewed on Genie Chan. Patient is a 69 y.o. male with known history of Type 2 Diabetes on Metformin 500mg bid at home. A1c:  
Lab Results Component Value Date/Time Hemoglobin A1c 6.3 11/18/2018 04:22 PM  
 Hemoglobin A1c 5.4 02/26/2018 03:49 AM  
 
 
Recent Glucose Results:  
Lab Results Component Value Date/Time GLU 83 11/19/2018 02:39 AM  
 GLU 93 11/18/2018 04:22 PM  
 GLUCPOC 105 (H) 11/19/2018 07:37 AM  
 GLUCPOC 69 11/19/2018 07:14 AM  
 GLUCPOC 98 11/18/2018 09:41 PM  
  
 
Lab Results Component Value Date/Time Creatinine 0.64 (L) 11/19/2018 02:39 AM  
 
CrCl cannot be calculated (Unknown ideal weight. ). Active Orders Diet DIET DIABETIC CONSISTENT CARB Regular PO intake: No data found. Will continue to follow as needed. Thank you Jimmie Doherty RD, CDE [facility] Office: [phone] Pager: [phone]

## 2018-11-19 NOTE — PROGRESS NOTES
1945 : TRANSFER - IN REPORT: 
 
Verbal report received from Jolanda Schirmer, RN (name) on Auther Bear  being received from ED (unit) for routine progression of care Report consisted of patients Situation, Background, Assessment and  
Recommendations(SBAR). Information from the following report(s) SBAR, Kardex, ED Summary, Intake/Output and MAR was reviewed with the receiving nurse. Opportunity for questions and clarification was provided. Assessment completed upon patients arrival to unit and care assumed. Primary Nurse Jono Truong RN and Jolanda Schirmer, RN, RN performed a dual skin assessment on this patient No impairment noted Mamadou score is 14 Received care, pt confused and attempting to get out of bed. Bed alarm, rails up x3, daughter at bedside, nonskid socks on, patient near nurse's station with door open. 2130- performed bedside swallow test, pt passed. 2125- BS 78, OJ given, BS recheck 2140-98. 
 
2300- pt sleeping in bed.

## 2018-11-19 NOTE — PROGRESS NOTES
11/19/2018 Reason for Admission:   Encephalopathy RRAT Score:   20 Do you (patient/family) have any concerns for transition/discharge? None voiced Plan for utilizing home health:     Yes Likelihood of readmission? Mod/yellow Transition of Care Plan:    Return home with wife and caregivers 11/19/2018   CARE MANAGEMENT NOTE: 
CM is following pt for initial discharge planning. EMR reviewed. CM spoke with pt's dtr, Colton Edward ([phone]) via phone to obtain hx for this needs assessment. Reportedly, pt resides with his wife in a one story home with 4 front entry steps and 9 stairs to the back. Dtr lives nearby. PTA, pt was ambulatory and he requires assistance with ADLs. There are caregivers 24/7 from an unknown agency. Pt obtains medications from [facility] pharmacy on [address]. Pt does not have home health care currently. DME in the home includes a BSC, shower chair, hospital bed, [place] chair, and he has a glucometer. PCP is Dr. Trish Perdue. Plan is to return home with family and caregivers. Dtr would like pt to have home health thru [facility] agency. Halley allen/isela Ramos Mouse is requested at discharge (credit card is on file). Megha

## 2018-11-19 NOTE — PROGRESS NOTES
Bedside and Verbal shift change report given to Lisa Zepeda RN (oncoming nurse) by Brenden Colby RN  (offgoing nurse). Report included the following information SBAR, Kardex, ED Summary, Intake/Output, MAR and Recent Results. Bs @ [phone], Tech gave OJ and recheck BS- 105. Day RN aware. End of night pt became combative with [facility] and trying to give meds.

## 2018-11-20 NOTE — PROGRESS NOTES
[facility] Medical Progress Note NAME: Mj Sanchez :  1940 MRM:  [id number] Date/Time: 2018 Assessment and Plan:  
 
 
Acute encephalopathy / Dementia / Anxiety and depression - AMS POA, unclear etiology. Possibly oversedation v. Early infection v. Advancing dementia. Appreciate neurology consult. MRI notes no acute stroke. Supplmenting B12. EEG to r/o occult seizure. Continue ASA, memantine and donepezil. Aspiration pneumonia / Cough:  CT scan shows multilobar consolidation concerning for pneumonia. Suspect he may have aspiration pna based on imaging and speech findings of dysphagia. cont augmentin x 7 days. Speech following 
  
Parkinson disease / Dysphagia / Debility - POA, likely poor baseline and likely has little rehab potential.  Continue sinemet. Fall precautions. PT/OT eval. 
  
Malnutrition / Weight loss / Failure to thrive - POA, presumed due to age, dementia, parkinson's. Glucerna supplements. Goals of care and dispo planning 
  
HTN (hypertension): cont norvasc and lisinopril 
  
Type 2 diabetes mellitus without complication - Diabetic diet and counseling. SSI per protocol. Holding metformin while acutely ill. Check A1c. 
  
Hyperlipidemia - Continue pravastatin. 
  
Hypokalemia - Replete PO and in IVF 
  
Glaucoma - continue timolol. Subjective: Chief Complaint:  F/u confusion No acute events. Pt awake and conversant this morning. Denies cp, sob, abd pain. Objective:  
 
Last 24hrs VS reviewed since prior progress note. Most recent are: 
 
Visit Vitals /73 (BP Patient Position: Pre-activity;Supine) Pulse 65 Temp 97.4 °F (36.3 °C) Resp 19 Ht 5' 10\" (1.778 m) Wt 59.9 kg (132 lb) SpO2 97% BMI 18.94 kg/m² SpO2 Readings from Last 6 Encounters:  
18 97% 18 97% 08/15/18 99% 18 96% 06/10/18 92% 18 96% Intake/Output Summary (Last 24 hours) at 2018 6155 Last data filed at 11/20/2018 9889 Gross per 24 hour Intake 1773 ml Output  Net 1773 ml Physical Exam: 
  
Gen:  Thin, frail, in no acute distress HEENT:  Pink conjunctivae, PERRL, hearing intact to voice, moist mucous membranes Neck:  Supple, without masses, thyroid non-tender Resp:  No accessory muscle use, clear breath sounds without wheezes rales or rhonchi 
Card:  No murmurs, normal S1, S2 without thrills, bruits or peripheral edema Abd:  Soft, non-tender, non-distended, normoactive bowel sounds are present, no mass Lymph:  No cervical or inguinal adenopathy Musc:  No cyanosis or clubbing Skin:  No rashes or ulcers, skin turgor is reduced Neuro:  no focal deficits, somewhat impulsive and has some difficulties following commands Psych:  Poor insight, oriented to person Telemetry reviewed:   normal sinus rhythm 
__________________________________________________________________ Medications Reviewed: (see below) Medications:  
 
Current Facility-Administered Medications Medication Dose Route Frequency  amoxicillin-clavulanate (AUGMENTIN) 875-125 mg per tablet 1 Tab  1 Tab Oral Q12H  
 lactobac ac& pc-s.therm-b.anim (STACY Q/RISAQUAD)  1 Cap Oral DAILY  amLODIPine (NORVASC) tablet 5 mg  5 mg Oral DAILY  pravastatin (PRAVACHOL) tablet 20 mg  20 mg Oral QHS  aspirin delayed-release tablet 81 mg  81 mg Oral DAILY  carbidopa-levodopa (SINEMET)  mg per tablet 2 Tab  2 Tab Oral 7am  
 carbidopa-levodopa (SINEMET)  mg per tablet 1 Tab  1 Tab Oral DAILY WITH DINNER  carbidopa-levodopa (SINEMET)  mg per tablet 1 Tab  1 Tab Oral QHS  donepezil (ARICEPT) tablet 10 mg  10 mg Oral QHS  memantine (NAMENDA) tablet 10 mg  10 mg Oral BID  timolol (TIMOPTIC) 0.5 % ophthalmic solution 1 Drop  1 Drop Both Eyes BID  acetaminophen (TYLENOL) tablet 650 mg  650 mg Oral Q4H PRN  
 ondansetron (ZOFRAN) injection 4 mg  4 mg IntraVENous Q4H PRN  
  diphenhydrAMINE (BENADRYL) capsule 25 mg  25 mg Oral Q4H PRN  
 enoxaparin (LOVENOX) injection 40 mg  40 mg SubCUTAneous Q24H  
 glucose chewable tablet 16 g  4 Tab Oral PRN  
 dextrose (D50W) injection syrg 12.5-25 g  25-50 mL IntraVENous PRN  
 glucagon (GLUCAGEN) injection 1 mg  1 mg IntraMUSCular PRN  
 insulin lispro (HUMALOG) injection   SubCUTAneous AC&HS  
 0.45% sodium chloride with KCl 20 mEq/L infusion   IntraVENous CONTINUOUS Lab Data Reviewed: (see below) Lab Review:  
 
Recent Labs  
  11/20/18 0311 11/18/18 
1622 WBC 6.4 9.6 HGB 12.7 13.8 HCT 38.3 41.6  205 Recent Labs  
  11/20/18 0311 11/19/18 
0239 11/18/18 
1622  140 143 K 4.1 5.0 3.2*  
 104 104 CO2 31 30 33* GLU 85 83 93 BUN 10 14 17 CREA 0.69* 0.64* 0.90  
CA 8.2* 7.9* 8.7 MG 1.7 1.6  --   
PHOS 2.4*  --   --   
ALB 2.9*  --  3.2* TBILI 1.0  --  1.4* SGOT 12*  --  13* ALT <6*  --  8* Lab Results Component Value Date/Time Glucose (POC) 128 (H) 11/20/2018 11:17 AM  
 Glucose (POC) 91 11/20/2018 06:58 AM  
 Glucose (POC) 143 (H) 11/19/2018 09:58 PM  
 Glucose (POC) 126 (H) 11/19/2018 05:13 PM  
 Glucose (POC) 89 11/19/2018 11:05 AM  
 
No results for input(s): PH, PCO2, PO2, HCO3, FIO2 in the last 72 hours. No results for input(s): INR in the last 72 hours. No lab exists for component: INREXT, INREXT All Micro Results Procedure Component Value Units Date/Time CULTURE, URINE [[id number]] Collected:  11/18/18 1622 Order Status:  Completed Specimen:  Urine from Clean catch Updated:  11/20/18 0963 Special Requests: NO SPECIAL REQUESTS [place] Count --     
  <10,000 COLONIES/mL Culture result: NO SIGNIFICANT GROWTH     
 CULTURE, BLOOD [[id number]] Collected:  11/18/18 1622 Order Status:  Completed Specimen:  Blood Updated:  11/20/18 8948 Special Requests: NO SPECIAL REQUESTS Culture result: NO GROWTH 2 DAYS CULTURE, BLOOD [[id number]] Collected:  11/18/18 1622 Order Status:  Completed Specimen:  Blood Updated:  11/20/18 8952 Special Requests: NO SPECIAL REQUESTS Culture result: NO GROWTH 2 DAYS URINE CULTURE HOLD SAMPLE [[id number]] Collected:  11/18/18 1622 Order Status:  Completed Specimen:  Urine from Serum Updated:  11/18/18 1628 Urine culture hold URINE ON HOLD IN MICROBIOLOGY DEPT FOR 3 DAYS. IF UNPRESERVED URINE IS SUBMITTED, IT CANNOT BE USED FOR ADDITIONAL TESTING AFTER 24 HRS, RECOLLECTION WILL BE REQUIRED. I have reviewed notes of prior 24hr. Other pertinent lab: None Total time spent with patient: 36 min Care Plan discussed with: Patient, Nursing Staff and Consultant/Specialist 
 
Discussed:  Care Plan Prophylaxis:  Lovenox Disposition:  SNF/LTC 
        
___________________________________________________ Attending Physician: Meri Del Castillo MD

## 2018-11-20 NOTE — PROGRESS NOTES
[facility]: [facility] Neurology [address] [place] [address] [phone] Name:   Petrona Gil Medical record #: [id number] Admission Date: 11/18/2018 Who Consulted: Dr. Elizabeth Pollard Reason for Consult:    AMS Subjective:  
Overnight events: MRI completed Objective: EEG:  Enccephalopathy Assessment/Plan: 1. Altered Mental Status: · Neurochecks:  Every 4 hours · EEG to without subclinical seizures · Stop sedating medications · MRI Brain non contributory · Follow up with Dr. Francoise Collins 
  
2. Mobility:  
· Has not been OOB. · PT/OT to eval for rehab 
  
3. Diet:   
· Failued STAND due to alertness, still has a diet and po medications ordered. 
  
4. VTE Prophylaxes: · Per primary team 
  Thank you for allowing the Neurology Service the pleasure of participating in the care of your patient. This patient will be discussed with my collaborating care team physician Dr. Skyler Yung and he may have further recommendations regarding this patient's care. We will sign off at this time, please re consult us with questions. Attending Attestation:  
 
NEUROLOGY NOTE ADDENDUM: 
 
11/20/2018 I have reviewed the documentation provided by the nurse practitioner, discussed her findings, clinical impression, and the proposed management plans with regards to this patient's encounter. I have personally performed a face to face diagnostic evaluation on this patient. My findings are as follows: 
 
 
Petrona Gil is a 69 y.o. male who  has a past medical history of Age-related physical debility, Anxiety and depression, Compression fracture of L3 lumbar vertebra ([address] Debilitated patient, 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, Diabetes ([address] Dysphagia, Hypertension, Malnutrition ([address] and Parkinson disease ([address] who presents for evaluation of AMS.  
  
Patient has history of dementia and Parkinsonism seeing neurologist Dr Jose Alexandre Harpal on Sinemet, Aricept and Namenda. Patient comes in for increased lethargy with R facial weakness. 
  
CT chest showing pneumonia MRI brain negative for acute stroke Exam: 
Visit Vitals /82 (BP 1 Location: Left arm, BP Patient Position: At rest) Pulse 70 Temp 97.4 °F (36.3 °C) Resp 19 Ht 5' 10\" (1.778 m) Wt 59.9 kg (132 lb) SpO2 97% BMI 18.94 kg/m² Gen: More alert, follows commands Some slowness of speech No visual field defect on confrontation exam. 
Full eyes movement, with no nystagmus, no diplopia, no ptosis. Normal gag and swallow. All remaining cranial nerves were normal 
Motor function: 5/5 in all extremities 
(+) bradykinesia 
(+) rigidity (-) tremors Sensory: intact to LT, PP and JPS DTRs + in all extremities, (-) Babinski Good FTN and HTS Gait: Deferred Assessment AMS, possible acute encephalopathy due to pneumonia in a patient with baseline dementia and Parkinsonism. Patient should be evaluated for stroke (dysrthria and R facial droop) Low Vit B12 Plan 1) ContinuePT/OT 2) Continue Sinemet, Aricept and Namenda Follow up with his neurologist Dr Casas Thank you for the consultation. Tom Baldwin MD 
Diplomate, American Board of Psychiatry and Neurology Diplomate, Neuromuscular Medicine Diplomate, [address] [place] [address] Board of Electrodiagnostic Medicine General:  Alert, cooperative, no acute distress. Lungs:   Clear to auscultation bilaterally. No crackles/wheezes. Heart: 
Abdominal:  Regular rate and rhythm, No murmur, click, rub or gallop. Soft and nondistended Skin: Skin color, texture, turgor normal.   
  
NEUROLOGICAL EXAM: 
  
Appearance:  Well developed, well nourished,  and is in no acute distress. Mental Status: Oriented to person. Slighlty inattentive. Speech garbled but able to name. Follows commands Cranial Nerves:   Intact visual fields.  PERRL, EOM's full, no nystagmus, left facial ptosis. Facial sensation is normal. Facial movement is symmetric. Palate is midline. Normal sternocleidomastoid strength. Tongue is midline. Reflexes:   Deep tendon reflexes 2+/4 and symmetrical.  
Sensory:   Normal to temperature and vibration. Gait:  Not tested. Tremor:   No tremor noted. Cerebellar:  No cerebellar signs present. Neurovascular:  Normal heart sounds and regular rhythm. No carotid bruits. 
  
   
                        Motor: No pronator drift of either outstretched arm.   
 
   
            Reflexes:         
  Biceps Triceps Plantar Patellar Achilles L 2 2 2 2 2 R 2 2 2 2 2  
  
 
Current Facility-Administered Medications Medication Dose Route Frequency Provider Last Rate Last Dose  amoxicillin-clavulanate (AUGMENTIN) 875-125 mg per tablet 1 Tab  1 Tab Oral Q12H Adelso Midget V, DO   1 Tab at 11/20/18 9956  
 lactobac ac& pc-s.therm-b.anim (STACY Q/RISAQUAD)  1 Cap Oral DAILY Adelso Midget V, DO   1 Cap at 11/19/18 5713  amLODIPine (NORVASC) tablet 5 mg  5 mg Oral DAILY Emiliano Barnett V, DO      
 pravastatin (PRAVACHOL) tablet 20 mg  20 mg Oral QHS Emiliano Barnett V, DO   20 mg at 11/19/18 2101  aspirin delayed-release tablet 81 mg  81 mg Oral DAILY Don Ross MD   81 mg at 11/19/18 4445  carbidopa-levodopa (SINEMET)  mg per tablet 2 Tab  2 Tab Oral 7am Don Ross MD   2 Tab at 11/20/18 6771  carbidopa-levodopa (SINEMET)  mg per tablet 1 Tab  1 Tab Oral DAILY WITH DINNER Don Ross MD   1 Tab at 11/19/18 [id number]  carbidopa-levodopa (SINEMET)  mg per tablet 1 Tab  1 Tab Oral QHS Don Ross MD   1 Tab at 11/19/18 2101  
 donepezil (ARICEPT) tablet 10 mg  10 mg Oral QHS Don Ross MD   10 mg at 11/19/18 2100  memantine (NAMENDA) tablet 10 mg  10 mg Oral BID Don Ross MD   10 mg at 11/19/18 1715  timolol (TIMOPTIC) 0.5 % ophthalmic solution 1 Drop  1 Drop Both Eyes BID Jose Fiore MD   1 Drop at 11/19/18 2100  acetaminophen (TYLENOL) tablet 650 mg  650 mg Oral Q4H PRN Jose Fiore MD      
 ondansetron [facility]) injection 4 mg  4 mg IntraVENous Q4H PRN Jose Fiore MD      
 diphenhydrAMINE (BENADRYL) capsule 25 mg  25 mg Oral Q4H PRN Jose Fiore MD      
 enoxaparin (LOVENOX) injection 40 mg  40 mg SubCUTAneous Q24H Jose Fiore MD   40 mg at 11/19/18 2100  
 glucose chewable tablet 16 g  4 Tab Oral PRN Jose Fiore MD      
 dextrose (D50W) injection syrg 12.5-25 g  25-50 mL IntraVENous PRN Jose Fiore MD      
 glucagon [facility]) injection 1 mg  1 mg IntraMUSCular PRN Jsoe Fiore MD      
 insulin lispro (HUMALOG) injection   SubCUTAneous AC&HS Jose Fiore MD   Stopped at 11/18/18 2200  
 0.45% sodium chloride with KCl 20 mEq/L infusion   IntraVENous CONTINUOUS Jose Fiore MD 75 mL/hr at 11/20/18 0302 Recent Results (from the past 24 hour(s)) GLUCOSE, POC Collection Time: 11/19/18 11:05 AM  
Result Value Ref Range Glucose (POC) 89 65 - 100 mg/dL Performed by Colton Christie  (PCT) GLUCOSE, POC Collection Time: 11/19/18  5:13 PM  
Result Value Ref Range Glucose (POC) 126 (H) 65 - 100 mg/dL Performed by Ximena Degroot GLUCOSE, POC Collection Time: 11/19/18  9:58 PM  
Result Value Ref Range Glucose (POC) 143 (H) 65 - 100 mg/dL Performed by Grey George (PCT) METABOLIC PANEL, BASIC Collection Time: 11/20/18  3:11 AM  
Result Value Ref Range Sodium 141 136 - 145 mmol/L Potassium 4.1 3.5 - 5.1 mmol/L Chloride 104 97 - 108 mmol/L  
 CO2 31 21 - 32 mmol/L Anion gap 6 5 - 15 mmol/L Glucose 85 65 - 100 mg/dL BUN 10 6 - 20 MG/DL Creatinine 0.69 (L) 0.70 - 1.30 MG/DL  
 BUN/Creatinine ratio 14 12 - 20 GFR est AA >60 >60 ml/min/1.73m2 GFR est non-AA >60 >60 ml/min/1.73m2  Calcium 8.2 (L) 8.5 - 10.1 MG/DL  
CBC WITH AUTOMATED DIFF  
 Collection Time: 18  3:11 AM  
Result Value Ref Range WBC 6.4 4.1 - 11.1 K/uL  
 RBC 4.06 (L) 4.10 - 5.70 M/uL  
 HGB 12.7 12.1 - 17.0 g/dL HCT 38.3 36.6 - 50.3 % MCV 94.3 80.0 - 99.0 FL  
 MCH 31.3 26.0 - 34.0 PG  
 MCHC 33.2 30.0 - 36.5 g/dL  
 RDW 13.3 11.5 - 14.5 % PLATELET 652 541 - 229 K/uL MPV 9.6 8.9 - 12.9 FL  
 NRBC 0.0 0  WBC ABSOLUTE NRBC 0.00 0.00 - 0.01 K/uL NEUTROPHILS 59 32 - 75 % LYMPHOCYTES 32 12 - 49 % MONOCYTES 7 5 - 13 % EOSINOPHILS 1 0 - 7 % BASOPHILS 1 0 - 1 % IMMATURE GRANULOCYTES 0 0.0 - 0.5 % ABS. NEUTROPHILS 3.8 1.8 - 8.0 K/UL  
 ABS. LYMPHOCYTES 2.0 0.8 - 3.5 K/UL  
 ABS. MONOCYTES 0.4 0.0 - 1.0 K/UL  
 ABS. EOSINOPHILS 0.1 0.0 - 0.4 K/UL  
 ABS. BASOPHILS 0.0 0.0 - 0.1 K/UL  
 ABS. IMM. GRANS. 0.0 0.00 - 0.04 K/UL  
 DF AUTOMATED MAGNESIUM Collection Time: 18  3:11 AM  
Result Value Ref Range Magnesium 1.7 1.6 - 2.4 mg/dL PHOSPHORUS Collection Time: 18  3:11 AM  
Result Value Ref Range Phosphorus 2.4 (L) 2.6 - 4.7 MG/DL  
HEPATIC FUNCTION PANEL Collection Time: 18  3:11 AM  
Result Value Ref Range Protein, total 6.2 (L) 6.4 - 8.2 g/dL Albumin 2.9 (L) 3.5 - 5.0 g/dL Globulin 3.3 2.0 - 4.0 g/dL A-G Ratio 0.9 (L) 1.1 - 2.2 Bilirubin, total 1.0 0.2 - 1.0 MG/DL Bilirubin, direct 0.2 0.0 - 0.2 MG/DL Alk. phosphatase 66 45 - 117 U/L  
 AST (SGOT) 12 (L) 15 - 37 U/L  
 ALT (SGPT) <6 (L) 12 - 78 U/L  
GLUCOSE, POC Collection Time: 18  6:58 AM  
Result Value Ref Range Glucose (POC) 91 65 - 100 mg/dL Performed by Yaya Barragan (PCT) Visit Vitals /82 (BP 1 Location: Left arm, BP Patient Position: At rest) Pulse 70 Temp 97.4 °F (36.3 °C) Resp 19 Ht 5' 10\" (1.778 m) Wt 59.9 kg (132 lb) SpO2 97% BMI 18.94 kg/m² O2 Device: Room air Temp (24hrs), Av °F (36.7 °C), Min:97.4 °F (36.3 °C), Max:98.9 °F (37.2 °C) 
   07 -  1900 In: 900 [I.V.:900] Out: -    11/18 1901 - 11/20 0700 In: 504 [P.O.:240; I.V.:633] Out: -  
 
 
 
Care Plan discussed with: 
Patient x Family RN Care Manager Consultant/Specialist:    
 
 
JANET Zuniga-BC

## 2018-11-20 NOTE — PROGRESS NOTES
Occupational Therapy Note: Pt declined OT this afternoon stating \"I am not interested. \" Explained benefits of OT but he cont to decline. Pt difficult to understand and he is confused. Will attempt OT next treatment date.

## 2018-11-20 NOTE — PROGRESS NOTES
Problem: Dysphagia (Adult) Goal: *Acute Goals and Plan of Care (Insert Text) Swallowing goals initiated 11-19-18: (weekly re-eval 11-26-18) 1) tolerate dysphagia 1, thins without oral holding or s/s aspiration by 11-23-18 Speech language pathology dysphagia treatment Patient: Guille Estevez (89 y.o. male) Date: 11/20/2018 Diagnosis: Acute encephalopathy <principal problem not specified> Precautions: aspiration ASSESSMENT: 
Patient with suspected aspiration at times due to : weakness , LOC, confusion, Feeder status, lung status. Minimal cough. He has been refusing PO and meds today at times, but took lunch in good amounts, but subsequent congestion. Progression toward goals: 
[]         Improving appropriately and progressing toward goals 
[]         Improving slowly and progressing toward goals [x]         Not making progress toward goals and plan of care will be adjusted PLAN: 
Recommendations and Planned Interventions: 
Feed only when awake and alert. Crush meds as able. Palliative care consult? Patient continues to benefit from skilled intervention to address the above impairments. Continue treatment per established plan of care. Discharge Recommendations:  Hospice? SUBJECTIVE:  
Patient talking more, but mostly unintelligible. . 
 
OBJECTIVE:  
Cognitive and Communication Status: 
Neurologic State: Alert, Confused Orientation Level: Disoriented X4 Cognition: (speech mostly unintelligible) Perception: Appears intact Safety/Judgement: Lack of insight into deficits Dysphagia Treatment: 
Oral Assessment: P.O. Trials: 
Patient Position: upright in bed Vocal quality prior to P.O.: (dysarthria) Consistency Presented: Thin liquid ORAL PHASE:  
Able to use straw PHARYNGEAL PHASE:  
Moderate weakness, no overt s/s aspiration, but suspect pharyngeal residue based on MBS results from FEB, weakness, intermittent wet voice, intermittant throat clearing. Exercises: 
Laryngeal Exercises: 
  
  
  
  
  
  
  
  
  
  
  
  
  
  
  
  
  
  
  
  
  
  
  
  
  
  
  
  
  
  
  
  
  
  
  
  
  
  
  
  
  
  
  
Pain: 
Pain Scale 1: Adult Nonverbal Pain Scale Pain Intensity 1: 0 After treatment:  
[]              Patient left in no apparent distress sitting up in chair 
[]              Patient left in no apparent distress in bed 
[]              Call bell left within reach 
[]              Nursing notified 
[]              Caregiver present 
[]              Bed alarm activated COMMUNICATION/EDUCATION:  
Patient was educated regarding His deficit(s) of dysphagia  as this relates to His diagnosis of encaphalopathy. He demonstrated Guarded understanding as evidenced by confusion. The patients plan of care including recommendations, planned interventions, and recommended diet changes were discussed with: Registered Nurse and Physician. 
[]              Posted safety precautions in patient's room. Salina Goldberg, SLP Time Calculation: 10 mins

## 2018-11-20 NOTE — PROGRESS NOTES
11/20/2018   CARE MANAGEMENT NOTE:  CM reviewed EMR and noted PT/OT recommendations for rehab. CM made referral to [address] [place] [address] and await their assessment for appropriate level of rehab. Megha

## 2018-11-20 NOTE — PROGRESS NOTES
Pt refusing medications this morning. RN was able to administer crushed antibiotic in apple sauce but patient refused others stating \"no more\" and \"no\" continuously when asked to try to take medications this morning. Roberto, Patient Tech, also stated patient didn't want to eat more than a little of his breakfast either. Diya Patel said she would try to feed him more breakfast at a later time. [address] Bedside and Verbal shift change report given to DANIELE Sanchez (oncoming nurse) by Myah Barrett RN (offgoing nurse).  Report included the following information SBAR, Kardex, Intake/Output, MAR and Recent Results. '

## 2018-11-20 NOTE — PROGRESS NOTES
Bedside and Verbal shift change report given to Luis Garg RN  by Aditi Monahan RN. Report included the following information SBAR, Kardex and MAR.

## 2018-11-20 NOTE — PROGRESS NOTES
Problem: Mobility Impaired (Adult and Pediatric) Goal: *Acute Goals and Plan of Care (Insert Text) Physical Therapy Goals Initiated 11/19/2018 1. Patient will move from supine to sit and sit to supine  in bed with moderate assistance  within 7 day(s). 2.  Patient will transfer from bed to chair and chair to bed with minimal assistance using the least restrictive device within 7 day(s). 3.  Patient will perform sit to stand with minimal assistance within 7 day(s). 4.  Patient will ambulate with minimal assistance for 20 feet with the least restrictive device within 7 day(s). physical Therapy TREATMENT Patient: Maria Esther Aguilar (85 y.o. male) Date: 11/20/2018 Diagnosis: Acute encephalopathy <principal problem not specified> Precautions:   
Chart, physical therapy assessment, plan of care and goals were reviewed. ASSESSMENT:   Spoke with RN prior to treatment who reports pt is more alert this date. Pt with improvement during session with decrease assist for supine>sit at Mod A x 2 and patient able to complete full standing posture x 1 rep although weight bearing on toes. 2nd attempt at standing with little success and poor placement of foot placement bilaterally. Pt responds to counting and rocking prior to standing attempts as previously described in last treatment note. Pt incontinent of urine upon standing with tech assisting with positioning of clean olive. Pt extremely rigid with rocking and rotational movements assisting with relaxation of contracted limbs. Pt able to demonstrate full extension of BLE's once back in supine. Progression toward goals: 
[]    Improving appropriately and progressing toward goals [x]    Improving slowly and progressing toward goals 
[]    Not making progress toward goals and plan of care will be adjusted PLAN: 
Patient continues to benefit from skilled intervention to address the above impairments. Continue treatment per established plan of care. Discharge Recommendations:  Rehab Further Equipment Recommendations for Discharge:  TBD SUBJECTIVE:  
Patient stated OK.  OBJECTIVE DATA SUMMARY:  
Critical Behavior: 
Neurologic State: Alert, Confused Orientation Level: Disoriented X4 Cognition: (speech mostly unintelligible) Safety/Judgement: Lack of insight into deficits Functional Mobility Training: 
Bed Mobility: 
  
Supine to Sit: Moderate assistance;Assist x2 Sit to Supine: Maximum assistance;Assist x2 Transfers: 
Sit to Stand: Assist x2;Maximum assistance Stand to Sit: Assist x2;Maximum assistance Static stand x 1 rep Max A x 2 Balance: 
Sitting: Impaired;High guard; With support Sitting - Static: Fair (occasional) Standing: Impaired; With support;Pull to stand Standing - Static: PoorAmbulation/Gait Training: 
  
  
  
  
  
  
  
  
  
  
  
  
  
  
  
  
 
  
  
   
 
Neuro Re-Education: 
Trunk flexion/extension incorporating  BUE's to facilitate movement pattern; trunk rotation with UE reach across midline. Pain: 
Pain Scale 1: Adult Nonverbal Pain Scale Pain Intensity 1: 0 Activity Tolerance:  
Fair. Please refer to the flowsheet for vital signs taken during this treatment. After treatment:  
[]    Patient left in no apparent distress sitting up in chair 
[x]    Patient left in no apparent distress in bed 
[x]    Call bell left within reach [x]    Nursing notified 
[]    Caregiver present [x]    Bed alarm activated COMMUNICATION/COLLABORATION:  
The patients plan of care was discussed with: Registered Nurse and Certified Nursing Assistant/Patient Care Technician Bren Medina PTA Time Calculation: 25 mins

## 2018-11-20 NOTE — DIABETES MGMT
DTC Consult Note Recommendations/ Comments: BG currently WNL. Met with pt and graddaughter for consult. Pt not appropriate for education and granddaughter not familiar with how pt is managing his DM at home. Called pt daughter and left voicemail. Education materials left on bedside table with DTC contact info. Current hospital DM medication: Humalog high sensitivity correction Consult received for:  [x]             Assessment of home management 
              []      Medication Recommendations []             Meter/monitoring 
   []             Insulin instruction []             New diagnosis []             Outpatient education []             Insulin pump patient []             Insulin infusion 
   []             DKA/HHS Chart reviewed and initial evaluation complete on Marilyn Patel. Patient is a 69 y.o. male with known history of Type 2 Diabetes on Metformin 500mg bid at home. Provided patient with the following: [x]             Survival skills education materials []             Insulin education materials []             CHO counting education materials [x]             Outpatient DTC contact number 
             []             Glucometer A1c:  
Lab Results Component Value Date/Time Hemoglobin A1c 6.3 11/18/2018 04:22 PM  
 
 
Recent Glucose Results:  
Lab Results Component Value Date/Time GLU 85 11/20/2018 03:11 AM  
 GLUCPOC 128 (H) 11/20/2018 11:17 AM  
 GLUCPOC 91 11/20/2018 06:58 AM  
 GLUCPOC 143 (H) 11/19/2018 09:58 PM  
  
 
Lab Results Component Value Date/Time Creatinine 0.69 (L) 11/20/2018 03:11 AM  
 
Estimated Creatinine Clearance: 76 mL/min (A) (based on SCr of 0.69 mg/dL (L)). Active Orders Diet DIET DIABETIC WITH OPTIONS Consistent Carb 2000kcal; Pureed PO intake:  
Patient Vitals for the past 72 hrs: 
 % Diet Eaten 11/19/18 1458 75 % Will continue to follow as needed. Thank you. Lesly Nolan RD, CDE [facility] Office: [phone] Pager: [phone]

## 2018-11-20 NOTE — PROCEDURES
Ace Gonzalez [facility] 
EEG Ana Rosa Almonte 
MR#: [id number] : 1940 ACCOUNT #: [de-identified] DATE OF SERVICE: 2018 REFERRING PHYSICIAN:  Tatiana Ansari An EEG is requested in 25-year-old with encephalopathy to evaluate background rhythms and to evaluate for epileptiform abnormalities. MEDICATIONS:  Emilia Rivas to include Sinemet, Pravachol, lisinopril, Aricept, Glucophage. This tracing was obtained while the patient was noted to be in the awake state, slow answering. During this state, the background consists of diffuse mixed frequency theta range activities. HYPERVENTILATION:  Not performed due to age. Intermittent photic stimulation not performed. SLEEP:  Not obtained. INTERPRETATION:  This is EEG recorded during the awake state is abnormal secondary to diffuse slowing and disorganization of the background rhythms indicative of a moderate degree of bihemispheric dysfunction as is commonly seen with an encephalopathy, which may have contributed with toxic, metabolic, diffuse structural, and/or pharmacological effect and clinical correlation is recommended. No epileptiform abnormalities are seen. This pattern is also commonly seen in chronic neurodegenerative disorder such as dementia and, again, clinical correlation recommended. MD ARIS [place] / [place] 
D: 2018 14:39 T: 2018 22:02 JOB #: [id number]

## 2018-11-20 NOTE — PROGRESS NOTES
Bedside and Verbal shift change report given to Bret RN (oncoming nurse) by Negra Wells RN (offgoing nurse). Report included the following information SBAR, Kardex, MAR, Accordion and Recent Results.

## 2018-11-21 NOTE — ACP (ADVANCE CARE PLANNING)
There is not AMD in the chart. Patient's wife with dementia and not able to make medical decisions. Three children would then have equal medical decisions making power. DDNR already on record and signed in 2015 by his spouse.

## 2018-11-21 NOTE — PROGRESS NOTES
Problem: Dysphagia (Adult) Goal: *Acute Goals and Plan of Care (Insert Text) Swallowing goals initiated 11-19-18: (weekly re-eval 11-26-18) 1) tolerate dysphagia 1, thins without oral holding or s/s aspiration by 11-23-18 Speech language pathology dysphagia treatment Patient: Pelon Grayson (17 y.o. male) Date: 11/21/2018 Diagnosis: Acute encephalopathy <principal problem not specified> Precautions: aspiration ASSESSMENT: 
Patient with moderate oral-pharyngeal dysphagia and positive for s/s aspiration at times. Worse management of thicker foods than thins, so thick liquids will not be helpful to decrease aspiration and will only increase pharymgeal residue after the swallow. Progression toward goals: 
[]         Improving appropriately and progressing toward goals 
[]         Improving slowly and progressing toward goals [x]         Not making progress toward goals and plan of care will be adjusted PLAN: 
Recommendations and Planned Interventions: 
Continue dysphagia 1, thins. Feed only when alert. Small amounts. Feed slowly Stop if coughing. Alternate drinks and food. Patient continues to benefit from skilled intervention to address the above impairments. Continue treatment per established plan of care. Discharge Recommendations:  Chevy Amato SUBJECTIVE:  
Patient stated yes, I like milk. OBJECTIVE:  
Cognitive and Communication Status: 
Neurologic State: Alert Orientation Level: Disoriented X4 Cognition: (followed 1-step directions. talking more clearly today) Perception: Appears intact Safety/Judgement: Lack of insight into deficits Dysphagia Treatment: 
Oral Assessment: P.O. Trials: 
Patient Position: upright in bed Vocal quality prior to P.O.: (wet at times) Consistency Presented: Puree; Thin liquid How Presented: SLP-fed/presented;Straw;Spoon ORAL PHASE:  
Bilateral oral pocketing L>R, he eventually cleared most of this.  Cleared better with sips of thins. Some chewing of purees. Intermittent diffuiclty with straw suck 
feeder PHARYNGEAL ;PHASE:  
Noisy swallow. Moderate to severe weakness After 50% of bfast, patient starting choking. Hooked up sx, but unable to retrieve anything from upper pharynx once attached. His cough was was and non productive to oral cavity. Suspect pharyngeal residue that was then aspirated. Can not r/o esophageal back up/build up as well. Spoke with RN and MD.  
 
 MD and SLP considered MBS. He had one earlier this year  With moderate oral-pharyngeal dysphagia and suspect his current level of dysphagia is similar or worse. MD reports that daughter thinks he can swallow well at home and \"'he only aspirates in the hospital\". She does not feed him purees at home. MBS deferred at this time. daughter is not present Exercises: 
Laryngeal Exercises: 
  
  
  
  
  
  
  
  
  
  
  
  
  
  
  
  
  
  
  
  
  
  
  
  
  
  
  
  
  
  
  
  
  
  
  
  
  
  
  
  
  
  
  
Pain: 
Pain Scale 1: Adult Nonverbal Pain Scale Pain Intensity 1: 0 After treatment:  
[]              Patient left in no apparent distress sitting up in chair 
[]              Patient left in no apparent distress in bed 
[]              Call bell left within reach 
[]              Nursing notified 
[]              Caregiver present 
[]              Bed alarm activated COMMUNICATION/EDUCATION:  
Patient was educated regarding His deficit(s) of dysphagia  as this relates to His diagnosis of encephalopathy plus dementia. He demonstrated Guarded understanding as evidenced by confusion. Sid Urbina The patients plan of care including recommendations, planned interventions, and recommended diet changes were discussed with: Registered Nurse. []              Posted safety precautions in patient's room. MADDY Ashraf Time Calculation: 15 mins

## 2018-11-21 NOTE — CONSULTS
Palliative Medicine Consult Patient Name: Marilyn Patel YOB: 1940 Date of Initial Consult: 11/21/18 Reason for Consult: care decisions/end stage disease Requesting Provider: Dr. Flora Ye Primary Care Physician: Kalee Patterson MD 
  
 SUMMARY:  
Marilyn Patel is a 69 y.o. with a past history of dementia, parkinsons, debility, DM, dysphagia, who was admitted on 11/18/2018 from home with a diagnosis of pneumonia/respiratory distress. Current medical issues leading to Palliative Medicine involvement include: end stage disease. Chart reviewed/HPI-patient admitted with pneumonia, concerning for aspiration. Patient with parkinsons and dementia. He lives at home with his wife who has underlying dementia but they have 24 hour caregivers in the home. Patient has been declining related to his disease process but according to his son, so far this has worked well in the home setting SH-. Daughter lives locally and 2 sons- one lives in [place](just arrived) and 1 in [place](not involved), He needs assistance with most other ADLs PALLIATIVE DIAGNOSES:  
1. GOC discussion 2. Debility 3. Dementia 4. Aspiration 5. Parkinsons 6. FTT 7. Secretions PLAN:  
1. Michaela(W) and I met with patient briefly. He was resting and not verbal with our team. Did talk with his bedside nurse and he was a little more interactive earlier in the day. He is struggling with his swallowing currently and diet been modified. 2. Next talked with his son(Onofre) via phone and reviewed our role in his care. He understands Palliative and did not seem fully interested in talking. He does understand his father's current medical issues and he agrees->taking it day by day. 3. Next, patient's daughter arrived in the room(Gina). Reviewed our role and then discussed her dad's current medical issues. She knows that her Dad has pneumonia, very weak and struggling with his swallowing.  Per his daughter, he was still able to feed himself at times, converse at little but had significant decline this weekend. 3. Romero Nam is very emotional->her brother and her not speaking->disagree at times on medical issues and goals for parents. Romero Nam with significant other stressors(recent breakup with boyfriend, depression and h/o of SI in the past). Explained that our team will continue to support. She wants to be clear that she does not want her dad to suffer if the end if nearing. 5. GOC-at this time, family wants to continue to attempt full restorative measures with abx. They seem potentially open to the idea of comfort if this does not go well or worsens. Patient had been on Hospice care for a short time in 2015 but they stopped because wanted ongoing tx as far as hospitalizations. Our team will continue to follow and attempt to bridge the relationship with daughter/son 6. AMD-never completed-paperwork in the chart is financial. His wife has dementia so technically, all 3 children would have equal say on medical decisions 7. Code status-DDNR signed by his wife in 2015. His wife would be the only one that could revoke the [facility] but given her dementia, that is not possible. DNAR already selected in EMR and we will continue to honor 8. Symptom management-denied pain to nurse earlier. Marii Razo really does not want a lot of medications that could cause any confusion but if rapid decline, consider benzos for agitation and opioids for SOB 9. Psychosocial-definite rift between son and daughter so will need to remind both of them that our goal is to support the patient even if they are not talking. 10. Discussed with bedside nurse 11. Initial consult note routed to primary continuity provider 12. Communicated plan of care with: Palliative IDT 
 
 
 GOALS OF CARE / TREATMENT PREFERENCES:  
[====Goals of Care====] GOALS OF CARE: 
Patient/Health Care Proxy Stated Goals:  Other (comment)(continue attempts at full restorative measures) TREATMENT PREFERENCES:  
Code Status: DNR Advance Care Planning: 
Advance Care Planning 11/18/2018 Patient's Healthcare Decision Maker is: -  
Primary Decision Maker Name -  
Primary Decision Maker Relationship to Patient - Secondary Decision Maker Name - Secondary Decision Maker Relationship to Patient -  
Confirm Advance Directive Yes, on file Does the patient have other document types - Medical Interventions: Limited additional interventions Other Instructions: The palliative care team has discussed with patient / health care proxy about goals of care / treatment preferences for patient. 
[====Goals of Care====] HISTORY:  
 
History obtained from: chart, son, daughter, bedside nurse CHIEF COMPLAINT: confusion HPI/SUBJECTIVE: The patient is:  
[x] Verbal and participatory [] Non-participatory due to:  
Patient talks very little. Audible secretions Clinical Pain Assessment (nonverbal scale for severity on nonverbal patients):  
Clinical Pain Assessment Severity: 0 Adult Nonverbal Pain Scale Face: No particular expression or smile Activity (Movement): Lying quietly, normal position Guarding: Lying quietly, no positioning of hands over areas of body Physiology (Vital Signs): Stable vital signs Respiratory: Baseline RR/SpO2 compliant with ventilator Total Score: 0 
 
 
 FUNCTIONAL ASSESSMENT:  
 
Palliative Performance Scale (PPS): PPS: 40 PSYCHOSOCIAL/SPIRITUAL SCREENING:  
 
Advance Care Planning: 
Advance Care Planning 11/18/2018 Patient's Healthcare Decision Maker is: -  
Primary Decision Maker Name -  
Primary Decision Maker Relationship to Patient - Secondary Decision Maker Name - Secondary Decision Maker Relationship to Patient -  
Confirm Advance Directive Yes, on file Does the patient have other document types - Any spiritual / Caodaism concerns: 
[] Yes /  [x] No 
 
Caregiver Burnout: 
 [] Yes /  [x] No /  [] No Caregiver Present Anticipatory grief assessment:  
[x] Normal  / [] Maladaptive ESAS Anxiety: Anxiety: 0 
 
ESAS Depression: Depression: 0 REVIEW OF SYSTEMS:  
 
Positive and pertinent negative findings in ROS are noted above in HPI. The following systems were [x] reviewed / [] unable to be reviewed as noted in HPI Other findings are noted below. Systems: constitutional, ears/nose/mouth/throat, respiratory, gastrointestinal, genitourinary, musculoskeletal, integumentary, neurologic, psychiatric, endocrine. Positive findings noted below. Modified ESAS Completed by: provider Fatigue: 3 Drowsiness: 2 Depression: 0 Pain: 0 Anxiety: 0 Nausea: 0 Anorexia: 0 Dyspnea: 5 Constipation: No  
     
 
 
 PHYSICAL EXAM:  
 
From RN flowsheet: 
Wt Readings from Last 3 Encounters:  
11/19/18 132 lb (59.9 kg) 08/27/18 132 lb (59.9 kg) 08/15/18 132 lb (59.9 kg) Blood pressure (!) 185/97, pulse 63, temperature 97.8 °F (36.6 °C), resp. rate 18, height 5' 10\" (1.778 m), weight 132 lb (59.9 kg), SpO2 93 %. Pain Scale 1: Numeric (0 - 10) Pain Intensity 1: 0 Last bowel movement, if known:  
 
Constitutional: ill appearing, not alert, secretions noted Eyes: pupils equal, anicteric ENMT: no nasal discharge, moist mucous membranes Cardiovascular: regular rhythm, distal pulses intact Respiratory: breathing mildly labored, symmetric, secretions noted Gastrointestinal: soft non-tender, +bowel sounds Musculoskeletal: no deformity, no tenderness to palpation Skin: warm, dry Neurologic: not following commands, moving all extremities Psychiatric: flat affect, Other: 
 
 
 HISTORY:  
 
Active Problems: 
  Failure to thrive (0-17) (9/19/2015) HTN (hypertension) (9/19/2015) Dementia (9/20/2015) Dysphagia (9/21/2015) Debility (9/21/2015) Parkinson disease ([facility] [address] (2/25/2018) Type 2 diabetes mellitus without complication ([facility] (7/59/5966) Acute encephalopathy (11/18/2018) Anxiety and depression () Age-related physical debility () Malnutrition ([facility] [address] () Weight loss (11/18/2018) Past Medical History:  
Diagnosis Date  Age-related physical debility  Anxiety and depression  Compression fracture of L3 lumbar vertebra (HCC)  Debilitated patient  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  Diabetes ([facility] [address]  Dysphagia dementia related  Hypertension  Malnutrition ([facility]  Parkinson disease ([facility] Past Surgical History:  
Procedure Laterality Date  HX HEENT Family History Problem Relation Age of Onset  Stroke Father History reviewed, no pertinent family history. Social History Tobacco Use  Smoking status: Never Smoker  Smokeless tobacco: Never Used Substance Use Topics  Alcohol use: No  
 
Allergies Allergen Reactions  Seroquel [Quetiapine] Anaphylaxis  Haldol [Haloperidol Lactate] Other (comments) \"Comatose state\"  Lorazepam Other (comments) Alternated mental status to benzos per family,  Morphine Other (comments) Change in mental status Current Facility-Administered Medications Medication Dose Route Frequency  amoxicillin-clavulanate (AUGMENTIN) 875-125 mg per tablet 1 Tab  1 Tab Oral Q12H  
 lactobac ac& pc-s.therm-b.anim (STACY Q/RISAQUAD)  1 Cap Oral DAILY  amLODIPine (NORVASC) tablet 5 mg  5 mg Oral DAILY  pravastatin (PRAVACHOL) tablet 20 mg  20 mg Oral QHS  aspirin delayed-release tablet 81 mg  81 mg Oral DAILY  carbidopa-levodopa (SINEMET)  mg per tablet 2 Tab  2 Tab Oral 7am  
 carbidopa-levodopa (SINEMET)  mg per tablet 1 Tab  1 Tab Oral DAILY WITH DINNER  carbidopa-levodopa (SINEMET)  mg per tablet 1 Tab  1 Tab Oral QHS  donepezil (ARICEPT) tablet 10 mg  10 mg Oral QHS  memantine (NAMENDA) tablet 10 mg  10 mg Oral BID  timolol (TIMOPTIC) 0.5 % ophthalmic solution 1 Drop  1 Drop Both Eyes BID  acetaminophen (TYLENOL) tablet 650 mg  650 mg Oral Q4H PRN  
 ondansetron (ZOFRAN) injection 4 mg  4 mg IntraVENous Q4H PRN  
 diphenhydrAMINE (BENADRYL) capsule 25 mg  25 mg Oral Q4H PRN  
 enoxaparin (LOVENOX) injection 40 mg  40 mg SubCUTAneous Q24H  
 glucose chewable tablet 16 g  4 Tab Oral PRN  
 dextrose (D50W) injection syrg 12.5-25 g  25-50 mL IntraVENous PRN  
 glucagon (GLUCAGEN) injection 1 mg  1 mg IntraMUSCular PRN  
 insulin lispro (HUMALOG) injection   SubCUTAneous AC&HS  
 0.45% sodium chloride with KCl 20 mEq/L infusion   IntraVENous CONTINUOUS  
 
 
 
 LAB AND IMAGING FINDINGS:  
 
Lab Results Component Value Date/Time WBC 7.5 11/21/2018 02:32 AM  
 HGB 13.9 11/21/2018 02:32 AM  
 PLATELET 286 47/43/8987 02:32 AM  
 
Lab Results Component Value Date/Time Sodium 139 11/21/2018 02:32 AM  
 Potassium 4.0 11/21/2018 02:32 AM  
 Chloride 105 11/21/2018 02:32 AM  
 CO2 27 11/21/2018 02:32 AM  
 BUN 12 11/21/2018 02:32 AM  
 Creatinine 0.57 (L) 11/21/2018 02:32 AM  
 Calcium 8.5 11/21/2018 02:32 AM  
 Magnesium 1.7 11/20/2018 03:11 AM  
 Phosphorus 2.4 (L) 11/20/2018 03:11 AM  
  
Lab Results Component Value Date/Time AST (SGOT) 12 (L) 11/20/2018 03:11 AM  
 Alk. phosphatase 66 11/20/2018 03:11 AM  
 Protein, total 6.2 (L) 11/20/2018 03:11 AM  
 Albumin 2.9 (L) 11/20/2018 03:11 AM  
 Globulin 3.3 11/20/2018 03:11 AM  
 
Lab Results Component Value Date/Time INR 1.1 02/25/2018 08:12 PM  
 Prothrombin time 11.0 02/25/2018 08:12 PM  
 aPTT 26.3 10/30/2015 01:50 AM  
  
No results found for: IRON, FE, TIBC, IBCT, PSAT, FERR No results found for: PH, PCO2, PO2 No components found for: [place] No results found for: CPK, CKMB Total time: 70 
Counseling / coordination time, spent as noted above: 65 
> 50% counseling / coordination?: yes Prolonged service was provided for  []30 min   []75 min in face to face time in the presence of the patient, spent as noted above. Time Start:  
Time End:  
Note: this can only be billed with 35232 (initial) or 62946 (follow up). If multiple start / stop times, list each separately.

## 2018-11-21 NOTE — ROUTINE PROCESS
Bedside shift change report given to Brynn (oncoming nurse) by Marko Jones (offgoing nurse). Report included the following information SBAR.

## 2018-11-21 NOTE — PROGRESS NOTES
[facility] Medical Progress Note NAME: Genie Chan :  1940 MRM:  [id number] Date/Time: 2018 Assessment and Plan:  
 
 
Acute encephalopathy / Dementia / Anxiety and depression - AMS POA, unclear etiology. Possibly oversedation v. Early infection v. Advancing dementia. Appreciate neurology consult. MRI notes no acute stroke. Supplmenting B12. EEG negative for seizure. Continue ASA, memantine and donepezil. Aspiration pneumonia / Cough:  CT scan shows multilobar consolidation concerning for pneumonia. Suspect he may have aspiration pna based on imaging and speech findings of dysphagia. cont augmentin x 7 days. Speech following 
  
Parkinson disease / Dysphagia / Debility - POA, likely poor baseline and likely has little rehab potential.  Continue sinemet. Fall precautions. PT/OT eval. 
  
Malnutrition / Weight loss / Failure to thrive - POA, presumed due to age, dementia, parkinson's. Glucerna supplements. Goals of care and dispo planning 
  
HTN (hypertension): cont norvasc and lisinopril 
  
Type 2 diabetes mellitus without complication - Diabetic diet and counseling. SSI per protocol. Holding metformin while acutely ill. Check A1c. 
  
Hyperlipidemia - Continue pravastatin. 
  
Hypokalemia - Replete PO and in IVF 
  
Glaucoma - continue timolol. Subjective: Chief Complaint:  F/u confusion No acute events. Pt awake and conversant this morning. Denies cp, sob, abd pain. Objective:  
 
Last 24hrs VS reviewed since prior progress note. Most recent are: 
 
Visit Vitals BP (!) 185/97 (BP 1 Location: Left arm, BP Patient Position: At rest) Pulse 63 Temp 97.8 °F (36.6 °C) Resp 18 Ht 5' 10\" (1.778 m) Wt 59.9 kg (132 lb) SpO2 93% BMI 18.94 kg/m² SpO2 Readings from Last 6 Encounters:  
18 93% 18 97% 08/15/18 99% 18 96% 06/10/18 92% 18 96% Intake/Output Summary (Last 24 hours) at 11/21/2018 1351 Last data filed at 11/20/2018 1857 Gross per 24 hour Intake 365 ml Output  Net 365 ml Physical Exam: 
  
Gen:  Thin, frail, in no acute distress HEENT:  Pink conjunctivae, PERRL, hearing intact to voice, moist mucous membranes Neck:  Supple, without masses, thyroid non-tender Resp:  No accessory muscle use, clear breath sounds without wheezes rales or rhonchi 
Card:  No murmurs, normal S1, S2 without thrills, bruits or peripheral edema Abd:  Soft, non-tender, non-distended, normoactive bowel sounds are present, no mass Lymph:  No cervical or inguinal adenopathy Musc:  No cyanosis or clubbing Skin:  No rashes or ulcers, skin turgor is reduced Neuro:  no focal deficits, somewhat impulsive and has some difficulties following commands Psych:  Poor insight, oriented to person Telemetry reviewed:   normal sinus rhythm 
__________________________________________________________________ Medications Reviewed: (see below) Medications:  
 
Current Facility-Administered Medications Medication Dose Route Frequency  amoxicillin-clavulanate (AUGMENTIN) 875-125 mg per tablet 1 Tab  1 Tab Oral Q12H  
 lactobac ac& pc-s.therm-b.anim (STACY Q/RISAQUAD)  1 Cap Oral DAILY  amLODIPine (NORVASC) tablet 5 mg  5 mg Oral DAILY  pravastatin (PRAVACHOL) tablet 20 mg  20 mg Oral QHS  aspirin delayed-release tablet 81 mg  81 mg Oral DAILY  carbidopa-levodopa (SINEMET)  mg per tablet 2 Tab  2 Tab Oral 7am  
 carbidopa-levodopa (SINEMET)  mg per tablet 1 Tab  1 Tab Oral DAILY WITH DINNER  carbidopa-levodopa (SINEMET)  mg per tablet 1 Tab  1 Tab Oral QHS  donepezil (ARICEPT) tablet 10 mg  10 mg Oral QHS  memantine (NAMENDA) tablet 10 mg  10 mg Oral BID  timolol (TIMOPTIC) 0.5 % ophthalmic solution 1 Drop  1 Drop Both Eyes BID  acetaminophen (TYLENOL) tablet 650 mg  650 mg Oral Q4H PRN  
  ondansetron (ZOFRAN) injection 4 mg  4 mg IntraVENous Q4H PRN  
 diphenhydrAMINE (BENADRYL) capsule 25 mg  25 mg Oral Q4H PRN  
 enoxaparin (LOVENOX) injection 40 mg  40 mg SubCUTAneous Q24H  
 glucose chewable tablet 16 g  4 Tab Oral PRN  
 dextrose (D50W) injection syrg 12.5-25 g  25-50 mL IntraVENous PRN  
 glucagon (GLUCAGEN) injection 1 mg  1 mg IntraMUSCular PRN  
 insulin lispro (HUMALOG) injection   SubCUTAneous AC&HS  
 0.45% sodium chloride with KCl 20 mEq/L infusion   IntraVENous CONTINUOUS Lab Data Reviewed: (see below) Lab Review:  
 
Recent Labs  
  11/21/18 0232 11/20/18 0311 11/18/18 
1622 WBC 7.5 6.4 9.6 HGB 13.9 12.7 13.8 HCT 41.8 38.3 41.6  178 205 Recent Labs  
  11/21/18 0232 11/20/18 0311 11/19/18 0239 11/18/18 
1622  141 140 143 K 4.0 4.1 5.0 3.2*  
 104 104 104 CO2 27 31 30 33* * 85 83 93 BUN 12 10 14 17 CREA 0.57* 0.69* 0.64* 0.90  
CA 8.5 8.2* 7.9* 8.7 MG  --  1.7 1.6  --   
PHOS  --  2.4*  --   --   
ALB  --  2.9*  --  3.2* TBILI  --  1.0  --  1.4* SGOT  --  12*  --  13* ALT  --  <6*  --  8* Lab Results Component Value Date/Time Glucose (POC) 157 (H) 11/21/2018 12:13 PM  
 Glucose (POC) 99 11/21/2018 05:54 AM  
 Glucose (POC) 171 (H) 11/20/2018 09:25 PM  
 Glucose (POC) 156 (H) 11/20/2018 04:04 PM  
 Glucose (POC) 128 (H) 11/20/2018 11:17 AM  
 
No results for input(s): PH, PCO2, PO2, HCO3, FIO2 in the last 72 hours. No results for input(s): INR in the last 72 hours. No lab exists for component: INREXT, INREXT All Micro Results Procedure Component Value Units Date/Time CULTURE, BLOOD [[id number]] Collected:  11/18/18 1622 Order Status:  Completed Specimen:  Blood Updated:  11/21/18 0500 Special Requests: NO SPECIAL REQUESTS Culture result: NO GROWTH 3 DAYS     
 CULTURE, BLOOD [[id number]] Collected:  11/18/18 4472 Order Status:  Completed Specimen:  Blood Updated:  11/21/18 0505 Special Requests: NO SPECIAL REQUESTS Culture result: NO GROWTH 3 DAYS     
 CULTURE, URINE [[id number]] Collected:  11/18/18 1622 Order Status:  Completed Specimen:  Urine from Clean catch Updated:  11/20/18 0915 Special Requests: NO SPECIAL REQUESTS [place] Count --     
  <10,000 COLONIES/mL Culture result: NO SIGNIFICANT GROWTH URINE CULTURE HOLD SAMPLE [[id number]] Collected:  11/18/18 1622 Order Status:  Completed Specimen:  Urine from Serum Updated:  11/18/18 1628 Urine culture hold URINE ON HOLD IN MICROBIOLOGY DEPT FOR 3 DAYS. IF UNPRESERVED URINE IS SUBMITTED, IT CANNOT BE USED FOR ADDITIONAL TESTING AFTER 24 HRS, RECOLLECTION WILL BE REQUIRED. I have reviewed notes of prior 24hr. Other pertinent lab: None Total time spent with patient: 30 min Care Plan discussed with: Patient, Nursing Staff and Consultant/Specialist 
 
Discussed:  Care Plan Prophylaxis:  Lovenox Disposition:  SNF/LTC 
        
___________________________________________________ Attending Physician: Reji Tovar MD

## 2018-11-21 NOTE — PROGRESS NOTES
Bedside and Verbal shift change report given to DANIELE Maldonado   by Nilsa Auguste RN. Report included the following information SBAR, Jhoana and MAR.

## 2018-11-21 NOTE — PROGRESS NOTES
[place] [phone] [place][phone]) [facility]: [phone] ([phone]) Code Status: DNR Advance Care Planning: 
Advance Care Planning 11/18/2018 Patient's Healthcare Decision Maker is: -legal NOK Primary Decision Maker Name -Misa Watson [phone] Primary Decision Maker Relationship to Patient -Son/Daughter Confirm Advance Directive No MPOA or AMD (financial living will on file) Does the patient have other document types -DDNR Patient / Family Encounter Documentation Participants (names): Patient, Palliative Team (Dr. Isidoro Taylor) Narrative:  
 
Joel Ojeda is a 69 y.o. with a past history of dementia, parkinsons, debility, DM, dysphagia, who was admitted on 11/18/2018 from home with a diagnosis of pneumonia/respiratory distress. Patient was asleep in bed, roused to voices but was non verbal during this visit. 
  
Social (gleaned from chart): Patient lives at home with wife with 24-hour caregivers according to son. Wife also has dementia. Two sons Love Habermann in [place] and another one who is not involved) and daughter Tosin Arellano - [facility]). See Dr. Hoffman Nine note. Goals of Care / Plan: CM working on d/c to SNF Thank you for the opportunity to be involved in the care of Mr. Becky Rodriguez. Zana Limon, [facility] Palliative Medicine  [phone]

## 2018-11-21 NOTE — PROGRESS NOTES
Spiritual Care Assessment/Progress Note [address] 
 
 
NAME: Gaviota Heart      MRN: [id number] AGE: 69 y.o. SEX: male Anabaptism Affiliation: Mosque  
Language: English  
 
11/21/2018     Total Time (in minutes): 7 Spiritual Assessment begun in [facility] 5M1 MED SURG 1 through conversation with: 
  
    []Patient        [x] Family    [] Friend(s) Reason for Consult: Palliative Care, Initial/Spiritual Assessment Spiritual beliefs: (Please include comment if needed) 
   [] Identifies with a mayda tradition:     
   [] Supported by a mayda community:        
   [x] Claims no spiritual orientation:       
   [] Seeking spiritual identity:            
   [] Adheres to an individual form of spirituality:       
   [] Not able to assess:                   
 
    
Identified resources for coping:  
   [] Prayer                           
   [] Music                  [] Guided Imagery 
   [] Family/friends                 [] Pet visits [] Devotional reading                         [x] Unknown 
   [] Other:                                         
 
 
Interventions offered during this visit: (See comments for more details) Family/Friend(s): Initial Assessment, Affirmation of emotions/emotional suffering Plan of Care: 
 
 [] Support spiritual and/or cultural needs  
 [] Support AMD and/or advance care planning process    
 [] Support grieving process 
 [] Coordinate Rites and/or Rituals  
 [] Coordination with community clergy [] No spiritual needs identified at this time 
 [] Detailed Plan of Care below (See Comments)  [] Make referral to Music Therapy 
[] Make referral to Pet Therapy    
[] Make referral to Addiction services 
[] Make referral to [facility] 
[] Make referral to Spiritual Care Partner 
[] No future visits requested       
[x] Follow up visits as needed Comments: I spoke with Mr. Francois's son, Anahi Lutz, during this initial spiritual assessment. He was feeding his father lunch when I dropped by. He arrived from [place] today and expressed hope about his father's condition being treatable. He was appreciative of the support and I informed him of our ongoing availability if needed. Rev. Jimmy Rosa M.Div, [facility] Lead

## 2018-11-21 NOTE — PROGRESS NOTES
Occupational Therapy Note:  Chart reviewed and pt cleared for therapy by RN. Re-educated pt on role of OT and benefits of activity. Pt confused and declined stating \"leave me alone . . I am waiting for my wife to get here . . . I can't do anything. \"  Despite education and encouragement unable to engage pt to actively participate in OT. Will continue to follow on later date.  
Cricket Holland, OTR/L, CBIS

## 2018-11-21 NOTE — PROGRESS NOTES
11/21/2018   CARE MANAGEMENT NOTE:  CM continues to follow pt for discharge planning. EMR reviewed. [facility] referral was made and they will continue to follow pt's progress with PT/OT before making a definitive decision. If pt goes home, home health thru [address] [facility] agency is requested by family. Megha

## 2018-11-22 NOTE — PROGRESS NOTES
[facility] Medical Progress Note NAME: Bushra Gu :  1940 MRM:  [id number] Date/Time: 2018 Assessment and Plan:  
 
Acute hypoxic respiratory failure: acute worsening of oxygen saturations last night. Suspect due to recurrent aspiration events. Continue oxygen supplementation to maintain saturations >90 Aspiration pneumonia / Cough:  CT scan shows multilobar consolidation concerning for pneumonia. Suspect he may have aspiration pna based on imaging and speech findings of dysphagia. Likely recurrent aspiration event yesterday. Change abx to zosyn. Acute encephalopathy / Dementia / Anxiety and depression: AMS POA, unclear etiology. Possibly oversedation v. Early infection v. Advancing dementia. Appreciate neurology consult. MRI notes no acute stroke. Supplmenting B12. EEG negative for seizure. Continue ASA, memantine and donepezil. Parkinson disease / Dysphagia / Debility - POA, likely poor baseline and likely has little rehab potential.  Continue sinemet. Fall precautions. PT/OT eval. 
  
Malnutrition / Weight loss / Failure to thrive - POA, presumed due to age, dementia, parkinson's. Glucerna supplements. Goals of care and dispo planning 
  
HTN (hypertension): cont norvasc and lisinopril 
  
Type 2 diabetes mellitus without complication - Diabetic diet and counseling. SSI per protocol. Holding metformin while acutely ill. Check A1c. 
  
Hyperlipidemia - Continue pravastatin. 
  
Hypokalemia - Replete PO and in IVF 
  
Glaucoma - continue timolol. Subjective: Chief Complaint:  F/u confusion Overnight events noted; pt with fever and desaturation. Pt awake this morning but not as conversant as yesterday. Objective:  
 
Last 24hrs VS reviewed since prior progress note. Most recent are: 
 
Visit Vitals /77 (BP 1 Location: Right arm, BP Patient Position: At rest) Pulse 75 Temp 98.7 °F (37.1 °C) Resp 18 Ht 5' 10\" (1.778 m) Wt 59.9 kg (132 lb) SpO2 99% BMI 18.94 kg/m² SpO2 Readings from Last 6 Encounters:  
11/22/18 99% 08/27/18 97% 08/15/18 99% 07/01/18 96% 06/10/18 92% 04/11/18 96% O2 Flow Rate (L/min): 15 l/min Intake/Output Summary (Last 24 hours) at 11/22/2018 1004 Last data filed at 11/21/2018 1230 Gross per 24 hour Intake 10 ml Output  Net 10 ml Physical Exam: 
  
Gen:  Thin, frail, in no acute distress HEENT:  Pink conjunctivae, PERRL, hearing intact to voice, moist mucous membranes Neck:  Supple, without masses, thyroid non-tender Resp:  No accessory muscle use, rhonchi bilateral 
Card:  No murmurs, normal S1, S2 without thrills, bruits or peripheral edema Abd:  Soft, non-tender, non-distended, normoactive bowel sounds are present, no mass Lymph:  No cervical or inguinal adenopathy Musc:  No cyanosis or clubbing Skin:  No rashes or ulcers, skin turgor is reduced Neuro:  no focal deficits, somewhat impulsive and has some difficulties following commands Psych:  Poor insight, oriented to person Telemetry reviewed:   normal sinus rhythm 
__________________________________________________________________ Medications Reviewed: (see below) Medications:  
 
Current Facility-Administered Medications Medication Dose Route Frequency  guaiFENesin ER (MUCINEX) tablet 600 mg  600 mg Oral Q12H  piperacillin-tazobactam (ZOSYN) 3.375 g in 0.9% sodium chloride (MBP/ADV) 100 mL  3.375 g IntraVENous Q8H  
 lactobac ac& pc-s.therm-b.anim (STACY Q/RISAQUAD)  1 Cap Oral DAILY  amLODIPine (NORVASC) tablet 5 mg  5 mg Oral DAILY  pravastatin (PRAVACHOL) tablet 20 mg  20 mg Oral QHS  aspirin delayed-release tablet 81 mg  81 mg Oral DAILY  carbidopa-levodopa (SINEMET)  mg per tablet 2 Tab  2 Tab Oral 7am  
 carbidopa-levodopa (SINEMET)  mg per tablet 1 Tab  1 Tab Oral DAILY WITH DINNER  
  carbidopa-levodopa (SINEMET)  mg per tablet 1 Tab  1 Tab Oral QHS  donepezil (ARICEPT) tablet 10 mg  10 mg Oral QHS  memantine (NAMENDA) tablet 10 mg  10 mg Oral BID  timolol (TIMOPTIC) 0.5 % ophthalmic solution 1 Drop  1 Drop Both Eyes BID  acetaminophen (TYLENOL) tablet 650 mg  650 mg Oral Q4H PRN  
 ondansetron (ZOFRAN) injection 4 mg  4 mg IntraVENous Q4H PRN  
 diphenhydrAMINE (BENADRYL) capsule 25 mg  25 mg Oral Q4H PRN  
 enoxaparin (LOVENOX) injection 40 mg  40 mg SubCUTAneous Q24H  
 glucose chewable tablet 16 g  4 Tab Oral PRN  
 dextrose (D50W) injection syrg 12.5-25 g  25-50 mL IntraVENous PRN  
 glucagon (GLUCAGEN) injection 1 mg  1 mg IntraMUSCular PRN  
 insulin lispro (HUMALOG) injection   SubCUTAneous AC&HS  
 0.45% sodium chloride with KCl 20 mEq/L infusion   IntraVENous CONTINUOUS Lab Data Reviewed: (see below) Lab Review:  
 
Recent Labs  
  11/22/18 0459 11/21/18 0232 11/20/18 
9652 WBC 11.5* 7.5 6.4 HGB 12.9 13.9 12.7 HCT 38.9 41.8 38.3  198 178 Recent Labs  
  11/22/18 
0459 11/21/18 
0232 11/20/18 
2030  139 141  
K 4.2 4.0 4.1  105 104 CO2 28 27 31 * 117* 85 BUN 13 12 10 CREA 0.74 0.57* 0.69* CA 8.5 8.5 8.2* MG  --   --  1.7 PHOS  --   --  2.4* ALB 2.9*  --  2.9* TBILI 1.0  --  1.0 SGOT 18  --  12* ALT 7*  --  <6* Lab Results Component Value Date/Time Glucose (POC) 104 (H) 11/22/2018 06:53 AM  
 Glucose (POC) 133 (H) 11/21/2018 09:16 PM  
 Glucose (POC) 138 (H) 11/21/2018 04:37 PM  
 Glucose (POC) 157 (H) 11/21/2018 12:13 PM  
 Glucose (POC) 99 11/21/2018 05:54 AM  
 
No results for input(s): PH, PCO2, PO2, HCO3, FIO2 in the last 72 hours. No results for input(s): INR in the last 72 hours. No lab exists for component: INREXT, INREXT All Micro Results Procedure Component Value Units Date/Time CULTURE, BLOOD [[id number]] Collected:  11/18/18 5322 Order Status:  Completed Specimen:  Blood Updated:  11/22/18 0507 Special Requests: NO SPECIAL REQUESTS Culture result: NO GROWTH 4 DAYS     
 CULTURE, BLOOD [[id number]] Collected:  11/18/18 1622 Order Status:  Completed Specimen:  Blood Updated:  11/22/18 0507 Special Requests: NO SPECIAL REQUESTS Culture result: NO GROWTH 4 DAYS     
 CULTURE, URINE [[id number]] Collected:  11/18/18 1622 Order Status:  Completed Specimen:  Urine from Clean catch Updated:  11/20/18 0915 Special Requests: NO SPECIAL REQUESTS [place] Count --     
  <10,000 COLONIES/mL Culture result: NO SIGNIFICANT GROWTH URINE CULTURE HOLD SAMPLE [[id number]] Collected:  11/18/18 1622 Order Status:  Completed Specimen:  Urine from Serum Updated:  11/18/18 1628 Urine culture hold URINE ON HOLD IN MICROBIOLOGY DEPT FOR 3 DAYS. IF UNPRESERVED URINE IS SUBMITTED, IT CANNOT BE USED FOR ADDITIONAL TESTING AFTER 24 HRS, RECOLLECTION WILL BE REQUIRED. I have reviewed notes of prior 24hr. Other pertinent lab: None Total time spent with patient: 36 min Care Plan discussed with: Patient and Nursing Staff Discussed:  Care Plan Prophylaxis:  Lovenox Disposition:  SNF/LTC 
        
___________________________________________________ Attending Physician: Tilman Dakin, MD

## 2018-11-22 NOTE — PROGRESS NOTES
RRT called for decreased BP, elevated fever, and decreased O2 stat. patient BP began at 82/56. Temp at 102.4, and stats staying in low 80's. On call MD notified. Orders placed for NS bolus and labs. patient placed on O2 via NRB. patient is DNR.  No transfer nor change in care made at this time per MD, will continue to monitor patient and wean to NC.

## 2018-11-22 NOTE — PROGRESS NOTES
I went to bedside after RRT called for hypotension. Patient with /58. Pulse 80. Sat 93% on NRB. Lungs coarse. I see in chart his hx of end stage dementia and aspiration. I see discussions in Palliative note about comfort and hospice. I note patient is DNR. At this point I will give a 500cc bolus, check labs, but make no other change in plan nor transfer.

## 2018-11-22 NOTE — PROGRESS NOTES
TRANSFER - IN REPORT: 
 
Verbal report received from [facility]. Jose G(name) on Jered Rubin  being received from 5th floor(unit) for change in patient condition(increased oxygen needs) Report consisted of patients Situation, Background, Assessment and  
Recommendations(SBAR). Information from the following report(s) SBAR was reviewed with the receiving nurse. Opportunity for questions and clarification was provided. Assessment completed upon patients arrival to unit and care assumed. Dual skin comleted with Tamara Lombardi. Charge nurse. Stage II area on sacrum. Faom dressing applied and wound care consulted. MD notified Patient 100% on non rebreather. Attempted to wean patient to Nasal cannula unsuccessfully. Patient back on non rebreather at 15 L. MD notified of patient condition. No new orders. Patient deep suctioned > 200 mL out. Family in with concerns related to patient decline requesting to speak to physician. Dr. Moy Arriaga spoke to family. Patient noted to have increased secretions. Respiratory at bedside to deep suction. Patient switched from non rebreather to Nasal cannula at 7 Liters with humidification. [address] Bedside and Verbal shift change report given to ovi SANABRIA (oncoming nurse) by hosea Gutiérrez RN (offgoing nurse). Report included the following information SBAR, Kardex, MAR, Recent Results and Cardiac Rhythm NSR.

## 2018-11-22 NOTE — PROGRESS NOTES
Patient very lethargic this morning and required non-re breather early this morning. Due to patients current state, RN felt uncomfortable with administering patient PO medications and held them. 11:30 Dr. Phillip Painter aware of current patient status, has discontinued diet and awaiting speech intervention for concerns on aspiration. MD said to hold all PO medications for now 12:30 Tech notified RN about patient 02 saturation being 85 despite non-re breather mask and being on 15L. Dr. Phillip Painter called and recommended transferring patient to step down for a higher level of care as well as getting the respiratory therapist to come and see the patient. Transfer orders were placed and respiratory came to deep suction the patient in order to help clear secretions that could have been contributing to patients  Low 02 saturations. 02 came back up to 90 as patient was placed on step down unit. 13:15 
TRANSFER - OUT REPORT: 
 
Verbal report given to Tracey Troy RN(name) on Sabrina Valle  being transferred to Step down(unit) for change in patient condition(02 saturations) Report consisted of patients Situation, Background, Assessment and  
Recommendations(SBAR). Information from the following report(s) SBAR, Kardex, Intake/Output, MAR and Recent Results was reviewed with the receiving nurse. Lines:  
Peripheral IV 11/20/18 Anterior; Inferior; Lower;Right Arm (Active) Site Assessment Clean, dry, & intact 11/22/2018  8:30 AM  
Phlebitis Assessment 0 11/22/2018  8:30 AM  
Infiltration Assessment 0 11/22/2018  8:30 AM  
Dressing Status Clean, dry, & intact 11/22/2018  8:30 AM  
Dressing Type Tape;Transparent 11/22/2018  8:30 AM  
Hub Color/Line Status Blue; Infusing 11/22/2018  8:30 AM  
Action Taken Open ports on tubing capped 11/22/2018  8:30 AM  
Alcohol Cap Used Yes 11/22/2018  8:30 AM  
   
Peripheral IV 11/22/18 Anterior; Inferior;Left;Lower Arm (Active) Site Assessment Clean, dry, & intact 11/22/2018  8:30 AM  
Phlebitis Assessment 0 11/22/2018  8:30 AM  
Infiltration Assessment 0 11/22/2018  8:30 AM  
Dressing Status Clean, dry, & intact 11/22/2018  8:30 AM  
Dressing Type Transparent 11/22/2018  8:30 AM  
Hub Color/Line Status Pink;Capped 11/22/2018  8:30 AM  
Action Taken Open ports on tubing capped 11/22/2018  8:30 AM  
Alcohol Cap Used Yes 11/22/2018  8:30 AM  
  
 
Opportunity for questions and clarification was provided. Patient transported with: 
 O2 @ 15 liters

## 2018-11-22 NOTE — PROGRESS NOTES
Advance Care Planning Note Name: Nikki Dasilva YOB: 1940 MRN: [id number] Admission Date: 11/18/2018  3:35 PM 
 
Date of discussion: 11/22/2018 Active Diagnoses: 
 
Hospital Problems  Date Reviewed: 11/18/2018 Codes Class Noted POA Acute encephalopathy ICD-10-CM: G93.40 ICD-9-CM: 348.30  11/18/2018 Yes Anxiety and depression ICD-10-CM: F41.9, F32.9 ICD-9-CM: 300.00, 311  Unknown Yes Age-related physical debility ICD-10-CM: R54 
ICD-9-CM: 029  Unknown Yes Malnutrition ([facility] ICD-10-CM: E46 
ICD-9-CM: 263.9  Unknown Yes Weight loss ICD-10-CM: R63.4 ICD-9-CM: 783.21  11/18/2018 Yes Type 2 diabetes mellitus without complication (HCC) (Chronic) ICD-10-CM: E11.9 ICD-9-CM: 250.00  2/26/2018 Yes Parkinson disease ([facility] (Chronic) ICD-10-CM: G20 
ICD-9-CM: 332.0  2/25/2018 Yes Dysphagia ICD-10-CM: R13.10 ICD-9-CM: 787.20  9/21/2015 Yes Debility ICD-10-CM: R53.81 ICD-9-CM: 799.3  9/21/2015 Yes Dementia (Chronic) ICD-10-CM: F03.90 ICD-9-CM: 294.20  9/20/2015 Yes Failure to thrive (0-17) (Chronic) ICD-10-CM: R62.51 
ICD-9-CM: 783.41  9/19/2015 Yes HTN (hypertension) (Chronic) ICD-10-CM: I10 
ICD-9-CM: 401.9  9/19/2015 Yes These active diagnoses are of sufficient risk that focused discussion on advance care planning is indicated in order to allow the patient to thoughtfully consider personal goals of care, and if situations arise that prevent the ability to personally give input, to ensure appropriate representation of their personal desires for different levels and aggressiveness of care. Discussion:  
 
Persons present and participating in discussion: Armin Gamble MD, daughter Ned Haas and son Rochelle Coello Discussion:  
Notified pt daughter and son about events overnight including fever and worsened hypoxia. Discussed suspected recurrent aspiration leading to pneumonia. Also discussed the findings of speech therapy including likely continued silent aspiration and overt aspiration events. At this juncture pt would be hospice and comfort care appropriate. Daughter and son do not feel ready for this transition Time Spent:  
 
Total time spent face-to-face in education and discussion: 16 minutes.   
 
Ajit Ruelas MD 
11/22/2018 
5:04 PM

## 2018-11-22 NOTE — PROGRESS NOTES
Bedside and Verbal shift change report given to Juan C Eagle RN  by Oni Kulkarni RN. Report included the following information SBAR, Kardex and MAR.

## 2018-11-23 NOTE — PROGRESS NOTES
Problem: Dysphagia (Adult) Goal: *Acute Goals and Plan of Care (Insert Text) Swallowing goals initiated 11-19-18: (weekly re-eval 11-26-18) 1) tolerate dysphagia 1, thins without oral holding or s/s aspiration by 11-23-18 -discontinued 2) work with family on educatino about dysphagia ? MBS by 62-74-57 Speech language pathology dysphagia treatment Patient: Mj Sanchez (78 y.o. male) Date: 11/23/2018 Diagnosis: Acute encephalopathy <principal problem not specified> Precautions: aspiration ASSESSMENT: 
Patient worsened in the last 2 days with more aspiration. He was transferred to ICU after hospice was discussed and family refused. He is still  DNR. This am, he is not managing his secretions, resisting oral care and oral sx. Oral sx resulted in max production of serous secretions. Could still palpate max secretions in pharynx. NOT safe for PO or PO meds. Will need continued discussion between MD and family about severity of dysphagia and feeding for comfort care. ON Wednesday, a MBS was offered to MD to help educate family about severity of dysphagia. At that time, it was declined and palliative care consulted instead. Today, he is not appropriate for MBS, as he is obviously struggling with secretions and agitated, not cooperating even with oral care. Progression toward goals: 
[]         Improving appropriately and progressing toward goals 
[]         Improving slowly and progressing toward goals [x]         Not making progress toward goals and plan of care will be adjusted PLAN: 
Recommendations and Planned Interventions: 
NPO, including meds until goals of care determined or secretion management improves. Patient continues to benefit from skilled intervention to address the above impairments. Continue treatment per established plan of care. Discharge Recommendations:  hospice SUBJECTIVE:  
Patient stated Mama.-when SLP completed oral care OBJECTIVE:  
 Cognitive and Communication Status: 
Neurologic State: Alert, Confused Orientation Level: Unable to verbalize Cognition: Decreased attention/concentration, No command following Perception: Appears intact Safety/Judgement: Lack of insight into deficits Dysphagia Treatment: 
Oral Assessment: P.O. Trials: 
  
Vocal quality prior to P.O.:   
 wet 
 could palpate secretions in pharynx as well Weak and multiple swallows noted on secretions. Resisted oral care  And oral suction by clenching lips. At this point, his dementia is making him orally defensive. Prior MBS results from [facility], [facility]. 2018 SPEECH PATHOLOGY MODIFIED BARIUM SWALLOW STUDY Patient: Keo Singh (89 y.o. male) Date: 2/27/2018 Primary Diagnosis: CVA (cerebral vascular accident) ([facility] CVA (cerebral vascular accident) ([facility] Precautions: aspiration  Fall 
  
ASSESSMENT : 
Based on the objective data described below, the patient presents with mild-moderate oral-pharyngeal dysphagia. He has oral-pharyngeal residue after the swallow, of which he was completely unaware. Sometimes, the lower pharyngeal residue entered the airway. He has a reduced reaction to this: sometimes throat clearing, sometimes silent aspiration. There is not any 1 consistency that is safer than another and thickening liquids would only add to the residue. Compensatory strategies may reduce aspiration risk, but he will need max cues. . 
Moderate aspiration and nutrition risks noted, especially based on his intermittant bouts of poor LOC. Patient will benefit from skilled intervention to address the above impairments. Patients rehabilitation potential is considered to be Fair Factors which may influence rehabilitation potential include:  
[]              None noted [x]              Mental ability/status [x]              Medical condition []              Home/family situation and support systems []              Safety awareness []              Pain tolerance/management 
[]              Other:   
  
PLAN : 
Recommendations and Planned Interventions: Dysphagia 1, thins. Pills will probably be very problematic due to cognitive issues. Continue to try. Crush if able: check with pharmacy. Feed only when awake and alert. Alternate drinks and foods. SLP will follow for diet upgrade. Frequency/Duration: Patient will be followed by speech-language pathology 3 times a week to address goals. Discharge Recommendations: Skilled Nursing Facility  
  
SUBJECTIVE:  
Patient alert and cooperative in eval, but not opening mouth much. 
  
OBJECTIVE:  
  
    
Past Medical History:  
Diagnosis Date  Age-related physical debility    
 Compression fracture of L3 lumbar vertebra (HCC)    
 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    
 Diabetes ([facility]    
 Dysphagia dementia related  Fall at home    
 Hypertension    
 Stool color black    
  
     
Past Surgical History:  
Procedure Laterality Date  HX HEENT      
  
Prior Level of Function/Home Situation:  
Home Situation Home Environment: Private residence # Steps to Enter: 4 One/Two Story Residence: One story Living Alone: No 
Support Systems: Family member(s) Patient Expects to be Discharged to[de-identified] Private residence Current DME Used/Available at Home: Cane, straight Diet prior to admission: soft, thins Current Diet:  NPO Radiologist: Filiberto Lujan Film Views: Lateral 
Patient Position: upright in hausted chair 
  
Trial 1: Trial 2:  
Consistency Presented: Thin liquid;Puree; Solid;Pill/Tablet How Presented: SLP-fed/presented;Spoon;Straw;Successive swallows ORAL PHASE:  
Unable to propel pill a-p. Had to expectorate Bolus Acceptance:  (reduced oral opening) Bolus Formation/Control: Impaired (reduced chew, premature spillage into pharynx): Mastication; Anterior;Posterior;Piecemeal (piecemeal with liquids)  :    
Propulsion: Delayed (# of seconds) Oral Residue: Lingual;Palatal (coating; which spilled to pharynx after the swallow; poor awareness of residue.) PHARYNGEAL PHASE:    
Initiation of Swallow:  (triggered at valleculae for purees, solids. Triggered at [facility] for thins. 3-5 second delay) Timing: Pooling 1-5 sec Penetration: Trace; After swallow;From residual (from pyriforms usually. occasioally from valleculae) Aspiration/Timing: Trace;Repeated; After;From residual; He attempted throat clear reaction, but sometimes aspiration was silent. Pharyngeal Clearance: Pyriform residue  (worse with thicker consistencies; poor sensation of oral and pharyngeal residue) Attempted Modifications: Alternate liquids/solids Effective Modifications: Alternate liquids/solids   
   
   
  
Trial 3: Trial 4:  
   
   
   
   
 :    :    
   
   
    
   
   
   
   
   
   
   
   
  
Decreased Tongue Base Retraction?: Yes Laryngeal Elevation: Reduced excursion with laryngeal vestibule gap (mild) Aspiration/Penetration Score: 8 (Aspiration-Contrast passes cords/glottis with no effort to eject, ie/silent aspiration) (from pyriform residue) Pharyngeal Symmetry: Not assessed Pharyngeal-Esophageal Segment: No impairment 
  
NOMS:  
The NOMS functional outcome measure was used to quantify this patient's level of swallowing impairment. Based on the NOMS, the patient was determined to be at level 4 for swallow function  
  
G Codes: In compliance with CMSs Claims Based Outcome Reporting, the following G-code set was chosen for this patient based the use of the NOMS functional outcome to quantify this patient's level of swallowing impairment. 
  
Using the NOMS, the patient was determined to be at level 4 for swallow function which correlates with the CK= 40-59% level of severity. 
  
Based on the objective assessment provided within this note, the current, goal, and discharge g-codes are as follows: 
  
Swallow  Swallowing:  Swallow Current Status CK= 40-59%  Swallow Goal Status CJ= 20-39% 
  
  
NOMS Swallowing Levels: 
Level 1 (CN): NPO Level 2 (CM): NPO but takes consistency in therapy Level 3 (CL): Takes less than 50% of nutrition p.o. and continues with nonoral feedings; and/or safe with mod cues; and/or max diet restriction Level 4 (CK): Safe swallow but needs mod cues; and/or mod diet restriction; and/or still requires some nonoral feeding/supplements Level 5 (CJ): Safe swallow with min diet restriction; and/or needs min cues Level 6 (CI): Independent with p.o.; rare cues; usually self cues; may need to avoid some foods or needs extra time Level 7 (CH): Independent for all p.o. JOYCE. (2003). National Outcomes Measurement System (NOMS): Adult Speech-Language Pathology User's Guide.  
  
  
COMMUNICATION/EDUCATION:  
Patient was educated regarding His deficit(s) of dysphagia  as this relates to His diagnosis of dysphagia, AMS. He demonstrated Guarded understanding as evidenced by dementia. 
  
The patients plan of care including findings from MBS, recommendations, planned interventions, and recommended diet changes were discussed with: none. []  Posted safety precautions in patient's room. []  Patient/family have participated as able in goal setting and plan of care. []  Patient/family agree to work toward stated goals and plan of care. []  Patient understands intent and goals of therapy, but is neutral about his/her participation. []  Patient is unable to participate in goal setting and plan of care. 
  
Thank you for this referral. 
MADDY Ashraf Time Calculation: 15 mins   
  
  
  
  
   
  
Electronically signed by MADDY Kaur at 02/27/18 3945 BASED ON THESE MBS results, suspect he has continued if not worse, moderate oral-pharyngeal residue without sensation. This lead to aspiration both during and after the swallow.  He was a silent aspirator on this MBS, so suspect this is present as well. Exercises: 
Laryngeal Exercises: 
  
  
  
  
  
  
  
  
  
  
  
  
  
  
  
  
  
  
  
  
  
  
  
  
  
  
  
  
  
  
  
  
  
  
  
  
  
  
  
  
  
  
  
Pain: 
Pain Scale 1: Adult Nonverbal Pain Scale Pain Intensity 1: 0 After treatment:  
[]              Patient left in no apparent distress sitting up in chair 
[]              Patient left in no apparent distress in bed 
[]              Call bell left within reach 
[]              Nursing notified 
[]              Caregiver present 
[]              Bed alarm activated COMMUNICATION/EDUCATION:  
Patient was educated regarding His deficit(s) of dysphagia  as this relates to His diagnosis of dementia. He demonstrated Guarded understanding as evidenced by confusion, dementia. Family not present. Gayathri Green The patients plan of care including recommendations, planned interventions, and recommended diet changes were discussed with: Registered Nurse. []              Posted safety precautions in patient's room. MADDY Redmond Time Calculation: 10 mins

## 2018-11-23 NOTE — PROGRESS NOTES
Palliative Medicine Consult Patient Name: Elza Ball YOB: 1940 Date of Initial Consult: 11/21/18 Reason for Consult: care decisions/end stage disease Requesting Provider: Dr. Terry Lewis Primary Care Physician: Marixa Shepherd MD 
  
 SUMMARY:  
Elza Ball is a 69 y.o. with a past history of dementia, parkinsons, debility, DM, dysphagia, who was admitted on 11/18/2018 from home with a diagnosis of pneumonia/respiratory distress. Current medical issues leading to Palliative Medicine involvement include: end stage disease. Chart reviewed/HPI-patient admitted with pneumonia, concerning for aspiration. Patient with parkinsons and dementia. He lives at home with his wife who has underlying dementia but they have 24 hour caregivers in the home. Patient has been declining related to his disease process but according to his son, so far this has worked well in the home setting SH-. Daughter lives locally and 2 sons- one lives in [place](just arrived) and 1 in [place](not involved), He needs assistance with most other ADLs PALLIATIVE DIAGNOSES:  
1. GOC discussion 2. Debility 3. Dementia 4. Aspiration 5. Parkinsons 6. FTT 7. Secretions PLAN:  
[facility] met with patient briefly but mainly talked with patient's daughter outside the room. Patient with rapid response yesterday secondary to increased SOB/likely aspiration. He was on NRB for a while but been able to be weaned back to nasal cannula. He is resting now. Dr. Terry Lewis had talked with family about possible transition to hospice but they did not seem ready for that change. Marielle Mcmullenelle seems to understand that the next couple days will be crucial on how he responds to current tx. Marielle Jackson clear that she does not want her Dad to suffer if further decline 2. GOC-at this time, family wants to continue to attempt full restorative measures with abx.  They seem potentially open to the idea of comfort if this does not go well or worsens. Patient had been on Hospice care for a short time in 2015 but they stopped because wanted ongoing tx as far as hospitalizations. Analilia Bene clear on not wanting him to suffer but once again, they are not seeming to want to change course at this time. If he were to decline over the weekend and seems inpatient hospice appropriate, please feel free to call our hospice inpatient team([phone]) 3. AMD-never completed-paperwork in the chart is financial. His wife has dementia so technically, all 3 children would have equal say on medical decisions 4. Code status-DDNR signed by his wife in 2015. His wife would be the only one that could revoke the [facility] but given her dementia, that is not possible. DNAR already selected in EMR and we will continue to honor 5. Symptom management-patient is resting right now. No medications needed for symptoms at this time but if declines with SOB, would consider adding low dose opioid. 6. Psychosocial-definite rift between son and daughter so will need to remind both of them that our goal is to support the patient even if they are not talking. They did talk yesterday with his decline and hopefully can remain cordial during this difficult time. 7. Discussed with bedside nurse and Dr. Dandre Sheppard 8. Initial consult note routed to primary continuity provider 9. Communicated plan of care with: Palliative IDT 
 
 
 GOALS OF CARE / TREATMENT PREFERENCES:  
[====Goals of Care====] GOALS OF CARE: 
Patient/Health Care Proxy Stated Goals: Other (comment)(continue attempts at full restorative measures) TREATMENT PREFERENCES:  
Code Status: DNR Advance Care Planning: 
Advance Care Planning 11/18/2018 Patient's Healthcare Decision Maker is: -  
Primary Decision Maker Name -  
Primary Decision Maker Relationship to Patient - Secondary Decision Maker Name - Secondary Decision Maker Relationship to Patient -  
Confirm Advance Directive Yes, on file Does the patient have other document types - Medical Interventions: Limited additional interventions Other Instructions: The palliative care team has discussed with patient / health care proxy about goals of care / treatment preferences for patient. 
[====Goals of Care====] HISTORY:  
 
History obtained from: chart, son, daughter, bedside nurse CHIEF COMPLAINT: confusion HPI/SUBJECTIVE: The patient is:  
[x] Verbal and participatory [] Non-participatory due to:  
Patient talks very little. Audible secretions 11/23-patient is resting. Breathing not labored at this time Clinical Pain Assessment (nonverbal scale for severity on nonverbal patients):  
Clinical Pain Assessment Severity: 0 Adult Nonverbal Pain Scale Face: No particular expression or smile Activity (Movement): Lying quietly, normal position Guarding: Lying quietly, no positioning of hands over areas of body Physiology (Vital Signs): Stable vital signs Respiratory: Baseline RR/SpO2 compliant with ventilator Total Score: 0 
 
 
 FUNCTIONAL ASSESSMENT:  
 
Palliative Performance Scale (PPS): PPS: 40 PSYCHOSOCIAL/SPIRITUAL SCREENING:  
 
Advance Care Planning: 
Advance Care Planning 11/18/2018 Patient's Healthcare Decision Maker is: -  
Primary Decision Maker Name -  
Primary Decision Maker Relationship to Patient - Secondary Decision Maker Name - Secondary Decision Maker Relationship to Patient -  
Confirm Advance Directive Yes, on file Does the patient have other document types - Any spiritual / Gnosticism concerns: 
[] Yes /  [x] No 
 
Caregiver Burnout: 
[] Yes /  [x] No /  [] No Caregiver Present Anticipatory grief assessment:  
[x] Normal  / [] Maladaptive ESAS Anxiety: Anxiety: 0 
 
ESAS Depression: Depression: 0 REVIEW OF SYSTEMS:  
 
Positive and pertinent negative findings in ROS are noted above in HPI. The following systems were [x] reviewed / [] unable to be reviewed as noted in HPI Other findings are noted below. Systems: constitutional, ears/nose/mouth/throat, respiratory, gastrointestinal, genitourinary, musculoskeletal, integumentary, neurologic, psychiatric, endocrine. Positive findings noted below. Modified ESAS Completed by: provider Fatigue: 3 Drowsiness: 2 Depression: 0 Pain: 0 Anxiety: 0 Nausea: 0 Anorexia: 0 Dyspnea: 5 Constipation: No  
  Stool Occurrence(s): 1 PHYSICAL EXAM:  
 
From RN flowsheet: 
Wt Readings from Last 3 Encounters:  
11/19/18 132 lb (59.9 kg) 08/27/18 132 lb (59.9 kg) 08/15/18 132 lb (59.9 kg) Blood pressure 127/75, pulse 64, temperature 97.9 °F (36.6 °C), resp. rate 15, height 5' 10\" (1.778 m), weight 132 lb (59.9 kg), SpO2 100 %. Pain Scale 1: Adult Nonverbal Pain Scale Pain Intensity 1: 0 Last bowel movement, if known:  
 
Constitutional: ill appearing, not alert, resting Eyes: pupils equal, anicteric ENMT: no nasal discharge, moist mucous membranes Cardiovascular: regular rhythm, distal pulses intact Respiratory: breathing mildly labored, symmetric, secretions noted Gastrointestinal: soft non-tender, +bowel sounds Musculoskeletal: no deformity, no tenderness to palpation Skin: warm, dry Neurologic: not following commands, moving all extremities Psychiatric: sleeping Other: 
 
 
 HISTORY:  
 
Active Problems: 
  Failure to thrive (0-17) (9/19/2015) HTN (hypertension) (9/19/2015) Dementia (9/20/2015) Dysphagia (9/21/2015) Debility (9/21/2015) Parkinson disease ([facility] [address] (2/25/2018) Type 2 diabetes mellitus without complication ([address] (4/76/0295) Acute encephalopathy (11/18/2018) Anxiety and depression () Age-related physical debility () Malnutrition ([address] () Weight loss (11/18/2018) Past Medical History:  
Diagnosis Date  Age-related physical debility  Anxiety and depression  Compression fracture of L3 lumbar vertebra (HCC)  Debilitated patient  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  Diabetes ([facility] [address]  Dysphagia dementia related  Hypertension  Malnutrition ([facility] [address]  Parkinson disease ([facility] [address] Past Surgical History:  
Procedure Laterality Date  HX HEENT Family History Problem Relation Age of Onset  Stroke Father History reviewed, no pertinent family history. Social History Tobacco Use  Smoking status: Never Smoker  Smokeless tobacco: Never Used Substance Use Topics  Alcohol use: No  
 
Allergies Allergen Reactions  Seroquel [Quetiapine] Anaphylaxis  Haldol [Haloperidol Lactate] Other (comments) \"Comatose state\"  Lorazepam Other (comments) Alternated mental status to benzos per family,  Morphine Other (comments) Change in mental status Current Facility-Administered Medications Medication Dose Route Frequency  guaiFENesin ER (MUCINEX) tablet 600 mg  600 mg Oral Q12H  piperacillin-tazobactam (ZOSYN) 3.375 g in 0.9% sodium chloride (MBP/ADV) 100 mL  3.375 g IntraVENous Q8H  
 carbidopa-levodopa (SINEMET)  mg per tablet 2 Tab  2 Tab Oral QHS  dextrose 5 % - 0.45% NaCl infusion  50 mL/hr IntraVENous CONTINUOUS  
 albuterol-ipratropium (DUO-NEB) 2.5 MG-0.5 MG/3 ML  3 mL Nebulization Q4H RT  
 acetaminophen (TYLENOL) suppository 650 mg  650 mg Rectal Q4H PRN  
 lactobac ac& pc-s.therm-b.anim (STACY Q/RISAQUAD)  1 Cap Oral DAILY  amLODIPine (NORVASC) tablet 5 mg  5 mg Oral DAILY  pravastatin (PRAVACHOL) tablet 20 mg  20 mg Oral QHS  aspirin delayed-release tablet 81 mg  81 mg Oral DAILY  carbidopa-levodopa (SINEMET)  mg per tablet 2 Tab  2 Tab Oral 7am  
 carbidopa-levodopa (SINEMET)  mg per tablet 1 Tab  1 Tab Oral DAILY WITH DINNER  
 donepezil (ARICEPT) tablet 10 mg  10 mg Oral QHS  memantine (NAMENDA) tablet 10 mg  10 mg Oral BID  timolol (TIMOPTIC) 0.5 % ophthalmic solution 1 Drop  1 Drop Both Eyes BID  ondansetron (ZOFRAN) injection 4 mg  4 mg IntraVENous Q4H PRN  
 diphenhydrAMINE (BENADRYL) capsule 25 mg  25 mg Oral Q4H PRN  
 enoxaparin (LOVENOX) injection 40 mg  40 mg SubCUTAneous Q24H  
 glucose chewable tablet 16 g  4 Tab Oral PRN  
 dextrose (D50W) injection syrg 12.5-25 g  25-50 mL IntraVENous PRN  
 glucagon (GLUCAGEN) injection 1 mg  1 mg IntraMUSCular PRN  
 insulin lispro (HUMALOG) injection   SubCUTAneous AC&HS  
 
 
 
 LAB AND IMAGING FINDINGS:  
 
Lab Results Component Value Date/Time WBC 12.6 (H) 11/23/2018 03:11 AM  
 HGB 13.8 11/23/2018 03:11 AM  
 PLATELET 593 58/07/0874 03:11 AM  
 
Lab Results Component Value Date/Time Sodium 140 11/23/2018 03:11 AM  
 Potassium 4.5 11/23/2018 03:11 AM  
 Chloride 104 11/23/2018 03:11 AM  
 CO2 28 11/23/2018 03:11 AM  
 BUN 15 11/23/2018 03:11 AM  
 Creatinine 0.95 11/23/2018 03:11 AM  
 Calcium 8.6 11/23/2018 03:11 AM  
 Magnesium 1.7 11/20/2018 03:11 AM  
 Phosphorus 2.4 (L) 11/20/2018 03:11 AM  
  
Lab Results Component Value Date/Time AST (SGOT) 18 11/22/2018 04:59 AM  
 Alk. phosphatase 73 11/22/2018 04:59 AM  
 Protein, total 6.3 (L) 11/22/2018 04:59 AM  
 Albumin 2.9 (L) 11/22/2018 04:59 AM  
 Globulin 3.4 11/22/2018 04:59 AM  
 
Lab Results Component Value Date/Time INR 1.1 02/25/2018 08:12 PM  
 Prothrombin time 11.0 02/25/2018 08:12 PM  
 aPTT 26.3 10/30/2015 01:50 AM  
  
No results found for: IRON, FE, TIBC, IBCT, PSAT, FERR No results found for: PH, PCO2, PO2 No components found for: [place] No results found for: CPK, CKMB Total time: 35 
Counseling / coordination time, spent as noted above: 30 
> 50% counseling / coordination?: yes Prolonged service was provided for  []30 min   []75 min in face to face time in the presence of the patient, spent as noted above. Time Start:  
Time End:  
Note: this can only be billed with 56271 (initial) or 56725 (follow up). If multiple start / stop times, list each separately.

## 2018-11-23 NOTE — PROGRESS NOTES
NUTRITION 
 
RECOMMENDATIONS:  
 
1. Diet texture per SLP, recommend minimal nutritional restrictions given recent poor PO intake. 2. Ensure Enlive TID with meal, if able to have thin liquids. 3. Obtain current weight, last weight on file from August.  
 
ASSESSMENT:  
11/23: rapid response call for hypotension yesterday and acute respiratory failure, suspicions for recurrent aspiration events. Diet cancelled yesterday morning until speech therapy can re-evaluate. Per night nurse documentation, patient non-verbal and not tracking with eyes. Stage II pressure injury to sacral region noted, protein needs updated. D5 running @ 50ml/hr providing 204kcal/day. Last documented intake was 10% of tray on 11/21, with other reports of refusing meals. Per MD note, hospice and comfort care appropriate but family not ready for this transition. Recent -152-133, A1C 6.3 No new weight on file, will reorder. 11/19: MST referral due to weight loss and eating poorly. Admitted with AMS, hx of dementia, Parkinson's disease, diabetes. Spoke with daughter on the phone who states his last weight taken was 132 lbs, through chart review this was in August.  She feels he has been losing eight, unsure of UBW. She reports he eats well at home, \"regular consistency without signs of aspiration, but eats what he likes. \"  He does not use an ONS at home. Lives with his wife who has Alzheimer's and they have 24 hour aides at the house. Seen by SLP and dx with severe dyshaphia, Pureed diet recommended, RD changed to Diabetic Pureed. BMI indicates normal weight. RD added Ensure High Protein BID to supply an additional 320 kcals and 32 gms protein. Weight Metrics 11/19/2018 8/27/2018 8/15/2018 7/1/2018 6/10/2018 4/11/2018 2/25/2018 Weight 132 lb 132 lb 132 lb 122 lb 122 lb 122 lb 143 lb BMI 18.94 kg/m2 21.31 kg/m2 21.31 kg/m2 19.69 kg/m2 19.69 kg/m2 19.69 kg/m2 20.52 kg/m2 Past Medical History:  
Diagnosis Date  Age-related physical debility  Anxiety and depression  Compression fracture of L3 lumbar vertebra (HCC)  Debilitated patient  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  Diabetes ([facility] [address]  Dysphagia dementia related  Hypertension  Malnutrition ([facility] [address]  Parkinson disease ([facility] [address] Diet: None Abd:   wdl         BM: 11/22, loose Skin Integrity: []Intact  [x]Other-stage II PI on sacrum Edema: [x]None []Other Nutritionally Significant Medications: [x] Reviewed & Includes: SSI, nivia-q Labs:   
Lab Results Component Value Date/Time Sodium 140 11/23/2018 03:11 AM  
 Potassium 4.5 11/23/2018 03:11 AM  
 Chloride 104 11/23/2018 03:11 AM  
 CO2 28 11/23/2018 03:11 AM  
 Anion gap 8 11/23/2018 03:11 AM  
 Glucose 152 (H) 11/23/2018 03:11 AM  
 BUN 15 11/23/2018 03:11 AM  
 Creatinine 0.95 11/23/2018 03:11 AM  
 Calcium 8.6 11/23/2018 03:11 AM  
 Magnesium 1.7 11/20/2018 03:11 AM  
 Phosphorus 2.4 (L) 11/20/2018 03:11 AM  
 Albumin 2.9 (L) 11/22/2018 04:59 AM  
 
 
Anthropometrics:  
Weight Source: Other (comment)(8/27/18) Height: 5' 10\" (177.8 cm), Body mass index is 18.94 kg/m². IBW : 75.3 kg (166 lb), % IBW (Calculated): 79.52 %,  , Wt Readings from Last 5 Encounters:  
11/19/18 59.9 kg (132 lb)  
08/27/18 59.9 kg (132 lb) 08/15/18 59.9 kg (132 lb) 07/01/18 55.3 kg (122 lb)  
06/10/18 55.3 kg (122 lb) Estimated Daily Nutrition Requirements:  
Weight Used: Other (comment)(weight from 8/27/18) Kcals: 1850 Kcals/day(to 2100) Based on:[place] Jeor(BMR x 1.2 + 250- 500) Protein: 72 g(-84g (1.2-1.4g/kg)) Fluid:1850 (1 mL/kcal) Carbohydrate: AT least 130 gms/day Education & Discharge Needs: 
 [] Pt discussed in ID rounds Nutrition related discharge needs addressed:  
  [x] Supplements (on d/c instruction &/or coupons provided)Will follow toleration to supplement  
  [] Tube Feedings  
  [] Education []No nutrition related discharge needs at this time Cultural, Lutheran and ethnic food preferences identified  
 [x] None   [] Yes NUTRITION DIAGNOSIS:  
 
Swallowing difficulty related to Parkinson's  as evidenced by severe oral dysphagia and moderate pharyngeal dysphagia per SLP  
 
11/23: nutrition dx continues, PO intake has declined due to dysphagia and medical status Pt is at Nutrition Risk:  [x] No Nutrition Risk Identified:  [] 
 
RD INTERVENTION / PT GOALS:  
 
Food/Nutrient Delivery:  Meals/Snacks: Modify diet/texture/consistency/nutrients(Pureed diet), Supplements: Commercial supplement(Ensure High Protein BID),  ,  ,   
Nutrition Education: ,   
Nutrition Counseling:   
Coordination of Care:   
 
Goal: Diet advancement vs nutrition support in the next 1-3 days MONITORING & EVALUATION:  
Food/Nutrient Intake Outcomes: Protein intake, Liquid meal replacement, Total energy intake Physical Signs/Symptoms Outcomes: Weight/weight change, Electrolyte and renal profile, GI profile, Glucose profile, GI  
  
Previous Nutrition Goals: 
Previous Goal Met: No 
Previous Recommendations:     
Previous Recommendations Implemented: Yes Eulalia Pena RD Pager [phone] Office [phone]

## 2018-11-23 NOTE — PROGRESS NOTES
1900: Bedside and Verbal shift change report given to Neymar Diggs RN (oncoming nurse) by Ernie Ricci RN (offgoing nurse). Report included the following information SBAR, Kardex, Procedure Summary, Intake/Output, MAR, Recent Results and Cardiac Rhythm NSR . 2000: Patient assessment noted, see flowsheet. Patient responds to voice but is nonverbal, does not track with eyes. BP 95/59, HR 76, O2 98 on 6 liters NC, temp 100.4. Blankets removed and room temp decreased. Large amount of clear oral suctions, lung sounds coarse with crackles. Patient incontinent, bedpad repositioned and pt. Turned. Will continue to monitor. 2200: Patient resting comfortably in bed. Temp recheck 99.4 
0000: Patient on 4 liters NC sating 97%, No new changes at this time. 0400: Reassessed, no new changes. VSS, afebrile, 4 liters O2 NC. Oral suctioning performed. Resting in bed, no s/s of pain. Will continue to monitor. 0700: Bedside and Verbal shift change report given to Malissa Ramos RN  (oncoming nurse) by Neymar Diggs RN (offgoing nurse). Report included the following information SBAR, Kardex, Procedure Summary, Intake/Output, MAR, Recent Results, Med Rec Status and Cardiac Rhythm NSR .

## 2018-11-23 NOTE — PROGRESS NOTES
Palliative Medicine Code Status: DNR Advance Care Planning: 
Advance Care Planning 11/18/2018 Patient's Healthcare Decision Maker is: -legal NOK Primary Decision Maker Name -Adebayo Mccormack [phone] Primary Decision Maker Relationship to Patient -Son/Daughter Confirm Advance Directive No MPOA or AMD (financial POA on file) Does the patient have other document types -DDNR  
  
 
 
 
 
Patient / Family Encounter Documentation Participants (names): Dtr Malu Hughes, Palliative Medicine (Dr. Mariela Hennessy, [facility]) Narrative:  Spoke with dtr Malu Hughes outside of room; pt was resting quietly in bed, appeared comfortable, dtr reports pt had been in significant respiratory distress yesterday. Dtr inquired why pt is not on \"banana bag,\" expressed desire to speak with Attending Physician face to face re: plan of care. Dtr stated she was told pt might have only days to live if condition does not begin to show signs of improvement, stated she does not want pt to suffer but indicated she is not ready to let pt go. Psychosocial Issues Identified/ Resilience Factors: Dtr shared that news of pt's poor prognosis prompted panic attack (reports hx of). Friction exists between dtr and son Adelia Boeck but dtr stated God led them to communicate peacefully yesterday. Goals of Care / Plan: Dtr remains hopeful for improvement but verbalized understanding that pt may not recover as hoped, stated she does not want pt to suffer. Palliative team will continue to follow for support and ongoing goals of care discussions. Thank you for including Palliative Medicine in the care of Mr. Maricarmen Blum. Stephanie  JEB Alegre, [facility] 
[phone])

## 2018-11-23 NOTE — ROUTINE PROCESS
0700 Shift change report received from [place], [address] [place] [address]. SBAR, Kardex, Intake/Output, MAR, Accordion, Recent Results, Med Rec Status and Cardiac Rhythm SR reviewed. 1900  Bedside report given to [facility], RN. SBAR, Kardex, Procedure Summary, Intake/Output, MAR, Accordion, Recent Results and Cardiac Rhythm SR reviewed. Patient is stable at this time. Call light within reach, bed alarm on, bed in low position.

## 2018-11-23 NOTE — PROGRESS NOTES
Problem: Pressure Injury - Risk of 
Goal: *Prevention of pressure injury Document Mamadou Scale and appropriate interventions in the flowsheet. Outcome: Not Progressing Towards Goal 
Pressure Injury Interventions: 
Sensory Interventions: Assess changes in LOC, Float heels, Keep linens dry and wrinkle-free, Maintain/enhance activity level, Minimize linen layers, Monitor skin under medical devices, Pad between skin to skin, Pressure redistribution bed/mattress (bed type), Turn and reposition approx. every two hours (pillows and wedges if needed) Moisture Interventions: Absorbent underpads, Apply protective barrier, creams and emollients, Check for incontinence Q2 hours and as needed, Internal/External urinary devices, Limit adult briefs, Maintain skin hydration (lotion/cream), Minimize layers, Moisture barrier Activity Interventions: Pressure redistribution bed/mattress(bed type) Mobility Interventions: Pressure redistribution bed/mattress (bed type), HOB 30 degrees or less, Turn and reposition approx. every two hours(pillow and wedges) Nutrition Interventions: Discuss nutritional consult with provider Friction and Shear Interventions: Apply protective barrier, creams and emollients, Foam dressings/transparent film/skin sealants, HOB 30 degrees or less, Lift sheet, Lift team/patient mobility team, Minimize layers Pressure injury found during assessment on 11/22/18, wound consulted, mepilex applied to bottom, pt.  On turn team.

## 2018-11-23 NOTE — PROGRESS NOTES
Shift Summary 0700:  Bedside report done. Patient resting quietly on 02 4L. No signs of distress noted at this time. D5 1/2 NS at 48. No needs or complaints voiced. 0800:  Patient resting in bed. Assessment done. 0840:  Dr. Kenny Bashir at the bedside to assess. 0940:  Speech therapy at the bedside to assess. Patient continues to be confused, no following any commands. 1030:  Patient seen by wound care. Son at the bedside. 1200:  Patient assessed. No changes noted. 1300:  Patient resting quietly. No changes noted. 1430:  Patient incontinent of stool. 1600: Son at the bedside. Assessment done. 1810:  Patient transferred to specialty bed. No other changes noted.

## 2018-11-23 NOTE — PROGRESS NOTES
Problem: Self Care Deficits Care Plan (Adult) Goal: *Acute Goals and Plan of Care (Insert Text) Occupational Therapy Goals Re-evaluation 11/23/2018 1. Patient will perform self feeding with moderate assistance within 7 days. 2. Patient will perform grooming with moderate assistance within 7 days. 3. Patient will perform upper body dressing with moderate assistance within 7 days. 4. Patient will perform EOB sitting with min assist for balance in prep for seated ADLs within 7 days. Initiated 11/19/2018 1. Patient will perform self-feeding with supervision/set-up within 7 day(s). 2.  Patient will perform grooming with minimal assistance/contact guard assist within 7 day(s). 3.  Patient will perform upper body dressing with minimal assistance/contact guard assist within 7 day(s). 4.  Patient will perform toilet transfers with moderate assistance  within 7 day(s). 5.  Patient will perform all aspects of toileting with moderate assistance  within 7 day(s). 6.  Patient will participate in upper extremity therapeutic exercise/activities with minimal assistance/contact guard assist for 5 minutes within 7 day(s). Occupational Therapy ReEVALUATION Patient: Keo Singh (80 y.o. male) Date: 11/23/2018 Diagnosis: Acute encephalopathy <principal problem not specified> Precautions:   
 
ASSESSMENT : 
Based on the objective data described below, the patient presents with hospital admission secondary to acute encephalopathy. Patient transferred to ICU secondary to decreased BP, elevated temperature, and decreased O2 saturations. Patient son present in room and reports patient lives with wife, who also has alzheimer's and both have 24hr caregivers. He reports patient requires assist for ADLs but has been found in home getting out of bed on his own and ambulating. Once seen caregivers support offered. Patient today is total assist x 2 for bed mobility .  Once seated EOB patient with poor sitting balance, but becomes more engaged when able to see son in room. Patient with good attempt to speak to son but words unintelligible. Patient requires full support for sitting balance and noted with attempt to swat at therapist.  patient returned to supine and positioned for comfort. Patient son reports plan for patient to return home with continued care. Patient with all needed equipment in home and family desire to return. Will follow 2x per week as patient son reports higher level baseline. Patient will benefit from skilled intervention to address the above impairments. Patients rehabilitation potential is considered to be Guarded Factors which may influence rehabilitation potential include:  
[]                None noted []                Mental ability/status [x]                Medical condition []                Home/family situation and support systems []                Safety awareness []                Pain tolerance/management 
[]                Other: PLAN : 
Recommendations and Planned Interventions: 
[x]                  Self Care Training                  [x]           Therapeutic Activities [x]                  Functional Mobility Training    []           Cognitive Retraining 
[x]                  Therapeutic Exercises           []           Endurance Activities [x]                  Balance Training                   [x]           Neuromuscular Re-Education []                  Visual/Perceptual Training     [x]      Home Safety Training 
[x]                  Patient Education                 [x]           Family Training/Education []                  Other (comment): Frequency/Duration: Patient will be followed by occupational therapy 2 times a week to address goals. Discharge Recommendations: Home Health with continued 24hr care Further Equipment Recommendations for Discharge: none noted SUBJECTIVE:  
Patient stated . OBJECTIVE DATA SUMMARY:  
Hospital course since last seen and reason for reevaluation: transfer to higher level of care for decreased O2 and low BPCognitive/Behavioral Status: 
Neurologic State: Confused;Drowsy Orientation Level: Unable to verbalize Cognition: No command following Perseveration: No perseveration noted Safety/Judgement: Lack of insight into deficitsSkin:  
Edema: none noted Vision/Perceptual:   
    
    
    
  
    
    
  
 
Range of Motion: 
AROM: Grossly decreased, non-functional 
PROM: Grossly decreased, non-functional 
  
  
  
  
  
  
Strength: 
Strength: Grossly decreased, non-functional 
  
  
  
 Coordination: 
Coordination: Generally decreased, functional 
    
  
Tone & Sensation: 
 
Tone: Abnormal 
  
  
  
  
  
  
  
Balance: 
Sitting: Impaired; With support Sitting - Static: Poor (constant support) Sitting - Dynamic: Poor (constant support) Functional Mobility and Transfers for ADLs:Bed Mobility: 
Rolling: Total assistance; Additional time;Assist x2 Supine to Sit: Total assistance; Additional time;Assist x2 Sit to Supine: Total assistance; Additional time;Assist x2 Scooting: Total assistance; Additional time;Assist x2 Transfers: ADL Assessment: 
Feeding: Total assistance Oral Facial Hygiene/Grooming: Total assistance Bathing: Total assistance Upper Body Dressing: Total assistance Lower Body Dressing: Total assistance Toileting: Total assistance ADL Intervention: 
  
 
  
 
  
 
  
 
  
 
  
 
  
 
Cognitive Retraining Safety/Judgement: Lack of insight into deficits Therapeutic Exercise: 
 
Functional Measure: 
Barthel Index: 
 
Bathin Bladder: 0 Bowels: 0 Groomin Dressin Feedin Mobility: 0 Stairs: 0 Toilet Use: 0 Transfer (Bed to Chair and Back): 0 Total: 0 Barthel and G-code impairment scale: 
Percentage of impairment CH 
0% CI 
1-19% CJ 
20-39% CK 
40-59% CL 
60-79% CM 
80-99% CN 
100% Barthel Score 0-100 100 99-80 79-60 59-40 20-39 1-19 
 0 Barthel Score 0-20 20 17-19 13-16 9-12 5-8 1-4 0 The Barthel ADL Index: Guidelines 1. The index should be used as a record of what a patient does, not as a record of what a patient could do. 2. The main aim is to establish degree of independence from any help, physical or verbal, however minor and for whatever reason. 3. The need for supervision renders the patient not independent. 4. A patient's performance should be established using the best available evidence. Asking the patient, friends/relatives and nurses are the usual sources, but direct observation and common sense are also important. However direct testing is not needed. 5. Usually the patient's performance over the preceding 24-48 hours is important, but occasionally longer periods will be relevant. 6. Middle categories imply that the patient supplies over 50 per cent of the effort. 7. Use of aids to be independent is allowed. Arnav Forrest., Barthel, D.W. ([id number]). Functional evaluation: the Barthel Index. [facility] (14)2. Barbara Mccarty edyta TROY Lewis, Alma Rosa Cortez., Clare Lundberg., [place], [address] [place] [address] (1999). Measuring the change indisability after inpatient rehabilitation; comparison of the responsiveness of the Barthel Index and Functional [place] Measure. Journal of Neurology, Neurosurgery, and Psychiatry, 66(4), 748-694. Mary Kay Moe, NDANNIELLE.RHONDA, ZBIGNIEW Cleary.MARIA ISABEL, & Rachna Payan MLianetA. (2004.) Assessment of post-stroke quality of life in cost-effectiveness studies: The usefulness of the Barthel Index and the EuroQoL-5D. [facility], 13, 161-02 G codes: In compliance with CMSs Claims Based Outcome Reporting, the following G-code set was chosen for this patient based on their primary functional limitation being treated:  
 
The outcome measure chosen to determine the severity of the functional limitation was the Barthel Index with a score of 0/100 which was correlated with the impairment scale. ? Self Care:  
  - CURRENT STATUS: CH - 0% impaired, limited or restricted  - GOAL STATUS: CI - 1%-19% impaired, limited or restricted  - D/C STATUS:  ---------------To be determined--------------- Occupational Therapy Evaluation Charge Determination History Examination Decision-Making LOW Complexity : Brief history review  MEDIUM Complexity : 3-5 performance deficits relating to physical, cognitive , or psychosocial skils that result in activity limitations and / or participation restrictions MEDIUM Complexity : Patient may present with comorbidities that affect occupational performnce. Miniml to moderate modification of tasks or assistance (eg, physical or verbal ) with assesment(s) is necessary to enable patient to complete evaluation Based on the above components, the patient evaluation is determined to be of the following complexity level: MEDIUM Pain: 
Pain Scale 1: Adult Nonverbal Pain Scale Pain Intensity 1: 0 Activity Tolerance: VSS Please refer to the flowsheet for vital signs taken during this treatment. After treatment:  
[] Patient left in no apparent distress sitting up in chair 
[x] Patient left in no apparent distress in bed 
[x] Call bell left within reach [x] Nursing notified 
[x] Caregiver present [x] Bed alarm activated COMMUNICATION/EDUCATION:  
The patients plan of care was discussed with: Physical Therapist and Registered Nurse. [x]    Home safety education was provided and the patient/caregiver indicated understanding. [x]    Patient/family have participated as able in goal setting and plan of care. [x]    Patient/family agree to work toward stated goals and plan of care. []    Patient understands intent and goals of therapy, but is neutral about his/her participation. []    Patient is unable to participate in goal setting and plan of care. This patients plan of care is appropriate for delegation to [facility]. Thank you for this referral. 
Samantha Morris OT R/L Time Calculation: 25 mins

## 2018-11-23 NOTE — DISCHARGE SUMMARY
Physician Interim Summary Patient ID: Nickolas Varela [id number] 
51 y.o. 
1940 PCP: Katlin Barrios MD  
 
Consults: Neurology Covering dates: 11/18/2018 through 11/23/2018 Admission Diagnoses: Acute encephalopathy Hospital Course:  
Mr. Sheryn Seip was admitted for AMS 2/2 aspiration pneumonia. He was seen by speech therapy and was noted to have dysphagia with continued aspiration risk. Overnight on 11/21 he developed worsened hypoxia and fever suggestive of a recurrent aspiration event. He is now NPO. Goals of care discussions ongoing with family **addendum. Due to recurrent aspiration and inability to handle secretions, patient has been determined to be unsafe for PO at this time. Family made aware. Son and daughter wished trial of TPN and if patient improves to restart Sinemet. However, they were advised that patient is not likely to improve. They are clear that patient is not to have a feeding tube. Additional hospital course and discharge summary will be done by discharging physician.

## 2018-11-23 NOTE — PROGRESS NOTES
Problem: Mobility Impaired (Adult and Pediatric) Goal: *Acute Goals and Plan of Care (Insert Text) Physical Therapy Goals Revised 11/23/2018 1. Patient will move from supine to sit and sit to supine , scoot up and down and roll side to side in bed with moderate assistance  within 7 day(s). 2.  Patient will transfer from bed to chair and chair to bed with moderate assistance  using the least restrictive device within 7 day(s). 3.  Patient will perform sit to stand with moderate assistance  within 7 day(s). 4.  Patient will ambulate with maximal assistance for 10 feet with the least restrictive device within 7 day(s). Initiated 11/19/2018; not met 1. Patient will move from supine to sit and sit to supine  in bed with moderate assistance  within 7 day(s). 2.  Patient will transfer from bed to chair and chair to bed with minimal assistance using the least restrictive device within 7 day(s). 3.  Patient will perform sit to stand with minimal assistance within 7 day(s). 4.  Patient will ambulate with minimal assistance for 20 feet with the least restrictive device within 7 day(s). physical Therapy REEVALUATION Patient: Giuseppe Melton (48 y.o. male) Date: 11/23/2018 Primary Diagnosis: Acute encephalopathy Precautions: fall Chart, physical therapy assessment, plan of care and goals were reviewed. ASSESSMENT : 
Based on the objective data described below, the patient presents with difficulty with ambulation. Patient transferred down to [facility]. Rolled patient on the side of bed total assist x 2, supine to sit total assist x 2, dangled on the edge of bed with total assist x 2, sitting balance poor need constant support for his balance. Patient starting to swat PT hands while supporting his sitting balance. Son in the room during the entire therapy reevaluation and stated patient has 24/7 care at home and some times wander around at night per son.  Assisted patient back to bed supine and reposition higher on bed nurse in the room after therapy and agreed to monitor patient. Patient will benefit from skilled intervention to address the above impairments. Patients rehabilitation potential is considered to be Fair Factors which may influence rehabilitation potential include:  
[]           None noted 
[]           Mental ability/status [x]           Medical condition 
[]           Home/family situation and support systems 
[x]           Safety awareness [x]           Pain tolerance/management 
[]           Other: PLAN : 
Recommendations and Planned Interventions: 
[x]             Bed Mobility Training             []      Neuromuscular Re-Education [x]             Transfer Training                   []      Orthotic/Prosthetic Training 
[]             Gait Training                         []      Modalities [x]             Therapeutic Exercises           []      Edema Management/Control [x]             Therapeutic Activities            []      Patient and Family Training/Education []             Other (comment): Frequency/Duration: Patient will be followed by physical therapy 2 times a week to address goals. Discharge Recommendations: Home Health Further Equipment Recommendations for Discharge: already has DME's SUBJECTIVE:  
Patient stated ok.  OBJECTIVE DATA SUMMARY:  
 
Past Medical History:  
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  
Procedure Laterality Date  HX HEENT Hospital course since last seen and reason for reevaluation: fairCritical Behavior: 
Neurologic State: Confused, Drowsy Orientation Level: Unable to verbalize Cognition: No command following Safety/Judgement: Lack of insight into deficits Strength:   
Strength: Grossly decreased, non-functional 
  
  
  
  
  
 
 Tone & Sensation:  
Tone: Abnormal 
  
  
  
  
  
  
  
  
   
Range Of Motion: 
AROM: Grossly decreased, non-functional 
  
  
  
PROM: Grossly decreased, non-functional 
  
  
  
Coordination: 
Coordination: Generally decreased, functional 
Functional Mobility: 
Bed Mobility: 
Rolling: Total assistance; Additional time;Assist x2 Supine to Sit: Total assistance; Additional time;Assist x2 Sit to Supine: Total assistance; Additional time;Assist x2 Scooting: Total assistance; Additional time;Assist [id number]: 
  
  
     
  
     
  
Balance:  
Sitting: Impaired; With support Sitting - Static: Poor (constant support) Sitting - Dynamic: Poor (constant support)Ambulation/Gait Training:  
  
  
  
  
  
  
  
  
  
  
  
  
  
  
  
   
 
 
 
Functional Measure: 
Barthel Index: 
 
Bathin Bladder: 0 Bowels: 0 Groomin Dressin Feedin Mobility: 0 Stairs: 0 Toilet Use: 0 Transfer (Bed to Chair and Back): 0 Total: 0 Barthel and G-code impairment scale: 
Percentage of impairment CH 
0% CI 
1-19% CJ 
20-39% CK 
40-59% CL 
60-79% CM 
80-99% CN 
100% Barthel Score 0-100 100 99-80 79-60 59-40 20-39 1-19 
 0 Barthel Score 0-20 20 17-19 13-16 9-12 5-8 1-4 0 The Barthel ADL Index: Guidelines 1. The index should be used as a record of what a patient does, not as a record of what a patient could do. 2. The main aim is to establish degree of independence from any help, physical or verbal, however minor and for whatever reason. 3. The need for supervision renders the patient not independent. 4. A patient's performance should be established using the best available evidence. Asking the patient, friends/relatives and nurses are the usual sources, but direct observation and common sense are also important. However direct testing is not needed. 5. Usually the patient's performance over the preceding 24-48 hours is important, but occasionally longer periods will be relevant. 6. Middle categories imply that the patient supplies over 50 per cent of the effort. 7. Use of aids to be independent is allowed. Griffin Hayedn., Barthel, D.W. ([id number]). Functional evaluation: the Barthel Index. [address] [facility] (14)2. TROY Duran, Kaylin Bernardo., Nate Tom., Ye Garcia, [facility] (1999). Measuring the change indisability after inpatient rehabilitation; comparison of the responsiveness of the Barthel Index and Functional [place] Measure. Journal of Neurology, Neurosurgery, and Psychiatry, 66(4), 578-254. Mildred Delgado, N.J.A, SHALINI Cleary, & Jamaal Pace MLianetA. (2004.) Assessment of post-stroke quality of life in cost-effectiveness studies: The usefulness of the Barthel Index and the EuroQoL-5D. [facility], 13, 109-09 G codes: In compliance with CMSs Claims Based Outcome Reporting, the following G-code set was chosen for this patient based on their primary functional limitation being treated: The outcome measure chosen to determine the severity of the functional limitation was the barthel with a score of 0/100 which was correlated with the impairment scale. ? Mobility - Walking and Moving Around:  
  - CURRENT STATUS: CN - 100% impaired, limited or restricted  - GOAL STATUS: CM - 80%-99% impaired, limited or restricted  - D/C STATUS:  ---------------To be determined---------------  
 
Pain: 
Pain Scale 1: Adult Nonverbal Pain Scale Pain Intensity 1: 0 Activity Tolerance:  
Fair. Please refer to the flowsheet for vital signs taken during this treatment. After treatment:  
[]  Patient left in no apparent distress sitting up in chair 
[x]  Patient left in no apparent distress in bed 
[x]  Call bell left within reach [x]  Nursing notified [x]  Caregiver present [x]  Bed alarm activated COMMUNICATION/EDUCATION:  
The patients plan of care was discussed with: Occupational Therapist, Registered Nurse and patient. [x]  Fall prevention education was provided and the patient/caregiver indicated understanding. [x]  Patient/family have participated as able in goal setting and plan of care. []  Patient/family agree to work toward stated goals and plan of care. []  Patient understands intent and goals of therapy, but is neutral about his/her participation. []  Patient is unable to participate in goal setting and plan of care. Thank you for this referral. 
Charline Bernardo, PT,[facility]. Time Calculation: 26 mins

## 2018-11-23 NOTE — PROGRESS NOTES
Formerly [facility] Medical Progress Note NAME: Maria Esther Found :  1940 MRM:  [id number] Date/Time: 2018 Assessment and Plan:  
 
Acute hypoxic respiratory failure: stabilization of oxygen requirements since last night. Suspect due to recurrent aspiration events. Continue oxygen supplementation to maintain saturations >90 Aspiration pneumonia / Cough:  CT scan shows multilobar consolidation concerning for pneumonia. Suspect he may have aspiration pna based on imaging and speech findings of dysphagia. Likely recurrent aspiration event . Continue zosyn. Acute encephalopathy / Dementia / Anxiety and depression: AMS POA, 2/2 continuing aspiration. Appreciate neurology consult. MRI notes no acute stroke. Supplmenting B12. EEG negative for seizure. Continue ASA, memantine and donepezil. Parkinson disease / Dysphagia / Debility - POA, likely poor baseline and likely has little rehab potential.  Continue sinemet. Fall precautions. PT/OT eval. 
  
Malnutrition / Weight loss / Failure to thrive - POA, presumed due to age, dementia, parkinson's. Glucerna supplements. Goals of care and dispo planning 
  
HTN (hypertension): cont norvasc and lisinopril 
  
Type 2 diabetes mellitus without complication - Diabetic diet and counseling. SSI per protocol. Holding metformin while acutely ill. Check A1c. 
  
Hyperlipidemia - Continue pravastatin. 
  
Hypokalemia - Replete PO and in IVF 
  
Glaucoma - continue timolol. Subjective: Chief Complaint:  F/u confusion Oxygen requirement stabilized from yesterday. Continues to have secretions suctioned. No cp, sob abd. Objective:  
 
Last 24hrs VS reviewed since prior progress note. Most recent are: 
 
Visit Vitals /79 (BP 1 Location: Right arm, BP Patient Position: At rest) Pulse 66 Temp 98.2 °F (36.8 °C) Resp 10 Ht 5' 10\" (1.778 m) Wt 59.9 kg (132 lb) SpO2 100% BMI 18.94 kg/m² SpO2 Readings from Last 6 Encounters:  
11/23/18 100% 08/27/18 97% 08/15/18 99% 07/01/18 96% 06/10/18 92% 04/11/18 96% O2 Flow Rate (L/min): 2 l/min Intake/Output Summary (Last 24 hours) at 11/23/2018 1513 Last data filed at 11/23/2018 1300 Gross per 24 hour Intake 1594.17 ml Output  Net 1594.17 ml Physical Exam: 
  
Gen:  Thin, frail, in no acute distress HEENT:  Pink conjunctivae, PERRL, hearing intact to voice, moist mucous membranes Neck:  Supple, without masses, thyroid non-tender Resp:  No accessory muscle use, rhonchi bilateral 
Card:  No murmurs, normal S1, S2 without thrills, bruits or peripheral edema Abd:  Soft, non-tender, non-distended, normoactive bowel sounds are present, no mass Lymph:  No cervical or inguinal adenopathy Musc:  No cyanosis or clubbing Skin:  No rashes or ulcers, skin turgor is reduced Neuro:  no focal deficits, somewhat impulsive and has some difficulties following commands Psych:  Poor insight, oriented to person Telemetry reviewed:   normal sinus rhythm 
__________________________________________________________________ Medications Reviewed: (see below) Medications:  
 
Current Facility-Administered Medications Medication Dose Route Frequency  guaiFENesin ER (MUCINEX) tablet 600 mg  600 mg Oral Q12H  piperacillin-tazobactam (ZOSYN) 3.375 g in 0.9% sodium chloride (MBP/ADV) 100 mL  3.375 g IntraVENous Q8H  
 carbidopa-levodopa (SINEMET)  mg per tablet 2 Tab  2 Tab Oral QHS  dextrose 5 % - 0.45% NaCl infusion  50 mL/hr IntraVENous CONTINUOUS  
 albuterol-ipratropium (DUO-NEB) 2.5 MG-0.5 MG/3 ML  3 mL Nebulization Q4H RT  
 acetaminophen (TYLENOL) suppository 650 mg  650 mg Rectal Q4H PRN  
 lactobac ac& pc-s.therm-b.anim (STACY Q/RISAQUAD)  1 Cap Oral DAILY  amLODIPine (NORVASC) tablet 5 mg  5 mg Oral DAILY  pravastatin (PRAVACHOL) tablet 20 mg  20 mg Oral QHS  aspirin delayed-release tablet 81 mg  81 mg Oral DAILY  carbidopa-levodopa (SINEMET)  mg per tablet 2 Tab  2 Tab Oral 7am  
 carbidopa-levodopa (SINEMET)  mg per tablet 1 Tab  1 Tab Oral DAILY WITH DINNER  
 donepezil (ARICEPT) tablet 10 mg  10 mg Oral QHS  memantine (NAMENDA) tablet 10 mg  10 mg Oral BID  timolol (TIMOPTIC) 0.5 % ophthalmic solution 1 Drop  1 Drop Both Eyes BID  ondansetron (ZOFRAN) injection 4 mg  4 mg IntraVENous Q4H PRN  
 diphenhydrAMINE (BENADRYL) capsule 25 mg  25 mg Oral Q4H PRN  
 enoxaparin (LOVENOX) injection 40 mg  40 mg SubCUTAneous Q24H  
 glucose chewable tablet 16 g  4 Tab Oral PRN  
 dextrose (D50W) injection syrg 12.5-25 g  25-50 mL IntraVENous PRN  
 glucagon (GLUCAGEN) injection 1 mg  1 mg IntraMUSCular PRN  
 insulin lispro (HUMALOG) injection   SubCUTAneous AC&HS Lab Data Reviewed: (see below) Lab Review:  
 
Recent Labs  
  11/23/18 0311 11/22/18 0459 11/21/18 
0232 WBC 12.6* 11.5* 7.5 HGB 13.8 12.9 13.9 HCT 42.4 38.9 41.8  212 198 Recent Labs  
  11/23/18 0311 11/22/18 0459 11/21/18 0232  137 139  
K 4.5 4.2 4.0  
 103 105 CO2 28 28 27 * 128* 117* BUN 15 13 12 CREA 0.95 0.74 0.57* CA 8.6 8.5 8.5 ALB  --  2.9*  --   
TBILI  --  1.0  --   
SGOT  --  18  --   
ALT  --  7*  --   
 
Lab Results Component Value Date/Time Glucose (POC) 122 (H) 11/23/2018 12:03 PM  
 Glucose (POC) 122 (H) 11/23/2018 07:31 AM  
 Glucose (POC) 133 (H) 11/22/2018 09:34 PM  
 Glucose (POC) 141 (H) 11/22/2018 04:32 PM  
 Glucose (POC) 118 (H) 11/22/2018 11:31 AM  
 
No results for input(s): PH, PCO2, PO2, HCO3, FIO2 in the last 72 hours. No results for input(s): INR in the last 72 hours. No lab exists for component: INREXT, INREXT All Micro Results Procedure Component Value Units Date/Time CULTURE, BLOOD [[id number]] Collected:  11/18/18 3789 Order Status:  Completed Specimen:  Blood Updated:  11/23/18 0524 Special Requests: NO SPECIAL REQUESTS Culture result: NO GROWTH 5 DAYS     
 CULTURE, BLOOD [[id number]] Collected:  11/18/18 1622 Order Status:  Completed Specimen:  Blood Updated:  11/23/18 0524 Special Requests: NO SPECIAL REQUESTS Culture result: NO GROWTH 5 DAYS     
 CULTURE, URINE [[id number]] Collected:  11/18/18 1622 Order Status:  Completed Specimen:  Urine from Clean catch Updated:  11/20/18 0915 Special Requests: NO SPECIAL REQUESTS [place] Count --     
  <10,000 COLONIES/mL Culture result: NO SIGNIFICANT GROWTH URINE CULTURE HOLD SAMPLE [[id number]] Collected:  11/18/18 1622 Order Status:  Completed Specimen:  Urine from Serum Updated:  11/18/18 1628 Urine culture hold URINE ON HOLD IN MICROBIOLOGY DEPT FOR 3 DAYS. IF UNPRESERVED URINE IS SUBMITTED, IT CANNOT BE USED FOR ADDITIONAL TESTING AFTER 24 HRS, RECOLLECTION WILL BE REQUIRED. I have reviewed notes of prior 24hr. Other pertinent lab: None Total time spent with patient: 30 min Care Plan discussed with: Patient, Nursing Staff and Consultant/Specialist 
 
Discussed:  Care Plan Prophylaxis:  Lovenox Disposition:  SNF/LTC 
        
___________________________________________________ Attending Physician: Oscar Verduzco MD

## 2018-11-23 NOTE — WOUND CARE
Wound care Initial visit for skin and wound assessment, alert, restless and combative with care Family at bedside- in the ICU of med surgical foam surface,  Legs contracted to hips,  # Assessment All skin folds and bony prominences assessed, turned with staff assistance. Incontinent of urine and stool- skin is thin and fragile Sacral pressure injury- 1x0.5x0.2 cm full thickness pink base- stage 3 pressure injury. Cadence wound skin intact with scattered areas of scarring and hypopigmentation- old skin injuries. Heels and elbows intact. Treatment Incontinent care given- assisted staff to place condom cath Repositioned in bed- prevalon boots applied Mepilex to sacral wound Low air loss surface ordered for patient. Recommendations/Plan Low air loss surface, mobility team consult Wound care to sacral wound Turn, reposition every 2 hours as tolerated, float heels, place in prevalon boots. Incontinent care with comfort shields. Apply Zinc to all open areas, moisture barrier as needed. Discussed plan of care and assessment with Dr Corine Butler and staff- orders obtained. Will follow, reconsult as needed.  
Patsy Ayers

## 2018-11-24 NOTE — PROGRESS NOTES
11/23/18 8935 Chart and Patient  Assessment Pulmonary History 0 Surgical History 0 Chest X-ray 2 Respiratory Pattern 0 Mental Status 0 Breath Sounds 1 Cough 0 Level of Activity/Mobility 1 Respiratory Assessment Total Score 4

## 2018-11-24 NOTE — PROGRESS NOTES
Pharmacy Dosing Services: 11/24/18 Lovenox dose change per low body weight protocol Physician:  [employer] Enoxaparin Indication:  DVT prophylaxis Previous Dose  Lovenox 40 mg SC daily Serum Creatinine Lab Results Component Value Date/Time Creatinine 0.95 11/23/2018 03:11 AM  
  
Creatinine Clearance Estimated Creatinine Clearance: 39.7 mL/min (based on SCr of 0.95 mg/dL). Platelets Lab Results Component Value Date/Time PLATELET 503 46/73/1296 03:11 AM  
   
H/H Lab Results Component Value Date/Time HGB 13.8 11/23/2018 03:11 AM  
  
 
Adjustments:  Changed Lovenox to 30 mg SC daily for low body weight Continue to monitor Signed Mae Ignacio

## 2018-11-24 NOTE — PROGRESS NOTES
Medical Progress Note NAME: Keo Singh :  1940 MRM:  [id number] Date/Time: 2018  11:26 AM 
  
Assessment / Plan:  
 
Acute hypoxic respiratory failure:  Due to aspiration. Now improved and back to 2L NC. 
-- continue oxygen 
  
Aspiration pneumonia:  CT scan shows multilobar consolidation concerning for pneumonia on admission. CXR on  with improved left mid lung ASDZ but new DEVONTE ASDZ. Likely continued aspiration. At risk for further aspiration. WBC trending back up likely from aspiration on . -- continue zosyn 
  
Acute encephalopathy / Dementia / Anxiety and depression:  Patient seen by neurology this admission. Head CT and MRI brain without acute process. EEG w/o seizure activity. B12 on low end of normal. 
-- continue ASA, memantine, and donepezil -- can resume b12 IM as outpatient 
  
Parkinson disease / Dysphagia / Debility / Failure to thrive / Malnutrition / Weight loss:  Not safe for PO including meds as re-evaluated by speech therapy. -- keep NPO 
-- will need to further discuss goals of care ie hospice vs PEG tube 
  
HTN (hypertension): -- hold po meds due to dysphagia 
  
Type 2 diabetes mellitus without complication: 
-- SSI alone 
  
Hyperlipidemia: 
-- hold statin 
  
  
Subjective: Chief Complaint / ROS:  Unable to obtain due to dementia. Objective:  
 
 
Vitals:  
 
 
  
Last 24hrs VS reviewed since prior progress note. Most recent are: 
 
Visit Vitals /81 Pulse 76 Temp 98.1 °F (36.7 °C) Resp 15 Ht 5' 10\" (1.778 m) Wt 43.1 kg (95 lb 0.3 oz) SpO2 100% BMI 13.63 kg/m² SpO2 Readings from Last 6 Encounters:  
18 100% 18 97% 08/15/18 99% 18 96% 06/10/18 92% 18 96% O2 Flow Rate (L/min): 2 l/min Intake/Output Summary (Last 24 hours) at 2018 1126 Last data filed at 2018 0800 Gross per 24 hour Intake 1260 ml Output 520 ml Net 740 ml Exam: Physical Exam: 
 
Gen:  thin, in no acute distress HEENT:  Pink conjunctivae, hearing intact to voice, moist mucous membranes Resp:  No accessory muscle use, left sided rales Card:  No murmurs, normal S1, S2 without thrills or peripheral edema Abd:  Soft, non-tender, non-distended, normoactive bowel sounds are present Skin:  No rashes Neuro: Face symmetric,  strength is 5/5 bilaterally, follows some commands appropriately Psych:  alert Telemetry reviewed:   SVT brief Medications Reviewed: (see below) Lab Data Reviewed: (see below) 
 
______________________________________________________________________ Medications:  
 
Current Facility-Administered Medications Medication Dose Route Frequency  hydrALAZINE (APRESOLINE) 20 mg/mL injection 20 mg  20 mg IntraVENous Q6H PRN  
 enoxaparin (LOVENOX) injection 30 mg  30 mg SubCUTAneous Q24H  
 albuterol-ipratropium (DUO-NEB) 2.5 MG-0.5 MG/3 ML  3 mL Nebulization Q6H RT  
 guaiFENesin ER (MUCINEX) tablet 600 mg  600 mg Oral Q12H  piperacillin-tazobactam (ZOSYN) 3.375 g in 0.9% sodium chloride (MBP/ADV) 100 mL  3.375 g IntraVENous Q8H  
 carbidopa-levodopa (SINEMET)  mg per tablet 2 Tab  2 Tab Oral QHS  dextrose 5 % - 0.45% NaCl infusion  50 mL/hr IntraVENous CONTINUOUS  
 acetaminophen (TYLENOL) suppository 650 mg  650 mg Rectal Q4H PRN  
 lactobac ac& pc-s.therm-b.anim (STACY Q/RISAQUAD)  1 Cap Oral DAILY  amLODIPine (NORVASC) tablet 5 mg  5 mg Oral DAILY  pravastatin (PRAVACHOL) tablet 20 mg  20 mg Oral QHS  aspirin delayed-release tablet 81 mg  81 mg Oral DAILY  carbidopa-levodopa (SINEMET)  mg per tablet 2 Tab  2 Tab Oral 7am  
 carbidopa-levodopa (SINEMET)  mg per tablet 1 Tab  1 Tab Oral DAILY WITH DINNER  
 donepezil (ARICEPT) tablet 10 mg  10 mg Oral QHS  memantine (NAMENDA) tablet 10 mg  10 mg Oral BID  timolol (TIMOPTIC) 0.5 % ophthalmic solution 1 Drop  1 Drop Both Eyes BID  
  ondansetron (ZOFRAN) injection 4 mg  4 mg IntraVENous Q4H PRN  
 diphenhydrAMINE (BENADRYL) capsule 25 mg  25 mg Oral Q4H PRN  
 glucose chewable tablet 16 g  4 Tab Oral PRN  
 dextrose (D50W) injection syrg 12.5-25 g  25-50 mL IntraVENous PRN  
 glucagon (GLUCAGEN) injection 1 mg  1 mg IntraMUSCular PRN  
 insulin lispro (HUMALOG) injection   SubCUTAneous AC&HS Lab Review:  
 
Recent Labs  
  11/23/18 0311 11/22/18 0459 WBC 12.6* 11.5* HGB 13.8 12.9 HCT 42.4 38.9  212 Recent Labs  
  11/23/18 0311 11/22/18 0459  137  
K 4.5 4.2  103 CO2 28 28 * 128* BUN 15 13 CREA 0.95 0.74 CA 8.6 8.5 ALB  --  2.9* TBILI  --  1.0 SGOT  --  18 ALT  --  7* Lab Results Component Value Date/Time Glucose (POC) 132 (H) 11/24/2018 08:36 AM  
 Glucose (POC) 118 (H) 11/23/2018 10:13 PM  
 Glucose (POC) 124 (H) 11/23/2018 04:10 PM  
 Glucose (POC) 122 (H) 11/23/2018 12:03 PM  
 Glucose (POC) 122 (H) 11/23/2018 07:31 AM  
 
 
 
Total time spent with patient: 30 Minutes Care Plan discussed with: Nursing Staff Discussed:  Care Plan Prophylaxis:  Lovenox Disposition:  SNF/LTC 
        
___________________________________________________ Attending Physician: Lorin Gaviria MD

## 2018-11-24 NOTE — PROGRESS NOTES
0700 Bedside report with offgoing RN. Patient arouses to voice. No command following. No sns of distress 0800 Rounds. Patient laying in bed fiddling with IV line. No sns of distress 
 
0900 Rounds. Patient napping. Will cont to monitor. Holding po medication in garza of aspiration event yesterday. Scheduled abx and eye drops 1000 Rounds. Patient sleeping, no sns of distress. 56 Daughter calls, updated on plan of care. Discussed recent labs work and vital signs. All questions answered fully. 1115 Rounds. Patient awake in bed. Denies pain. No other needs voiced at this time. Swabbed mouth for comfort. Will cont to monitor. 1145 BG checked. Patient awake in bed. Denies pain. No sns of distress. Will cont to monitor. 1315 Patient sleeping. No sns of distress. Will cont to monitor. [id number] Assisted son in placing patient in gown. Answered questions fully. No other needs voiced at this time. 1500 Patient sleeping. No other needs noted. Son at bedside. 1606 Patient sleeping. No needs noted. Arouses to voice. No sns of distress [address] Son at bedside. No needs voiced at this time. 1800 Rounds. Patient awake in bed verbalizing huger. Swabbed mouth for comfort. Will cont to monitor 1900 shift report with off going RN Bedside and Verbal shift change report given to Onelia Paul (oncoming nurse) by Cesar Olivares RN (offgoing nurse). Report included the following information SBAR, Kardex, Procedure Summary, Intake/Output, MAR, Accordion and Recent Results.

## 2018-11-24 NOTE — PROGRESS NOTES
Follow up visit with Mr. Sheryn Seip, no family present. Mr. Sheryn Seip appeared to be doing something specific with his hands though nothing was in his hands. Provided words of comfort at which he distinctly said thank you. He appeared to nod his head at the suggestion of prayer. Provided spiritual presence and assurance of prayer. Visited by: Gunnar Jacobsen MS., [address] ([id number])

## 2018-11-25 NOTE — PROGRESS NOTES
Robert responded to Code S call. Pt VAN negative. Orders in for CTA from Dr. Vera Starr CTA. [facility] ICU RN taking the patient to CT.

## 2018-11-25 NOTE — ACP (ADVANCE CARE PLANNING)
Advance Care Planning Note Name: Nikki Dasilva YOB: 1940 MRN: [id number] Admission Date: 11/18/2018  3:35 PM  
 
Date of discussion:  11/25/2018 Active Diagnoses: Active Problems: 
  Failure to thrive (0-17) (9/19/2015) HTN (hypertension) (9/19/2015) Dementia (9/20/2015) Dysphagia (9/21/2015) Debility (9/21/2015) Parkinson disease ([facility] [address] (2/25/2018) Type 2 diabetes mellitus without complication ([facility] [address] (2/14/6217) Acute encephalopathy (11/18/2018) Anxiety and depression () Age-related physical debility () Malnutrition ([facility] [address] () Weight loss (11/18/2018) These active diagnoses are of sufficient risk that focused discussion on advance care planning is indicated in order to allow the patient to thoughtfully consider personal goals of care; and, if situations arise that prevent the ability to personally give input, to ensure appropriate representation of their personal desires for different levels and aggressiveness of care. Discussion:  
 
Persons present and participating in discussion: Kimmie Coello Discussion: Outlined patient risk of aspiration based upon speech evaluation and likely aspiration event at home leading to admission and aspiration event during hospitalization leading to ICU transfer. Son and daughter are very clear that they do not wish for patient to have feeding tubes. They requested that patient be allowed to take medications, namely Sinemet, with puree foods. Although withholding Sinemet will likely worsen Parkinsons, the risk of aspiration outweighs benefit of taking med po. Discussed that given risk of aspiration, patient could have a severe aspiration event that could even lead to death.   After extended discussion, son and daughter wish for continued restorative measures including treatment of aspiration pneumonia with abx but without resuscitation or feeding tubes. Based upon these wishes, recommendation was made to keep patient NPO and start TPN if needed for nutritional needs. If patient does not improve enough with these measures (48 hours or so) to be able to safely swallow, then a follow up discussion will be held to determine next steps ie comfort measures with comfort feeding or continued aggressive measures while NPO. Time Spent:  
 
Total time spent face-to-face in education and discussion: 20 minutes.   
  
 
  
 
Jessi Kaminski MD

## 2018-11-25 NOTE — PROGRESS NOTES
1900: Bedside and Verbal shift change report given to Shakir Zhong RN (oncoming nurse) by Yvonne Gutierrez RN (offgoing nurse). Report included the following information SBAR, Kardex, Procedure Summary, Intake/Output, MAR, Recent Results and Cardiac Rhythm NSR . 2000: Patient assessed, see flowsheet. Patient restless in bed, pulling at call bell cord. Incontinent care performed. VSS, afebrile, no s/s pain. Will continue to monitor. 0000: Patient resting comfortably in bed sleeping, no new changed at this time, no s/s of pain. Will continue to monitor. 0200: Patient asleep in bed.  
 
0400: Patient reassessment noted, no new changed, /74, HR 85, O2 100 on 2 liters NC, no s/s of pain. Will continue to monitor. 0700: Bedside and Verbal shift change report given to Yvonne Gutierrez RN (oncoming nurse) by Shakir Zhong RN (offgoing nurse). Report included the following information SBAR, Kardex, Procedure Summary, Intake/Output, MAR, Recent Results and Cardiac Rhythm NSR .

## 2018-11-25 NOTE — ROUTINE PROCESS
0700: Bedside and Verbal shift change report given to Feliberto Gowers RN (oncoming nurse) by Petra Sandifer RN (offgoing nurse).  Report included the following information SBAR, Kardex, Procedure Summary, Intake/Output, MAR, Recent Results and Cardiac Rhythm NSR

## 2018-11-25 NOTE — PROGRESS NOTES
0700 Shift report. Patient awake in bed. Denies pain. No other needs voiced at this time 0800  Scheduled meds. BG checked 123. Patient awake in bed clean and dry. Will cont to monitor. 0900 Rounds. Patient sleeping. Arouses to voice. No needs noted. Will cont to monitor. 1030 Patient sleeping. Arouses to voice. No needs noted at this time. Patient refuses mouth swab 
 
1150 BG checked. No other needs noted at this time. [address] MD Kimberly Dozier at bedside to assess, calls daughter to discuss plan of care. [address] calls code S on patient. Charge RN Meri Velasquez responds. BG checked patient VAN - we travel to CT.  
 
1303 Arrive back from CT. Settled patient back into bed. 
 
1347 MD Kimberly Dozier agrees its okay to remove Tele neuro machine from patient's room. States tele neurologist does not need to see patient. Will take patient to MRI non urgently per MD Kimberly Dozier. [address] [facility] Family at bedside. Made MD Aguilar aware. Patient requesting blanket. Obliged. 1516 Rounded on patient. Family remains at bedside. Provided ginger ale to patient's wife per request.  
 
4058 Called Daughter made her aware of transfer status. Called report to [facility] RN 
 
TRANSFER - OUT REPORT: 
 
Verbal report given to Krystle Tenorio RN(name) on Mariaelena Pimentel  being transferred to [facility](unit) for routine progression of care Report consisted of patients Situation, Background, Assessment and  
Recommendations(SBAR). Information from the following report(s) SBAR, Kardex, Intake/Output, MAR, Accordion, Recent Results and Cardiac Rhythm NSR was reviewed with the receiving nurse. Lines:  
Peripheral IV 11/20/18 Anterior; Inferior; Lower;Right Arm (Active) Site Assessment Clean, dry, & intact 11/25/2018  4:00 PM  
Phlebitis Assessment 0 11/25/2018  4:00 PM  
Infiltration Assessment 0 11/25/2018  4:00 PM  
Dressing Status Clean, dry, & intact 11/25/2018  4:00 PM  
Dressing Type Transparent;Tape 11/25/2018  4:00 PM  
 Hub Color/Line Status Blue; Infusing 11/25/2018  4:00 PM  
Action Taken Open ports on tubing capped 11/25/2018  4:00 PM  
Alcohol Cap Used Yes 11/25/2018  4:00 PM  
   
Peripheral IV 11/22/18 Anterior; Inferior;Left;Lower Arm (Active) Site Assessment Clean, dry, & intact 11/25/2018  4:00 PM  
Phlebitis Assessment 0 11/25/2018  4:00 PM  
Infiltration Assessment 0 11/25/2018  4:00 PM  
Dressing Status Clean, dry, & intact 11/25/2018  4:00 PM  
Dressing Type Transparent;Tape 11/25/2018  4:00 PM  
Hub Color/Line Status Pink;Capped 11/25/2018  4:00 PM  
Action Taken Open ports on tubing capped 11/25/2018  4:00 PM  
Alcohol Cap Used Yes 11/25/2018  4:00 PM  
  
 
Opportunity for questions and clarification was provided. Patient transported with: 
 Monitor O2 @ 2 liters Tech

## 2018-11-25 NOTE — PROGRESS NOTES
Medical Progress Note NAME: Mj Sanchez :  1940 MRM:  [id number] Date/Time: 2018  12:27 PM 
  
Assessment / Plan:  
 
Acute encephalopathy / Dementia / Anxiety and depression:  Patient seen by neurology this admission. Head CT and MRI brain without acute process. EEG w/o seizure activity. B12 on low end of normal.  This AM, patient will open eyes but not look to voice and aphasic, although did speak with RN earlier in the day. -- can resume b12 IM as outpatient 
-- check CT head along with CTA head / neck 
-- add ASA suppository if no bleed **1:59 PM 
Discussed with teleneurology. Recommends repeat MRI and EEG.   
Acute hypoxic respiratory failure:  Due to aspiration. Now improved and back to 2L NC. 
-- continue oxygen 
  
Aspiration pneumonia:  CT scan shows multilobar consolidation concerning for pneumonia on admission. CXR on  with improved left mid lung ASDZ but new DEVONTE ASDZ. Likely continued aspiration. At risk for further aspiration. -- continue zosyn 
-- keep NPO 
  
Parkinson disease / Dysphagia / Debility / Failure to thrive / Malnutrition / Weight loss:  Not safe for PO including meds as re-evaluated by speech therapy. Spoke with son and daughter this AM, they do not want a PEG tube for patient. They wish to feed despite aspiration risk. However, discussed that patient could die from aspiration. -- keep NPO 
-- family to be here at 2 PM to further discuss goals of care 
  
HTN (hypertension): -- hold po meds due to dysphagia 
  
Type 2 diabetes mellitus without complication: 
-- SSI alone 
  
Hyperlipidemia: 
-- hold statin 
  
**1:14 PM 
Patient reassessed post CT. CT head negative. Patient now says, \"good morning\" in response to good morning.  is equal.  Speech is slow. Subjective: Chief Complaint / ROS:  Unable to obtain due to dementia. Objective:  
 
 
Vitals: Last 24hrs VS reviewed since prior progress note. Most recent are: 
 
Visit Vitals /89 Pulse 69 Temp 98 °F (36.7 °C) Resp 12 Ht 5' 10\" (1.778 m) Wt 43.1 kg (95 lb 0.3 oz) SpO2 96% BMI 13.63 kg/m² SpO2 Readings from Last 6 Encounters:  
11/25/18 96% 08/27/18 97% 08/15/18 99% 07/01/18 96% 06/10/18 92% 04/11/18 96% O2 Flow Rate (L/min): 2 l/min Intake/Output Summary (Last 24 hours) at 11/25/2018 1227 Last data filed at 11/25/2018 1200 Gross per 24 hour Intake 1499.17 ml Output  Net 1499.17 ml Exam:  
 
Physical Exam: 
 
Gen:  thin, in no acute distress HEENT:  Pink conjunctivae, hearing intact to voice, moist mucous membranes Resp:  No accessory muscle use, left sided rales Card:  No murmurs, normal S1, S2 without thrills or peripheral edema Abd:  Soft, non-tender, non-distended, normoactive bowel sounds are present Neuro:  Opens eyes to sternal rub, but does not look to voice, resists movement of UE, non-verbal 
Psych:  somnolent Telemetry reviewed:   Sinus rhythm, PAC Medications Reviewed: (see below) Lab Data Reviewed: (see below) 
 
______________________________________________________________________ Medications:  
 
Current Facility-Administered Medications Medication Dose Route Frequency  iopamidol (ISOVUE-370) 76 % injection 100 mL  100 mL IntraVENous RAD ONCE  hydrALAZINE (APRESOLINE) 20 mg/mL injection 20 mg  20 mg IntraVENous Q6H PRN  
 enoxaparin (LOVENOX) injection 30 mg  30 mg SubCUTAneous Q24H  
 albuterol-ipratropium (DUO-NEB) 2.5 MG-0.5 MG/3 ML  3 mL Nebulization Q6H RT  
 piperacillin-tazobactam (ZOSYN) 3.375 g in 0.9% sodium chloride (MBP/ADV) 100 mL  3.375 g IntraVENous Q8H  
 dextrose 5 % - 0.45% NaCl infusion  50 mL/hr IntraVENous CONTINUOUS  
 acetaminophen (TYLENOL) suppository 650 mg  650 mg Rectal Q4H PRN  
 timolol (TIMOPTIC) 0.5 % ophthalmic solution 1 Drop  1 Drop Both Eyes BID  
  ondansetron (ZOFRAN) injection 4 mg  4 mg IntraVENous Q4H PRN  
 diphenhydrAMINE (BENADRYL) capsule 25 mg  25 mg Oral Q4H PRN  
 glucose chewable tablet 16 g  4 Tab Oral PRN  
 dextrose (D50W) injection syrg 12.5-25 g  25-50 mL IntraVENous PRN  
 glucagon (GLUCAGEN) injection 1 mg  1 mg IntraMUSCular PRN  
 insulin lispro (HUMALOG) injection   SubCUTAneous AC&HS Lab Review:  
 
Recent Labs  
  11/25/18 0438 11/23/18 
8727 WBC 9.4 12.6* HGB 13.9 13.8 HCT 41.3 42.4  204 Recent Labs  
  11/25/18 0438 11/23/18 
7449  140  
K 3.7 4.5  
 104 CO2 28 28 * 152* BUN 9 15 CREA 0.72 0.95  
CA 8.7 8.6 MG 1.9  --   
PHOS 2.8  --   
ALB 2.7*  --   
TBILI 1.4*  --   
SGOT 18  --   
ALT 12  --   
 
Lab Results Component Value Date/Time Glucose (POC) 130 (H) 11/25/2018 11:48 AM  
 Glucose (POC) 123 (H) 11/25/2018 07:54 AM  
 Glucose (POC) 134 (H) 11/24/2018 09:57 PM  
 Glucose (POC) 125 (H) 11/24/2018 04:33 PM  
 Glucose (POC) 136 (H) 11/24/2018 11:43 AM  
 
 
 
Total time spent with patient: 40 Minutes Care Plan discussed with: Nursing Staff, son/daughter by phone Discussed:  Care Plan Prophylaxis:  Lovenox Disposition:  SNF/LTC 
        
___________________________________________________ Attending Physician: Mat Dela Cruz MD

## 2018-11-25 NOTE — PROGRESS NOTES
Responded to Code Stroke in ICU. No family present. Mr Kamari Carey made eye contact though it is impossible to tell what if anything he understands. Previous notes indicate he has belief in God. Provided calming spiritual presence and assurance of prayer. Visited by: Kamala Goldman., MS., [address] ([id number])

## 2018-11-25 NOTE — PROGRESS NOTES
Problem: Pressure Injury - Risk of 
Goal: *Prevention of pressure injury Document Mamadou Scale and appropriate interventions in the flowsheet. Outcome: Progressing Towards Goal 
Pressure Injury Interventions: 
Sensory Interventions: Assess changes in LOC, Turn and reposition approx. every two hours (pillows and wedges if needed), Sit a 90-degree angle/use footstool if needed, Pressure redistribution bed/mattress (bed type), Pad between skin to skin, Monitor skin under medical devices, Minimize linen layers, Keep linens dry and wrinkle-free, Float heels Moisture Interventions: Absorbent underpads, Apply protective barrier, creams and emollients, Check for incontinence Q2 hours and as needed, Moisture barrier, Minimize layers, Maintain skin hydration (lotion/cream), Limit adult briefs Activity Interventions: Pressure redistribution bed/mattress(bed type) Mobility Interventions: Turn and reposition approx. every two hours(pillow and wedges), Pressure redistribution bed/mattress (bed type), HOB 30 degrees or less Nutrition Interventions: Document food/fluid/supplement intake Friction and Shear Interventions: Apply protective barrier, creams and emollients, Foam dressings/transparent film/skin sealants, HOB 30 degrees or less, Lift sheet, Lift team/patient mobility team, Minimize layers Patient turned q2h and repositioned with pillows, on turn team 
 
 
 
Problem: Falls - Risk of 
Goal: *Absence of Falls Document Teresa Landa Fall Risk and appropriate interventions in the flowsheet. Outcome: Progressing Towards Goal 
Fall Risk Interventions: 
Mobility Interventions: Communicate number of staff needed for ambulation/transfer, Bed/chair exit alarm Mentation Interventions: Adequate sleep, hydration, pain control, Bed/chair exit alarm, Door open when patient unattended, More frequent rounding, Reorient patient, Room close to nurse's station, Evaluate medications/consider consulting pharmacy Medication Interventions: Bed/chair exit alarm Elimination Interventions: Toileting schedule/hourly rounds, Bed/chair exit alarm History of Falls Interventions: Bed/chair exit alarm, Consult care management for discharge planning, Door open when patient unattended, Evaluate medications/consider consulting pharmacy, Room close to nurse's station, Utilize gait belt for transfer/ambulation, Investigate reason for fall

## 2018-11-26 NOTE — PROGRESS NOTES
Problem: Dysphagia (Adult) Goal: *Acute Goals and Plan of Care (Insert Text) Swallowing goals initiated 11-19-18: (weekly re-eval 11-26-18) 1) tolerate dysphagia 1, thins without oral holding or s/s aspiration by 11-23-18 -discontinued 2) work with family on educatino about dysphagia ? MBS by 12-3-18 Speech language pathology dysphagia treatment: WEEKLY REASSESSMENT Patient: Nydia Madden (36 y.o. male) Date: 11/26/2018 Diagnosis: Acute encephalopathy <principal problem not specified> Precautions: aspiration ASSESSMENT: 
Patient is alert, confused, restless. Not following commands and speech intelligibility is poor as usual.  He had no spontaneous swallows with PO: severe oral holding-had to suction material out of oral cavity. Severe secretion management issues- can palpate secretions in pharynx. Family and MD working on comfort care vs alternative nutrition. PEG is not recommended, as he has high risks for post prandial aspiration due to sedentary status and minimal swallow recovery potential.   
 
Patient's progression toward goals since last assessment: Patient has failed a PO trial in the last week. Moderate /moderate to severe oral-pharyngeal dysphagia with aspiration highly suspected. MBS in Feb showed moderate oral-pharyngeal dysphagia and suspect his level of dementia and dysphagia has worsened. Currently NPO. PLAN: 
Goals have been updated based on progression since last assessment. Patient continues to benefit from skilled intervention to address the above impairments. Continue to follow the patient 1 time a week to address goals. Recommendations and Planned Interventions: 
NPO. Patient and family to continue discussion about goals of care. He is currently not a candidate for MBS due to confusion, resistence for PO,oral  Care, agitation. MBS may be needed at some point to help convince family about severity of dysphagia. Discharge Recommendations: To Be Determined SUBJECTIVE:  
Patient trying to pull at clothes and lines and resistent oral care. . 
 
OBJECTIVE:  
Cognitive and Communication Status: 
Neurologic State: Alert, Confused(pulling at sheets, lines. resisting oral care and oral sx) Orientation Level: Unable to verbalize Cognition: (speech mostly unintelligible-baseline; not following directions-baseline) Perception: Appears intact Perseveration: No perseveration noted Safety/Judgement: Lack of insight into deficits Dysphagia Treatment: 
Oral Assessment: 
Oral Assessment Dentition: Upper & lower dentures(IN) 
P.O. Trials: 
Patient Position: upright in bed Vocal quality prior to P.O.: (wet. clearer after oral-and upper pharyngeal sx) Consistency Presented: Thin liquid(on toothette, on spoon) How Presented: SLP-fed/presented ORAL PHASE: 
Bolus Acceptance: Impaired(clenching lips for oral care. occasional opening for spoon) Bolus Formation/Control: Impaired(severe oral holding) Propulsion: Delayed (# of seconds) Oral Residue: Greater than 50% of bolus(throughtout oral cavity-had to sx to remove water and secretions) PHARYNGEAL PHASE:  
Initiation of Swallow: Absent Laryngeal Elevation: Weak(no spontaneous swallows in this sessiion. he is having severe secretion mangement issues) Aspiration Signs/Symptoms: Change vocal quality;Clear throat;Weak cough(wet cough. non productive to oral cavity) Pharyngeal Phase Characteristics: Suspected pharyngeal residue;Multiple swallows Exercises: 
Laryngeal Exercises: 
  
  
  
  
  
  
  
  
  
  
  
  
  
  
  
  
  
  
  
  
  
  
  
  
  
  
  
  
  
  
  
  
  
  
  
  
  
  
  
  
  
  
  
Pain: After treatment:  
[]                Patient left in no apparent distress sitting up in chair 
[x]                Patient left in no apparent distress in bed 
[]                Call bell left within reach [x]                Nursing notified 
[]                Caregiver present []                Bed alarm activated COMMUNICATION/EDUCATION:  
Patient was educated regarding His deficit(s) of DYSPHAGIA  as this relates to His diagnosis of ASPIRATION PNA. He demonstrated Guarded understanding as evidenced by Dementia. No family present. Daryn Landers The patients plan of care including recommendations, planned interventions, and recommended diet changes were discussed with: Registered Nurse and Physician. 
[]                Posted safety precautions in patient's room. Carolina Mendoza SLP Time Calculation: 10 mins

## 2018-11-26 NOTE — PROGRESS NOTES
0715 - Bedside and Verbal shift change report given to Chavez Foreman (oncoming nurse) by Tammi Leach (offgoing nurse). Report included the following information SBAR, Kardex, Intake/Output, MAR, Accordion, Recent Results and Cardiac Rhythm NSR.  
 
1200 - Instruction received from Comfort Bishop, to call him with any concerns and he will relay message to sister, Rufino Schreiber. Son's number is [phone] 
 
7261 - PICC ok to use. No need for XRAY to verify.  Per PICC team.

## 2018-11-26 NOTE — PROGRESS NOTES
TRANSFER - IN REPORT: 
 
Verbal report received from Charles Almonte (name) on Genie Chan  being received from ICU(unit) for routine progression of care Report consisted of patient Situation, Background, Assessment and  
Recommendations(SBAR). Information from the following report(s) SBAR, MAR and Recent Results was reviewed with the receiving nurse. Opportunity for questions and clarification was provided. Assessment completed upon patients arrival to unit and care assumed. Pt originally planned to go to room 322, but had to be moved to 326. Call to daughter, Harvinder Salamanca [phone], to inform that pt would now be in room 326. Daughter then asked to speak to charge nurse, Sung Taylor. Bedside and Verbal shift change report given to Aida Andrews (oncoming nurse) by Elin Larose (offgoing nurse). Report included the following information SBAR, MAR and Recent Results.

## 2018-11-26 NOTE — PROGRESS NOTES
Palliative Medicine Code Status: DNR Advance Care Planning: 
Advance Care Planning 11/18/2018 Patient's Healthcare Decision Maker is: Legal NOK Primary Decision Maker Name Berta Lozano and Anupam Marquez (third child's contact info not provided). [address]:  [phone] Primary Decision Maker Relationship to Patient Son/Daughter Confirm Advance Directive No MPOA or AMD (financial POA on file) Does the patient have other document types DDNR  
  
 
 
 
Patient / Family Encounter Documentation Participants (names): Son Bennett moser, Palliative Medicine (Dr. Son Captain, [address]) Narrative:  Met with son at bedside; pt rested throughout visit, flickered eyelids momentarily but did not otherwise interact, had bout of congested cough at end of visit. Extended family member had also been present but stepped out during discussion. Son stated he and his sister had spoken with physicians re: plan of care; pt is not safe for PO, family is not interested in peg tube; TPN to be initiated. Son stated they will determine the \"next steps\" after seeing how pt fares with TPN. Son is returning home to [place] this evening, plans to come back to [place] this Saturday 12/1, stated he is keeping his brother in [place] up to date. Psychosocial Issues Identified/ Resilience Factors: Son stated his sister Jocelynn Cordon a personality disorder,\" reports stress of pt's current medical issues has been challenging for her. Son indicated that interactions between himself and his and siblings have been amicable. Pt's wife was able to visit over the weekend. Goals of Care / Plan: Trial period of TPN per family discussion with Attending Physician over weekend. Son stated \"next steps\" would be discussed pending outcome of TPN trial but did not indicate what route family might consider. SW will continue to follow for support. Discussed with SLP. Thank you for including Palliative Medicine in the care of Mr. Domingo Romero. Stephanie [address], [facility] 
[phone])

## 2018-11-26 NOTE — PROGRESS NOTES
Medical Progress Note NAME: Nikki Dasilva :  1940 MRM:  [id number] Date/Time: 2018  9:45 AM 
  
Assessment / Plan:  
 
Acute encephalopathy / Dementia / Anxiety and depression:  Patient seen by neurology this admission. Head CT and MRI brain without acute process. EEG w/o seizure activity. B12 on low end of normal.  Had another episode of less responsiveness likely from waxing and waning encephalopathy. Repeat CT and CTA negative. EEG repeated with moderate to severe slowing. 
-- add ASA suppository -- MRI planned 
  
Acute hypoxic respiratory failure:  Due to aspiration. -- continue oxygen 
  
Aspiration pneumonia:  CT scan shows multilobar consolidation concerning for pneumonia on admission. CXR on  with improved left mid lung ASDZ but new DEVONTE ASDZ. -- continue zosyn 
-- keep NPO 
  
Parkinson disease / Dysphagia / Debility / Failure to thrive / Malnutrition / Weight loss:  Family is adamant against PEG. Discussed with speech therapy this AM and patient is not safe for PO or even MBS. Advised son on  that if patient does not improve in 24-48 hours that we will need to regroup and determine next steps as long term TPN is not a good solution. -- strict NPO 
-- PICC to start TPN 
  
HTN (hypertension): -- clonidine patch if needed or IV prn meds 
  
Type 2 diabetes mellitus without complication: 
-- SSI alone 
  
Hyperlipidemia: 
-- hold statin 
  
  
Subjective: Chief Complaint / ROS:  Unable to obtain due to dementia. Objective:  
 
 
Vitals:  
 
 
  
Last 24hrs VS reviewed since prior progress note. Most recent are: 
 
Visit Vitals /81 (BP 1 Location: Left arm, BP Patient Position: At rest) Pulse 68 Temp 98.3 °F (36.8 °C) Resp 14 Ht 5' 10\" (1.778 m) Wt 43.1 kg (95 lb 0.3 oz) SpO2 98% BMI 13.63 kg/m² SpO2 Readings from Last 6 Encounters:  
18 98% 18 97% 08/15/18 99% 18 96% 06/10/18 92% 18 96% O2 Flow Rate (L/min): 2 l/min Intake/Output Summary (Last 24 hours) at 11/26/2018 0945 Last data filed at 11/25/2018 1903 Gross per 24 hour Intake 652.5 ml Output  Net 652.5 ml Exam:  
 
Physical Exam: 
 
Gen:  thin, in no acute distress HEENT:  Pink conjunctivae, hearing intact to voice, moist mucous membranes Resp:  No accessory muscle use, coarse upper breath sounds Card:  No murmurs, normal S1, S2 without thrills or peripheral edema Abd:  Soft, non-tender, non-distended, normoactive bowel sounds are present Neuro:  Opens eyes to voice, resists movement of UE equally Psych:  somnolent Telemetry reviewed:   Sinus rhythm, PAC Medications Reviewed: (see below) Lab Data Reviewed: (see below) 
 
______________________________________________________________________ Medications:  
 
Current Facility-Administered Medications Medication Dose Route Frequency  hydrALAZINE (APRESOLINE) 20 mg/mL injection 20 mg  20 mg IntraVENous Q6H PRN  
 enoxaparin (LOVENOX) injection 30 mg  30 mg SubCUTAneous Q24H  
 albuterol-ipratropium (DUO-NEB) 2.5 MG-0.5 MG/3 ML  3 mL Nebulization Q6H RT  
 piperacillin-tazobactam (ZOSYN) 3.375 g in 0.9% sodium chloride (MBP/ADV) 100 mL  3.375 g IntraVENous Q8H  
 dextrose 5 % - 0.45% NaCl infusion  50 mL/hr IntraVENous CONTINUOUS  
 acetaminophen (TYLENOL) suppository 650 mg  650 mg Rectal Q4H PRN  
 timolol (TIMOPTIC) 0.5 % ophthalmic solution 1 Drop  1 Drop Both Eyes BID  ondansetron (ZOFRAN) injection 4 mg  4 mg IntraVENous Q4H PRN  
 diphenhydrAMINE (BENADRYL) capsule 25 mg  25 mg Oral Q4H PRN  
 glucose chewable tablet 16 g  4 Tab Oral PRN  
 dextrose (D50W) injection syrg 12.5-25 g  25-50 mL IntraVENous PRN  
 glucagon (GLUCAGEN) injection 1 mg  1 mg IntraMUSCular PRN  
 insulin lispro (HUMALOG) injection   SubCUTAneous AC&HS Lab Review:  
 
Recent Labs  
  11/25/18 
0030 WBC 9.4 HGB 13.9 HCT 41.3  Recent Labs  
  11/25/18 
6663   
K 3.7  CO2 28 * BUN 9  
CREA 0.72  
CA 8.7 MG 1.9 PHOS 2.8 ALB 2.7* TBILI 1.4* SGOT 18 ALT 12 Lab Results Component Value Date/Time Glucose (POC) 120 (H) 11/26/2018 06:32 AM  
 Glucose (POC) 128 (H) 11/25/2018 08:46 PM  
 Glucose (POC) 155 (H) 11/25/2018 05:06 PM  
 Glucose (POC) 126 (H) 11/25/2018 12:29 PM  
 Glucose (POC) 130 (H) 11/25/2018 11:48 AM  
 
 
 
Total time spent with patient: 25 Minutes Care Plan discussed with: Nursing Staff and speech Discussed:  Care Plan Prophylaxis:  Lovenox Disposition:  SNF/LTC 
        
___________________________________________________ Attending Physician: Lorin Gaviria MD

## 2018-11-26 NOTE — PROGRESS NOTES
Bedside and Verbal shift change report given to Rosamaria Vinson RN (oncoming nurse) by Ramiro Clinton (offgoing nurse). Report included the following information SBAR, Kardex, Accordion, Recent Results and Cardiac Rhythm NSR/PVCs. Pt lying in bed at shift change. Pt has wet cough and was suctioned by RN. Bedside and Verbal shift change report given to Daisy Jordan, [address] [place] [address] (oncoming nurse) by Rosamaria Vinson RN (offgoing nurse). Report included the following information SBAR, Kardex, Accordion, Recent Results and Cardiac Rhythm NSR, PVCs.

## 2018-11-26 NOTE — PROGRESS NOTES
NUTRITION 
 
RECOMMENDATIONS:  
 
1. TPN Rec's: Day 1:  D20 + 5% AA at 42ml/hr Day 2: D20 + 5% AA at 63ml/hr Day 3: D20 + 5% AA at goal rate of 75ml/hr with 20% lipids at 42ml/hr x 12 h 3x/week to provide 2016kcals, 90gm pro/day. 2. If appropriate for PO, Diet texture per SLP, recommend minimal nutritional restrictions given recent poor PO intake. 3. Ensure Enlive TID with meal, if able to have thin liquids. 4. Obtain current weight, last weight on file from August.  
 
ASSESSMENT:  
11/26:  Pt remains NPO per SLP rec's - determined not safe for PO.  NPO x 5 days now. Family is refusing PEG and does not want hospice at this time so PICC ordered to be placed for TPN today. Weight has significantly decreased since admission. Current weight indicates severely underweight per BMI of 13. Stage II PU to sacrum. Labs reviewed. Meds: humalog; D5 1/2 NaCl running at 50ml/hr (204kcals). BM noted 11/26.  
 
11/23: rapid response call for hypotension yesterday and acute respiratory failure, suspicions for recurrent aspiration events. Diet cancelled yesterday morning until speech therapy can re-evaluate. Per night nurse documentation, patient non-verbal and not tracking with eyes. Stage II pressure injury to sacral region noted, protein needs updated. D5 running @ 50ml/hr providing 204kcal/day. Last documented intake was 10% of tray on 11/21, with other reports of refusing meals. Per MD note, hospice and comfort care appropriate but family not ready for this transition. Recent -152-133, A1C 6.3 No new weight on file, will reorder. 11/19: MST referral due to weight loss and eating poorly. Admitted with AMS, hx of dementia, Parkinson's disease, diabetes.   Spoke with daughter on the phone who states his last weight taken was 132 lbs, through chart review this was in August.  She feels he has been losing eight, unsure of UBW.  She reports he eats well at home, \"regular consistency without signs of aspiration, but eats what he likes. \"  He does not use an ONS at home. Lives with his wife who has Alzheimer's and they have 24 hour aides at the house. Seen by SLP and dx with severe dyshaphia, Pureed diet recommended, RD changed to Diabetic Pureed. BMI indicates normal weight. RD added Ensure High Protein BID to supply an additional 320 kcals and 32 gms protein. Weight Metrics 11/24/2018 8/27/2018 8/15/2018 7/1/2018 6/10/2018 4/11/2018 2/25/2018 Weight 95 lb 0.3 oz 132 lb 132 lb 122 lb 122 lb 122 lb 143 lb BMI 13.63 kg/m2 21.31 kg/m2 21.31 kg/m2 19.69 kg/m2 19.69 kg/m2 19.69 kg/m2 20.52 kg/m2 Past Medical History:  
Diagnosis Date  Age-related physical debility  Anxiety and depression  Compression fracture of L3 lumbar vertebra (HCC)  Debilitated patient  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  Diabetes ([address]  Dysphagia dementia related  Hypertension  Malnutrition ([facility]  Parkinson disease ([facility] Diet: NPO Abd:   wdl         BM: 11/26 Skin Integrity: []Intact  [x]Other-stage II PI on sacrum Edema: [x]None []Other Nutritionally Significant Medications: [x] Reviewed & Includes: SSI, nivia-q Labs:   
Lab Results Component Value Date/Time Sodium 142 11/25/2018 04:38 AM  
 Potassium 3.7 11/25/2018 04:38 AM  
 Chloride 104 11/25/2018 04:38 AM  
 CO2 28 11/25/2018 04:38 AM  
 Anion gap 10 11/25/2018 04:38 AM  
 Glucose 144 (H) 11/25/2018 04:38 AM  
 BUN 9 11/25/2018 04:38 AM  
 Creatinine 0.72 11/25/2018 04:38 AM  
 Calcium 8.7 11/25/2018 04:38 AM  
 Magnesium 1.9 11/25/2018 04:38 AM  
 Phosphorus 2.8 11/25/2018 04:38 AM  
 Albumin 2.7 (L) 11/25/2018 04:38 AM  
 
 
Anthropometrics:  
Weight Source: Bed Height: 5' 10\" (177.8 cm), Body mass index is 13.63 kg/m².  
IBW : 75.3 kg (166 lb), % IBW (Calculated): 79.52 %,  ,   
 Wt Readings from Last 5 Encounters:  
11/24/18 43.1 kg (95 lb 0.3 oz) 08/27/18 59.9 kg (132 lb) 08/15/18 59.9 kg (132 lb) 07/01/18 55.3 kg (122 lb)  
06/10/18 55.3 kg (122 lb) Estimated Daily Nutrition Requirements:  
Weight Used: Other (comment)(weight from 8/27/18) Kcals: 1850 Kcals/day(to 2100) Based on:[place] Jeor(BMR x 1.2 + 250- 500) Protein: 72 g(-84g (1.2-1.4g/kg)) Fluid:1850 (1 mL/kcal) Carbohydrate: AT least 130 gms/day Education & Discharge Needs: 
 [] Pt discussed in ID rounds Nutrition related discharge needs addressed:  
  [x] Supplements (on d/c instruction &/or coupons provided)Will follow POC [] Tube Feedings  
  [] Education []No nutrition related discharge needs at this time Cultural, [employer] and ethnic food preferences identified  
 [x] None   [] Yes NUTRITION DIAGNOSIS:  
 
Swallowing difficulty related to Parkinson's  as evidenced by severe oral dysphagia and moderate pharyngeal dysphagia per SLP  
 
11/23: nutrition dx continues, PO intake has declined due to dysphagia and medical status 11/26: Nutrition dx continues; pt not safe for PO per SLP; now NPO Pt is at Nutrition Risk:  [x] No Nutrition Risk Identified:  [] 
 
RD INTERVENTION / PT GOALS:  
 
Food/Nutrient Delivery:  TPN Nutrition Education: ,   
Nutrition Counseling:   
Coordination of Care:   
 
Goal: Meet EEN's with PN/ EN/ PO in the next 1-3 days MONITORING & EVALUATION:  
Food/Nutrient Intake Outcomes: Protein intake, Liquid meal replacement, Total energy intake Physical Signs/Symptoms Outcomes: Weight/weight change, Electrolyte and renal profile, GI profile, Glucose profile, GI  
  
Previous Nutrition Goals: 
Previous Goal Met: No 
Previous Recommendations:     
Previous Recommendations Implemented: Mallory Cortez RD Pager [phone] Office [phone]

## 2018-11-26 NOTE — PROGRESS NOTES
Attempted mri, mri checklist was not complete, told RN I would do the checklist if patient was brought downstairs to mri, however the patient was unable to speak or answer questions. He was sent back to floor, asked RN to complete mri checklist or have family help with it so we can do the MRI.

## 2018-11-26 NOTE — PROGRESS NOTES
TPN Rec's: 
 
 Day 1:  D20 + 5% AA at 42ml/hr Day 2: D20 + 5% AA at 63ml/hr Day 3: D20 + 5% AA at goal rate of 75ml/hr with 20% lipids at 42ml/hr x 12 h 3x/week  
 
(Goal rate will provide 2016kcals, 90gm pro/day - sufficient to meet pt's EEN's). Joss Coon RD Pager: [phone]

## 2018-11-26 NOTE — PROGRESS NOTES
PICC Placement Note PRE-PROCEDURE VERIFICATION Correct Procedure: yes Correct Site:  yes Temperature: Temp: 98.3 °F (36.8 °C), Temperature Source: Temp Source: Oral 
Recent Labs  
  11/25/18 
7577 BUN 9  
CREA 0.72  WBC 9.4 Allergies: Seroquel [quetiapine]; Haldol [haloperidol lactate]; Lorazepam; and Morphine Education materials for PICC Care given: yes. See Patient Education activity for further details. PICC Booklet placed at bedside: yes Closed Ended PICC Catheters: 
Flush Lumens as Follows: 
Intermittent Medication:   Flush before and after each medication with 10 ml NS. Unused Ports:  Flush every 8 hours with 10 ml NS. 
TPN Ports:  Flush every 24 hours with 20 ml NS prior to hanging new bag. Blood Draws: Stop infusion, draw off and waste 10 ml of blood. Draw sample with 10cc syringe or greater. DO NOT USE VACUTAINER . Transfer with appropriate device to lab  tubes. Flush with 20 ml NS. Dressing Change:  Every 7 days, and PRN using sterile technique if integrity of dressing is compromised. Initial dressing change for central line 24-48 hours post insertion if gauze is used. Apply new dressing per policy. PROCEDURE DETAIL Consent was obtained and all questions were answered related to risks and benefits. A triple lumen PICC line was inserted, as a sterile procedure using ultrasound and modified Seldinger technique for TPN. The following documentation is in addition to the PICC properties in the lines/airways flowsheet : 
Lot #: [id number] Lidocaine 1% administered intradermally :yes Internal Catheter Total Length: 39 (cm) Vein Selection for PICC:right brachial 
Central Line Bundle followed yes Complication Related to Insertion:no The placement was verified by EKG, MAX P WAVE @ 39 (cm). PER EKG PICC TIP @ C/A junction. X-ray: no.  
Line is okay to use: yes Gloria Barrera RN

## 2018-11-26 NOTE — PROGRESS NOTES
11- CASE MANAGEMENT NOTE: 
I have reviewed the pt's EMR and will be available to assist as indicated.  ALEXEI Jenkins, CM

## 2018-11-26 NOTE — PROGRESS NOTES
Palliative Medicine Consult Patient Name: Bushra Gu YOB: 1940 Date of Initial Consult: 11/21/18 Reason for Consult: care decisions/end stage disease Requesting Provider: Dr. Mason Larkin Primary Care Physician: Sanford Kinney MD 
  
 SUMMARY:  
Bushra Gu is a 69 y.o. with a past history of dementia, parkinsons, debility, DM, dysphagia, who was admitted on 11/18/2018 from home with a diagnosis of pneumonia/respiratory distress. Current medical issues leading to Palliative Medicine involvement include: end stage disease. Chart reviewed/HPI-patient admitted with pneumonia, concerning for aspiration. Patient with parkinsons and dementia. He lives at home with his wife who has underlying dementia but they have 24 hour caregivers in the home. Patient has been declining related to his disease process but according to his son, so far this has worked well in the home setting SH-. Daughter lives locally and 2 sons- one lives in [place](just arrived) and 1 in [place](not involved), He needs assistance with most other ADLs PALLIATIVE DIAGNOSES:  
1. GOC discussion 2. Debility 3. Dementia 4. Aspiration 5. Parkinsons 6. FTT 7. Secretions PLAN:  
1. Yue([facility]) and I met with patient and son. Patient been moved out of the ICU and now started on TPN. Still issues with aspiration and strictly NPO. Son feels comfortable with understanding current medical issues. He knows that there is high chance this may not be successful but would like to make an attempt at the TPN first before changing goals 2. GOC-at this time, family wants to continue to attempt full restorative measures with abx and TPN. Son returning to [place] and then will return on Saturday. Our team will continue to follow closely as high risk of dcline 3.  AMD-never completed-paperwork in the chart is financial. His wife has dementia so technically, all 3 children would have equal say on medical decisions 4. Code status-DDNR signed by his wife in 2015. His wife would be the only one that could revoke the [facility] but given her dementia, that is not possible. DNAR already selected in EMR and we will continue to honor 5. Symptom management-patient is resting right now. No medications needed for symptoms at this time but if declines with SOB, would consider adding low dose opioid. 6. Psychosocial-son and daughter seem to be communicating on issues with their Dad. Spouse did visit over the weekend 7. Discussed with bedside nurse and Dr. Josy Simons 8. Initial consult note routed to primary continuity provider 9. Communicated plan of care with: Palliative IDT 
 
 
 GOALS OF CARE / TREATMENT PREFERENCES:  
[====Goals of Care====] GOALS OF CARE: 
Patient/Health Care Proxy Stated Goals: Other (comment)(continue attempts at full restorative measures) TREATMENT PREFERENCES:  
Code Status: DNR Advance Care Planning: 
Advance Care Planning 11/23/2018 Patient's Healthcare Decision Maker is: Legal Next of Kin Primary Decision Maker Name -  
Primary Decision Maker Relationship to Patient - Secondary Decision Maker Name - Secondary Decision Maker Relationship to Patient -  
Confirm Advance Directive None Patient Would Like to Complete Advance Directive Unable Does the patient have other document types Do Not Resuscitate; Power of [employer] Medical Interventions: Limited additional interventions Other Instructions: The palliative care team has discussed with patient / health care proxy about goals of care / treatment preferences for patient. 
[====Goals of Care====] HISTORY:  
 
History obtained from: chart, son, daughter, bedside nurse CHIEF COMPLAINT: confusion HPI/SUBJECTIVE: The patient is:  
[x] Verbal and participatory [] Non-participatory due to:  
Patient talks very little. Audible secretions 11/23-patient is resting. Breathing not labored at this time 11/26-patient resting most of the time. Did open his eyes to son and apparently spoke a few words to him Clinical Pain Assessment (nonverbal scale for severity on nonverbal patients):  
Clinical Pain Assessment Severity: 0 Adult Nonverbal Pain Scale Face: No particular expression or smile Activity (Movement): Lying quietly, normal position Guarding: Lying quietly, no positioning of hands over areas of body Physiology (Vital Signs): Stable vital signs Respiratory: Baseline RR/SpO2 compliant with ventilator Total Score: 0 
 
 
 FUNCTIONAL ASSESSMENT:  
 
Palliative Performance Scale (PPS): PPS: 40 PSYCHOSOCIAL/SPIRITUAL SCREENING:  
 
Advance Care Planning: 
Advance Care Planning 11/23/2018 Patient's Healthcare Decision Maker is: Legal Next of Kin Primary Decision Maker Name -  
Primary Decision Maker Relationship to Patient - Secondary Decision Maker Name - Secondary Decision Maker Relationship to Patient -  
Confirm Advance Directive None Patient Would Like to Complete Advance Directive Unable Does the patient have other document types Do Not Resuscitate; Power of [employer] Any spiritual / Yazidi concerns: 
[] Yes /  [x] No 
 
Caregiver Burnout: 
[] Yes /  [x] No /  [] No Caregiver Present Anticipatory grief assessment:  
[x] Normal  / [] Maladaptive ESAS Anxiety: Anxiety: 0 
 
ESAS Depression: Depression: 0 REVIEW OF SYSTEMS:  
 
Positive and pertinent negative findings in ROS are noted above in HPI. The following systems were [x] reviewed / [] unable to be reviewed as noted in HPI Other findings are noted below. Systems: constitutional, ears/nose/mouth/throat, respiratory, gastrointestinal, genitourinary, musculoskeletal, integumentary, neurologic, psychiatric, endocrine. Positive findings noted below. Modified ESAS Completed by: provider Fatigue: 3 Drowsiness: 2 Depression: 0 Pain: 0 Anxiety: 0 Nausea: 0 Anorexia: 0 Dyspnea: 5 Constipation: No  
  Stool Occurrence(s): 1 PHYSICAL EXAM:  
 
From RN flowsheet: 
Wt Readings from Last 3 Encounters:  
11/24/18 95 lb 0.3 oz (43.1 kg) 08/27/18 132 lb (59.9 kg) 08/15/18 132 lb (59.9 kg) Blood pressure 149/81, pulse 63, temperature 98.3 °F (36.8 °C), resp. rate 14, height 5' 10\" (1.778 m), weight 95 lb 0.3 oz (43.1 kg), SpO2 97 %. Pain Scale 1: Visual 
Pain Intensity 1: 0 Last bowel movement, if known:  
 
Constitutional: ill appearing, not alert, resting Eyes: pupils equal, anicteric ENMT: no nasal discharge, moist mucous membranes Cardiovascular: regular rhythm, distal pulses intact Respiratory: breathing mildly labored, symmetric, secretions noted Gastrointestinal: soft non-tender, +bowel sounds Musculoskeletal: no deformity, no tenderness to palpation Skin: warm, dry Neurologic: not following commands, moving all extremities Psychiatric: sleeping Other: 
 
 
 HISTORY:  
 
Active Problems: 
  Failure to thrive (0-17) (9/19/2015) HTN (hypertension) (9/19/2015) Dementia (9/20/2015) Dysphagia (9/21/2015) Debility (9/21/2015) Parkinson disease ([address] (2/25/2018) Type 2 diabetes mellitus without complication ([address] (9/45/1511) Acute encephalopathy (11/18/2018) Anxiety and depression () Age-related physical debility () Malnutrition ([address] () Weight loss (11/18/2018) Past Medical History:  
Diagnosis Date  Age-related physical debility  Anxiety and depression  Compression fracture of L3 lumbar vertebra (HCC)  Debilitated patient  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  Diabetes ([address]  Dysphagia dementia related  Hypertension  Malnutrition ([address]  Parkinson disease ([address] Past Surgical History:  
Procedure Laterality Date  HX HEENT Family History Problem Relation Age of Onset  Stroke Father History reviewed, no pertinent family history. Social History Tobacco Use  Smoking status: Never Smoker  Smokeless tobacco: Never Used Substance Use Topics  Alcohol use: No  
 
Allergies Allergen Reactions  Seroquel [Quetiapine] Anaphylaxis  Haldol [Haloperidol Lactate] Other (comments) \"Comatose state\"  Lorazepam Other (comments) Alternated mental status to benzos per family,  Morphine Other (comments) Change in mental status Current Facility-Administered Medications Medication Dose Route Frequency  aspirin (ASA) suppository 300 mg  300 mg Rectal DAILY  hydrALAZINE (APRESOLINE) 20 mg/mL injection 10 mg  10 mg IntraVENous Q6H PRN  
 TPN ADULT - CENTRAL AA 5% D20% W/ ELECTROLYTES AND CA   IntraVENous CONTINUOUS  
 enoxaparin (LOVENOX) injection 30 mg  30 mg SubCUTAneous Q24H  
 albuterol-ipratropium (DUO-NEB) 2.5 MG-0.5 MG/3 ML  3 mL Nebulization Q6H RT  
 piperacillin-tazobactam (ZOSYN) 3.375 g in 0.9% sodium chloride (MBP/ADV) 100 mL  3.375 g IntraVENous Q8H  
 dextrose 5 % - 0.45% NaCl infusion  50 mL/hr IntraVENous CONTINUOUS  
 acetaminophen (TYLENOL) suppository 650 mg  650 mg Rectal Q4H PRN  
 timolol (TIMOPTIC) 0.5 % ophthalmic solution 1 Drop  1 Drop Both Eyes BID  ondansetron (ZOFRAN) injection 4 mg  4 mg IntraVENous Q4H PRN  
 diphenhydrAMINE (BENADRYL) capsule 25 mg  25 mg Oral Q4H PRN  
 glucose chewable tablet 16 g  4 Tab Oral PRN  
 dextrose (D50W) injection syrg 12.5-25 g  25-50 mL IntraVENous PRN  
 glucagon (GLUCAGEN) injection 1 mg  1 mg IntraMUSCular PRN  
 insulin lispro (HUMALOG) injection   SubCUTAneous AC&HS  
 
 
 
 LAB AND IMAGING FINDINGS:  
 
Lab Results Component Value Date/Time WBC 9.4 11/25/2018 04:38 AM  
 HGB 13.9 11/25/2018 04:38 AM  
 PLATELET 795 38/59/8945 04:38 AM  
 
Lab Results Component Value Date/Time  Sodium 142 11/25/2018 04:38 AM  
 Potassium 3.7 11/25/2018 04:38 AM  
 Chloride 104 11/25/2018 04:38 AM  
 CO2 28 11/25/2018 04:38 AM  
 BUN 9 11/25/2018 04:38 AM  
 Creatinine 0.72 11/25/2018 04:38 AM  
 Calcium 8.7 11/25/2018 04:38 AM  
 Magnesium 1.9 11/25/2018 04:38 AM  
 Phosphorus 2.8 11/25/2018 04:38 AM  
  
Lab Results Component Value Date/Time AST (SGOT) 18 11/25/2018 04:38 AM  
 Alk. phosphatase 66 11/25/2018 04:38 AM  
 Protein, total 6.6 11/25/2018 04:38 AM  
 Albumin 2.7 (L) 11/25/2018 04:38 AM  
 Globulin 3.9 11/25/2018 04:38 AM  
 
Lab Results Component Value Date/Time INR 1.1 02/25/2018 08:12 PM  
 Prothrombin time 11.0 02/25/2018 08:12 PM  
 aPTT 26.3 10/30/2015 01:50 AM  
  
No results found for: IRON, FE, TIBC, IBCT, PSAT, FERR No results found for: PH, PCO2, PO2 No components found for: [place] No results found for: CPK, CKMB Total time: 35 
Counseling / coordination time, spent as noted above: 30 
> 50% counseling / coordination?: yes Prolonged service was provided for  []30 min   []75 min in face to face time in the presence of the patient, spent as noted above. Time Start:  
Time End:  
Note: this can only be billed with 92968 (initial) or 85370 (follow up). If multiple start / stop times, list each separately.

## 2018-11-26 NOTE — PROGRESS NOTES
Consult for TPN Dosing per Pharmacy by Dr. Eduar Gallegos Consult provided for this 69 y.o. male, for indication of NPO x 5 days Day of Therapy 1 Ht: 177.8 cm (70\")           Weight 43.1 kg Labs: 
BMP BMP: No results found for: NA, K, CL, CO2, AGAP, GLU, BUN, CREA, GFRAA, GFRNA  
   
CMP CMP: No results found for: NA, K, CL, CO2, AGAP, GLU, BUN, CREA, GFRAA, GFRNA, CA, MG, PHOS, ALB, TBIL, TP, ALB, GLOB, AGRAT, SGOT, ALT, GPT Ca/ Phos Lab Results Component Value Date/Time Calcium 8.7 11/25/2018 04:38 AM  
 Phosphorus 2.8 11/25/2018 04:38 AM  
   
Mag Lab Results Component Value Date/Time Magnesium 1.9 11/25/2018 04:38 AM  
  
Tg No components found for: TGL Albumin Lab Results Component Value Date/Time Protein, total 6.6 11/25/2018 04:38 AM  
 Albumin 2.7 (L) 11/25/2018 04:38 AM  
   
Dietician recs 11/26:  Pt remains NPO per SLP rec's - determined not safe for PO.  NPO x 5 days now. Family is refusing PEG and does not want hospice at this time so PICC ordered to be placed for TPN today. Weight has significantly decreased since admission. Current weight indicates severely underweight per BMI of 13.  
 
1. TPN Rec's: Day 1:  D20 + 5% AA at 42ml/hr Day 2: D20 + 5% AA at 63ml/hr Day 3: D20 + 5% AA at goal rate of 75ml/hr with 20% lipids at 42ml/hr x 12 h 3x/week to provide 2016kcals, 90gm pro/day. TPN  to be initiated at ½ goal macronutrients and titrated to goal per patient tolerance. Electrolytes to be monitored and adjusted daily. MD to replete electrolytes acutely outside of TPN as needed. Additional recommendations/Orders:  
1. Corrective insulin coverage q6h while on TPN. 2. Change/decrease IVF to while on TPN-MD to further adjust as needed 3. BMP, Ca, Mag, Phos ordered daily 4. Triglycerides, Prealbumin, CMP ordered upon initiation and weekly Pharmacy to follow daily and will make changes based on labs/clinical status.

## 2018-11-26 NOTE — CDMP QUERY
Please clarify & documentation the Present on Admission (POA) status for:  
 
=> Pressure ulcer of sacral region, stage 3 POA 
=> Pressure ulcer of sacral region, stage 3 not POA 
=> Other explanation of clinical findings  
=> Clinically Undetermined (no explanation for clinical findings) The medical record reflects the following clinical findings, treatment, and risk factors. Risk Factors:  Debility, Malnutrition Clinical Indicators:   
11/23 WOCN Assessment All skin folds and bony prominences assessed, turned with staff assistance. Incontinent of urine and stool- skin is thin and fragile Sacral pressure injury- 1x0.5x0.2 cm full thickness pink base- stage 3 pressure injury. Cadence wound skin intact with scattered areas of scarring and hypopigmentation- old skin injuries. Heels and elbows intact. Treatment:  
WOCN consult Incontinent care given- assisted staff to place condom cath Repositioned in bed- prevalon boots applied Mepilex to sacral wound Low air loss surface ordered for patient. Please clarify and document your clinical opinion in the progress notes and discharge summary including the definitive and/or presumptive diagnosis, (suspected or probable), related to the above clinical findings. Please include clinical findings supporting your diagnosis. Thank you Cash Brandt  
[facility] 
[phone]

## 2018-11-27 NOTE — PROGRESS NOTES
Bedside and Verbal shift change report given to Jarod Hilario RN (oncoming nurse) by Darrin Kenyon RN (offgoing nurse). Report included the following information SBAR, Kardex, Intake/Output, MAR and Recent Results.

## 2018-11-27 NOTE — PROGRESS NOTES
Problem: Dysphagia (Adult) Goal: *Acute Goals and Plan of Care (Insert Text) Swallowing goals initiated 11-19-18: (weekly re-eval 11-26-18) 1) tolerate dysphagia 1, thins without oral holding or s/s aspiration by 11-23-18 -discontinued 2) work with family on educatino about dysphagia ? MBS by 12-3-18 Speech language pathology dysphagia treatment Patient: Keo Singh (41 y.o. male) Date: 11/27/2018 Diagnosis: Acute encephalopathy <principal problem not specified> Precautions: aspiration ASSESSMENT: 
Patient not alert or responsive enough for MBS yet. STill with secretion management issues. Progression toward goals: 
[]         Improving appropriately and progressing toward goals 
[]         Improving slowly and progressing toward goals [x]         Not making progress toward goals and plan of care will be adjusted PLAN: 
Recommendations and Planned Interventions: MBS when alert. He has TPN Patient continues to benefit from skilled intervention to address the above impairments. Continue treatment per established plan of care. Discharge Recommendations:  hospice SUBJECTIVE:  
Patient talked. His speech was clear, but empty  \"I just.. It's ok\". OBJECTIVE:  
Cognitive and Communication Status: 
Neurologic State: Confused, Drowsy Orientation Level: Unable to verbalize Cognition: No command following Perception: Appears intact Perseveration: No perseveration noted Safety/Judgement: Lack of insight into deficits Dysphagia Treatment: 
Oral Assessment: P.O. Trials:none. Attempted TO PLACE spoon in mouth. He opened mouth, but made no attempt to move tongue. Vocal quality prior to P.O.:   
 clear after suction. Patient congested on secretions. Attempted suction. He was resistant. Nonproductive. Exercises: 
Laryngeal Exercises: Pain: 
Pain Scale 1: Adult Nonverbal Pain Scale Pain Intensity 1: 0 After treatment:  
[]              Patient left in no apparent distress sitting up in chair 
[]              Patient left in no apparent distress in bed 
[]              Call bell left within reach 
[]              Nursing notified 
[]              Caregiver present 
[]              Bed alarm activated COMMUNICATION/EDUCATION:  
Patient was educated regarding His deficit(s) of dysphagia  as this relates to His diagnosis of dementia with PNA. He demonstrated Guarded understanding as evidenced by dementia. . 
 
The patients plan of care including recommendations, planned interventions, and recommended diet changes were discussed with: Registered Nurse and Physician. 
[]              Posted safety precautions in patient's room. MADDY Ro Time Calculation: 10 mins

## 2018-11-27 NOTE — PROGRESS NOTES
Bedside and Verbal shift change report given to Sirisha [address] (oncoming nurse) by Alfredito Yates (offgoing nurse). Report included the following information SBAR, Kardex, Intake/Output, Accordion and Recent Results. 0800: pt able to verbalize this am 
 
0945: spoke with son Nabil Tai on phone and gave him update/answered all questions 1300: spoke with malick denis on phone and received verbal consent for mri Bedside and Verbal shift change report given to NICHO nielson (oncoming nurse) by Madisyn Hassan (offgoing nurse). Report included the following information SBAR, Kardex, Intake/Output, Accordion and Recent Results.

## 2018-11-27 NOTE — PROGRESS NOTES
Problem: Falls - Risk of 
Goal: *Absence of Falls Document Judi Bob Fall Risk and appropriate interventions in the flowsheet. Outcome: Progressing Towards Goal 
Fall Risk Interventions: 
Mobility Interventions: Assess mobility with egress test, Bed/chair exit alarm, PT Consult for mobility concerns Mentation Interventions: Adequate sleep, hydration, pain control, Bed/chair exit alarm, Door open when patient unattended Medication Interventions: Assess postural VS orthostatic hypotension Elimination Interventions: Bed/chair exit alarm, Toileting schedule/hourly rounds History of Falls Interventions: Bed/chair exit alarm, Room close to nurse's station

## 2018-11-27 NOTE — PROGRESS NOTES
Medical Progress Note NAME: Lisa Caldera :  1940 MRM:  [id number] Date/Time: 2018  11:18 AM 
  
Assessment / Plan:  
 
Acute encephalopathy / Dementia / Anxiety and depression:  Patient seen by neurology this admission. Head CT and MRI brain without acute process. EEG w/o seizure activity. B12 on low end of normal.  Had another episode of less responsiveness likely from waxing and waning encephalopathy. Repeat CT and CTA negative. EEG repeated with moderate to severe slowing. 
-- continue ASA suppository -- MRI planned 
  
Acute hypoxic respiratory failure:  Due to aspiration. -- continue oxygen 
  
Aspiration pneumonia:  CT scan shows multilobar consolidation concerning for pneumonia on admission. CXR on  with improved left mid lung ASDZ but new DEVONTE ASDZ. -- continue zosyn 
-- keep strict NPO 
  
Parkinson disease / Dysphagia / Debility / Failure to thrive / Malnutrition / Weight loss:  Family is adamant against PEG. Discussed with speech therapy and patient is not safe for PO or even MBS. Advised son on  that if patient does not improve in 24-48 hours that we will need to regroup and determine next steps as long term TPN is not a good long term solution. -- strict NPO 
-- continue TPN for now -- speech following 
  
HTN (hypertension): -- start clonidine patch 
  
Type 2 diabetes mellitus without complication: 
-- SSI alone 
  
Hyperlipidemia: 
-- hold statin 
  
  
Subjective: Chief Complaint / ROS:  \"Hi\" Unable to obtain additional hx or ROS. Objective:  
 
 
Vitals:  
 
 
  
Last 24hrs VS reviewed since prior progress note. Most recent are: 
 
Visit Vitals BP (!) 166/95 (BP 1 Location: Left arm, BP Patient Position: At rest) Pulse 70 Temp 98 °F (36.7 °C) Resp 20 Ht 5' 10\" (1.778 m) Wt 42 kg (92 lb 9.5 oz) SpO2 98% BMI 13.29 kg/m² SpO2 Readings from Last 6 Encounters:  
18 98% 18 97% 08/15/18 99% 18 96% 06/10/18 92% 04/11/18 96% O2 Flow Rate (L/min): 2 l/min Intake/Output Summary (Last 24 hours) at 11/27/2018 1118 Last data filed at 11/27/2018 0700 Gross per 24 hour Intake 1989.5 ml Output  Net 1989.5 ml Exam:  
 
Physical Exam: 
 
Gen:  thin, in no acute distress HEENT:  Pink conjunctivae, hearing intact to voice, moist mucous membranes Resp:  No accessory muscle use, coarse upper breath sounds Card:  No murmurs, normal S1, S2 without thrills or peripheral edema Abd:  Soft, non-tender, non-distended, normoactive bowel sounds are present Neuro:  Eyes open, moves both UE equally Psych:  lethargic Telemetry reviewed:   Sinus rhythm, PAC Medications Reviewed: (see below) Lab Data Reviewed: (see below) 
 
______________________________________________________________________ Medications:  
 
Current Facility-Administered Medications Medication Dose Route Frequency  aspirin (ASA) suppository 300 mg  300 mg Rectal DAILY  hydrALAZINE (APRESOLINE) 20 mg/mL injection 10 mg  10 mg IntraVENous Q6H PRN  
 TPN ADULT - CENTRAL AA 5% D20% W/ ELECTROLYTES AND CA   IntraVENous CONTINUOUS  
 sodium chloride (NS) flush 10-30 mL  10-30 mL InterCATHeter PRN  
 sodium chloride (NS) flush 10 mL  10 mL InterCATHeter Q24H  
 sodium chloride (NS) flush 10 mL  10 mL InterCATHeter PRN  
 sodium chloride (NS) flush 10-40 mL  10-40 mL InterCATHeter Q8H  
 sodium chloride (NS) flush 20 mL  20 mL InterCATHeter Q24H  
 alteplase (CATHFLO) 1 mg in sterile water (preservative free) 1 mL injection  1 mg InterCATHeter PRN  
 enoxaparin (LOVENOX) injection 30 mg  30 mg SubCUTAneous Q24H  
 albuterol-ipratropium (DUO-NEB) 2.5 MG-0.5 MG/3 ML  3 mL Nebulization Q6H RT  
 piperacillin-tazobactam (ZOSYN) 3.375 g in 0.9% sodium chloride (MBP/ADV) 100 mL  3.375 g IntraVENous Q8H  
 dextrose 5 % - 0.45% NaCl infusion  50 mL/hr IntraVENous CONTINUOUS  
  acetaminophen (TYLENOL) suppository 650 mg  650 mg Rectal Q4H PRN  
 timolol (TIMOPTIC) 0.5 % ophthalmic solution 1 Drop  1 Drop Both Eyes BID  ondansetron (ZOFRAN) injection 4 mg  4 mg IntraVENous Q4H PRN  
 diphenhydrAMINE (BENADRYL) capsule 25 mg  25 mg Oral Q4H PRN  
 glucose chewable tablet 16 g  4 Tab Oral PRN  
 dextrose (D50W) injection syrg 12.5-25 g  25-50 mL IntraVENous PRN  
 glucagon (GLUCAGEN) injection 1 mg  1 mg IntraMUSCular PRN  
 insulin lispro (HUMALOG) injection   SubCUTAneous AC&HS Lab Review:  
 
Recent Labs  
  11/25/18 
8278 WBC 9.4 HGB 13.9 HCT 41.3  Recent Labs  
  11/26/18 
1627 11/25/18 
6588  142  
K 3.2* 3.7  104 CO2 29 28 * 144* BUN 5* 9  
CREA 0.66* 0.72  
CA 8.3* 8.7 MG 1.8 1.9 PHOS 2.7 2.8 ALB  --  2.7* TBILI  --  1.4* SGOT  --  18 ALT  --  12 Lab Results Component Value Date/Time Glucose (POC) 172 (H) 11/27/2018 06:39 AM  
 Glucose (POC) 155 (H) 11/26/2018 08:40 PM  
 Glucose (POC) 122 (H) 11/26/2018 04:09 PM  
 Glucose (POC) 121 (H) 11/26/2018 10:44 AM  
 Glucose (POC) 120 (H) 11/26/2018 06:32 AM  
 
 
 
Total time spent with patient: 15 Minutes Care Plan discussed with: Nursing Staff and speech Discussed:  Care Plan Prophylaxis:  Lovenox Disposition:  SNF/LTC 
        
___________________________________________________ Attending Physician: Jaswant Melchor MD

## 2018-11-27 NOTE — PROGRESS NOTES
Palliative Medicine Consult Patient Name: Mariaelena Pimentel YOB: 1940 Date of Initial Consult: 11/21/18 Reason for Consult: care decisions/end stage disease Requesting Provider: Dr. Surya Mobley Primary Care Physician: Zander Enriquez MD 
  
 SUMMARY:  
Mariaelena Pimentel is a 69 y.o. with a past history of dementia, parkinsons, debility, DM, dysphagia, who was admitted on 11/18/2018 from home with a diagnosis of pneumonia/respiratory distress. Current medical issues leading to Palliative Medicine involvement include: end stage disease. Chart reviewed/HPI-patient admitted with pneumonia, concerning for aspiration. Patient with parkinsons and dementia. He lives at home with his wife who has underlying dementia but they have 24 hour caregivers in the home. Patient has been declining related to his disease process but according to his son, so far this has worked well in the home setting SH-. Daughter lives locally and 2 sons- one lives in [place](just arrived) and 1 in [place](not involved), He needs assistance with most other ADLs PALLIATIVE DIAGNOSES:  
1. GOC discussion 2. Debility 3. Dementia 4. Aspiration 5. Parkinsons 6. FTT 7. Secretions PLAN:  
1. Yue([facility]) and I met with patient briefly. He did not arouse for us but has been responding to others per the chart. Remains on TPN and plan is to continue to several days to see if gains any strength to be able to swallow better. Difficult situation as family does not want a PEG(which I agree with) but want to attempt TPN/allow to eat if possible. Talked with Dr. Kimberly Dozier and we both agree prognosis is very poor and this is unlikely to be successful. Our team will continue to follow 2. GOC-at this time, family wants to continue to attempt full restorative measures with abx and TPN. Son returned to [place] on 11/26 and then will return on Saturday. Our team will continue to follow closely as high risk of dcline 3. AMD-never completed-paperwork in the chart is financial. His wife has dementia so technically, all 3 children would have equal say on medical decisions 4. Code status-DDNR signed by his wife in 2015. His wife would be the only one that could revoke the [facility] but given her dementia, that is not possible. DNAR already selected in EMR and we will continue to honor 5. Symptom management-patient is resting right now. No medications needed for symptoms at this time but if declines with SOB, would consider adding low dose opioid. 6. Psychosocial-son and daughter seem to be communicating on issues with their Dad. Spouse did visit over the weekend 7. Discussed with bedside nurse and Dr. Moses Tiwari 8. Initial consult note routed to primary continuity provider 9. Communicated plan of care with: Palliative IDT 
 
 
 GOALS OF CARE / TREATMENT PREFERENCES:  
[====Goals of Care====] GOALS OF CARE: 
Patient/Health Care Proxy Stated Goals: Other (comment)(continue attempts at full restorative measures) TREATMENT PREFERENCES:  
Code Status: DNR Advance Care Planning: 
Advance Care Planning 11/23/2018 Patient's Healthcare Decision Maker is: Legal Next of Kin Primary Decision Maker Name -  
Primary Decision Maker Relationship to Patient - Secondary Decision Maker Name - Secondary Decision Maker Relationship to Patient -  
Confirm Advance Directive None Patient Would Like to Complete Advance Directive Unable Does the patient have other document types Do Not Resuscitate; Power of [place] Medical Interventions: Limited additional interventions Other Instructions: The palliative care team has discussed with patient / health care proxy about goals of care / treatment preferences for patient. 
[====Goals of Care====] HISTORY:  
 
History obtained from: chart, son, daughter, bedside nurse CHIEF COMPLAINT: confusion HPI/SUBJECTIVE: The patient is:  
[x] Verbal and participatory [] Non-participatory due to:  
Patient talks very little. Audible secretions 11/23-patient is resting. Breathing not labored at this time 11/26-patient resting most of the time. Did open his eyes to son and apparently spoke a few words to him 
 
11/27-resting, slight opened his eyes to our voice Clinical Pain Assessment (nonverbal scale for severity on nonverbal patients):  
Clinical Pain Assessment Severity: 0 Adult Nonverbal Pain Scale Face: No particular expression or smile Activity (Movement): Lying quietly, normal position Guarding: Lying quietly, no positioning of hands over areas of body Physiology (Vital Signs): Stable vital signs Respiratory: Baseline RR/SpO2 compliant with ventilator Total Score: 0 
 
 
 FUNCTIONAL ASSESSMENT:  
 
Palliative Performance Scale (PPS): PPS: 40 PSYCHOSOCIAL/SPIRITUAL SCREENING:  
 
Advance Care Planning: 
Advance Care Planning 11/23/2018 Patient's Healthcare Decision Maker is: Legal Next of Kin Primary Decision Maker Name -  
Primary Decision Maker Relationship to Patient - Secondary Decision Maker Name - Secondary Decision Maker Relationship to Patient -  
Confirm Advance Directive None Patient Would Like to Complete Advance Directive Unable Does the patient have other document types Do Not Resuscitate; Power of [address] Any spiritual / Pentecostalism concerns: 
[] Yes /  [x] No 
 
Caregiver Burnout: 
[] Yes /  [x] No /  [] No Caregiver Present Anticipatory grief assessment:  
[x] Normal  / [] Maladaptive ESAS Anxiety: Anxiety: 0 
 
ESAS Depression: Depression: 0 REVIEW OF SYSTEMS:  
 
Positive and pertinent negative findings in ROS are noted above in HPI. The following systems were [x] reviewed / [] unable to be reviewed as noted in HPI Other findings are noted below.  
Systems: constitutional, ears/nose/mouth/throat, respiratory, gastrointestinal, genitourinary, musculoskeletal, integumentary, neurologic, psychiatric, endocrine. Positive findings noted below. Modified ESAS Completed by: provider Fatigue: 3 Drowsiness: 2 Depression: 0 Pain: 0 Anxiety: 0 Nausea: 0 Anorexia: 0 Dyspnea: 5 Constipation: No  
  Stool Occurrence(s): 1 PHYSICAL EXAM:  
 
From RN flowsheet: 
Wt Readings from Last 3 Encounters:  
11/27/18 92 lb 9.5 oz (42 kg) 08/27/18 132 lb (59.9 kg) 08/15/18 132 lb (59.9 kg) Blood pressure 158/88, pulse 73, temperature 98 °F (36.7 °C), resp. rate 20, height 5' 10\" (1.778 m), weight 92 lb 9.5 oz (42 kg), SpO2 99 %. Pain Scale 1: Adult Nonverbal Pain Scale Pain Intensity 1: 0 Last bowel movement, if known:  
 
Constitutional: ill appearing, not alert, resting Eyes: pupils equal, anicteric ENMT: no nasal discharge, moist mucous membranes Cardiovascular: regular rhythm, distal pulses intact Respiratory: breathing mildly labored, symmetric, secretions noted Gastrointestinal: soft non-tender, +bowel sounds Musculoskeletal: no deformity, no tenderness to palpation Skin: warm, dry Neurologic: not following commands, moving all extremities Psychiatric: sleeping Other: 
 
 
 HISTORY:  
 
Active Problems: 
  Failure to thrive (0-17) (9/19/2015) HTN (hypertension) (9/19/2015) Dementia (9/20/2015) Dysphagia (9/21/2015) Debility (9/21/2015) Parkinson disease ([address] (2/25/2018) Type 2 diabetes mellitus without complication ([address] (5/48/0723) Acute encephalopathy (11/18/2018) Anxiety and depression () Age-related physical debility () Malnutrition ([address] () Weight loss (11/18/2018) Past Medical History:  
Diagnosis Date  Age-related physical debility  Anxiety and depression  Compression fracture of L3 lumbar vertebra (HCC)  Debilitated patient  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  Diabetes ([address]  Dysphagia dementia related  History of vascular access device 11/26/2018  
 5 Fr triple PICC, 39 cm R brachial, TPN  Hypertension  Malnutrition [facility]  Parkinson disease ([facility] Past Surgical History:  
Procedure Laterality Date  HX HEENT Family History Problem Relation Age of Onset  Stroke Father History reviewed, no pertinent family history. Social History Tobacco Use  Smoking status: Never Smoker  Smokeless tobacco: Never Used Substance Use Topics  Alcohol use: No  
 
Allergies Allergen Reactions  Seroquel [Quetiapine] Anaphylaxis  Haldol [Haloperidol Lactate] Other (comments) \"Comatose state\"  Lorazepam Other (comments) Alternated mental status to benzos per family,  Morphine Other (comments) Change in mental status Current Facility-Administered Medications Medication Dose Route Frequency  cloNIDine (CATAPRES) 0.1 mg/24 hr patch 1 Patch  1 Patch TransDERmal Q7D  
 TPN ADULT - CENTRAL AA 5% D20% W/ ELECTROLYTES AND CA   IntraVENous CONTINUOUS  
 aspirin (ASA) suppository 300 mg  300 mg Rectal DAILY  hydrALAZINE (APRESOLINE) 20 mg/mL injection 10 mg  10 mg IntraVENous Q6H PRN  
 TPN ADULT - CENTRAL AA 5% D20% W/ ELECTROLYTES AND CA   IntraVENous CONTINUOUS  
 sodium chloride (NS) flush 10-30 mL  10-30 mL InterCATHeter PRN  
 sodium chloride (NS) flush 10 mL  10 mL InterCATHeter Q24H  
 sodium chloride (NS) flush 10 mL  10 mL InterCATHeter PRN  
 sodium chloride (NS) flush 10-40 mL  10-40 mL InterCATHeter Q8H  
 sodium chloride (NS) flush 20 mL  20 mL InterCATHeter Q24H  
 alteplase (CATHFLO) 1 mg in sterile water (preservative free) 1 mL injection  1 mg InterCATHeter PRN  
 enoxaparin (LOVENOX) injection 30 mg  30 mg SubCUTAneous Q24H  
 albuterol-ipratropium (DUO-NEB) 2.5 MG-0.5 MG/3 ML  3 mL Nebulization Q6H RT  
 piperacillin-tazobactam (ZOSYN) 3.375 g in 0.9% sodium chloride (MBP/ADV) 100 mL  3.375 g IntraVENous Q8H  
  dextrose 5 % - 0.45% NaCl infusion  50 mL/hr IntraVENous CONTINUOUS  
 acetaminophen (TYLENOL) suppository 650 mg  650 mg Rectal Q4H PRN  
 timolol (TIMOPTIC) 0.5 % ophthalmic solution 1 Drop  1 Drop Both Eyes BID  ondansetron (ZOFRAN) injection 4 mg  4 mg IntraVENous Q4H PRN  
 diphenhydrAMINE (BENADRYL) capsule 25 mg  25 mg Oral Q4H PRN  
 glucose chewable tablet 16 g  4 Tab Oral PRN  
 dextrose (D50W) injection syrg 12.5-25 g  25-50 mL IntraVENous PRN  
 glucagon (GLUCAGEN) injection 1 mg  1 mg IntraMUSCular PRN  
 insulin lispro (HUMALOG) injection   SubCUTAneous AC&HS  
 
 
 
 LAB AND IMAGING FINDINGS:  
 
Lab Results Component Value Date/Time WBC 9.4 11/25/2018 04:38 AM  
 HGB 13.9 11/25/2018 04:38 AM  
 PLATELET 683 95/36/7925 04:38 AM  
 
Lab Results Component Value Date/Time Sodium 142 11/26/2018 04:27 PM  
 Potassium 3.2 (L) 11/26/2018 04:27 PM  
 Chloride 104 11/26/2018 04:27 PM  
 CO2 29 11/26/2018 04:27 PM  
 BUN 5 (L) 11/26/2018 04:27 PM  
 Creatinine 0.66 (L) 11/26/2018 04:27 PM  
 Calcium 8.3 (L) 11/26/2018 04:27 PM  
 Magnesium 1.8 11/26/2018 04:27 PM  
 Phosphorus 2.7 11/26/2018 04:27 PM  
  
Lab Results Component Value Date/Time AST (SGOT) 18 11/25/2018 04:38 AM  
 Alk. phosphatase 66 11/25/2018 04:38 AM  
 Protein, total 6.6 11/25/2018 04:38 AM  
 Albumin 2.7 (L) 11/25/2018 04:38 AM  
 Globulin 3.9 11/25/2018 04:38 AM  
 
Lab Results Component Value Date/Time INR 1.1 02/25/2018 08:12 PM  
 Prothrombin time 11.0 02/25/2018 08:12 PM  
 aPTT 26.3 10/30/2015 01:50 AM  
  
No results found for: IRON, FE, TIBC, IBCT, PSAT, FERR No results found for: PH, PCO2, PO2 No components found for: [place] No results found for: CPK, CKMB Total time: 15 
Counseling / coordination time, spent as noted above: 15 
> 50% counseling / coordination?: yes Prolonged service was provided for  []30 min   []75 min in face to face time in the presence of the patient, spent as noted above. Time Start:  
Time End:  
Note: this can only be billed with 90007 (initial) or 73222 (follow up). If multiple start / stop times, list each separately.

## 2018-11-27 NOTE — PROGRESS NOTES
Palliative Medicine Social Work Note SW made supportive visit to pt along with Palliative Medicine Physician Dr. Ashish Grubbs. No family present at time of visit. Pt was poorly responsive, opened eyes slightly but did not otherwise engage. Spoke comfortingly to pt. Will continue to follow for support. Thank you for including Palliative Medicine in the care of Mr. Gurpreet Garrett. [address] [place], [facility] Pall[facility]:  [phone])

## 2018-11-28 NOTE — PROGRESS NOTES
Patient not arousable for PO. Suctioned material from pharynx with yankauer: thin and serous. He did not respond to deep suction. Not appropriate for PO.

## 2018-11-28 NOTE — PROGRESS NOTES
Medical Progress Note NAME: Immanuel Charles :  1940 MRM:  [id number] Date/Time: 2018  11:18 AM 
  
Assessment / Plan:  
 
Acute encephalopathy / Dementia / Anxiety and depression:  Patient seen by neurology this admission. Head CT and MRI brain without acute process. EEG w/o seizure activity. B12 on low end of normal.  Had another episode of less responsiveness likely from waxing and waning encephalopathy. Repeat CT and CTA negative. EEG repeated with moderate to severe slowing. Continue ASA supp. Repeat MRI planned (do not suspect it to show anything new from MRI 9 days ago).   
Acute hypoxic respiratory failure:  Due to aspiration. Continue supplmental oxygen as needed to keep sats greater than 90 %. 
  
Aspiration pneumonia:  CT scan shows multilobar consolidation concerning for pneumonia on admission. CXR on  with improved left mid lung ASDZ but new DEVONTE ASDZ. Continue zosyn for a total of 7 days. Keep NPO. 
  
Parkinson disease / Dysphagia / Debility / Failure to thrive / Malnutrition / Weight loss:  Family is adamant against PEG. Discussed with speech therapy and patient is not safe for PO or even MBS. Advised son on  that if patient does not improve in 24-48 hours that we will need to regroup and determine next steps as long term TPN is not a good long term solution. Strict NPO. TPN for now. Appreciate SLP assistance. Very poor prognosis 
  
HTN (hypertension): Improved with clonidine patch 
  
Type 2 diabetes mellitus without complication: SSI per protocol Hyperlipidemia: Hold statin. Not likely of any benefit to this patient at this point Pressure ulcer of sacral region, stage 3 POA- Wound Care Subjective: Chief Complaint / ROS:  Patient seen and examined. Chart reviewed. He does not respond to any questioning this AM. Unable to obtain additional hx or ROS. Objective:  
 
 
Vitals: Last 24hrs VS reviewed since prior progress note. Most recent are: 
 
Visit Vitals /78 (BP 1 Location: Left arm, BP Patient Position: At rest) Pulse 76 Temp 98.2 °F (36.8 °C) Resp 17 Ht 5' 10\" (1.778 m) Wt 41.5 kg (91 lb 7.9 oz) SpO2 96% BMI 13.13 kg/m² SpO2 Readings from Last 6 Encounters:  
11/28/18 96% 08/27/18 97% 08/15/18 99% 07/01/18 96% 06/10/18 92% 04/11/18 96% O2 Flow Rate (L/min): 2 l/min Intake/Output Summary (Last 24 hours) at 11/28/2018 0848 Last data filed at 11/28/2018 2770 Gross per 24 hour Intake 1397.62 ml Output 3 ml Net 1394.62 ml Exam:  
 
Physical Exam: 
 
Gen:  thin, in no acute distress HEENT:  Pink conjunctivae, hearing intact to voice, moist mucous membranes Resp:  No accessory muscle use, coarse upper breath sounds Card:  No murmurs, normal S1, S2 without thrills or peripheral edema Abd:  Soft, non-tender, non-distended, normoactive bowel sounds are present Neuro:  Eyes open, moves both UE equally Psych:  lethargic Telemetry reviewed:   Sinus rhythm, PAC Medications Reviewed: (see below) Lab Data Reviewed: (see below) 
 
______________________________________________________________________ Medications:  
 
Current Facility-Administered Medications Medication Dose Route Frequency  fat emulsion 20% (LIPOSYN, INTRAlipid) infusion 500 mL  500 mL IntraVENous Q MON, WED & FRI  TPN ADULT - CENTRAL AA 5% D20% W/ ELECTROLYTES AND CA   IntraVENous CONTINUOUS  
 cloNIDine (CATAPRES) 0.1 mg/24 hr patch 1 Patch  1 Patch TransDERmal Q7D  
 TPN ADULT - CENTRAL AA 5% D20% W/ ELECTROLYTES AND CA   IntraVENous CONTINUOUS  
 aspirin (ASA) suppository 300 mg  300 mg Rectal DAILY  hydrALAZINE (APRESOLINE) 20 mg/mL injection 10 mg  10 mg IntraVENous Q6H PRN  
 sodium chloride (NS) flush 10-30 mL  10-30 mL InterCATHeter PRN  
 sodium chloride (NS) flush 10 mL  10 mL InterCATHeter Q24H  sodium chloride (NS) flush 10 mL  10 mL InterCATHeter PRN  
 sodium chloride (NS) flush 10-40 mL  10-40 mL InterCATHeter Q8H  
 sodium chloride (NS) flush 20 mL  20 mL InterCATHeter Q24H  
 alteplase (CATHFLO) 1 mg in sterile water (preservative free) 1 mL injection  1 mg InterCATHeter PRN  
 enoxaparin (LOVENOX) injection 30 mg  30 mg SubCUTAneous Q24H  
 albuterol-ipratropium (DUO-NEB) 2.5 MG-0.5 MG/3 ML  3 mL Nebulization Q6H RT  
 piperacillin-tazobactam (ZOSYN) 3.375 g in 0.9% sodium chloride (MBP/ADV) 100 mL  3.375 g IntraVENous Q8H  
 dextrose 5 % - 0.45% NaCl infusion  50 mL/hr IntraVENous CONTINUOUS  
 acetaminophen (TYLENOL) suppository 650 mg  650 mg Rectal Q4H PRN  
 timolol (TIMOPTIC) 0.5 % ophthalmic solution 1 Drop  1 Drop Both Eyes BID  ondansetron (ZOFRAN) injection 4 mg  4 mg IntraVENous Q4H PRN  
 diphenhydrAMINE (BENADRYL) capsule 25 mg  25 mg Oral Q4H PRN  
 glucose chewable tablet 16 g  4 Tab Oral PRN  
 dextrose (D50W) injection syrg 12.5-25 g  25-50 mL IntraVENous PRN  
 glucagon (GLUCAGEN) injection 1 mg  1 mg IntraMUSCular PRN  
 insulin lispro (HUMALOG) injection   SubCUTAneous AC&HS Lab Review: No results for input(s): WBC, HGB, HCT, PLT, HGBEXT, HCTEXT, PLTEXT, HGBEXT, HCTEXT, PLTEXT in the last 72 hours. Recent Labs  
  11/27/18 
1550 11/26/18 
1627  142  
K 3.5 3.2*  
 104 CO2 29 29 * 139* BUN 8 5* CREA 0.57* 0.66* CA 8.5 8.3*  
MG 1.7 1.8 PHOS 2.5* 2.7 Lab Results Component Value Date/Time Glucose (POC) 232 (H) 11/28/2018 07:16 AM  
 Glucose (POC) 201 (H) 11/27/2018 09:03 PM  
 Glucose (POC) 106 (H) 11/27/2018 04:28 PM  
 Glucose (POC) 214 (H) 11/27/2018 11:52 AM  
 Glucose (POC) 172 (H) 11/27/2018 06:39 AM  
 
 
 
Total time spent with patient: 15 Minutes Care Plan discussed with: Nursing Staff Discussed:  Care Plan Prophylaxis:  Lovenox Disposition:  SNF/LTC ___________________________________________________ Attending Physician: Noris Walker DO

## 2018-11-28 NOTE — PROGRESS NOTES
Palliative Medicine Consult Patient Name: Lisa Bowman YOB: 1940 Date of Initial Consult: 11/21/18 Reason for Consult: care decisions/end stage disease Requesting Provider: Dr. Rolo Tejada Primary Care Physician: João Yancey MD 
  
 SUMMARY:  
Lisa Bowman is a 69 y.o. with a past history of dementia, parkinsons, debility, DM, dysphagia, who was admitted on 11/18/2018 from home with a diagnosis of pneumonia/respiratory distress. Current medical issues leading to Palliative Medicine involvement include: end stage disease. Chart reviewed/HPI-patient admitted with pneumonia, concerning for aspiration. Patient with parkinsons and dementia. He lives at home with his wife who has underlying dementia but they have 24 hour caregivers in the home. Patient has been declining related to his disease process but according to his son, so far this has worked well in the home setting SH-. Daughter lives locally and 2 sons- one lives in [place](just arrived) and 1 in [place](not involved), He needs assistance with most other ADLs PALLIATIVE DIAGNOSES:  
1. GOC discussion 2. Debility 3. Dementia 4. Aspiration 5. Parkinsons 6. FTT 7. Secretions PLAN:  
1. Met with patient who remains minimally responsive. No family at bedside. I know the family wanted to give the TPN a few days to see if would help with his \"strength\". I have very little hope that is going to be successful. Son is suppose to return to [place] on Saturday. Plan on calling him tomorrow to update him and start to have discussions about possible transition to comfort. 2. GOC-at this time, family wants to continue to attempt full restorative measures with abx and TPN. See note above. Will talk with son tomorrow because at that time, have given TPN several days to see if any effectiveness.  
3. AMD-never completed-paperwork in the chart is financial. His wife has dementia so technically, all 3 children would have equal say on medical decisions 4. Code status-DDNR signed by his wife in 2015. His wife would be the only one that could revoke the [facility] but given her dementia, that is not possible. DNAR already selected in EMR and we will continue to honor 5. Symptom management-patient is resting right now. No medications needed for symptoms at this time but if declines with SOB, would consider adding low dose opioid. 6. Psychosocial-son and daughter seem to be communicating on issues with their Dad. Spouse did visit over the weekend 7. Discussed with bedside nurse and Dr. Raymond Huerta 8. Initial consult note routed to primary continuity provider 9. Communicated plan of care with: Palliative IDT 
 
 
 GOALS OF CARE / TREATMENT PREFERENCES:  
[====Goals of Care====] GOALS OF CARE: 
Patient/Health Care Proxy Stated Goals: Other (comment)(continue attempts at full restorative measures) TREATMENT PREFERENCES:  
Code Status: DNR Advance Care Planning: 
Advance Care Planning 11/23/2018 Patient's Healthcare Decision Maker is: Legal Next of Kin Primary Decision Maker Name -  
Primary Decision Maker Relationship to Patient - Secondary Decision Maker Name - Secondary Decision Maker Relationship to Patient -  
Confirm Advance Directive None Patient Would Like to Complete Advance Directive Unable Does the patient have other document types Do Not Resuscitate; Power of [employer] Medical Interventions: Limited additional interventions Other Instructions: The palliative care team has discussed with patient / health care proxy about goals of care / treatment preferences for patient. 
[====Goals of Care====] HISTORY:  
 
History obtained from: chart, son, daughter, bedside nurse CHIEF COMPLAINT: confusion HPI/SUBJECTIVE: The patient is:  
[x] Verbal and participatory [] Non-participatory due to:  
Patient talks very little. Audible secretions 11/23-patient is resting. Breathing not labored at this time 11/26-patient resting most of the time. Did open his eyes to son and apparently spoke a few words to him 
 
11/27-resting, slight opened his eyes to our voice 11/28-resting. Once again, barely arouses when I am in the room. Clinical Pain Assessment (nonverbal scale for severity on nonverbal patients):  
Clinical Pain Assessment Severity: 0 Adult Nonverbal Pain Scale Face: No particular expression or smile Activity (Movement): Lying quietly, normal position Guarding: Lying quietly, no positioning of hands over areas of body Physiology (Vital Signs): Stable vital signs Respiratory: Baseline RR/SpO2 compliant with ventilator Total Score: 0 
 
 
 FUNCTIONAL ASSESSMENT:  
 
Palliative Performance Scale (PPS): PPS: 40 PSYCHOSOCIAL/SPIRITUAL SCREENING:  
 
Advance Care Planning: 
Advance Care Planning 11/23/2018 Patient's Healthcare Decision Maker is: Legal Next of Kin Primary Decision Maker Name -  
Primary Decision Maker Relationship to Patient - Secondary Decision Maker Name - Secondary Decision Maker Relationship to Patient -  
Confirm Advance Directive None Patient Would Like to Complete Advance Directive Unable Does the patient have other document types Do Not Resuscitate; Power of Lamine Alexis Any spiritual / Christianity concerns: 
[] Yes /  [x] No 
 
Caregiver Burnout: 
[] Yes /  [x] No /  [] No Caregiver Present Anticipatory grief assessment:  
[x] Normal  / [] Maladaptive ESAS Anxiety: Anxiety: 0 
 
ESAS Depression: Depression: 0 REVIEW OF SYSTEMS:  
 
Positive and pertinent negative findings in ROS are noted above in HPI. The following systems were [x] reviewed / [] unable to be reviewed as noted in HPI Other findings are noted below.  
Systems: constitutional, ears/nose/mouth/throat, respiratory, gastrointestinal, genitourinary, musculoskeletal, integumentary, neurologic, psychiatric, endocrine. Positive findings noted below. Modified ESAS Completed by: provider Fatigue: 3 Drowsiness: 2 Depression: 0 Pain: 0 Anxiety: 0 Nausea: 0 Anorexia: 0 Dyspnea: 5 Constipation: No  
  Stool Occurrence(s): 1 PHYSICAL EXAM:  
 
From RN flowsheet: 
Wt Readings from Last 3 Encounters:  
11/28/18 91 lb 7.9 oz (41.5 kg) 08/27/18 132 lb (59.9 kg) 08/15/18 132 lb (59.9 kg) Blood pressure (!) 134/96, pulse 62, temperature 98.9 °F (37.2 °C), resp. rate 17, height 5' 10\" (1.778 m), weight 91 lb 7.9 oz (41.5 kg), SpO2 96 %. Pain Scale 1: Adult Nonverbal Pain Scale Pain Intensity 1: 0 Last bowel movement, if known:  
 
Constitutional: ill appearing, not alert, resting Eyes: pupils equal, anicteric ENMT: no nasal discharge, moist mucous membranes Cardiovascular: regular rhythm, distal pulses intact Respiratory: breathing mildly labored, symmetric, secretions noted Gastrointestinal: soft non-tender, +bowel sounds Musculoskeletal: no deformity, no tenderness to palpation Skin: warm, dry Neurologic: not following commands, moving all extremities Psychiatric: sleeping Other: 
 
 
 HISTORY:  
 
Active Problems: 
  Failure to thrive (0-17) (9/19/2015) HTN (hypertension) (9/19/2015) Dementia (9/20/2015) Dysphagia (9/21/2015) Debility (9/21/2015) Parkinson disease ([address] (2/25/2018) Type 2 diabetes mellitus without complication ([address] (4/74/2482) Acute encephalopathy (11/18/2018) Anxiety and depression () Age-related physical debility () Malnutrition ([address] () Weight loss (11/18/2018) Past Medical History:  
Diagnosis Date  Age-related physical debility  Anxiety and depression  Compression fracture of L3 lumbar vertebra (HCC)  Debilitated patient  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  Diabetes ([address]  Dysphagia dementia related  History of vascular access device 11/26/2018  
 5 Fr triple PICC, 39 cm R brachial, TPN  Hypertension  Mal[facility]  Parkinson disease ([facility] Past Surgical History:  
Procedure Laterality Date  HX HEENT Family History Problem Relation Age of Onset  Stroke Father History reviewed, no pertinent family history. Social History Tobacco Use  Smoking status: Never Smoker  Smokeless tobacco: Never Used Substance Use Topics  Alcohol use: No  
 
Allergies Allergen Reactions  Seroquel [Quetiapine] Anaphylaxis  Haldol [Haloperidol Lactate] Other (comments) \"Comatose state\"  Lorazepam Other (comments) Alternated mental status to benzos per family,  Morphine Other (comments) Change in mental status Current Facility-Administered Medications Medication Dose Route Frequency  fat emulsion 20% (LIPOSYN, INTRAlipid) infusion 500 mL  500 mL IntraVENous Q MON, WED & FRI  TPN ADULT - CENTRAL AA 5% D20% W/ ELECTROLYTES AND CA   IntraVENous CONTINUOUS  
 cloNIDine (CATAPRES) 0.1 mg/24 hr patch 1 Patch  1 Patch TransDERmal Q7D  
 TPN ADULT - CENTRAL AA 5% D20% W/ ELECTROLYTES AND CA   IntraVENous CONTINUOUS  
 aspirin (ASA) suppository 300 mg  300 mg Rectal DAILY  hydrALAZINE (APRESOLINE) 20 mg/mL injection 10 mg  10 mg IntraVENous Q6H PRN  
 sodium chloride (NS) flush 10-30 mL  10-30 mL InterCATHeter PRN  
 sodium chloride (NS) flush 10 mL  10 mL InterCATHeter Q24H  
 sodium chloride (NS) flush 10 mL  10 mL InterCATHeter PRN  
 sodium chloride (NS) flush 10-40 mL  10-40 mL InterCATHeter Q8H  
 sodium chloride (NS) flush 20 mL  20 mL InterCATHeter Q24H  
 alteplase (CATHFLO) 1 mg in sterile water (preservative free) 1 mL injection  1 mg InterCATHeter PRN  
 enoxaparin (LOVENOX) injection 30 mg  30 mg SubCUTAneous Q24H  
 albuterol-ipratropium (DUO-NEB) 2.5 MG-0.5 MG/3 ML  3 mL Nebulization Q6H RT  
  piperacillin-tazobactam (ZOSYN) 3.375 g in 0.9% sodium chloride (MBP/ADV) 100 mL  3.375 g IntraVENous Q8H  
 dextrose 5 % - 0.45% NaCl infusion  50 mL/hr IntraVENous CONTINUOUS  
 acetaminophen (TYLENOL) suppository 650 mg  650 mg Rectal Q4H PRN  
 timolol (TIMOPTIC) 0.5 % ophthalmic solution 1 Drop  1 Drop Both Eyes BID  ondansetron (ZOFRAN) injection 4 mg  4 mg IntraVENous Q4H PRN  
 diphenhydrAMINE (BENADRYL) capsule 25 mg  25 mg Oral Q4H PRN  
 glucose chewable tablet 16 g  4 Tab Oral PRN  
 dextrose (D50W) injection syrg 12.5-25 g  25-50 mL IntraVENous PRN  
 glucagon (GLUCAGEN) injection 1 mg  1 mg IntraMUSCular PRN  
 insulin lispro (HUMALOG) injection   SubCUTAneous AC&HS  
 
 
 
 LAB AND IMAGING FINDINGS:  
 
Lab Results Component Value Date/Time WBC 9.4 11/25/2018 04:38 AM  
 HGB 13.9 11/25/2018 04:38 AM  
 PLATELET 001 98/84/7571 04:38 AM  
 
Lab Results Component Value Date/Time Sodium 135 (L) 11/28/2018 06:42 AM  
 Potassium 3.4 (L) 11/28/2018 06:42 AM  
 Chloride 101 11/28/2018 06:42 AM  
 CO2 30 11/28/2018 06:42 AM  
 BUN 12 11/28/2018 06:42 AM  
 Creatinine 0.68 (L) 11/28/2018 06:42 AM  
 Calcium 8.1 (L) 11/28/2018 06:42 AM  
 Magnesium 1.7 11/27/2018 03:50 PM  
 Phosphorus 2.5 (L) 11/27/2018 03:50 PM  
  
Lab Results Component Value Date/Time AST (SGOT) 18 11/25/2018 04:38 AM  
 Alk. phosphatase 66 11/25/2018 04:38 AM  
 Protein, total 6.6 11/25/2018 04:38 AM  
 Albumin 2.7 (L) 11/25/2018 04:38 AM  
 Globulin 3.9 11/25/2018 04:38 AM  
 
Lab Results Component Value Date/Time INR 1.1 02/25/2018 08:12 PM  
 Prothrombin time 11.0 02/25/2018 08:12 PM  
 aPTT 26.3 10/30/2015 01:50 AM  
  
No results found for: IRON, FE, TIBC, IBCT, PSAT, FERR No results found for: PH, PCO2, PO2 No components found for: [place] No results found for: CPK, CKMB Total time: 15 
Counseling / coordination time, spent as noted above: 15 
> 50% counseling / coordination?: yes Prolonged service was provided for  []30 min   []75 min in face to face time in the presence of the patient, spent as noted above. Time Start:  
Time End:  
Note: this can only be billed with 27149 (initial) or 04221 (follow up). If multiple start / stop times, list each separately.

## 2018-11-28 NOTE — PROGRESS NOTES
Problem: Mobility Impaired (Adult and Pediatric) Goal: *Acute Goals and Plan of Care (Insert Text) Physical Therapy Goals Revised 11/23/2018 1. Patient will move from supine to sit and sit to supine , scoot up and down and roll side to side in bed with moderate assistance  within 7 day(s). 2.  Patient will transfer from bed to chair and chair to bed with moderate assistance  using the least restrictive device within 7 day(s). 3.  Patient will perform sit to stand with moderate assistance  within 7 day(s). 4.  Patient will ambulate with maximal assistance for 10 feet with the least restrictive device within 7 day(s). Initiated 11/19/2018; not met 1. Patient will move from supine to sit and sit to supine  in bed with moderate assistance  within 7 day(s). 2.  Patient will transfer from bed to chair and chair to bed with minimal assistance using the least restrictive device within 7 day(s). 3.  Patient will perform sit to stand with minimal assistance within 7 day(s). 4.  Patient will ambulate with minimal assistance for 20 feet with the least restrictive device within 7 day(s). physical Therapy TREATMENT Patient: Marilyn Patel (89 y.o. male) Date: 11/28/2018 Diagnosis: Acute encephalopathy <principal problem not specified> Precautions:   
Chart, physical therapy assessment, plan of care and goals were reviewed. ASSESSMENT: 
Pt received in bed, open eyes in response to verbal and tactile cues. Pt attempted to straighten RLE with verbal cues but limited command following after one attempt. Incontinent of bladder. Unable to assist with rolling bed mobility  Total A for rolling bed mobility for hygine/ chux change. Wet cough with rolling, suctioned x 2 with robert. Reposition in bed. RN notified. Progression toward goals: 
[]    Improving appropriately and progressing toward goals [x]    Improving slowly and progressing toward goals []    Not making progress toward goals and plan of care will be adjusted PLAN: 
Patient continues to benefit from skilled intervention to address the above impairments. Continue treatment per established plan of care. Discharge Recommendations:  SNF vs TBD Further Equipment Recommendations for Discharge:  none SUBJECTIVE:  
Patient stated Derek Avers OBJECTIVE DATA SUMMARY:  
Critical Behavior: 
Neurologic State: Eyes open to voice Orientation Level: Disoriented X4 Cognition: No command following, Poor safety awareness Safety/Judgement: Lack of insight into deficits Functional Mobility Training: 
Bed Mobility: 
Rolling: Total assistance;Assist x2 Transfers: 
  
  
     
  
     
  
  
  
  
Balance: 
 Ambulation/Gait Training: 
  
  
  
  
  
  
  
  
  
  
  
  
  
  
  
  
  
  
Stairs: 
  
  
   
 
Neuro Re-Education: 
 
Therapeutic Exercises:  
 
Pain: 
Pain Scale 1: Adult Nonverbal Pain Scale Activity Tolerance:  
fair Please refer to the flowsheet for vital signs taken during this treatment. After treatment:  
[]    Patient left in no apparent distress sitting up in chair 
[x]    Patient left in no apparent distress in bed 
[x]    Call bell left within reach [x]    Nursing notified 
[]    Caregiver present [x]    Bed alarm activated COMMUNICATION/COLLABORATION:  
The patients plan of care was discussed with: Registered Nurse and Occupational Therapist Assistant Roger Ames Time Calculation: 15 mins \

## 2018-11-28 NOTE — PROGRESS NOTES
Verbal shift change report given to Michael Rojas RN (oncoming nurse) by Ambika Shirley RN (offgoing nurse). Report included the following information SBAR, Kardex, Intake/Output, MAR, Accordion, Recent Results and Med Rec Status.

## 2018-11-28 NOTE — PROGRESS NOTES
Bedside and Verbal shift change report given to Dianna RN (oncoming nurse) by Yudy Norman RN  (offgoing nurse). Report included the following information SBAR, Kardex, Intake/Output, MAR, Accordion, Recent Results, Med Rec Status and Cardiac Rhythm NSR.  
1530: Telephone order received from Dr. Raynaldo Nyhan to D/C D5 1/2 NS, and Change insulin lispro to resistance.

## 2018-11-28 NOTE — PROGRESS NOTES
Problem: Pressure Injury - Risk of 
Goal: *Prevention of pressure injury Document Mamadou Scale and appropriate interventions in the flowsheet. Outcome: Progressing Towards Goal 
Pressure Injury Interventions: 
Sensory Interventions: Assess changes in LOC, Assess need for specialty bed, Discuss PT/OT consult with provider, Monitor skin under medical devices Moisture Interventions: Absorbent underpads, Assess need for specialty bed, Check for incontinence Q2 hours and as needed, Maintain skin hydration (lotion/cream) Activity Interventions: Assess need for specialty bed, Increase time out of bed, PT/OT evaluation Mobility Interventions: Assess need for specialty bed, Pressure redistribution bed/mattress (bed type), PT/OT evaluation, Turn and reposition approx. every two hours(pillow and wedges) Nutrition Interventions: Document food/fluid/supplement intake, Discuss nutritional consult with provider Friction and Shear Interventions: Apply protective barrier, creams and emollients, Lift team/patient mobility team, Transferring/repositioning devices Problem: Falls - Risk of 
Goal: *Absence of Falls Document Tana Castillo Fall Risk and appropriate interventions in the flowsheet. Outcome: Progressing Towards Goal 
Fall Risk Interventions: 
Mobility Interventions: Assess mobility with egress test, Bed/chair exit alarm, Patient to call before getting OOB, PT Consult for mobility concerns Mentation Interventions: Adequate sleep, hydration, pain control, Bed/chair exit alarm, Reorient patient Medication Interventions: Assess postural VS orthostatic hypotension, Evaluate medications/consider consulting pharmacy, Patient to call before getting OOB, Teach patient to arise slowly Elimination Interventions: Bed/chair exit alarm, Toileting schedule/hourly rounds, Call light in reach History of Falls Interventions: Bed/chair exit alarm, Door open when patient unattended

## 2018-11-28 NOTE — PROGRESS NOTES
1900 
Bedside and Verbal shift change report given to Miles Barrios RN (oncoming nurse) by Rosalinda Damon (offgoing nurse). Report included the following information SBAR, Kardex, Procedure Summary, Intake/Output, MAR, Accordion and Recent Results. Patient on bed, family on the bedside. Initial assessment done. Antony Dempsey Went down with the patient for MRI.  
 
2300 Unable to perform MRI due to patient's condition. Visit Vitals /85 Pulse 77 Temp 98.3 °F (36.8 °C) Resp 17 Ht 5' 10\" (1.778 m) Wt 41.5 kg (91 lb 7.9 oz) SpO2 97% BMI 13.13 kg/m²  
 
0700 Bedside and Verbal shift change report given to [employer] RN (oncoming nurse) by Miles Barrios RN (offgoing nurse). Report included the following information SBAR, Kardex, Procedure Summary, Intake/Output, MAR, Accordion and Recent Results.

## 2018-11-28 NOTE — DIABETES MGMT
DTC Progress Note Recommendations/ Comments: -326mg/dL over the past 24 hours. If appropriate, please consider discontinuing D5 as pt has TPN ordered. Also may consider changing correction scale to resistant while on TPN. Msg sent to Dr. Adams Memos. Current hospital DM medication:  
-Humalog high sensitivity correction  
-D5% @ 50mL/hr Chart reviewed and initial evaluation complete on Cecilia Shay. Patient is a 69 y.o. male with known history of Type 2 Diabetes on Metformin 500mg bid at home. A1c:  
Lab Results Component Value Date/Time Hemoglobin A1c 6.3 11/18/2018 04:22 PM  
 
 
Recent Glucose Results:  
Lab Results Component Value Date/Time  (H) 11/28/2018 06:42 AM  
  (H) 11/27/2018 03:50 PM  
 GLUCPOC 326 (H) 11/28/2018 11:57 AM  
 GLUCPOC 232 (H) 11/28/2018 07:16 AM  
 GLUCPOC 201 (H) 11/27/2018 09:03 PM  
  
 
Lab Results Component Value Date/Time Creatinine 0.68 (L) 11/28/2018 06:42 AM  
 
Estimated Creatinine Clearance: 53.4 mL/min (A) (based on SCr of 0.68 mg/dL (L)). Active Orders Diet DIET NPO  
  
 
PO intake:  
Patient Vitals for the past 72 hrs: 
 % Diet Eaten 11/27/18 1814 0 % 11/27/18 1203 0 % 11/27/18 0700 0 % Will continue to follow as needed. Thank you. Richard Mitchell RD, CDE [facility] Office: [phone] Pager: [phone]

## 2018-11-28 NOTE — PROGRESS NOTES
Problem: Self Care Deficits Care Plan (Adult) Goal: *Acute Goals and Plan of Care (Insert Text) Occupational Therapy Goals Re-evaluation 11/23/2018 1. Patient will perform self feeding with moderate assistance within 7 days. 2. Patient will perform grooming with moderate assistance within 7 days. 3. Patient will perform upper body dressing with moderate assistance within 7 days. 4. Patient will perform EOB sitting with min assist for balance in prep for seated ADLs within 7 days. Initiated 11/19/2018 1. Patient will perform self-feeding with supervision/set-up within 7 day(s). 2.  Patient will perform grooming with minimal assistance/contact guard assist within 7 day(s). 3.  Patient will perform upper body dressing with minimal assistance/contact guard assist within 7 day(s). 4.  Patient will perform toilet transfers with moderate assistance  within 7 day(s). 5.  Patient will perform all aspects of toileting with moderate assistance  within 7 day(s). 6.  Patient will participate in upper extremity therapeutic exercise/activities with minimal assistance/contact guard assist for 5 minutes within 7 day(s). .Occupational Therapy TREATMENT Patient: Petrona Gil (33 y.o. male) Date: 11/28/2018 Diagnosis: Acute encephalopathy <principal problem not specified> Precautions:   
Chart, occupational therapy assessment, plan of care, and goals were reviewed. ASSESSMENT: 
Pt received in bed, opens eyes in response to verbal and tactile cues. Pt s UE's rigid with elbows in flexion. limited command following to hold Shilpa Brought. Incontinent of bladder. Total assist to wash face. Unable to assist with rolling bed mobility  Total A for rolling bed mobility for hygiene/ chux change. Wet cough with rolling, suctioned x 3 with natividadkauer. Repositioned  in bed. RN notified. Progression toward goals: 
[]       Improving appropriately and progressing toward goals [x]       Improving slowly and progressing toward goals 
[]       Not making progress toward goals and plan of care will be adjusted PLAN: 
Patient continues to benefit from skilled intervention to address the above impairments. Continue treatment per established plan of care. Discharge Recommendations:  SNF vs LTC Further Equipment Recommendations for Discharge:  None SUBJECTIVE:  
Patient stated *.  OBJECTIVE DATA SUMMARY:  
Cognitive/Behavioral Status: 
Neurologic State: Eyes open to voice Orientation Level: Disoriented X4 Cognition: Impaired decision making Functional Mobility and Transfers for ADLs:Bed Mobility: 
Rolling: Total assistance;Assist x2 Transfers: 
  
 Not attempted ADL Intervention: 
  
 
Grooming Washing Face: Total assistance (dependent) Toileting Toileting Assistance: Total assistance(dependent) Activity Tolerance:  
Poor Please refer to the flowsheet for vital signs taken during this treatment. After treatment:  
[] Patient left in no apparent distress sitting up in chair 
[x] Patient left in no apparent distress in bed 
[x] Call bell left within reach [x] Nursing notified 
[] Caregiver present [x] Bed alarm activated COMMUNICATION/COLLABORATION:  
The patients plan of care was discussed with: Physical Therapy Assistant, Occupational Therapist and Registered Nurse BETSEY Landeros Time Calculation: 15 mins

## 2018-11-29 NOTE — PROGRESS NOTES
11- CASE MANAGEMENT NOTE: 
I met briefly with the pt's niece who confirmed that the pt and his wife (has dementia) live in their one level house with private duty care 24/7. She gave me their son's contact information (stated the daughter is a  and cannot answer the phone)-Onofre Lema- whom I contacted. He confirmed that the pt and wife do have 24 hour/day caregivers but stated the pt is not nearly medically for discharge at this time. He feels the pt's pneumonia needs to be cleared up before discharge. It is my understanding that hospice may be a consideration but it does not appear the son is considering that. He did confirm that, when the pt is stable for discharge, they would like to use [employer] for wheelchair [place] and their credit card information is on file with them. CM will continue to follow.  ALEXEI Jenkins, CM

## 2018-11-29 NOTE — PROGRESS NOTES
Palliative Medicine Consult Patient Name: Mai Chou YOB: 1940 Date of Initial Consult: 11/21/18 Reason for Consult: care decisions/end stage disease Requesting Provider: Dr. Heri Gutierrez Primary Care Physician: Brian Rocha MD 
  
 SUMMARY:  
Mai Chou is a 69 y.o. with a past history of dementia, parkinsons, debility, DM, dysphagia, who was admitted on 11/18/2018 from home with a diagnosis of pneumonia/respiratory distress. Current medical issues leading to Palliative Medicine involvement include: end stage disease. Chart reviewed/HPI-patient admitted with pneumonia, concerning for aspiration. Patient with parkinsons and dementia. He lives at home with his wife who has underlying dementia but they have 24 hour caregivers in the home. Patient has been declining related to his disease process but according to his son, so far this has worked well in the home setting SH-. Daughter lives locally and 2 sons- one lives in [place](just arrived) and 1 in [place](not involved), He needs assistance with most other ADLs PALLIATIVE DIAGNOSES:  
1. GOC discussion 2. Debility 3. Dementia 4. Aspiration 5. Parkinsons 6. FTT 7. Secretions PLAN:  
1. Met with patient who for the first time in seeing the patient, he is awake and says very softly that Jose Varela is tired\". Denies any pain. No family at bedside. 2. GOC-at this time, family wants to continue to attempt full restorative measures with abx and TPN. Talked with CM and Dr. Long Fernandez and we agree that the likelihood of recovery/improvement is very poor. Son demanding an XRAY today from Dr. Long Fernandez. Will attempt to talk with son so we can re-address goals. Suspect this will become a Hospice situation if family agrees. 3. AMD-never completed-paperwork in the chart is financial. His wife has dementia so technically, all 3 children would have equal say on medical decisions 4. Code status-DDNR signed by his wife in 2015. His wife would be the only one that could revoke the [facility] but given her dementia, that is not possible. DNAR already selected in EMR and we will continue to honor 5. Symptom management-patient is resting right now. No medications needed for symptoms at this time but if declines with SOB, would consider adding low dose opioid. Secretions noted at times 6. Psychosocial-son and daughter seem to be communicating on issues with their Dad. Spouse did visit over the weekend 7. Discussed with bedside nurse and Dr. Charmayne Perish 8. Initial consult note routed to primary continuity provider 9. Communicated plan of care with: Palliative IDT 
 
 
 GOALS OF CARE / TREATMENT PREFERENCES:  
[====Goals of Care====] GOALS OF CARE: 
Patient/Health Care Proxy Stated Goals: Other (comment)(continue attempts at full restorative measures) TREATMENT PREFERENCES:  
Code Status: DNR Advance Care Planning: 
Advance Care Planning 11/23/2018 Patient's Healthcare Decision Maker is: Legal Next of Kin Primary Decision Maker Name -  
Primary Decision Maker Relationship to Patient - Secondary Decision Maker Name - Secondary Decision Maker Relationship to Patient -  
Confirm Advance Directive None Patient Would Like to Complete Advance Directive Unable Does the patient have other document types Do Not Resuscitate; Power of [employer] Medical Interventions: Limited additional interventions Other Instructions: The palliative care team has discussed with patient / health care proxy about goals of care / treatment preferences for patient. 
[====Goals of Care====] HISTORY:  
 
History obtained from: chart, son, daughter, bedside nurse CHIEF COMPLAINT: confusion HPI/SUBJECTIVE: The patient is:  
[x] Verbal and participatory [] Non-participatory due to:  
Patient talks very little. Audible secretions 11/23-patient is resting. Breathing not labored at this time 11/26-patient resting most of the time. Did open his eyes to son and apparently spoke a few words to him 
 
11/27-resting, slight opened his eyes to our voice 11/28-resting. Once again, barely arouses when I am in the room. 11/29-more awake and states he is tired Clinical Pain Assessment (nonverbal scale for severity on nonverbal patients):  
Clinical Pain Assessment Severity: 0 Adult Nonverbal Pain Scale Face: No particular expression or smile Activity (Movement): Lying quietly, normal position Guarding: Lying quietly, no positioning of hands over areas of body Physiology (Vital Signs): Stable vital signs Respiratory: Baseline RR/SpO2 compliant with ventilator Total Score: 0 
 
 
 FUNCTIONAL ASSESSMENT:  
 
Palliative Performance Scale (PPS): PPS: 40 PSYCHOSOCIAL/SPIRITUAL SCREENING:  
 
Advance Care Planning: 
Advance Care Planning 11/23/2018 Patient's Healthcare Decision Maker is: Legal Next of Kin Primary Decision Maker Name -  
Primary Decision Maker Relationship to Patient - Secondary Decision Maker Name - Secondary Decision Maker Relationship to Patient -  
Confirm Advance Directive None Patient Would Like to Complete Advance Directive Unable Does the patient have other document types Do Not Resuscitate; Power of [employer] Any spiritual / Synagogue concerns: 
[] Yes /  [x] No 
 
Caregiver Burnout: 
[] Yes /  [x] No /  [] No Caregiver Present Anticipatory grief assessment:  
[x] Normal  / [] Maladaptive ESAS Anxiety: Anxiety: 0 
 
ESAS Depression: Depression: 0 REVIEW OF SYSTEMS:  
 
Positive and pertinent negative findings in ROS are noted above in HPI. The following systems were [x] reviewed / [] unable to be reviewed as noted in HPI Other findings are noted below.  
Systems: constitutional, ears/nose/mouth/throat, respiratory, gastrointestinal, genitourinary, musculoskeletal, integumentary, neurologic, psychiatric, endocrine. Positive findings noted below. Modified ESAS Completed by: provider Fatigue: 3 Drowsiness: 2 Depression: 0 Pain: 0 Anxiety: 0 Nausea: 0 Anorexia: 0 Dyspnea: 5 Constipation: No  
  Stool Occurrence(s): 1 PHYSICAL EXAM:  
 
From RN flowsheet: 
Wt Readings from Last 3 Encounters:  
11/28/18 91 lb 7.9 oz (41.5 kg) 08/27/18 132 lb (59.9 kg) 08/15/18 132 lb (59.9 kg) Blood pressure (!) 159/91, pulse 71, temperature 97.8 °F (36.6 °C), resp. rate 20, height 5' 10\" (1.778 m), weight 91 lb 7.9 oz (41.5 kg), SpO2 90 %. Pain Scale 1: Visual 
Pain Intensity 1: 0 Last bowel movement, if known:  
 
Constitutional: ill appearing, awake and attempting to talk Eyes: pupils equal, anicteric ENMT: no nasal discharge, moist mucous membranes Cardiovascular: regular rhythm, distal pulses intact Respiratory: breathing mildly labored, symmetric, secretions noted Gastrointestinal: soft non-tender, +bowel sounds Musculoskeletal: no deformity, no tenderness to palpation Skin: warm, dry Neurologic: not following commands, moving all extremities Psychiatric: sleeping Other: 
 
 
 HISTORY:  
 
Active Problems: 
  Failure to thrive (0-17) (9/19/2015) HTN (hypertension) (9/19/2015) Dementia (9/20/2015) Dysphagia (9/21/2015) Debility (9/21/2015) Parkinson disease ([address] (2/25/2018) Type 2 diabetes mellitus without complication ([address] (8/78/1378) Acute encephalopathy (11/18/2018) Anxiety and depression () Age-related physical debility () Malnutrition ([address] () Weight loss (11/18/2018) Past Medical History:  
Diagnosis Date  Age-related physical debility  Anxiety and depression  Compression fracture of L3 lumbar vertebra (HCC)  Debilitated patient  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  Diabetes [facility]  Dysphagia dementia related  History of vascular access device 11/26/2018  
 5 Fr triple PICC, 39 cm R brachial, TPN  Hypertension  Malnutrition ([facility]  Parkinson disease ([facility] Past Surgical History:  
Procedure Laterality Date  HX HEENT Family History Problem Relation Age of Onset  Stroke Father History reviewed, no pertinent family history. Social History Tobacco Use  Smoking status: Never Smoker  Smokeless tobacco: Never Used Substance Use Topics  Alcohol use: No  
 
Allergies Allergen Reactions  Seroquel [Quetiapine] Anaphylaxis  Haldol [Haloperidol Lactate] Other (comments) \"Comatose state\"  Lorazepam Other (comments) Alternated mental status to benzos per family,  Morphine Other (comments) Change in mental status Current Facility-Administered Medications Medication Dose Route Frequency  diphenhydrAMINE (BENADRYL) injection 25 mg  25 mg IntraVENous ONCE PRN  
 TPN ADULT - CENTRAL AA 5% D20% W/ ELECTROLYTES AND CA   IntraVENous CONTINUOUS  
 potassium chloride 10 mEq in 100 ml IVPB  10 mEq IntraVENous Q1H  
 fat emulsion 20% (LIPOSYN, INTRAlipid) infusion 500 mL  500 mL IntraVENous Q MON, WED & FRI  TPN ADULT - CENTRAL AA 5% D20% W/ ELECTROLYTES AND CA   IntraVENous CONTINUOUS  
 glucose chewable tablet 16 g  4 Tab Oral PRN  
 dextrose (D50W) injection syrg 12.5-25 g  25-50 mL IntraVENous PRN  
 glucagon (GLUCAGEN) injection 1 mg  1 mg IntraMUSCular PRN  
 insulin lispro (HUMALOG) injection   SubCUTAneous AC&HS  cloNIDine (CATAPRES) 0.1 mg/24 hr patch 1 Patch  1 Patch TransDERmal Q7D  
 aspirin (ASA) suppository 300 mg  300 mg Rectal DAILY  hydrALAZINE (APRESOLINE) 20 mg/mL injection 10 mg  10 mg IntraVENous Q6H PRN  
 sodium chloride (NS) flush 10-30 mL  10-30 mL InterCATHeter PRN  
 sodium chloride (NS) flush 10 mL  10 mL InterCATHeter Q24H  sodium chloride (NS) flush 10 mL  10 mL InterCATHeter PRN  
 sodium chloride (NS) flush 10-40 mL  10-40 mL InterCATHeter Q8H  
 sodium chloride (NS) flush 20 mL  20 mL InterCATHeter Q24H  
 alteplase (CATHFLO) 1 mg in sterile water (preservative free) 1 mL injection  1 mg InterCATHeter PRN  
 enoxaparin (LOVENOX) injection 30 mg  30 mg SubCUTAneous Q24H  
 albuterol-ipratropium (DUO-NEB) 2.5 MG-0.5 MG/3 ML  3 mL Nebulization Q6H RT  
 piperacillin-tazobactam (ZOSYN) 3.375 g in 0.9% sodium chloride (MBP/ADV) 100 mL  3.375 g IntraVENous Q8H  
 acetaminophen (TYLENOL) suppository 650 mg  650 mg Rectal Q4H PRN  
 timolol (TIMOPTIC) 0.5 % ophthalmic solution 1 Drop  1 Drop Both Eyes BID  ondansetron (ZOFRAN) injection 4 mg  4 mg IntraVENous Q4H PRN  
 diphenhydrAMINE (BENADRYL) capsule 25 mg  25 mg Oral Q4H PRN  
 dextrose (D50W) injection syrg 12.5-25 g  25-50 mL IntraVENous PRN  
 
 
 
 LAB AND IMAGING FINDINGS:  
 
Lab Results Component Value Date/Time WBC 9.4 11/25/2018 04:38 AM  
 HGB 13.9 11/25/2018 04:38 AM  
 PLATELET 189 40/49/8668 04:38 AM  
 
Lab Results Component Value Date/Time Sodium 135 (L) 11/29/2018 06:02 AM  
 Potassium 3.3 (L) 11/29/2018 06:02 AM  
 Chloride 101 11/29/2018 06:02 AM  
 CO2 29 11/29/2018 06:02 AM  
 BUN 17 11/29/2018 06:02 AM  
 Creatinine 0.74 11/29/2018 06:02 AM  
 Calcium 8.0 (L) 11/29/2018 06:02 AM  
 Magnesium 1.8 11/29/2018 06:02 AM  
 Phosphorus 2.9 11/29/2018 06:02 AM  
  
Lab Results Component Value Date/Time AST (SGOT) 22 11/29/2018 06:02 AM  
 Alk. phosphatase 52 11/29/2018 06:02 AM  
 Protein, total 5.7 (L) 11/29/2018 06:02 AM  
 Albumin 2.2 (L) 11/29/2018 06:02 AM  
 Globulin 3.5 11/29/2018 06:02 AM  
 
Lab Results Component Value Date/Time  INR 1.1 02/25/2018 08:12 PM  
 Prothrombin time 11.0 02/25/2018 08:12 PM  
 aPTT 26.3 10/30/2015 01:50 AM  
  
No results found for: IRON, FE, TIBC, IBCT, PSAT, FERR  
 No results found for: PH, PCO2, PO2 No components found for: [place] No results found for: CPK, CKMB Total time: 25 
Counseling / coordination time, spent as noted above: 25 
> 50% counseling / coordination?: yes Prolonged service was provided for  []30 min   []75 min in face to face time in the presence of the patient, spent as noted above. Time Start:  
Time End:  
Note: this can only be billed with 90291 (initial) or 12361 (follow up). If multiple start / stop times, list each separately.

## 2018-11-29 NOTE — PROGRESS NOTES
NUTRITION 
 
RECOMMENDATIONS:  
 
TPN Rec's: Continue D20 + 5% AA at goal rate of 75ml/hr with 20% lipids at 42ml/hr x 12 h 3x/week to provide 2016kcals, 90gm pro/day. 1. Monitor Goals of care. Pt at TPN goal, monitor if/any progress. 2. If appropriate for PO, Diet texture per SLP, recommend minimal nutritional restrictions given recent poor PO intake. 3. Ensure Enlive TID with meal, if able to have thin liquids. 4. Obtain current weight, last weight on file from August.  
 
ASSESSMENT:  
11/29: Pt continues NPO- unsafe per SLP. TPN (D20/AA5) at goal of 75 mL/hr currently running. First lipids given last night. Last BM 11/28. D5 IVF off now. Last documented weight was incorrect, deleted. (97.5kg) Please reweigh pt. Pt has been losing a pound per day roughly. K+ 3.3, Na 135, , 220 mg/dL. Adjust insulin in TPN as necessary. 11/26:  Pt remains NPO per SLP rec's - determined not safe for PO.  NPO x 5 days now. Family is refusing PEG and does not want hospice at this time so PICC ordered to be placed for TPN today. Weight has significantly decreased since admission. Current weight indicates severely underweight per BMI of 13. Stage II PU to sacrum. Labs reviewed. Meds: humalog; D5 1/2 NaCl running at 50ml/hr (204kcals). BM noted 11/26.  
 
11/23: rapid response call for hypotension yesterday and acute respiratory failure, suspicions for recurrent aspiration events. Diet cancelled yesterday morning until speech therapy can re-evaluate. Per night nurse documentation, patient non-verbal and not tracking with eyes. Stage II pressure injury to sacral region noted, protein needs updated. D5 running @ 50ml/hr providing 204kcal/day. Last documented intake was 10% of tray on 11/21, with other reports of refusing meals. Per MD note, hospice and comfort care appropriate but family not ready for this transition. Recent -152-133, A1C 6.3 No new weight on file, will reorder. 11/19: MST referral due to weight loss and eating poorly. Admitted with AMS, hx of dementia, Parkinson's disease, diabetes. Spoke with daughter on the phone who states his last weight taken was 132 lbs, through chart review this was in August.  She feels he has been losing eight, unsure of UBW. She reports he eats well at home, \"regular consistency without signs of aspiration, but eats what he likes. \"  He does not use an ONS at home. Lives with his wife who has Alzheimer's and they have 24 hour aides at the house. Seen by SLP and dx with severe dyshaphia, Pureed diet recommended, RD changed to Diabetic Pureed. BMI indicates normal weight. RD added Ensure High Protein BID to supply an additional 320 kcals and 32 gms protein. Weight Metrics 11/28/2018 8/27/2018 8/15/2018 7/1/2018 6/10/2018 4/11/2018 2/25/2018 Weight 91 lb 7.9 oz 132 lb 132 lb 122 lb 122 lb 122 lb 143 lb BMI 13.13 kg/m2 21.31 kg/m2 21.31 kg/m2 19.69 kg/m2 19.69 kg/m2 19.69 kg/m2 20.52 kg/m2 Past Medical History:  
Diagnosis Date  Age-related physical debility  Anxiety and depression  Compression fracture of L3 lumbar vertebra (HCC)  Debilitated patient  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  Diabetes ([address]  Dysphagia dementia related  History of vascular access device 11/26/2018  
 5 Fr triple PICC, 39 cm R brachial, TPN  Hypertension  Malnutrition ([address]  Parkinson disease ([address] Diet: NPO Abd:   wdl         BM: 11/26 Skin Integrity: []Intact  [x]Other-stage II PI on sacrum Edema: [x]None []Other Nutritionally Significant Medications: [x] Reviewed & Includes: SSI, nivia-q Labs:   
Lab Results Component Value Date/Time  Sodium 135 (L) 11/29/2018 06:02 AM  
 Potassium 3.3 (L) 11/29/2018 06:02 AM  
 Chloride 101 11/29/2018 06:02 AM  
 CO2 29 11/29/2018 06:02 AM  
 Anion gap 5 11/29/2018 06:02 AM  
 Glucose 246 (H) 11/29/2018 06:02 AM  
 BUN 17 11/29/2018 06:02 AM  
 Creatinine 0.74 11/29/2018 06:02 AM  
 Calcium 8.0 (L) 11/29/2018 06:02 AM  
 Magnesium 1.8 11/29/2018 06:02 AM  
 Phosphorus 2.9 11/29/2018 06:02 AM  
 Albumin 2.2 (L) 11/29/2018 06:02 AM  
 
 
Anthropometrics:  
Weight Source: Bed Height: 5' 10\" (177.8 cm), Body mass index is 13.13 kg/m². IBW : 75.3 kg (166 lb), % IBW (Calculated): 79.52 %,  , Wt Readings from Last 5 Encounters:  
11/28/18 41.5 kg (91 lb 7.9 oz) 08/27/18 59.9 kg (132 lb) 08/15/18 59.9 kg (132 lb) 07/01/18 55.3 kg (122 lb)  
06/10/18 55.3 kg (122 lb) Estimated Daily Nutrition Requirements:  
Weight Used: Other (comment)(weight from 8/27/18) Kcals: 1850 Kcals/day(to 2100) Based on:[place] Jeor(BMR x 1.2 + 250- 500) Protein: 72 g(-84g (1.2-1.4g/kg)) Fluid:1850 (1 mL/kcal) Carbohydrate: AT least 130 gms/day Education & Discharge Needs: 
 [] Pt discussed in ID rounds Nutrition related discharge needs addressed:  
  [x] Supplements (on d/c instruction &/or coupons provided)Will follow POC [] Tube Feedings  
  [] Education []No nutrition related discharge needs at this time Cultural, Congregation and ethnic food preferences identified  
 [x] None   [] Yes NUTRITION DIAGNOSIS:  
 
Swallowing difficulty related to Parkinson's  as evidenced by severe oral dysphagia and moderate pharyngeal dysphagia per SLP  
 
11/23: nutrition dx continues, PO intake has declined due to dysphagia and medical status 11/26: Nutrition dx continues; pt not safe for PO per SLP; now NPO Pt is at Nutrition Risk:  [x] No Nutrition Risk Identified:  [] 
 
RD INTERVENTION / PT GOALS:  
 
Food/Nutrient Delivery:  TPN Nutrition Education: ,   
Nutrition Counseling:   
Coordination of Care:   
 
Goal: Meet EEN's with PN/ EN/ PO in the next 1-3 days MONITORING & EVALUATION:  
Food/Nutrient Intake Outcomes: Enteral/parenteral nutrition intake, Total energy intake Physical Signs/Symptoms Outcomes: Weight/weight change Previous Nutrition Goals: 
Previous Goal Met: No 
Previous Recommendations:     
Previous Recommendations Implemented: No 
 
Joe Hardwick RD Pager [phone] Office [phone]

## 2018-11-29 NOTE — PROGRESS NOTES
Medical Progress Note NAME: Mai Chou :  1940 MRM:  [id number] Date/Time: 2018  11:18 AM 
  
Assessment / Plan:  
 
Acute encephalopathy / Dementia / Anxiety and depression:  Patient seen by neurology this admission. Head CT and MRI brain without acute process. EEG w/o seizure activity. B12 on low end of normal.  Continues with waxing and waning encephalopathy. Repeat CT and CTA negative. EEG repeated with moderate to severe slowing. Additional attempt of repeat MRI brain with IV benadryl for mild sedation (numerous \"allergies\" to other sedating meds). I do not think the MRI brain will change treatment plan or prognosis. Continue ASA supp.  
  
Acute hypoxic respiratory failure:  Due to aspiration. Continue supplmental oxygen as needed to keep sats greater than 90 %. 
  
Aspiration pneumonia:  CT scan shows multilobar consolidation concerning for pneumonia on admission. CXR on  with improved left mid lung ASDZ but new DEVONTE ASDZ. Continue zosyn for a total of 7 days. Keep NPO. Appreciate speech input 
  
Parkinson disease / Dysphagia / Debility / Failure to thrive / Malnutrition / Weight loss:  Family is adamant against PEG, which in fact should not be offered in any form to this patient. Patient is not safe for PO or even MBS. Strict NPO. TPN trial is not resulting in any improvement in his functional status. Palliative care working to revisit [address] and long term plan. Appreciate SLP assistance. Very poor prognosis 
  
HTN (hypertension): Improved with clonidine patch 
  
Type 2 diabetes mellitus without complication: SSI per protocol Hyperlipidemia: Hold statin. Not likely of any benefit to this patient at this point Pressure ulcer of sacral region, stage 3 POA- Wound Care Subjective: Chief Complaint / ROS:  Patient seen and examined. Chart reviewed. He is awake with eyes open but no meaningful verbal responses Unable to obtain additional hx or ROS. Objective:  
 
 
Vitals:  
 
 
  
Last 24hrs VS reviewed since prior progress note. Most recent are: 
 
Visit Vitals /64 (BP 1 Location: Left arm, BP Patient Position: At rest) Pulse 93 Temp 97.9 °F (36.6 °C) Resp 20 Ht 5' 10\" (1.778 m) Wt 97.5 kg (214 lb 15.2 oz) SpO2 90% BMI 30.84 kg/m² SpO2 Readings from Last 6 Encounters:  
11/29/18 90% 08/27/18 97% 08/15/18 99% 07/01/18 96% 06/10/18 92% 04/11/18 96% O2 Flow Rate (L/min): 2 l/min No intake or output data in the 24 hours ending 11/29/18 0903 Exam:  
 
Physical Exam: 
 
Gen:  thin, in no acute distress HEENT:  Pink conjunctivae, hearing intact to voice, moist mucous membranes Resp:  No accessory muscle use, coarse upper breath sounds Card:  No murmurs, normal S1, S2 without thrills or peripheral edema Abd:  Soft, non-tender, non-distended, normoactive bowel sounds are present Neuro:  Eyes open, moves both UE equally Psych:  Not answering questions appropriately Telemetry reviewed:   Sinus rhythm, PAC Medications Reviewed: (see below) Lab Data Reviewed: (see below) 
 
______________________________________________________________________ Medications:  
 
Current Facility-Administered Medications Medication Dose Route Frequency  diphenhydrAMINE (BENADRYL) injection 25 mg  25 mg IntraVENous ONCE PRN  
 TPN ADULT - CENTRAL AA 5% D20% W/ ELECTROLYTES AND CA   IntraVENous CONTINUOUS  
 potassium chloride 10 mEq in 100 ml IVPB  10 mEq IntraVENous Q1H  
 fat emulsion 20% (LIPOSYN, INTRAlipid) infusion 500 mL  500 mL IntraVENous Q MON, WED & FRI  TPN ADULT - CENTRAL AA 5% D20% W/ ELECTROLYTES AND CA   IntraVENous CONTINUOUS  
 glucose chewable tablet 16 g  4 Tab Oral PRN  
 dextrose (D50W) injection syrg 12.5-25 g  25-50 mL IntraVENous PRN  
 glucagon (GLUCAGEN) injection 1 mg  1 mg IntraMUSCular PRN  
 insulin lispro (HUMALOG) injection   SubCUTAneous AC&HS  
  cloNIDine (CATAPRES) 0.1 mg/24 hr patch 1 Patch  1 Patch TransDERmal Q7D  
 aspirin (ASA) suppository 300 mg  300 mg Rectal DAILY  hydrALAZINE (APRESOLINE) 20 mg/mL injection 10 mg  10 mg IntraVENous Q6H PRN  
 sodium chloride (NS) flush 10-30 mL  10-30 mL InterCATHeter PRN  
 sodium chloride (NS) flush 10 mL  10 mL InterCATHeter Q24H  
 sodium chloride (NS) flush 10 mL  10 mL InterCATHeter PRN  
 sodium chloride (NS) flush 10-40 mL  10-40 mL InterCATHeter Q8H  
 sodium chloride (NS) flush 20 mL  20 mL InterCATHeter Q24H  
 alteplase (CATHFLO) 1 mg in sterile water (preservative free) 1 mL injection  1 mg InterCATHeter PRN  
 enoxaparin (LOVENOX) injection 30 mg  30 mg SubCUTAneous Q24H  
 albuterol-ipratropium (DUO-NEB) 2.5 MG-0.5 MG/3 ML  3 mL Nebulization Q6H RT  
 piperacillin-tazobactam (ZOSYN) 3.375 g in 0.9% sodium chloride (MBP/ADV) 100 mL  3.375 g IntraVENous Q8H  
 acetaminophen (TYLENOL) suppository 650 mg  650 mg Rectal Q4H PRN  
 timolol (TIMOPTIC) 0.5 % ophthalmic solution 1 Drop  1 Drop Both Eyes BID  ondansetron (ZOFRAN) injection 4 mg  4 mg IntraVENous Q4H PRN  
 diphenhydrAMINE (BENADRYL) capsule 25 mg  25 mg Oral Q4H PRN  
 dextrose (D50W) injection syrg 12.5-25 g  25-50 mL IntraVENous PRN Lab Review: No results for input(s): WBC, HGB, HCT, PLT, HGBEXT, HCTEXT, PLTEXT, HGBEXT, HCTEXT, PLTEXT in the last 72 hours. Recent Labs  
  11/29/18 
0602 11/28/18 
4775 11/27/18 
1550 11/26/18 
1627 * 135* 137 142  
K 3.3* 3.4* 3.5 3.2*  
 101 101 104 CO2 29 30 29 29 * 220* 111* 139* BUN 17 12 8 5* CREA 0.74 0.68* 0.57* 0.66* CA 8.0* 8.1* 8.5 8.3*  
MG 1.8  --  1.7 1.8 PHOS 2.9  --  2.5* 2.7 ALB 2.2*  --   --   --   
TBILI 0.6  --   --   --   
SGOT 22  --   --   --   
ALT 8*  --   --   --   
 
Lab Results Component Value Date/Time  Glucose (POC) 230 (H) 11/29/2018 06:21 AM  
 Glucose (POC) 166 (H) 11/28/2018 09:25 PM  
 Glucose (POC) 126 (H) 11/28/2018 04:32 PM  
 Glucose (POC) 326 (H) 11/28/2018 11:57 AM  
 Glucose (POC) 232 (H) 11/28/2018 07:16 AM  
 
 
 
Total time spent with patient: 25 Minutes Care Plan discussed with: Nursing Staff Discussed:  Care Plan Prophylaxis:  Lovenox Disposition:  SNF/LTC 
        
___________________________________________________ Attending Physician: Denton Galvan DO

## 2018-11-29 NOTE — PROGRESS NOTES
Bedside and Verbal shift change report given to Gela Gao RN (oncoming nurse) by Chavez Kelly RN (offgoing nurse). Report included the following information SBAR, Kardex, Accordion, Recent Results and Cardiac Rhythm NSR. Dtr at bedside at shift change. She was irritable and complaining about many things. RN listened to her and engaged her in conversation. Dtr was more personable and thankful for care by RN. 1900:  Dtr left bedside and went home. Pt non-verbal and spontaneously opens eyes. Does not appear to be in any discomfort. Exp wheezing and wet cough. Suction prn and is at bedside. 2145:  MRI tech called to ask if RN will call MD to ask if something can be done to facilitate MRI. Pt was contracted and fidgety last night and could not be completed. Dtr was asking tonight why test hadn't been completed. Bedside and Verbal shift change report given to Brit García RN (oncoming nurse) by Gela Gao RN (offgoing nurse). Report included the following information SBAR, Mariodex, Accordion, Recent Results and Cardiac Rhythm NSR. Brit García made call to MD to change ACHS to Q6 since pt on TPN and to ask about MRI. MD salamanca he would leave info for Dr Sanchez Rising tomorrow to make that decision.

## 2018-11-29 NOTE — PROGRESS NOTES
0800- I have spoken with the son this morning concerning the patients treatment plan. He has asked how the pneumonia is and I could not give him an answer, there was not current imaging on the chart. However patient is being treated with antibiotics this has been communicated to the son.  ~ TRES Alexandra RN  
 
1200- Additional family members have come to see patient and has asked what the plan is, I have explained we are moving forward with the MRI sometime today but a direct time has not been established per the MRI team. Jefe Alexandra RN  
 
1300- The son has called back and has asked for a chest x-ray to be done, Dr Lianet Brown has already placed the orders. Pt will remain NPO for concerns of aspiration pneumonia. ~ TRES Barbour RN  
 
1620- Bedside and Verbal shift change report given to [address] [place] [address]  (oncoming nurse) by Santa Mayer. Nasrin SANABRIA  (offgoing nurse). Report included the following information SBAR, Kardex and Med Rec Status.

## 2018-11-29 NOTE — PROGRESS NOTES
Bedside and Verbal shift change report given to Kaykay Ivy RN (oncoming nurse) by Anamika Hooks RN (offgoing nurse). Report included the following information SBAR, Kardex, Accordion, Recent Results and Cardiac Rhythm NSR. Dtr was out in hallway crying and RN consoled her. She had just gotten off the phone with Dr Elsa Lin and was told her father has approx 1 month to live. Dtr then went back into pt room. She continues to ask about MRI that has been rescheduled due to pt being fidgity and contracted. 1845:  RN called MRI and spoke to Radha Fraga. He will call when he is almost ready for pt and RN will administer benadryl. Dtr notified of plan. 1925:  Benadryl administered. Pt transported to [facility]. Dtr went with pt.  present in room to console dtr for many hours during shift. Bedside and Verbal shift change report given to Jovana Laird RN (oncoming nurse) by Kaykay Ivy RN (offgoing nurse). Report included the following information SBAR, Kardex, Accordion, Recent Results and Cardiac Rhythm NSR.

## 2018-11-29 NOTE — PROGRESS NOTES
0000- bed pad still dry- resting in bed with eyes closed. HOB 45 degrees per daughter request and to help with secretions. Prevalon boots in place. Door left open and patient bed near nursing station. 0100- patient sleeping in bed with no signs of distress or discomfort. Ear foam placed on nasal cannula to protect skin. 0300- patient turned in bed- with pillows, resting comfortably. Bed pad changed and incontinence care given. RN attempted to use yankeur to remove excess secretions, but patient clamped mouth shut and is resistant to use. RN was able to suction sides of the mouth. 0500- Patient pads changed from incontinence omkar care done. Small soft BM. Turned patient to the right. Prevalon boots in place.

## 2018-11-29 NOTE — PROGRESS NOTES
Responded to request for  to [facility]. Mr. Smiley Stark daughter was tearful as she shared the news she received her father is not going to survive. Her mother is at home with Alzheimer  and she and her two brothers do not see eye to eye about continued care. Mr. Florina Alicia very distinctly shred he is not well. He also spoke quite a bit though it was difficult to understand anything he said. He did Grimace a couple of times. Provided calming spiritual presence as his daughter shared they are [facility].  Provided a supportive listening presence as her daughter shared and as Perez Corley appeared to relax jennifer. Geovanna Swain joined us and we spent some time in prayer. Geovanna Swain stayed to continue to care for Perez Corley and his daughter. Provided assurance of prayer. Visited by: Louise Bravo, MS., [address] [employer] [address] [place] [address] ([phone])

## 2018-11-30 NOTE — PROGRESS NOTES
Problem: Pressure Injury - Risk of 
Goal: *Prevention of pressure injury Document Mamadou Scale and appropriate interventions in the flowsheet. Outcome: Progressing Towards Goal 
Pressure Injury Interventions: 
Sensory Interventions: Assess need for specialty bed, Assess changes in LOC, Chair cushion, Check visual cues for pain, Float heels Moisture Interventions: Absorbent underpads, Apply protective barrier, creams and emollients, Assess need for specialty bed, Contain wound drainage Activity Interventions: Assess need for specialty bed, Chair cushion, Increase time out of bed, PT/OT evaluation Mobility Interventions: Assess need for specialty bed, Chair cushion, Float heels, HOB 30 degrees or less Nutrition Interventions: Document food/fluid/supplement intake, Offer support with meals,snacks and hydration Friction and Shear Interventions: Apply protective barrier, creams and emollients, Feet elevated on foot rest, HOB 30 degrees or less, Lift sheet, Minimize layers. Ramírez Rollins 0700- Bedside and Verbal shift change report given to Chidi Messina RN (oncoming nurse) by Teresa Calderon RN (offgoing nurse). Report included the following information SBAR, Kardex and Recent Results. .. 1240- TRANSFER - OUT REPORT: 
 
Verbal report given to [facility]. RN(name) on Maria Esther Found  being transferred to [address] [place] [address] AT [place] [address]LO[address](unit) for routine progression of care Report consisted of patients Situation, Background, Assessment and  
Recommendations(SBAR). Information from the following report(s) SBAR, Kardex and Recent Results was reviewed with the receiving nurse. Lines: PICC Triple Lumen Right;Brachial (Active) Central Line Being Utilized Yes 11/30/2018  8:08 AM  
Criteria for Appropriate Use Total parenteral nutrition 11/30/2018  8:08 AM  
Site Assessment Clean, dry, & intact 11/30/2018  8:08 AM  
Phlebitis Assessment 0 11/30/2018  8:08 AM  
Infiltration Assessment 0 11/30/2018  8:08 AM  
 Arm Circumference (cm) 25 cm 11/26/2018  3:29 PM  
Date of Last Dressing Change 11/26/18 11/30/2018  8:08 AM  
Dressing Status Clean, dry, & intact 11/30/2018  8:08 AM  
External Catheter Length (cm) 0 centimeters 11/26/2018  3:29 PM  
Dressing Type Disk with Chlorhexadine gluconate (CHG) 11/30/2018  8:08 AM  
Action Taken Open ports on tubing capped 11/30/2018  8:08 AM  
Hub Color/Line Status [place]; Infusing 11/30/2018  8:08 AM  
Positive Blood Return (Site #1) Yes 11/30/2018  8:08 AM  
Hub Color/Line Status Red; Infusing 11/30/2018  8:08 AM  
Positive Blood Return (Site #2) Yes 11/30/2018  8:08 AM  
Hub Color/Line Status Red 11/30/2018  8:08 AM  
Positive Blood Return (Site #3) Yes 11/30/2018  8:08 AM  
Alcohol Cap Used Yes 11/30/2018  8:08 AM  
  
 
Opportunity for questions and clarification was provided. Patient transported with:BED and with denny Schilling

## 2018-11-30 NOTE — PROGRESS NOTES
Palliative Medicine Consult Patient Name: Dot Pérez YOB: 1940 Date of Initial Consult: 11/21/18 Reason for Consult: care decisions/end stage disease Requesting Provider: Dr. Charline Hogan Primary Care Physician: Eleno Roberts MD 
  
 SUMMARY:  
Dot Pérez is a 69 y.o. with a past history of dementia, parkinsons, debility, DM, dysphagia, who was admitted on 11/18/2018 from home with a diagnosis of pneumonia/respiratory distress. Current medical issues leading to Palliative Medicine involvement include: end stage disease. Chart reviewed/HPI-patient admitted with pneumonia, concerning for aspiration. Patient with parkinsons and dementia. He lives at home with his wife who has underlying dementia but they have 24 hour caregivers in the home. Patient has been declining related to his disease process but according to his son, so far this has worked well in the home setting SH-. Daughter lives locally and 2 sons- one lives in [place](just arrived) and 1 in [place](not involved), He needs assistance with most other ADLs PALLIATIVE DIAGNOSES:  
1. GOC discussion 2. Debility 3. Dementia 4. Aspiration 5. Parkinsons 6. FTT 7. Secretions PLAN:  
1. Met with patient who continues to do poorly. Now has developed pneumonia on the right which I am sure related to aspiration. Still on TPN. He clearly with secretions and at times minimally responsive. Dr. Yudith Newton talked with daughter last night about the situation and we think transition to Hospice most appropriate. She met with chaplains as well 2. Talked with his son over the phone and explained the situation and we really do not see any chance of recovery. Explained we think transition to Hospice care most appropriate at this time. He seems on board but does want to think about it. He did talk with caregivers in the home and they would not be comfortable caring for patient at home with Hospice care. Explained that I think his dad would be inpatient appropriate at this time and think timeline would likely be very short once TPN stopped and we focused on symptoms. 3. If want to transition to Hospice over the weekend, please call [facility] for evaluation. 4. GOC-leaning toward transition to comfort care. 5. AMD-never completed-paperwork in the chart is financial. His wife has dementia so technically, all 3 children would have equal say on medical decisions 6. Code status-DDNR signed by his wife in 2015. His wife would be the only one that could revoke the [facility] but given her dementia, that is not possible. DNAR already selected in EMR and we will continue to honor 7. Symptom management-patient is resting right now per family request but would recommend low dose opioids, scopolamine patch and robinul if transition to comfort 8. Psychosocial-son and daughter seem to be communicating on issues with their Dad. Spouse did visit over the weekend 9. Discussed with bedside nurse, CM and Dr. Raymond Huerta 10. Initial consult note routed to primary continuity provider 11. Communicated plan of care with: Palliative IDT 
 
 
 GOALS OF CARE / TREATMENT PREFERENCES:  
[====Goals of Care====] GOALS OF CARE: 
Patient/Health Care Proxy Stated Goals: Other (comment)(continue attempts at full restorative measures) TREATMENT PREFERENCES:  
Code Status: DNR Advance Care Planning: 
Advance Care Planning 11/23/2018 Patient's Healthcare Decision Maker is: Legal Next of Kin Primary Decision Maker Name -  
Primary Decision Maker Relationship to Patient - Secondary Decision Maker Name - Secondary Decision Maker Relationship to Patient -  
Confirm Advance Directive None Patient Would Like to Complete Advance Directive Unable Does the patient have other document types Do Not Resuscitate; Power DocbookMD Medical Interventions: Limited additional interventions Other Instructions: The palliative care team has discussed with patient / health care proxy about goals of care / treatment preferences for patient. 
[====Goals of Care====] HISTORY:  
 
History obtained from: chart, son, daughter, bedside nurse CHIEF COMPLAINT: confusion HPI/SUBJECTIVE: The patient is:  
[x] Verbal and participatory [] Non-participatory due to:  
Patient talks very little. Audible secretions 11/23-patient is resting. Breathing not labored at this time 11/26-patient resting most of the time. Did open his eyes to son and apparently spoke a few words to him 
 
11/27-resting, slight opened his eyes to our voice 11/28-resting. Once again, barely arouses when I am in the room. 11/29-more awake and states he is tired 11/30-not awake today. Secretions noted Clinical Pain Assessment (nonverbal scale for severity on nonverbal patients):  
Clinical Pain Assessment Severity: 0 Adult Nonverbal Pain Scale Face: No particular expression or smile Activity (Movement): Lying quietly, normal position Guarding: Lying quietly, no positioning of hands over areas of body Physiology (Vital Signs): Stable vital signs Respiratory: Baseline RR/SpO2 compliant with ventilator Total Score: 0 
 
 
 FUNCTIONAL ASSESSMENT:  
 
Palliative Performance Scale (PPS): PPS: 40 PSYCHOSOCIAL/SPIRITUAL SCREENING:  
 
Advance Care Planning: 
Advance Care Planning 11/23/2018 Patient's Healthcare Decision Maker is: Legal Next of Kin Primary Decision Maker Name -  
Primary Decision Maker Relationship to Patient - Secondary Decision Maker Name - Secondary Decision Maker Relationship to Patient -  
Confirm Advance Directive None Patient Would Like to Complete Advance Directive Unable Does the patient have other document types Do Not Resuscitate; Power of [employer] Any spiritual / Orthodoxy concerns: 
[] Yes /  [x] No 
 
Caregiver Burnout: 
[] Yes /  [x] No /  [] No Caregiver Present Anticipatory grief assessment:  
[x] Normal  / [] Maladaptive ESAS Anxiety: Anxiety: 0 
 
ESAS Depression: Depression: 0 REVIEW OF SYSTEMS:  
 
Positive and pertinent negative findings in ROS are noted above in HPI. The following systems were [x] reviewed / [] unable to be reviewed as noted in HPI Other findings are noted below. Systems: constitutional, ears/nose/mouth/throat, respiratory, gastrointestinal, genitourinary, musculoskeletal, integumentary, neurologic, psychiatric, endocrine. Positive findings noted below. Modified ESAS Completed by: provider Fatigue: 3 Drowsiness: 2 Depression: 0 Pain: 0 Anxiety: 0 Nausea: 0 Anorexia: 0 Dyspnea: 5 Constipation: No  
  Stool Occurrence(s): 1 PHYSICAL EXAM:  
 
From RN flowsheet: 
Wt Readings from Last 3 Encounters:  
11/30/18 99 lb 9.6 oz (45.2 kg) 08/27/18 132 lb (59.9 kg) 08/15/18 132 lb (59.9 kg) Blood pressure 118/77, pulse 79, temperature 98.5 °F (36.9 °C), resp. rate 18, height 5' 10\" (1.778 m), weight 99 lb 9.6 oz (45.2 kg), SpO2 91 %. Pain Scale 1: Adult Nonverbal Pain Scale Pain Intensity 1: 0 Last bowel movement, if known:  
 
Constitutional: ill appearing, awake and attempting to talk Eyes: pupils equal, anicteric ENMT: no nasal discharge, moist mucous membranes Cardiovascular: regular rhythm, distal pulses intact Respiratory: breathing mildly labored, symmetric, secretions noted Gastrointestinal: soft non-tender, +bowel sounds Musculoskeletal: no deformity, no tenderness to palpation Skin: warm, dry Neurologic: not following commands, moving all extremities Psychiatric: sleeping Other: 
 
 
 HISTORY:  
 
Active Problems: 
  Failure to thrive (0-17) (9/19/2015) HTN (hypertension) (9/19/2015) Dementia (9/20/2015) Dysphagia (9/21/2015) Debility (9/21/2015) Parkinson disease ([facility] (2/25/2018) Type 2 diabetes mellitus without complication ([facility] (7/74/2689) Acute encephalopathy (11/18/2018) Anxiety and depression () Age-related physical debility () Malnutrition ([address] () Weight loss (11/18/2018) Past Medical History:  
Diagnosis Date  Age-related physical debility  Anxiety and depression  Compression fracture of L3 lumbar vertebra (HCC)  Debilitated patient  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  Diabetes ([address]  Dysphagia dementia related  History of vascular access device 11/26/2018  
 5 Fr triple PICC, 39 cm R brachial, TPN  Hypertension  Malnutrition ([address]  Parkinson disease ([address] Past Surgical History:  
Procedure Laterality Date  HX HEENT Family History Problem Relation Age of Onset  Stroke Father History reviewed, no pertinent family history. Social History Tobacco Use  Smoking status: Never Smoker  Smokeless tobacco: Never Used Substance Use Topics  Alcohol use: No  
 
Allergies Allergen Reactions  Seroquel [Quetiapine] Anaphylaxis  Haldol [Haloperidol Lactate] Other (comments) \"Comatose state\"  Lorazepam Other (comments) Alternated mental status to benzos per family,  Morphine Other (comments) Change in mental status Current Facility-Administered Medications Medication Dose Route Frequency  potassium chloride 10 mEq in 100 ml IVPB  10 mEq IntraVENous Q1H  
 TPN ADULT - CENTRAL AA 5% D20% W/ ELECTROLYTES AND CA   IntraVENous CONTINUOUS  
 insulin lispro (HUMALOG) injection   SubCUTAneous Q6H  
 fat emulsion 20% (LIPOSYN, INTRAlipid) infusion 500 mL  500 mL IntraVENous Q MON, WED & FRI  glucose chewable tablet 16 g  4 Tab Oral PRN  
 dextrose (D50W) injection syrg 12.5-25 g  25-50 mL IntraVENous PRN  
 glucagon (GLUCAGEN) injection 1 mg  1 mg IntraMUSCular PRN  
 cloNIDine (CATAPRES) 0.1 mg/24 hr patch 1 Patch  1 Patch TransDERmal Q7D  
  aspirin (ASA) suppository 300 mg  300 mg Rectal DAILY  hydrALAZINE (APRESOLINE) 20 mg/mL injection 10 mg  10 mg IntraVENous Q6H PRN  
 sodium chloride (NS) flush 10-30 mL  10-30 mL InterCATHeter PRN  
 sodium chloride (NS) flush 10 mL  10 mL InterCATHeter Q24H  
 sodium chloride (NS) flush 10 mL  10 mL InterCATHeter PRN  
 sodium chloride (NS) flush 10-40 mL  10-40 mL InterCATHeter Q8H  
 sodium chloride (NS) flush 20 mL  20 mL InterCATHeter Q24H  
 alteplase (CATHFLO) 1 mg in sterile water (preservative free) 1 mL injection  1 mg InterCATHeter PRN  
 enoxaparin (LOVENOX) injection 30 mg  30 mg SubCUTAneous Q24H  
 albuterol-ipratropium (DUO-NEB) 2.5 MG-0.5 MG/3 ML  3 mL Nebulization Q6H RT  
 piperacillin-tazobactam (ZOSYN) 3.375 g in 0.9% sodium chloride (MBP/ADV) 100 mL  3.375 g IntraVENous Q8H  
 acetaminophen (TYLENOL) suppository 650 mg  650 mg Rectal Q4H PRN  
 timolol (TIMOPTIC) 0.5 % ophthalmic solution 1 Drop  1 Drop Both Eyes BID  ondansetron (ZOFRAN) injection 4 mg  4 mg IntraVENous Q4H PRN  
 diphenhydrAMINE (BENADRYL) capsule 25 mg  25 mg Oral Q4H PRN  
 dextrose (D50W) injection syrg 12.5-25 g  25-50 mL IntraVENous PRN  
 
 
 
 LAB AND IMAGING FINDINGS:  
 
Lab Results Component Value Date/Time WBC 9.4 11/25/2018 04:38 AM  
 HGB 13.9 11/25/2018 04:38 AM  
 PLATELET 261 61/27/1423 04:38 AM  
 
Lab Results Component Value Date/Time Sodium 139 11/30/2018 05:57 AM  
 Potassium 3.3 (L) 11/30/2018 05:57 AM  
 Chloride 105 11/30/2018 05:57 AM  
 CO2 30 11/30/2018 05:57 AM  
 BUN 18 11/30/2018 05:57 AM  
 Creatinine 0.69 (L) 11/30/2018 05:57 AM  
 Calcium 7.8 (L) 11/30/2018 05:57 AM  
 Magnesium 1.8 11/30/2018 05:57 AM  
 Phosphorus 3.1 11/30/2018 05:57 AM  
  
Lab Results Component Value Date/Time AST (SGOT) 22 11/29/2018 06:02 AM  
 Alk.  phosphatase 52 11/29/2018 06:02 AM  
 Protein, total 5.7 (L) 11/29/2018 06:02 AM  
 Albumin 2.2 (L) 11/29/2018 06:02 AM  
 Globulin 3.5 11/29/2018 06:02 AM  
 
Lab Results Component Value Date/Time INR 1.1 02/25/2018 08:12 PM  
 Prothrombin time 11.0 02/25/2018 08:12 PM  
 aPTT 26.3 10/30/2015 01:50 AM  
  
No results found for: IRON, FE, TIBC, IBCT, PSAT, FERR No results found for: PH, PCO2, PO2 No components found for: [place] No results found for: CPK, CKMB Total time: 35 
Counseling / coordination time, spent as noted above: 30 
> 50% counseling / coordination?: yes Prolonged service was provided for  []30 min   []75 min in face to face time in the presence of the patient, spent as noted above. Time Start:  
Time End:  
Note: this can only be billed with 41359 (initial) or 34745 (follow up). If multiple start / stop times, list each separately.

## 2018-11-30 NOTE — PROGRESS NOTES
Attempted to work with the patient for Physical Therapy, chart reviewed and discussed with nurse patient prognosis poor and family still undecided to hospice yet. We will defer therapy for now pending outcome of the family meeting. Thank you.

## 2018-11-30 NOTE — PROGRESS NOTES
Medical Progress Note NAME: Cristal Swift :  1940 MRM:  [id number] Date/Time: 2018  11:18 AM 
  
Assessment / Plan:  
 
Acute encephalopathy / Dementia / Anxiety and depression:  Patient seen by neurology this admission. Head CT and MRI brain without acute process. EEG w/o seizure activity. B12 on low end of normal.  Continues with waxing and waning encephalopathy. Repeat CT and CTA negative. EEG repeated with moderate to severe slowing. Additional MRI done with no interval change. -- His continue is universally fatal at this point and will not improve. He is not a candidate for chronic TPN, PEG, or any oral intake. Palliative care and myself have spoken to family innumerable amount of times. Daughter and point- person for remaining two brothers spoken to yesterday and today, I have reinforced that he has days to weeks to live despite any therapy we provide. She understands. Palliative care reached out to brother who will be in tomorrow. Continue ASA supp.  
  
Acute hypoxic respiratory failure:  Due to continued aspiration. Continue supplmental oxygen as needed to keep sats greater than 90 %. 
  
Aspiration pneumonia:  CT scan shows multilobar consolidation concerning for pneumonia on admission. CXR on  with improved left mid lung ASDZ but new DEVONTE ASDZ, CXR  shows improving DEVONTE ASDZ but new RLL ASDZ. He will never clear his pneumonia as he continues to aspirate his secretions. Continue zosyn. Keep NPO. Appreciate speech input 
  
Parkinson disease / Dysphagia / Debility / Failure to thrive / Malnutrition / Weight loss:  Family is adamant against PEG, which in fact should not be offered in any form to this patient. Patient is not safe for PO or even MBS. Strict NPO. TPN trial is not resulting in any improvement in his functional status. Very poor prognosis.  Is appropriate for inpatient v. Hospice house. 
  
HTN (hypertension): Improved with clonidine patch 
  
 Type 2 diabetes mellitus without complication: SSI per protocol Hyperlipidemia: Hold statin. Not likely of any benefit to this patient at this point Pressure ulcer of sacral region, stage 3 POA- Wound Care consulted Subjective: Chief Complaint / ROS:  Patient seen and examined. Chart reviewed. He is awake with eyes open but no meaningful verbal responses. Audibly gurgling his secretions. Unable to obtain additional hx or ROS. Objective:  
 
 
Vitals:  
 
 
  
Last 24hrs VS reviewed since prior progress note. Most recent are: 
 
Visit Vitals /77 (BP 1 Location: Left arm, BP Patient Position: At rest) Pulse 90 Temp 97.1 °F (36.2 °C) Resp 18 Ht 5' 10\" (1.778 m) Wt 45.2 kg (99 lb 9.6 oz) SpO2 91% BMI 14.29 kg/m² SpO2 Readings from Last 6 Encounters:  
11/30/18 91% 08/27/18 97% 08/15/18 99% 07/01/18 96% 06/10/18 92% 04/11/18 96% O2 Flow Rate (L/min): 2 l/min Intake/Output Summary (Last 24 hours) at 11/30/2018 1526 Last data filed at 11/30/2018 1242 Gross per 24 hour Intake 3217.92 ml Output  Net 3217.92 ml Exam:  
 
Physical Exam: 
 
Gen:  thin, in no acute distress HEENT:  Pink conjunctivae, hearing intact to voice, moist mucous membranes Resp:  No accessory muscle use, coarse breath sounds throughout Card:  No murmurs, normal S1, S2 without thrills or peripheral edema Abd:  Soft, non-tender, non-distended, normoactive bowel sounds are present Neuro:  Eyes open, moves both UE equally Psych:  Not answering questions, no meaningful interaction. Telemetry reviewed:   Sinus rhythm, PAC Medications Reviewed: (see below) Lab Data Reviewed: (see below) 
 
______________________________________________________________________ Medications:  
 
Current Facility-Administered Medications Medication Dose Route Frequency  TPN ADULT - CENTRAL AA 5% D20% W/ ELECTROLYTES AND CA   IntraVENous CONTINUOUS  
  TPN ADULT - CENTRAL AA 5% D20% W/ ELECTROLYTES AND CA   IntraVENous CONTINUOUS  
 insulin lispro (HUMALOG) injection   SubCUTAneous Q6H  
 fat emulsion 20% (LIPOSYN, INTRAlipid) infusion 500 mL  500 mL IntraVENous Q MON, WED & FRI  glucose chewable tablet 16 g  4 Tab Oral PRN  
 dextrose (D50W) injection syrg 12.5-25 g  25-50 mL IntraVENous PRN  
 glucagon (GLUCAGEN) injection 1 mg  1 mg IntraMUSCular PRN  
 cloNIDine (CATAPRES) 0.1 mg/24 hr patch 1 Patch  1 Patch TransDERmal Q7D  
 aspirin (ASA) suppository 300 mg  300 mg Rectal DAILY  hydrALAZINE (APRESOLINE) 20 mg/mL injection 10 mg  10 mg IntraVENous Q6H PRN  
 sodium chloride (NS) flush 10-30 mL  10-30 mL InterCATHeter PRN  
 sodium chloride (NS) flush 10 mL  10 mL InterCATHeter Q24H  
 sodium chloride (NS) flush 10 mL  10 mL InterCATHeter PRN  
 sodium chloride (NS) flush 10-40 mL  10-40 mL InterCATHeter Q8H  
 sodium chloride (NS) flush 20 mL  20 mL InterCATHeter Q24H  
 alteplase (CATHFLO) 1 mg in sterile water (preservative free) 1 mL injection  1 mg InterCATHeter PRN  
 enoxaparin (LOVENOX) injection 30 mg  30 mg SubCUTAneous Q24H  
 albuterol-ipratropium (DUO-NEB) 2.5 MG-0.5 MG/3 ML  3 mL Nebulization Q6H RT  
 piperacillin-tazobactam (ZOSYN) 3.375 g in 0.9% sodium chloride (MBP/ADV) 100 mL  3.375 g IntraVENous Q8H  
 acetaminophen (TYLENOL) suppository 650 mg  650 mg Rectal Q4H PRN  
 timolol (TIMOPTIC) 0.5 % ophthalmic solution 1 Drop  1 Drop Both Eyes BID  ondansetron (ZOFRAN) injection 4 mg  4 mg IntraVENous Q4H PRN  
 diphenhydrAMINE (BENADRYL) capsule 25 mg  25 mg Oral Q4H PRN  
 dextrose (D50W) injection syrg 12.5-25 g  25-50 mL IntraVENous PRN Lab Review: No results for input(s): WBC, HGB, HCT, PLT, HGBEXT, HCTEXT, PLTEXT, HGBEXT, HCTEXT, PLTEXT in the last 72 hours. Recent Labs 11/30/18 
0557 11/29/18 
0602 11/28/18 
2223 11/27/18 
1550  135* 135* 137  
K 3.3* 3.3* 3.4* 3.5  101 101 101 CO2 30 29 30 29 * 246* 220* 111* BUN 18 17 12 8 CREA 0.69* 0.74 0.68* 0.57* CA 7.8* 8.0* 8.1* 8.5 MG 1.8 1.8  --  1.7 PHOS 3.1 2.9  --  2.5* ALB  --  2.2*  --   --   
TBILI  --  0.6  --   --   
SGOT  --  22  --   --   
ALT  --  8*  --   --   
 
Lab Results Component Value Date/Time Glucose (POC) 180 (H) 11/30/2018 11:16 AM  
 Glucose (POC) 217 (H) 11/30/2018 06:03 AM  
 Glucose (POC) 151 (H) 11/30/2018 12:57 AM  
 Glucose (POC) 170 (H) 11/29/2018 09:06 PM  
 Glucose (POC) 161 (H) 11/29/2018 04:07 PM  
 
 
 
Total time spent with patient: 60 Minutes Care Plan discussed with: Patient, Family, Care Manager, Nursing Staff, Consultant/Specialist and >50% of time spent in counseling and coordination of care Discussed:  Code Status, Care Plan and D/C Planning Prophylaxis:  Lovenox Disposition:  SNF/LTC v. Hospice 
        
___________________________________________________ Attending Physician: Crow Lopez DO

## 2018-11-30 NOTE — PROGRESS NOTES
Responded to request for  to [facility] room 319. Jerzy Cody present and providing care. Patient resting quietly in bed, appears comfortable and without discomfort. Daughter, Betito Glover, at bedside. Shared a prayer with Eyal Alan and daughter, then continued comfort care. Tearful, expressing feelings of anticipatory grief and bereavement. Received news today that her father is declining; considering and preparing for Hospice care. Gave voice to her thoughts, feelings, emotions, and concerns as she discussed the health and care of her parents (also caring for mother with Alzheimer's), family dynamics (some disagreement over care), and personal struggles. Provided spiritual presence and listening, spoke words of comfort and encouragement. Daughter requested prayer and a prayer was offered. Visited by Rev. Isabell Naqvi, [facility]  paging service: [phone])

## 2018-11-30 NOTE — DIABETES MGMT
DTC Progress Note Recommendations/ Comments: If appropriate, please consider adding insulin to TPN - 5 units/L, pt has required 14 units of correction over the past 24 hours. Current hospital DM medication:  
-Humalog insulin resistant correction  
-TPN @ 75mL/hr - no insulin Chart reviewed and initial evaluation complete on Feroz Mancini. Patient is a 69 y.o. male with known history of Type 2 Diabetes on Metformin 500mg bid at home. A1c:  
Lab Results Component Value Date/Time Hemoglobin A1c 6.3 11/18/2018 04:22 PM  
 
 
Recent Glucose Results:  
Lab Results Component Value Date/Time  (H) 11/30/2018 05:57 AM  
 GLUCPOC 217 (H) 11/30/2018 06:03 AM  
 GLUCPOC 151 (H) 11/30/2018 12:57 AM  
 GLUCPOC 170 (H) 11/29/2018 09:06 PM  
  
 
Lab Results Component Value Date/Time Creatinine 0.69 (L) 11/30/2018 05:57 AM  
 
Estimated Creatinine Clearance: 57.3 mL/min (A) (based on SCr of 0.69 mg/dL (L)). Active Orders Diet DIET NPO  
  
 
PO intake:  
Patient Vitals for the past 72 hrs: 
 % Diet Eaten 11/27/18 1814 0 % 11/27/18 1203 0 % Will continue to follow as needed. Thank you. Rachelle Kahn RD, CDE [facility] Office: [phone] Pager: [phone]

## 2018-11-30 NOTE — PROGRESS NOTES
Problem: Falls - Risk of 
Goal: *Absence of Falls Document Donna Rosales Fall Risk and appropriate interventions in the flowsheet. Outcome: Progressing Towards Goal 
Fall Risk Interventions: 
Mobility Interventions: Assess mobility with egress test, Bed/chair exit alarm, Mechanical lift, OT consult for ADLs, PT Consult for mobility concerns Mentation Interventions: Adequate sleep, hydration, pain control, Bed/chair exit alarm, Door open when patient unattended, Eyeglasses and hearing aids Medication Interventions: Assess postural VS orthostatic hypotension, Bed/chair exit alarm, Patient to call before getting OOB, Teach patient to arise slowly Elimination Interventions: Bed/chair exit alarm, Call light in reach, Elevated toilet seat, Toilet paper/wipes in reach History of Falls Interventions: Bed/chair exit alarm, Consult care management for discharge planning, Door open when patient unattended, Investigate reason for fall. Ramírez Rollins Pt need oral an throat suction off and on. .when he hold oral  secration noted gargling sound need suction. Ramírez Rollins 02 sat is low 84 with 2l NC. Ramírez Rollins Increased 3 littler still pt 02 sat is 86%. Ramírez Rollins Increased 4l NC now O2 sat is 90- 92%. . 
 
1300-Oral/ Throat suction done. . Sound better now. Ramírez Rollins

## 2018-11-30 NOTE — PROGRESS NOTES
Occupational Therapy Note: Attempted to work with the patient, chart reviewed and discussed with nurse patient prognosis poor and family still undecided to hospice yet. We will defer therapy for now pending outcome of the family meeting. Thank you MEGHA Garza/DALTON

## 2018-11-30 NOTE — PROGRESS NOTES
Again completed oral-pharyngeal suction for copious amounts of secretions with yankauer. He awakened, but no gag or pharyngeal swallow response noted. Weak cough on secretions further down. Secretion management issues persist. NOT safe for PO by mouth.

## 2018-11-30 NOTE — PROGRESS NOTES
is attempting follow up with pt and family in room 319. No family present at the time.  discussed with RN, Yaya Fierro, who will have  paged when family returns. Chaplain Chip Trivedi M.Div, M.S, [address] [facility]ual [facility] available at [phone])

## 2018-12-01 NOTE — PROGRESS NOTES
is attempting follow up with pt's family. Family is not present at the time.  discussed with RN and will continue to attempt to follow. Chaplain Payton Mckeon M.Div, M.S, [facility] Spiritual Care available at [phone])

## 2018-12-01 NOTE — ROUTINE PROCESS
Bedside and Verbal shift change report given to DANIELE Maldonado  by Lady Bruce RN. Report included the following information SBAR and Kardex.

## 2018-12-01 NOTE — PROGRESS NOTES
Problem: Pressure Injury - Risk of 
Goal: *Prevention of pressure injury Document Mamadou Scale and appropriate interventions in the flowsheet. Outcome: Progressing Towards Goal 
Pressure Injury Interventions: 
Sensory Interventions: Assess need for specialty bed, Assess changes in LOC Moisture Interventions: Absorbent underpads Activity Interventions: Assess need for specialty bed Mobility Interventions: Assess need for specialty bed Nutrition Interventions: Document food/fluid/supplement intake Friction and Shear Interventions: Apply protective barrier, creams and emollients Problem: Falls - Risk of 
Goal: *Absence of Falls Document Max Papa Fall Risk and appropriate interventions in the flowsheet. Outcome: Progressing Towards Goal 
Fall Risk Interventions: 
Mobility Interventions: Bed/chair exit alarm Mentation Interventions: Bed/chair exit alarm Medication Interventions: Bed/chair exit alarm Elimination Interventions: Call light in reach, Bed/chair exit alarm History of Falls Interventions: Bed/chair exit alarm Comments: Patient resting bed head to toes assessment are as charted. No s/s of pain noted. Pain assessment on going. Falls prevention maintained. Patient noted to have TPN running per MD orders. Incontinent care provided. Patient turned and repositioned. Frequent suction provided with white thick sputum noted. Patient tolerated suction well with O2 at 97% on 3L. Aspiration precaution maintained. Call light and belongings within reach. Will continue to monitor.

## 2018-12-01 NOTE — PROGRESS NOTES
Primary Nurse Gerhard Forte and Craig Briceno RN performed a dual skin assessment on this patient Impairment noted- see wound doc flow sheet Mamadou score is 11 Patient suctioned by RN and deep suctioned by respiratory therapist.  Very little relief from secretions despite frequent suctioning. Suction connected at the bedside. Incontinent care performed on the patient. Patient very stiff and rigid. Pillow placed between legs and turned on right side. 18:00 Patient's daughter at bed side states she is angry with her fathers situation. RN believes daughter is attempting to cope with her father's declining status. Daughter states interest in hospice and likes Dr. Kika Zazueta. Daughter expressed worry that patient will not have pain control but will receive too much morphine and therefore would remain sedated instead of comfortable. RN reassured daughter that the plan of care is to move towards comfort and that appropriate dosing would occur if patient was to become hospice or comfort status. Daughter seems more reassured and did not ask any further questions or voice and more concerns. 18:15 Patient's daughter on phone with brother now. Bedside and Verbal shift change report given to Leeanna Arroyo and Juana Singh RN(oncoming nurse) by Betzy Coon RN (offgoing nurse). Report included the following information SBAR, Kardex, Intake/Output, MAR and Recent Results.

## 2018-12-01 NOTE — PROGRESS NOTES
[facility] Medical Progress Note NAME: Nydia Madden :  1940 MRM:  [id number] Date/Time: 2018  8:29 AM 
 
  
Assessment and Plan:  
 
Acute encephalopathy / Dementia / Anxiety and depression - Appreciate neurology input. Unremarkable multiple CTA/CT/MRI brain, EEG with slowing, B12 adequte. Continues waxing and waning. This is fatal end stage dementia. There is no role for chronic TPN, PEG, and unsafe to offer any oral intake. Palliative care and MDs have had multiple discussions with family.  
  
Acute hypoxic respiratory failure - POA, and persistent due to continued aspiration. Continue supplmental oxygen as needed to keep sats greater than 90 %. 
  
Aspiration pneumonia - POA, with CT showing multilobar consolidation, CXR on  with new DEVONTE ASDZ, CXR  with new RLL ASDZ. Continue zosyn, but no cure expected. NPO. Appreciate speech input 
  
Parkinson disease / Dysphagia / Debility / Failure to thrive / Malnutrition / Weight loss - No role for PEG or TPN. Grim  prognosis. Is appropriate for inpatient v. Hospice house. 
  
HTN (hypertension)  -Stable on clonidine patch 
  
Type 2 diabetes mellitus without complication - In end stage disease, stop all testing or treatment 
  
Hyperlipidemia- In end stage disease, stop all testing or treatment 
  
Pressure ulcer of sacral region, stage 3 POA - Wound Care consulted, for comfort. Lacking nutrition or mobility, no cure expected 
  
  
Subjective: Chief Complaint:  Cannot obtain, no family present ROS: 
(bold if positive, if negative) Not Tolerating PT  NPO Objective:  
 
Last 24hrs VS reviewed since prior progress note. Most recent are: 
 
Visit Vitals /65 (BP 1 Location: Left arm, BP Patient Position: At rest;Supine) Pulse 96 Temp 98.9 °F (37.2 °C) Resp 20 Ht 5' 10\" (1.778 m) Wt 55.3 kg (122 lb) SpO2 96% BMI 17.51 kg/m² SpO2 Readings from Last 6 Encounters: 12/01/18 96% 08/27/18 97% 08/15/18 99% 07/01/18 96% 06/10/18 92% 04/11/18 96% O2 Flow Rate (L/min): 3 l/min Intake/Output Summary (Last 24 hours) at 12/1/2018 8093 Last data filed at 12/1/2018 9509 Gross per 24 hour Intake 2707.72 ml Output  Net 2707.72 ml Physical Exam: 
 
Gen:  Cachectic, frail, ill appearing, in no acute distress HEENT:  Pink conjunctivae, PERRL, hearing may be intact to voice, dry mucous membranes Neck:  Supple, without masses, thyroid non-tender Resp:  No accessory muscle use, coarse breath sounds with rhonchi 
Card:  No murmurs, normal S1, S2 without thrills, bruits or peripheral edema Abd:  Soft, non-tender, non-distended, reduced bowel sounds are present, no mass Lymph:  No cervical or inguinal adenopathy Musc:  No cyanosis or clubbing Skin:  Sacral wound, skin turgor is reduced Neuro:  Cranial nerves are grossly intact, general motor weakness, does not follow commands Psych:  Poor insight, not oriented Telemetry reviewed:   normal sinus rhythm 
__________________________________________________________________ Medications Reviewed: (see below) Medications:  
 
Current Facility-Administered Medications Medication Dose Route Frequency  scopolamine (TRANSDERM-SCOP) 1 mg over 3 days 1 Patch  1 Patch TransDERmal Q72H  TPN ADULT - CENTRAL AA 5% D20% W/ ELECTROLYTES AND CA   IntraVENous CONTINUOUS  
 insulin lispro (HUMALOG) injection   SubCUTAneous Q6H  
 fat emulsion 20% (LIPOSYN, INTRAlipid) infusion 500 mL  500 mL IntraVENous Q MON, WED & FRI  glucose chewable tablet 16 g  4 Tab Oral PRN  
 dextrose (D50W) injection syrg 12.5-25 g  25-50 mL IntraVENous PRN  
 glucagon (GLUCAGEN) injection 1 mg  1 mg IntraMUSCular PRN  
 cloNIDine (CATAPRES) 0.1 mg/24 hr patch 1 Patch  1 Patch TransDERmal Q7D  
 aspirin (ASA) suppository 300 mg  300 mg Rectal DAILY  hydrALAZINE (APRESOLINE) 20 mg/mL injection 10 mg  10 mg IntraVENous Q6H PRN  
 sodium chloride (NS) flush 10-30 mL  10-30 mL InterCATHeter PRN  
 sodium chloride (NS) flush 10 mL  10 mL InterCATHeter Q24H  
 sodium chloride (NS) flush 10 mL  10 mL InterCATHeter PRN  
 sodium chloride (NS) flush 10-40 mL  10-40 mL InterCATHeter Q8H  
 sodium chloride (NS) flush 20 mL  20 mL InterCATHeter Q24H  
 alteplase (CATHFLO) 1 mg in sterile water (preservative free) 1 mL injection  1 mg InterCATHeter PRN  
 enoxaparin (LOVENOX) injection 30 mg  30 mg SubCUTAneous Q24H  
 albuterol-ipratropium (DUO-NEB) 2.5 MG-0.5 MG/3 ML  3 mL Nebulization Q6H RT  
 piperacillin-tazobactam (ZOSYN) 3.375 g in 0.9% sodium chloride (MBP/ADV) 100 mL  3.375 g IntraVENous Q8H  
 acetaminophen (TYLENOL) suppository 650 mg  650 mg Rectal Q4H PRN  
 timolol (TIMOPTIC) 0.5 % ophthalmic solution 1 Drop  1 Drop Both Eyes BID  ondansetron (ZOFRAN) injection 4 mg  4 mg IntraVENous Q4H PRN  
 diphenhydrAMINE (BENADRYL) capsule 25 mg  25 mg Oral Q4H PRN  
 dextrose (D50W) injection syrg 12.5-25 g  25-50 mL IntraVENous PRN Lab Data Reviewed: (see below) Lab Review: No results for input(s): WBC, HGB, HCT, PLT, HGBEXT, HCTEXT, PLTEXT in the last 72 hours. Recent Labs 12/01/18 
5847 11/30/18 
0557 11/29/18 
0602  139 135* K 3.4* 3.3* 3.3*  
 105 101 CO2 29 30 29 * 228* 246* BUN 19 18 17 CREA 0.73 0.69* 0.74 CA 8.1* 7.8* 8.0*  
MG 1.8 1.8 1.8 PHOS 2.4* 3.1 2.9 ALB  --   --  2.2* TBILI  --   --  0.6 SGOT  --   --  22 ALT  --   --  8* Lab Results Component Value Date/Time Glucose (POC) 209 (H) 12/01/2018 04:45 AM  
 Glucose (POC) 226 (H) 11/30/2018 11:48 PM  
 Glucose (POC) 191 (H) 11/30/2018 09:49 PM  
 Glucose (POC) 191 (H) 11/30/2018 04:30 PM  
 Glucose (POC) 180 (H) 11/30/2018 11:16 AM  
 
No results for input(s): PH, PCO2, PO2, HCO3, FIO2 in the last 72 hours. No results for input(s): INR in the last 72 hours. No lab exists for component: INREXT All Micro Results Procedure Component Value Units Date/Time CULTURE, BLOOD [[id number]] Collected:  11/18/18 1622 Order Status:  Completed Specimen:  Blood Updated:  11/24/18 0510 Special Requests: NO SPECIAL REQUESTS Culture result: NO GROWTH 6 DAYS     
 CULTURE, BLOOD [[id number]] Collected:  11/18/18 1622 Order Status:  Completed Specimen:  Blood Updated:  11/24/18 0510 Special Requests: NO SPECIAL REQUESTS Culture result: NO GROWTH 6 DAYS     
 CULTURE, URINE [[id number]] Collected:  11/18/18 1622 Order Status:  Completed Specimen:  Urine from Clean catch Updated:  11/20/18 0915 Special Requests: NO SPECIAL REQUESTS [place] Count --     
  <10,000 COLONIES/mL Culture result: NO SIGNIFICANT GROWTH URINE CULTURE HOLD SAMPLE [[id number]] Collected:  11/18/18 1622 Order Status:  Completed Specimen:  Urine from Serum Updated:  11/18/18 1628 Urine culture hold URINE ON HOLD IN MICROBIOLOGY DEPT FOR 3 DAYS. IF UNPRESERVED URINE IS SUBMITTED, IT CANNOT BE USED FOR ADDITIONAL TESTING AFTER 24 HRS, RECOLLECTION WILL BE REQUIRED. I have reviewed notes of prior 24hr. Other pertinent lab: none Total time spent with patient: 45 Minutes Care Plan discussed with: Patient, Nursing Staff and >50% of time spent in counseling and coordination of care Discussed:  Care Plan Prophylaxis:  H2B/PPI Disposition:  hospice 
        
___________________________________________________ Attending Physician: eBe Nunes MD

## 2018-12-02 NOTE — PROGRESS NOTES
Problem: Pressure Injury - Risk of 
Goal: *Prevention of pressure injury Document Mamadou Scale and appropriate interventions in the flowsheet. Outcome: Progressing Towards Goal 
Pressure Injury Interventions: 
Sensory Interventions: Assess changes in LOC Moisture Interventions: Absorbent underpads Activity Interventions: Pressure redistribution bed/mattress(bed type) Mobility Interventions: HOB 30 degrees or less Nutrition Interventions: Document food/fluid/supplement intake Friction and Shear Interventions: Apply protective barrier, creams and emollients

## 2018-12-02 NOTE — ROUTINE PROCESS
Bedside and Verbal shift change report given to Erica (oncoming nurse) by Moshe Case (offgoing nurse). Report included the following information SBAR, Kardex and Recent Results.

## 2018-12-02 NOTE — PROGRESS NOTES
Problem: Pressure Injury - Risk of 
Goal: *Prevention of pressure injury Document Mamadou Scale and appropriate interventions in the flowsheet. Outcome: Progressing Towards Goal 
Pressure Injury Interventions: 
Sensory Interventions: Assess changes in LOC, Pad between skin to skin, Pressure redistribution bed/mattress (bed type), Keep linens dry and wrinkle-free Moisture Interventions: Absorbent underpads, Apply protective barrier, creams and emollients Activity Interventions: Pressure redistribution bed/mattress(bed type) Mobility Interventions: Float heels Nutrition Interventions: Document food/fluid/supplement intake Friction and Shear Interventions: Apply protective barrier, creams and emollients Problem: Falls - Risk of 
Goal: *Absence of Falls Document Gates Kappa Fall Risk and appropriate interventions in the flowsheet. Outcome: Progressing Towards Goal 
Fall Risk Interventions: 
Mobility Interventions: Bed/chair exit alarm Mentation Interventions: Adequate sleep, hydration, pain control Medication Interventions: Bed/chair exit alarm Elimination Interventions: Bed/chair exit alarm History of Falls Interventions: Bed/chair exit alarm

## 2018-12-02 NOTE — ROUTINE PROCESS
Patient's daughter will be here around 3pm tomorrow and that's when Ana Luisa Wiggins or Elton Francisco can come see the patient and speak to her. Bedside shift change report given to Areli (oncoming nurse) by Javed Iyer (offgoing nurse). Report included the following information SBAR.

## 2018-12-03 PROBLEM — J96.90 RESPIRATORY FAILURE (HCC): Status: ACTIVE | Noted: 2018-01-01

## 2018-12-03 NOTE — HOSPICE
[facility] to Those in Need 
[phone] Nursing Note Patient Name: Julee Cordero YOB: 1940 Age: 69 y.o. [facility] [facility] RN Note:  Hospice consult noted. Chart reviewed. Family planning to arrive to [facility] tentatively at 4pm for information and GIP admission to hospice. Ginny HICKS updated via this communication. Thank you for the opportunity to care for this patient and family. Please contact [facility] [facility] at [phone] with any questions or concerns.
Attending Only

## 2018-12-03 NOTE — H&P
[facility] Good Help to Those in Need 
[phone] Patient Name: Marilyn Patel YOB: 1940 Date of Provider Hospice Visit: 12/03/18 Level of Care:   [x] General Inpatient (GIP)    [] Routine   [] Respite Current Location of Care: 
[] [facility] [x] [address] [] [facility] [] [address] [] Hospice House [facility], patient referred from: 
[] [facility] [] [address] [] [facility] [] [address] [] Home [] Other:  
 
Date of Original Hospice Admission: 12/3/18 Hospice Medical Director at time of admission: Ivette Taylor Principle Hospice Diagnosis: Respiratory failure Diagnoses RELATED to the terminal prognosis: aspiration pneumonia, severe protein malnutrition, parkinsons-end stage, dementia Other Diagnoses: Abelardo Patel is a 69y.o. year old who was admitted to [facility]. Patient admitted to the hospital on 11/18/18 with pneumonia, sepsis, respiratory failure. Patient treated with IVF, abx and evaluated by speech therapy. Patient with dysphagia/aspiration and unable to swallow safely related to mental status and Parkinson's. Family did not want a PEG but wanted to attempt TPN for a brief period of time to see if he would recover. Unfortunately, he continued to decline with issues of aspiration/worsening CXR with right sided infiltrate. Patient not responsive enough to eat or interact. Increased secretions and patient transitioned to inpatient hospice after discussions with 2 children(son in [place] and daughter locally) The patient's principle diagnosis has resulted in respiratory failure/pneumonia Refer to LCD Functionally, the patient's Karnofsky and/or Palliative Performance Scale has declined over a period of weeks and is estimated at 10-20. The patient is dependent on the following ADLs: 
 
Objective information that support this patients limited prognosis includes: CXR IMPRESSION: Improved aeration airspace disease in the left midlung. 
  
 There is increasing right basilar atelectasis/airspace disease The patient/family chose comfort measures with the support of Hospice. HOSPICE DIAGNOSES Active Symptoms: 
1. SOB 2. Secretions 3. Agitation at times PLAN 1. Patient admitted to [facility] level of care for managing secretions and frequent nurse checks as TPN being stopped. 2. Scopolamine patch and robinul 0.2 mg IV every 4 hours 3. Food for comfort per family 4. Comfort order set for pain, SOB, fever, agitation 5. Discussed with Tin Niño from hospice and family 6.  and SW to support family needs 7. Disposition: likely will pass in the hospital 
 
Prognosis estimated based on 12/03/18 clinical assessment is:  
[x] Hours to Days   
[] Days to Weeks   
[] Other: 
 
Communicated plan of care with: Hospice Case Manager; Hospice IDT; Care Team 
 
 GOALS OF CARE Resuscitation Status: DNR Durable DNR: [x] Yes [] No 
 
Primary Decision Maker ([address]): 3 children equally-discussed with Bob Hernández and Paz Heard. Third child not available. Spouse with dementia and can not make decisions Relationship to patient: 
Phone number: 
[] Named in a scanned document  
[x] Legal Next of Kin 
[] Guardian Secondary Decision Maker (First Alternate Health Care Agent):  
Relationship to patient: 
Phone number: 
[] Named in a scanned document  
[] Legal Next of Kin 
[] Guardian ACP documents you current have include: 
[] Advance Directive or Living Will 
[] Durable Do Not Resuscitate 
[] Physician Orders for Scope of Treatment (POST) [] Medical Power of  
[] Other HISTORY History obtained from: chart, family CHIEF COMPLAINT: sob The patient is:  
[] Verbal 
[] Nonverbal 
[x] Unresponsive HPI/SUBJECTIVE:  Patient is minimally responsive most of the time. Secretions noted REVIEW OF SYSTEMS The following systems were: [x] reviewed  [] unable to be reviewed Positive ROS include: Constitutional: fatigue, weakness, short of breath Ears/nose/mouth/throat: increased airway secretions Respiratory:shortness of breath,  
Gastrointestinal:poor appetite, Neurologic:, weakness Adult Non-Verbal Pain Assessment Score: 3/10 Face 
[] 0   No particular expression or smile 
[x] 1   Occasional grimace, tearing, frowning, wrinkled forehead 
[] 2   Frequent grimace, tearing, frowning, wrinkled forehead Activity (movement) [] 0   Lying quietly, normal position 
[x] 1   Seeking attention through movement or slow, cautious movement 
[] 2   Restless, excessive activity and/or withdrawal reflexes Guarding 
[x] 0   Lying quietly, no positioning of hands over areas of body 
[] 1   Splinting areas of the body, tense 
[] 2   Rigid, stiff Physiology (vital signs) [x] 0   Stable vital signs [] 1   Change in any of the following: SBP > 20mm Hg; HR > 20/minute 
[] 2   Change in any of the following: SBP > 30mm Hg; HR > 25/minute Respiratory 
[] 0   Baseline RR/SpO2, compliant with ventilator 
[x] 1   RR > 10 above baseline, or 5% drop SpO2, mild asynchrony with ventilator 
[] 2   RR > 20 above baseline, or 10% drop SpO2, asynchrony with ventilator FUNCTIONAL ASSESSMENT Palliative Performance Scale (PPS):10-20 PSYCHOSOCIAL/SPIRITUAL ASSESSMENT Active Problems: 
  Failure to thrive (0-17) (9/19/2015) HTN (hypertension) (9/19/2015) Dementia (9/20/2015) Dysphagia (9/21/2015) Debility (9/21/2015) Parkinson disease ([facility] [address] (2/25/2018) Type 2 diabetes mellitus without complication ([address] (2/27/2916) Acute encephalopathy (11/18/2018) Anxiety and depression () Age-related physical debility () Malnutrition ([address] () Weight loss (11/18/2018) Past Medical History:  
Diagnosis Date  Age-related physical debility  Anxiety and depression  Compression fracture of L3 lumbar vertebra (HCC)  Debilitated patient  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  Diabetes ([facility] [address]  Dysphagia dementia related  History of vascular access device 11/26/2018  
 5 Fr triple PICC, 39 cm R brachial, TPN  Hypertension  Malnutrition ([facility] [address]  Parkinson disease ([facility] [address] Past Surgical History:  
Procedure Laterality Date  HX HEENT Social History Tobacco Use  Smoking status: Never Smoker  Smokeless tobacco: Never Used Substance Use Topics  Alcohol use: No  
 
Family History Problem Relation Age of Onset  Stroke Father Allergies Allergen Reactions  Seroquel [Quetiapine] Anaphylaxis  Haldol [Haloperidol Lactate] Other (comments) \"Comatose state\"  Lorazepam Other (comments) Alternated mental status to benzos per family,  Morphine Other (comments) Change in mental status Current Facility-Administered Medications Medication Dose Route Frequency  glycopyrrolate (ROBINUL) injection 0.1 mg  0.1 mg IntraVENous TID  scopolamine (TRANSDERM-SCOP) 1 mg over 3 days 1 Patch  1 Patch TransDERmal Q72H  cloNIDine (CATAPRES) 0.1 mg/24 hr patch 1 Patch  1 Patch TransDERmal Q7D  
 alteplase (CATHFLO) 1 mg in sterile water (preservative free) 1 mL injection  1 mg InterCATHeter PRN  
 acetaminophen (TYLENOL) suppository 650 mg  650 mg Rectal Q4H PRN  
 timolol (TIMOPTIC) 0.5 % ophthalmic solution 1 Drop  1 Drop Both Eyes BID  ondansetron (ZOFRAN) injection 4 mg  4 mg IntraVENous Q4H PRN  
 diphenhydrAMINE (BENADRYL) capsule 25 mg  25 mg Oral Q4H PRN PHYSICAL EXAM  
 
Wt Readings from Last 3 Encounters:  
12/03/18 122 lb 12.7 oz (55.7 kg) 08/27/18 132 lb (59.9 kg) 08/15/18 132 lb (59.9 kg) Visit Vitals /65 (BP 1 Location: Left arm, BP Patient Position: At rest) Pulse 75 Temp 98.9 °F (37.2 °C) Resp 16 Ht 5' 10\" (1.778 m) Wt 122 lb 12.7 oz (55.7 kg) SpO2 96% BMI 17.62 kg/m² Supplemental O2  [x] Yes  [] NO Last bowel movement:  
 
Currently this patient has: 
[] Peripheral IV [x] PICC  [] PORT [] ICD [] Logan Catheter [] NG Tube   [] PEG Tube   
[] Rectal Tube [] Drain 
[] Other:  
 
Constitutional: emaciated, minimally responsive Eyes: sunken ENMT:dry mucosa Cardiovascular: rrr Respiratory: secretions noted Gastrointestinal: thin, soft Musculoskeletal:muscle wasting Skin:bruising Neurologic:mininally responsive Psychiatric: not interactive Other:  
 
 
Pertinent Lab and or Imaging Tests: 
Lab Results Component Value Date/Time Sodium 140 12/02/2018 04:07 AM  
 Potassium 3.8 12/02/2018 04:07 AM  
 Chloride 104 12/02/2018 04:07 AM  
 CO2 30 12/02/2018 04:07 AM  
 Anion gap 6 12/02/2018 04:07 AM  
 Glucose 127 (H) 12/02/2018 04:07 AM  
 BUN 19 12/02/2018 04:07 AM  
 Creatinine 0.67 (L) 12/02/2018 04:07 AM  
 BUN/Creatinine ratio 28 (H) 12/02/2018 04:07 AM  
 GFR est AA >60 12/02/2018 04:07 AM  
 GFR est non-AA >60 12/02/2018 04:07 AM  
 Calcium 8.4 (L) 12/02/2018 04:07 AM  
 
Lab Results Component Value Date/Time Protein, total 5.7 (L) 12/02/2018 04:07 AM  
 Albumin 2.2 (L) 12/02/2018 04:07 AM  
 
   
 
Total time: 60 
Counseling / coordination time:  
> 50% counseling / coordination?:  
 
This patient meets Hospice General Inpatient (GIP) Level of Care. The precipitating event that resulted in the need for GIP was: pneumonia/respiratory failure The interventions tried that have been unsuccessful at controlling symptoms include: robinul and scopolamine patch Supporting documentation for GIP need for pain control: 
[x] Frequent evaluation by a doctor, nurse practitioner, nurse  
[x] Frequent medication adjustment   
[x] IVs that cannot be administered at home  
[] Aggressive pain management  
[] Complicated technical delivery of medications Supporting documentation for GIP need for symptom control: []  Sudden decline necessitating intensive nursing intervention 
[]  Uncontrolled / intractable nausea or vomiting  
[]  Pathological fractures 
[]  Advanced open wounds requiring frequent skilled care [x] Unmanageable respiratory distress 
[] New or worsening delirium  
[] Delirium with behavior issues 
[] Imminent death  with skilled nursing needs documented above

## 2018-12-03 NOTE — HOSPICE
[facility] Good [facility] to Those in Need 
[phone] Inpatient Nursing Admission Patient Name: Josefa Singh YOB: 1940 Age: 69 y.o. Date of Hospice Admission: 12/3/2018 Hospice Attending Elected by Patient: Rodriguez Duque MD 
Primary Care Physician: Abel Alvares MD 
Admitting RN: Anat Merino RN : Donny Almaraz MSKARLOS Level of Care (GIP/Routine/Respite): Routine Facility of Care: [facility] Patient Room: 522/01 HOSPICE SUMMARY  
ER Visits/ Hospitalizations in past year: 2 Hospice Diagnosis: Respiratory failure ([address] [J96.90] Onset Date of Hospice Diagnosis: 12/3/2018 Summary of Disease Progression Leading to Hospice Diagnosis: 
Per chart:  Patient admitted to the hospital on 11/18/18 with pneumonia, sepsis, respiratory failure. Patient treated with IVF, abx and evaluated by speech therapy. Patient with dysphagia/aspiration and unable to swallow safely related to mental status and Parkinson's. Family did not want a PEG but wanted to attempt TPN for a brief period of time to see if he would recover. Unfortunately, he continued to decline with issues of aspiration/worsening CXR with right sided infiltrate. Patient not responsive enough to eat or interact. Increased secretions and patient transitioned to inpatient hospice after discussions with 2 children(son in [place] and daughter locally)  
  The patient's principle diagnosis has resulted in respiratory failure/pneumonia. Functionally, the patient's Karnofsky and/or Palliative Performance Scale has declined over a period of weeks and is estimated at 10-20.   
Objective information that support this patients limited prognosis includes: 
  
CXR IMPRESSION: Improved aeration airspace disease in the left midlung. 
  
There is increasing right basilar atelectasis/airspace disease Per chart Co-Morbidities:  
Patient Active Problem List  
Diagnosis Code  Failure to thrive (0-17) R62.51  
  HTN (hypertension) I10  
 Dementia F03.90  Dysphagia R13.10  Debility R53.81  
 Parkinson disease ([address] Lyubov Pitch  Type 2 diabetes mellitus without complication (HCC) V02.8  Acute encephalopathy G93.40  Anxiety and depression F41.9, F32.9  Age-related physical debility R54  Malnutrition ([address] E46  Weight loss R63.4  Respiratory failure ([address] J96.90 Diagnoses RELATED to the terminal prognosis: Parkinson's Disease Other Diagnoses: se above Rationale for a prognosis of life expectancy of 6 months or less if the disease follows its normal course (Disease Specific History):  
 
Feroz Mancini is a 69 y.o. who was admitted to [place] [address]. The patient's principle diagnosis of Parkinson's Disease has resulted in respiratory failure . Functionally, the patient's Palliative Performance Scale has declined over a period of weeks and is estimated at 20. Objective information that support this patients limited prognosis includes: increasing right basilar atelectasis/airspace disease. The patient/family chose comfort measures with the support of Hospice. Patient meets for GIP LOC as evidenced by increased secretions, agitation and dyspnea. Prognosis estimated based on 12/03/18 clinical assessment is:  
[] Few to Many Hours [] Hours to Days [x] Few to Many Days  
[] Days to Weeks  
[] Few to Many Weeks  
[] Weeks to Months  
[] Few to Many Months ASSESSMENT Patient self-reports:  []  Yes    [x] No 
 
SYMPTOMS, SIGNS/PHYSICAL FINDINGS: patient is unresponsive with increased secretions, shortness of breath and agitation. KARNOFSKY: 20 
 
Learning Assessment: 
Patient: NO 
Caregiver Able to learn, focused on self needs. CLINICAL INFORMATION Wt Readings from Last 3 Encounters:  
12/03/18 55.7 kg (122 lb 12.7 oz) 08/27/18 59.9 kg (132 lb) 08/15/18 59.9 kg (132 lb) Ht Readings from Last 3 Encounters:  
11/19/18 5' 10\" (1.778 m) 08/27/18 5' 6\" (1.676 m) 08/15/18 5' 6\" (1.676 m) There is no height or weight on file to calculate BMI. There were no vitals taken for this visit. LAB VALUES No results found for this visit on 12/03/18 (from the past 12 hour(s)). No results found for this visit on 12/03/18 (from the past 6 hour(s)). Lab Results Component Value Date/Time Protein, total 5.7 (L) 12/02/2018 04:07 AM  
 Albumin 2.2 (L) 12/02/2018 04:07 AM  
 
 
Currently this patient has: 
[] Supplemental O2 [x] Peripheral IV  [] PICC    [] PORT  
[] Logan Catheter [] NG Tube   [] PEG Tube [] Ostomy   
[] AICD: Has ICD been deactivated? [] Yes [] No:______ PLAN 1. Admit to GIP level of care for dyspnea, secretions and agitation. 2.  Dyspnea- morphine 2 mg IV q 15 minutes as needed for SOB,  
3. Agitation: Lorazepam 1mg IV every 15 minutes as needed 4. Secretions-Scopolomine Patch every 72 hrs, Robinul 0.2mg every 4 hrs PRN 5.  and MSW visits Hospice Team Frequency Orders:Skilled Nurse -   Daily x 7 days , with 5 PRN visits for symptom control. MSW  1 visit for initial assessment/evaluation for family support and need for volunteer services. Evelia Gallegos  1 visit for initial assessment/evaluation for spiritual support. ADVANCE CARE PLANNING (Complete in ACP Flow Sheet) Code Status: DNR Durable DNR: [x]  Yes  []  No 
Code Status Discussed/Confirmed:yes Preference for Other Life Sustaining Treatment Discussed/Confirmed:yes Hospitalization Preference: [facility] Advance Care Planning 11/23/2018 Patient's Healthcare Decision Maker is: Legal Next of Kin Primary Decision Maker Name -  
Primary Decision Maker Relationship to Patient - Secondary Decision Maker Name - Secondary Decision Maker Relationship to Patient -  
Confirm Advance Directive None Patient Would Like to Complete Advance Directive Unable Does the patient have other document types Do Not Resuscitate; Power of [employer] Carmel Hernandez [phone]  Service: [] Yes  []  No      [x] Unknown Appropriate for Pinning Ceremony:  [] Yes     [x] No 
Pentecostalism: [employer]  Home: unknown DISCHARGE PLANNING 1. Discharge Plan: GIP, discharge home if improvement 2. Patient/Family teaching: yes 3. Response to patient/family teaching: daughter receptive to teaching SOCIAL/EMOTIONAL/SPIRITUAL NEEDS Spiritual Issues Identified: daughter reports support from community clergy Psych/ Social/ Emotional Issues Identified: daughter perseverates on own needs and reports strained relationship with siblings. Caregiver Support: 
[x] Provided information on End of Life Care  
[x] Material Provided: Sree Pham From My Sight or [facility]  
 
CARE COORDINATION Dr. Shandra Steiner contacted, discharge to hospice order received Dr. Shandra Steiner contacted, agrees to serve as attending provider for hospice and provided verbal certification of terminal illness with life expectancy of 6 months or less. Orders for hospice admission, medications and plan of treatment received. Medication reconciliation completed. MEDS: See medication list below DME: Per hospital 
Supplies: Per hospital 
IDT communication to include MD, SN, SW, CH and support team 
 
ALLERGIES AND MEDICATIONS Allergies: Allergies Allergen Reactions  Seroquel [Quetiapine] Anaphylaxis  Haldol [Haloperidol Lactate] Other (comments) \"Comatose state\"  Lorazepam Other (comments) Alternated mental status to benzos per family,  Morphine Other (comments) Change in mental status Current Facility-Administered Medications Medication Dose Route Frequency  LORazepam (ATIVAN) injection 1 mg  1 mg IntraVENous Q15MIN PRN  
 ketorolac (TORADOL) injection 30 mg  30 mg IntraVENous Q8H PRN  
 scopolamine (TRANSDERM-SCOP) 1 mg over 3 days 1 Patch  1 Patch TransDERmal Q72H  
 glycopyrrolate (ROBINUL) injection 0.2 mg  0.2 mg IntraVENous Q4H  
  bisacodyl (DULCOLAX) suppository 10 mg  10 mg Rectal DAILY PRN  
 morphine 10 mg/ml injection 2 mg  2 mg Inhalation Q15MIN PRN

## 2018-12-03 NOTE — PROGRESS NOTES
Follow up visit with  Tye Jett. He appeared to be resting comfortably, no family present. Provided prayer at his bedside. Left Pastoral Care card informing family of my visit and prayer. Visited by: Katie Farah MS., [address] ([id number])

## 2018-12-03 NOTE — ROUTINE PROCESS
Bedside shift change report given to [facility] (oncoming nurse) by Marta Oscar (offgoing nurse). Report included the following information SBAR, Kardex, Procedure Summary, Intake/Output, MAR, Accordion, Recent Results and Med Rec Status.

## 2018-12-03 NOTE — PROGRESS NOTES
Palliative Medicine Consult Patient Name: Jen Bobby YOB: 1940 Date of Initial Consult: 11/21/18 Reason for Consult: care decisions/end stage disease Requesting Provider: Dr. Shanda Serrano Primary Care Physician: Shaquille Goff MD 
  
 SUMMARY:  
Jen Bobby is a 69 y.o. with a past history of dementia, parkinsons, debility, DM, dysphagia, who was admitted on 11/18/2018 from home with a diagnosis of pneumonia/respiratory distress. Current medical issues leading to Palliative Medicine involvement include: end stage disease. Chart reviewed/HPI-patient admitted with pneumonia, concerning for aspiration. Patient with parkinsons and dementia. He lives at home with his wife who has underlying dementia but they have 24 hour caregivers in the home. Patient has been declining related to his disease process but according to his son, so far this has worked well in the home setting SH-. Daughter lives locally and 2 sons- one lives in [place](just arrived) and 1 in [place](not involved), He needs assistance with most other ADLs PALLIATIVE DIAGNOSES:  
1. GOC discussion 2. Debility 3. Dementia 4. Aspiration 5. Parkinsons 6. FTT 7. Secretions PLAN:  
1. Met with patient who continues to do poorly. Still not responding to TPN and minimally responsive. Over the weekend, he struggled with handling secretions and ongoing SOB. 2. Talked with his son and daughter via phone(Dr. Evelia Keys also talked with daughter). Discussed with both of them about transition to comfort care/inpatient hospice care. Despite the children not communicating well, they both agree on transition to hospice care, inpatient. Daughter will be here around 4 PM and will meet with Haydee(hospice). 3. Discussed with bedside nurse and Dr. Evelia Keys as well as JOHANN Maharaj 4. GOC- comfort care.   
5. AMD-never completed-paperwork in the chart is financial. His wife has dementia so technically, all 3 children would have equal say on medical decisions. 2 out of 3 agree on hospice care. 6. Code status-DDNR signed by his wife in 2015. His wife would be the only one that could revoke the [facility] but given her dementia, that is not possible. DNAR already selected in EMR and we will continue to honor 7. Symptom management-son wanted to know if it would be ok to allow his Dad to eat for comfort if transitions to hospice. Explained that would be fine as long as does not call worsening symptoms. Will place comfort order set once transitioned to hospice 8. Psychosocial-son and daughter seem to be communicating on issues with their Dad. Spouse did visit last weekend 9. Discussed with bedside nurse, CM and Dr. Jessie Zamora 10. Initial consult note routed to primary continuity provider 11. Communicated plan of care with: Palliative IDT 
 
 
 GOALS OF CARE / TREATMENT PREFERENCES:  
[====Goals of Care====] GOALS OF CARE: 
Patient/Health Care Proxy Stated Goals: Comfort TREATMENT PREFERENCES:  
Code Status: DNR Advance Care Planning: 
Advance Care Planning 11/23/2018 Patient's Healthcare Decision Maker is: Legal Next of Kin Primary Decision Maker Name -  
Primary Decision Maker Relationship to Patient - Secondary Decision Maker Name - Secondary Decision Maker Relationship to Patient -  
Confirm Advance Directive None Patient Would Like to Complete Advance Directive Unable Does the patient have other document types Do Not Resuscitate; Power of [employer] Medical Interventions: Comfort measures Other Instructions:  
Artificially Administered Nutrition: No feeding tube The palliative care team has discussed with patient / health care proxy about goals of care / treatment preferences for patient. 
[====Goals of Care====] HISTORY:  
 
History obtained from: chart, son, daughter, bedside nurse CHIEF COMPLAINT: confusion HPI/SUBJECTIVE: The patient is: [x] Verbal and participatory [] Non-participatory due to:  
Patient talks very little. Audible secretions 11/23-patient is resting. Breathing not labored at this time 11/26-patient resting most of the time. Did open his eyes to son and apparently spoke a few words to him 
 
11/27-resting, slight opened his eyes to our voice 11/28-resting. Once again, barely arouses when I am in the room. 11/29-more awake and states he is tired 11/30-not awake today. Secretions noted 12/2-not very responsive this morning. Secretions noted Clinical Pain Assessment (nonverbal scale for severity on nonverbal patients):  
Clinical Pain Assessment Severity: 0 Adult Nonverbal Pain Scale Face: No particular expression or smile Activity (Movement): Lying quietly, normal position Guarding: Splinting areas of the body, tense Physiology (Vital Signs): Stable vital signs Respiratory: Baseline RR/SpO2 compliant with ventilator Total Score: 1 FUNCTIONAL ASSESSMENT:  
 
Palliative Performance Scale (PPS): PPS: 40 PSYCHOSOCIAL/SPIRITUAL SCREENING:  
 
Advance Care Planning: 
Advance Care Planning 11/23/2018 Patient's Healthcare Decision Maker is: Legal Next of Kin Primary Decision Maker Name -  
Primary Decision Maker Relationship to Patient - Secondary Decision Maker Name - Secondary Decision Maker Relationship to Patient -  
Confirm Advance Directive None Patient Would Like to Complete Advance Directive Unable Does the patient have other document types Do Not Resuscitate; Power of [employer] Any spiritual / Pentecostalism concerns: 
[] Yes /  [x] No 
 
Caregiver Burnout: 
[] Yes /  [x] No /  [] No Caregiver Present Anticipatory grief assessment:  
[x] Normal  / [] Maladaptive ESAS Anxiety: Anxiety: 0 
 
ESAS Depression: Depression: 0 REVIEW OF SYSTEMS:  
 
Positive and pertinent negative findings in ROS are noted above in HPI. The following systems were [x] reviewed / [] unable to be reviewed as noted in HPI Other findings are noted below. Systems: constitutional, ears/nose/mouth/throat, respiratory, gastrointestinal, genitourinary, musculoskeletal, integumentary, neurologic, psychiatric, endocrine. Positive findings noted below. Modified ESAS Completed by: provider Fatigue: 3 Drowsiness: 2 Depression: 0 Pain: 0 Anxiety: 0 Nausea: 0 Anorexia: 0 Dyspnea: 5 Constipation: No  
  Stool Occurrence(s): 1 PHYSICAL EXAM:  
 
From RN flowsheet: 
Wt Readings from Last 3 Encounters:  
12/03/18 122 lb 12.7 oz (55.7 kg) 08/27/18 132 lb (59.9 kg) 08/15/18 132 lb (59.9 kg) Blood pressure 125/65, pulse 75, temperature 98.9 °F (37.2 °C), resp. rate 16, height 5' 10\" (1.778 m), weight 122 lb 12.7 oz (55.7 kg), SpO2 96 %. Pain Scale 1: Adult Nonverbal Pain Scale Pain Intensity 1: 0 Last bowel movement, if known:  
 
Constitutional: ill appearing, not interacting Eyes: pupils equal, anicteric ENMT: no nasal discharge, moist mucous membranes Cardiovascular: regular rhythm, distal pulses intact Respiratory: breathing mildly labored, symmetric, secretions noted Gastrointestinal: soft non-tender, +bowel sounds Musculoskeletal: no deformity, no tenderness to palpation Skin: warm, dry Neurologic: not following commands, moving all extremities Psychiatric: sleeping Other: 
 
 
 HISTORY:  
 
Active Problems: 
  Failure to thrive (0-17) (9/19/2015) HTN (hypertension) (9/19/2015) Dementia (9/20/2015) Dysphagia (9/21/2015) Debility (9/21/2015) Parkinson disease ([facility] (2/25/2018) Type 2 diabetes mellitus without complication ([address] (7/11/9984) Acute encephalopathy (11/18/2018) Anxiety and depression () Age-related physical debility () Malnutrition ([address] () Weight loss (11/18/2018) Past Medical History:  
Diagnosis Date  Age-related physical debility  Anxiety and depression  Compression fracture of L3 lumbar vertebra (HCC)  Debilitated patient  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  Diabetes ([facility]  Dysphagia dementia related  History of vascular access device 11/26/2018  
 5 Fr triple PICC, 39 cm R brachial, TPN  Hypertension  Malnutrition ([facility]  Parkinson disease ([facility] Past Surgical History:  
Procedure Laterality Date  HX HEENT Family History Problem Relation Age of Onset  Stroke Father History reviewed, no pertinent family history. Social History Tobacco Use  Smoking status: Never Smoker  Smokeless tobacco: Never Used Substance Use Topics  Alcohol use: No  
 
Allergies Allergen Reactions  Seroquel [Quetiapine] Anaphylaxis  Haldol [Haloperidol Lactate] Other (comments) \"Comatose state\"  Lorazepam Other (comments) Alternated mental status to benzos per family,  Morphine Other (comments) Change in mental status Current Facility-Administered Medications Medication Dose Route Frequency  glycopyrrolate (ROBINUL) injection 0.1 mg  0.1 mg IntraVENous TID  TPN ADULT - CENTRAL AA 5% D20% W/ ELECTROLYTES AND CA   IntraVENous CONTINUOUS  
 scopolamine (TRANSDERM-SCOP) 1 mg over 3 days 1 Patch  1 Patch TransDERmal Q72H  
 insulin lispro (HUMALOG) injection   SubCUTAneous Q6H  
 fat emulsion 20% (LIPOSYN, INTRAlipid) infusion 500 mL  500 mL IntraVENous Q MON, WED & FRI  glucose chewable tablet 16 g  4 Tab Oral PRN  
 dextrose (D50W) injection syrg 12.5-25 g  25-50 mL IntraVENous PRN  
 glucagon (GLUCAGEN) injection 1 mg  1 mg IntraMUSCular PRN  
 cloNIDine (CATAPRES) 0.1 mg/24 hr patch 1 Patch  1 Patch TransDERmal Q7D  
 aspirin (ASA) suppository 300 mg  300 mg Rectal DAILY  hydrALAZINE (APRESOLINE) 20 mg/mL injection 10 mg  10 mg IntraVENous Q6H PRN  
  sodium chloride (NS) flush 10-30 mL  10-30 mL InterCATHeter PRN  
 sodium chloride (NS) flush 10 mL  10 mL InterCATHeter Q24H  
 sodium chloride (NS) flush 10 mL  10 mL InterCATHeter PRN  
 sodium chloride (NS) flush 10-40 mL  10-40 mL InterCATHeter Q8H  
 sodium chloride (NS) flush 20 mL  20 mL InterCATHeter Q24H  
 alteplase (CATHFLO) 1 mg in sterile water (preservative free) 1 mL injection  1 mg InterCATHeter PRN  
 enoxaparin (LOVENOX) injection 30 mg  30 mg SubCUTAneous Q24H  
 albuterol-ipratropium (DUO-NEB) 2.5 MG-0.5 MG/3 ML  3 mL Nebulization Q6H RT  
 piperacillin-tazobactam (ZOSYN) 3.375 g in 0.9% sodium chloride (MBP/ADV) 100 mL  3.375 g IntraVENous Q8H  
 acetaminophen (TYLENOL) suppository 650 mg  650 mg Rectal Q4H PRN  
 timolol (TIMOPTIC) 0.5 % ophthalmic solution 1 Drop  1 Drop Both Eyes BID  ondansetron (ZOFRAN) injection 4 mg  4 mg IntraVENous Q4H PRN  
 diphenhydrAMINE (BENADRYL) capsule 25 mg  25 mg Oral Q4H PRN  
 dextrose (D50W) injection syrg 12.5-25 g  25-50 mL IntraVENous PRN  
 
 
 
 LAB AND IMAGING FINDINGS:  
 
Lab Results Component Value Date/Time WBC 6.7 12/02/2018 04:07 AM  
 HGB 10.2 (L) 12/02/2018 04:07 AM  
 PLATELET 136 91/77/7120 04:07 AM  
 
Lab Results Component Value Date/Time Sodium 140 12/02/2018 04:07 AM  
 Potassium 3.8 12/02/2018 04:07 AM  
 Chloride 104 12/02/2018 04:07 AM  
 CO2 30 12/02/2018 04:07 AM  
 BUN 19 12/02/2018 04:07 AM  
 Creatinine 0.67 (L) 12/02/2018 04:07 AM  
 Calcium 8.4 (L) 12/02/2018 04:07 AM  
 Magnesium 1.9 12/03/2018 12:38 AM  
 Phosphorus 3.2 12/03/2018 12:38 AM  
  
Lab Results Component Value Date/Time AST (SGOT) 17 12/02/2018 04:07 AM  
 Alk. phosphatase 53 12/02/2018 04:07 AM  
 Protein, total 5.7 (L) 12/02/2018 04:07 AM  
 Albumin 2.2 (L) 12/02/2018 04:07 AM  
 Globulin 3.5 12/02/2018 04:07 AM  
 
Lab Results Component Value Date/Time  INR 1.1 02/25/2018 08:12 PM  
 Prothrombin time 11.0 02/25/2018 08:12 PM  
 aPTT 26.3 10/30/2015 01:50 AM  
  
No results found for: IRON, FE, TIBC, IBCT, PSAT, FERR No results found for: PH, PCO2, PO2 No components found for: [place] No results found for: CPK, CKMB Total time: 35 
Counseling / coordination time, spent as noted above: 30 
> 50% counseling / coordination?: yes Prolonged service was provided for  []30 min   []75 min in face to face time in the presence of the patient, spent as noted above. Time Start:  
Time End:  
Note: this can only be billed with 09529 (initial) or 63628 (follow up). If multiple start / stop times, list each separately.

## 2018-12-03 NOTE — HOSPICE
[employer] Good Help to Those in Need 
[phone] Inpatient Nursing Admission Patient Name: Quynh Echeverria YOB: 1940 Age: 69 y.o. Date of Hospice Admission: 12/3/2018 Hospice Attending Elected by Patient: Abby Martin MD 
Primary Care Physician: Wilfredo Lamb MD 
Admitting RN: Sonja Chen : *** Level of Care (GIP/Routine/Respite): *** Facility of Care: *** Patient Room: [facility] HOSPICE SUMMARY  
ER Visits/ Hospitalizations in past year: *** Hospice Diagnosis: respirtory failure Onset Date of Hospice Diagnosis: *** Summary of Disease Progression Leading to Hospice Diagnosis: *** Co-Morbidities:  
Patient Active Problem List  
Diagnosis Code  Failure to thrive (0-17) R62.51  
 HTN (hypertension) I10  
 Dementia F03.90  Dysphagia R13.10  Debility R53.81  
 Parkinson disease ([address] Mitchell Stapleton  Type 2 diabetes mellitus without complication (HCC) I60.3  Acute encephalopathy G93.40  Anxiety and depression F41.9, F32.9  Age-related physical debility R54  Malnutrition ([address] E46  Weight loss R63.4  Respiratory failure ([address] J96.90 Diagnoses RELATED to the terminal prognosis: *** Other Diagnoses: *** Rationale for a prognosis of life expectancy of 6 months or less if the disease follows its normal course (Disease Specific History):  
 
Quynh Echeverria is a 69 y.o. who was admitted to [facility]. The patient's principle diagnosis of *** has resulted in ***. Functionally, the patient's Palliative Performance Scale has declined over a period of *** and is estimated at ***. Objective information that support this patients limited prognosis includes: ***. The patient/family chose comfort measures with the support of Hospice. Patient meets for *** LOC as evidenced by *** Prognosis estimated based on 12/03/18 clinical assessment is:  
[] Few to Many Hours [] Hours to Days  
[] Few to Many Days [] Days to Weeks  
[] Few to Many Weeks  
[] Weeks to Months  
[] Few to Many Months ASSESSMENT Patient self-reports:  []  Yes    [] No 
 
SYMPTOMS: *** SIGNS/PHYSICAL FINDINGS: *** [place]: *** FAST for all dementia:   
 
Learning Assessment: 
Patient Is patient willing/able to learn? What is the highest level of education completed? Learning preference (written material, demonstration, visual)? Learning barriers (ESOL, [place], poor vision)? Caregiver Is caregiver willing to learn care for patient? What is the highest level of education completed? Learning preference (written material, demonstration, visual)? Learning barriers (ESOL, [place], poor vision)? CLINICAL INFORMATION Wt Readings from Last 3 Encounters:  
12/03/18 55.7 kg (122 lb 12.7 oz) 08/27/18 59.9 kg (132 lb) 08/15/18 59.9 kg (132 lb) Ht Readings from Last 3 Encounters:  
11/19/18 5' 10\" (1.778 m) 08/27/18 5' 6\" (1.676 m)  
08/15/18 5' 6\" (1.676 m) There is no height or weight on file to calculate BMI. There were no vitals taken for this visit. LAB VALUES No results found for this visit on 12/03/18 (from the past 12 hour(s)). No results found for this visit on 12/03/18 (from the past 6 hour(s)). Lab Results Component Value Date/Time Protein, total 5.7 (L) 12/02/2018 04:07 AM  
 Albumin 2.2 (L) 12/02/2018 04:07 AM  
 
 
Currently this patient has: 
[] Supplemental O2 [] Peripheral IV  [] PICC    [] PORT  
[] Logan Catheter [] NG Tube   [] PEG Tube [] Ostomy   
[] AICD: Has ICD been deactivated? [] Yes [] No:______ PLAN  
1. *** Hospice Team Frequency Orders:Skilled Nurse -  *** Daily x 7 days /every other day x 7 days *** with 5 PRN visits for symptom control. SUJEY  1 visit for initial assessment/evaluation for family support and need for volunteer services. Yolanda Hernandez  1 visit for initial assessment/evaluation for spiritual support. ADVANCE CARE PLANNING (Complete in ACP Flow Sheet) Code Status: *** Durable DNR: []  Yes  []  No 
Code Status Discussed/Confirmed: 
Preference for Other Life Sustaining Treatment Discussed/Confirmed: 
Hospitalization Preference:  (ex. Patient would like to receive end of life care in the hospital, in a facility, at home etc.) Advance Care Planning 2018 Patient's Healthcare Decision Maker is: Legal Next of Kin Primary Decision Maker Name -  
Primary Decision Maker Relationship to Patient - Secondary Decision Maker Name - Secondary Decision Maker Relationship to Patient -  
Confirm Advance Directive None Patient Would Like to Complete Advance Directive Unable Does the patient have other document types Do Not Resuscitate; [employer]  Service: [] Yes  []  No      [] Unknown Appropriate for Pinning Ceremony:  [] Yes     [] No 
Mu-ism: Orthodoxy  Home: *** DISCHARGE PLANNING 1. Discharge Plan: *** 
2. Patient/Family teaching: *** 3. Response to patient/family teaching: *** SOCIAL/EMOTIONAL/SPIRITUAL NEEDS Spiritual Issues Identified: *** Psych/ Social/ Emotional Issues Identified: *** Caregiver Support: 
[] Provided information on End of Life Care  
[] Material Provided: Gone From My Sight or Journey's End  
 
CARE COORDINATION Dr. Jesus Jackson contacted, discharge to hospice order received Dr. Jesus Jackson contacted, agrees to serve as attending provider for hospice and provided verbal certification of terminal illness with life expectancy of 6 months or less. Orders for hospice admission, medications and plan of treatment received. Medication reconciliation completed. MEDS: See medication list below DME: Per hospital 
Supplies: Per hospital 
IDT communication to include MD, SN, SW, CH and support team 
 
ALLERGIES AND MEDICATIONS Allergies: Allergies Allergen Reactions  Seroquel [Quetiapine] Anaphylaxis  Haldol [Haloperidol Lactate] Other (comments) \"Comatose state\"  Lorazepam Other (comments) Alternated mental status to benzos per family,  Morphine Other (comments) Change in mental status No current facility-administered medications for this encounter.

## 2018-12-03 NOTE — PROGRESS NOTES
NUTRITION 
 
RECOMMENDATIONS:  
 
Noted TPN to d/c. Pt likely to transition to hospice tomorrow. monitor Goals of care. 1. If appropriate for PO on comfort feeds, minimal nutritional restrictions given recent poor PO intake. 2. Ensure Enlive TID with meal, if able to have thin liquids. ASSESSMENT:  
12/3: Noted plans to transition to hospice. Allow comfort feeds if appropriate. Please re-consult for any nutritional needs. 11/29: Pt continues NPO- unsafe per SLP. TPN (D20/AA5) at goal of 75 mL/hr currently running. First lipids given last night. Last BM 11/28. D5 IVF off now. Last documented weight was incorrect, deleted. (97.5kg) Please reweigh pt. Pt has been losing a pound per day roughly. K+ 3.3, Na 135, , 220 mg/dL. Adjust insulin in TPN as necessary. 11/26:  Pt remains NPO per SLP rec's - determined not safe for PO.  NPO x 5 days now. Family is refusing PEG and does not want hospice at this time so PICC ordered to be placed for TPN today. Weight has significantly decreased since admission. Current weight indicates severely underweight per BMI of 13. Stage II PU to sacrum. Labs reviewed. Meds: humalog; D5 1/2 NaCl running at 50ml/hr (204kcals). BM noted 11/26.  
 
11/23: rapid response call for hypotension yesterday and acute respiratory failure, suspicions for recurrent aspiration events. Diet cancelled yesterday morning until speech therapy can re-evaluate. Per night nurse documentation, patient non-verbal and not tracking with eyes. Stage II pressure injury to sacral region noted, protein needs updated. D5 running @ 50ml/hr providing 204kcal/day. Last documented intake was 10% of tray on 11/21, with other reports of refusing meals. Per MD note, hospice and comfort care appropriate but family not ready for this transition. Recent -152-133, A1C 6.3 No new weight on file, will reorder. 11/19: MST referral due to weight loss and eating poorly.   Admitted with AMS, hx of dementia, Parkinson's disease, diabetes. Spoke with daughter on the phone who states his last weight taken was 132 lbs, through chart review this was in August.  She feels he has been losing eight, unsure of UBW. She reports he eats well at home, \"regular consistency without signs of aspiration, but eats what he likes. \"  He does not use an ONS at home. Lives with his wife who has Alzheimer's and they have 24 hour aides at the house. Seen by SLP and dx with severe dyshaphia, Pureed diet recommended, RD changed to Diabetic Pureed. BMI indicates normal weight. RD added Ensure High Protein BID to supply an additional 320 kcals and 32 gms protein. Weight Metrics 12/3/2018 8/27/2018 8/15/2018 7/1/2018 6/10/2018 4/11/2018 2/25/2018 Weight 122 lb 12.7 oz 132 lb 132 lb 122 lb 122 lb 122 lb 143 lb BMI 17.62 kg/m2 21.31 kg/m2 21.31 kg/m2 19.69 kg/m2 19.69 kg/m2 19.69 kg/m2 20.52 kg/m2 Past Medical History:  
Diagnosis Date  Age-related physical debility  Anxiety and depression  Compression fracture of L3 lumbar vertebra (HCC)  Debilitated patient  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  Diabetes ([address]  Dysphagia dementia related  History of vascular access device 11/26/2018  
 5 Fr triple PICC, 39 cm R brachial, TPN  Hypertension  Malnutrition ([address]  Parkinson disease ([address] Diet: NPO Abd:   wdl         BM: 11/26 Skin Integrity: []Intact  [x]Other-stage II PI on sacrum Edema: [x]None []Other Nutritionally Significant Medications: [x] Reviewed & Includes: SSI, nivia-q Labs:   
Lab Results Component Value Date/Time  Sodium 140 12/02/2018 04:07 AM  
 Potassium 3.8 12/02/2018 04:07 AM  
 Chloride 104 12/02/2018 04:07 AM  
 CO2 30 12/02/2018 04:07 AM  
 Anion gap 6 12/02/2018 04:07 AM  
 Glucose 127 (H) 12/02/2018 04:07 AM  
 BUN 19 12/02/2018 04:07 AM  
 Creatinine 0.67 (L) 12/02/2018 04:07 AM  
 Calcium 8.4 (L) 12/02/2018 04:07 AM  
 Magnesium 1.9 12/03/2018 12:38 AM  
 Phosphorus 3.2 12/03/2018 12:38 AM  
 Albumin 2.2 (L) 12/02/2018 04:07 AM  
 
 
Anthropometrics:  
Weight Source: Bed Height: 5' 10\" (177.8 cm), Body mass index is 17.62 kg/m². IBW : 75.3 kg (166 lb), % IBW (Calculated): 79.52 %,  , Wt Readings from Last 5 Encounters:  
12/03/18 55.7 kg (122 lb 12.7 oz) 08/27/18 59.9 kg (132 lb) 08/15/18 59.9 kg (132 lb) 07/01/18 55.3 kg (122 lb)  
06/10/18 55.3 kg (122 lb) Estimated Daily Nutrition Requirements:  
Weight Used: Other (comment)(weight from 8/27/18) Kcals: 1850 Kcals/day(to 2100) Based on:[address] [place](BMR x 1.2 + 250- 500) Protein: 72 g(-84g (1.2-1.4g/kg)) Fluid:1850 (1 mL/kcal) Carbohydrate: AT least 130 gms/day Education & Discharge Needs: 
 [] Pt discussed in ID rounds Nutrition related discharge needs addressed:  
  [x] Supplements (on d/c instruction &/or coupons provided)Will follow POC [] Tube Feedings  
  [] Education []No nutrition related discharge needs at this time Cultural, Scientologist and ethnic food preferences identified  
 [x] None   [] Yes NUTRITION DIAGNOSIS:  
 
Swallowing difficulty related to Parkinson's  as evidenced by severe oral dysphagia and moderate pharyngeal dysphagia per SLP  
 
11/23: nutrition dx continues, PO intake has declined due to dysphagia and medical status 11/26: Nutrition dx continues; pt not safe for PO per SLP; now NPO Pt is at Nutrition Risk:  [x] No Nutrition Risk Identified:  [] 
 
RD INTERVENTION / PT GOALS:  
 
Food/Nutrient Delivery:  TPN Nutrition Education: ,   
Nutrition Counseling:   
Coordination of Care:   
 
Goal: Meet EEN's with PN/ EN/ PO in the next 1-3 days MONITORING & EVALUATION:  
Food/Nutrient Intake Outcomes: Enteral/parenteral nutrition intake, Total energy intake Physical Signs/Symptoms Outcomes: Weight/weight change Previous Nutrition Goals: 
Previous Goal Met: No 
Previous Recommendations:     
Previous Recommendations Implemented: No 
 
Jaime Jaimes RD Pager [phone] Office [phone]

## 2018-12-03 NOTE — PROGRESS NOTES
Occupational therapy note: 
Noted patient with plan for hospice services. OT spoke with attending MD for confirmation and verbal order for discontinuing OT services. Will complete current order. Leatha Paz MS OTR/L

## 2018-12-03 NOTE — PROGRESS NOTES
[facility] Medical Progress Note NAME: Jered Rubin :  1940 MRM:  [id number] Date/Time: 2018  8:29 AM 
   
Subjective: Chief Complaint:  Pt non-verbal, contracted. Spoke with daughter via phone. She was concerned about having the prior physician continue to care for her father as it was relayed that he have cancer however found to have pneumonia. She also does not want liberal use of morphine ROS: 
(bold if positive, if negative) Not Tolerating PT  NPO Objective:  
 
Last 24hrs VS reviewed since prior progress note. Most recent are: 
 
Visit Vitals /62 (BP 1 Location: Right arm, BP Patient Position: At rest) Pulse 68 Temp 98.4 °F (36.9 °C) Resp 16 Ht 5' 10\" (1.778 m) Wt 55.3 kg (122 lb) SpO2 98% BMI 17.51 kg/m² SpO2 Readings from Last 6 Encounters:  
18 98% 18 97% 08/15/18 99% 18 96% 06/10/18 92% 18 96% O2 Flow Rate (L/min): 2 l/min Intake/Output Summary (Last 24 hours) at 2018 2209 Last data filed at 2018 9671 Gross per 24 hour Intake 400 ml Output  Net 400 ml Physical Exam: 
 
Gen: Cachetic male. Non-verbal. Contracted. HEENT:  Pink conjunctivae, PERRL, hearing may be intact to voice, dry mucous membranes Neck:  Supple, without masses, thyroid non-tender Resp: Upper airway secretions with poor clearance. Lungs coare Card:  No murmurs, normal S1, S2 without thrills, bruits or peripheral edema Abd:  Soft, non-tender, non-distended, reduced bowel sounds are present, no mass Lymph:  No cervical or inguinal adenopathy Musc:  No cyanosis or clubbing Skin:  Sacral wound, skin turgor is reduced Neuro: Diffuse weakness. Contracted. LUE > RUE. Bilaterally in LE. Non-verbal 
Psych: Poor insight Telemetry reviewed:   normal sinus rhythm 
__________________________________________________________________ Medications Reviewed: (see below) Medications: Current Facility-Administered Medications Medication Dose Route Frequency  glycopyrrolate (ROBINUL) injection 0.1 mg  0.1 mg IntraVENous TID  TPN ADULT - CENTRAL AA 5% D20% W/ ELECTROLYTES AND CA   IntraVENous CONTINUOUS  
 scopolamine (TRANSDERM-SCOP) 1 mg over 3 days 1 Patch  1 Patch TransDERmal Q72H  
 insulin lispro (HUMALOG) injection   SubCUTAneous Q6H  
 fat emulsion 20% (LIPOSYN, INTRAlipid) infusion 500 mL  500 mL IntraVENous Q MON, WED & FRI  glucose chewable tablet 16 g  4 Tab Oral PRN  
 dextrose (D50W) injection syrg 12.5-25 g  25-50 mL IntraVENous PRN  
 glucagon (GLUCAGEN) injection 1 mg  1 mg IntraMUSCular PRN  
 cloNIDine (CATAPRES) 0.1 mg/24 hr patch 1 Patch  1 Patch TransDERmal Q7D  
 aspirin (ASA) suppository 300 mg  300 mg Rectal DAILY  hydrALAZINE (APRESOLINE) 20 mg/mL injection 10 mg  10 mg IntraVENous Q6H PRN  
 sodium chloride (NS) flush 10-30 mL  10-30 mL InterCATHeter PRN  
 sodium chloride (NS) flush 10 mL  10 mL InterCATHeter Q24H  
 sodium chloride (NS) flush 10 mL  10 mL InterCATHeter PRN  
 sodium chloride (NS) flush 10-40 mL  10-40 mL InterCATHeter Q8H  
 sodium chloride (NS) flush 20 mL  20 mL InterCATHeter Q24H  
 alteplase (CATHFLO) 1 mg in sterile water (preservative free) 1 mL injection  1 mg InterCATHeter PRN  
 enoxaparin (LOVENOX) injection 30 mg  30 mg SubCUTAneous Q24H  
 albuterol-ipratropium (DUO-NEB) 2.5 MG-0.5 MG/3 ML  3 mL Nebulization Q6H RT  
 piperacillin-tazobactam (ZOSYN) 3.375 g in 0.9% sodium chloride (MBP/ADV) 100 mL  3.375 g IntraVENous Q8H  
 acetaminophen (TYLENOL) suppository 650 mg  650 mg Rectal Q4H PRN  
 timolol (TIMOPTIC) 0.5 % ophthalmic solution 1 Drop  1 Drop Both Eyes BID  ondansetron (ZOFRAN) injection 4 mg  4 mg IntraVENous Q4H PRN  
 diphenhydrAMINE (BENADRYL) capsule 25 mg  25 mg Oral Q4H PRN  
 dextrose (D50W) injection syrg 12.5-25 g  25-50 mL IntraVENous PRN Lab Data Reviewed: (see below) Lab Review:  
 
Recent Labs 12/02/18 
0407 WBC 6.7 HGB 10.2* HCT 31.6*  
 Recent Labs 12/02/18 
0407 12/01/18 
5268 11/30/18 
3296  138 139  
K 3.8 3.4* 3.3*  
 103 105 CO2 30 29 30 * 238* 228* BUN 19 19 18 CREA 0.67* 0.73 0.69* CA 8.4* 8.1* 7.8*  
MG 1.8 1.8 1.8 PHOS 2.7 2.4* 3.1 ALB 2.2*  --   --   
TBILI 0.9  --   --   
SGOT 17  --   --   
ALT 11*  --   --   
 
Lab Results Component Value Date/Time Glucose (POC) 124 (H) 12/02/2018 06:17 PM  
 Glucose (POC) 107 (H) 12/02/2018 05:48 PM  
 Glucose (POC) 194 (H) 12/02/2018 11:25 AM  
 Glucose (POC) 134 (H) 12/02/2018 05:53 AM  
 Glucose (POC) 95 12/01/2018 11:54 PM  
 
No results for input(s): PH, PCO2, PO2, HCO3, FIO2 in the last 72 hours. No results for input(s): INR in the last 72 hours. No lab exists for component: INREXT, INREXT Assessment and Plan:  
Acute encephalopathy / Dementia / Anxiety and depression - Appreciate neurology input. Unremarkable multiple CTA/CT/MRI brain, EEG with slowing, B12 adequte. Continues waxing and waning. This is fatal end stage dementia. There is no role for chronic TPN, PEG, and unsafe to offer any oral intake. Palliative care and MDs have had multiple discussions with family. Ideally needs Hospice 
  
Acute hypoxic respiratory failure - POA, and persistent due to continued aspiration. Scopolamine, glycopyrrolate, NPO however this cannot be prevented as aspiration secretions  
  
Aspiration pneumonia - POA, with CT showing multilobar consolidation, CXR on 11/23 with new DEVONTE ASDZ, CXR 11/29 with new RLL ASDZ. On zosyn. Will not help if develops aspiration pneumonitis. Speech on board. Pt NPO. Receiving TPN which will not change other co-morbidities 
  
Parkinson disease / Dysphagia / Debility / Failure to thrive / Malnutrition / Weight loss - No role for PEG or TPN. Extremely poor prognosis.  Pt on TPN but this will not change the ultimate prognosis  
  
 HTN (hypertension)  - on clonidine  
  
Type 2 diabetes mellitus without complication - ISS while on TPN 
  
Hyperlipidemia- would stop statin due to prognosis  
  
Pressure ulcer of sacral region, stage 3 POA - wound care Other pertinent lab: none Total time spent with patient: 39 Minutes (discussion with daughter via phone and RN) Care Plan discussed with: Patient, Nursing Staff and >50% of time spent in counseling and coordination of care Discussed:  Care Plan Prophylaxis:  H2B/PPI Disposition:  TBD    
___________________________________________________ Attending Physician: Joaquina Davis MD

## 2018-12-03 NOTE — PROGRESS NOTES
12/3/2018   CARE MANAGEMENT NOTE:  Pt transferred from [facility] to the 5th floor. EMR reviewed and order received from MD re: hospice. CM made a referral to [facility] for inpt admission. Megha

## 2018-12-03 NOTE — ROUTINE PROCESS
Bedside shift change report given to [facility] (oncoming nurse) by Cr Proctor (offgoing nurse). Report included the following information SBAR.

## 2018-12-04 NOTE — HOSPICE
[address] [facility] Help to Those in Need 
[phone] Social Work Admission NotePatient Name: Gaviota Heart YOB: 1940 Age: 69 y.o. Date of Visit: 12/04/18 Facility of Care: [facility] Patient Room: 522/01 Hospice Attending: Cleve Ross MD 
Hospice Diagnosis: Respiratory failure [facility]) [J96.90] Level of Care:  
 [x]  GIP []  Respite 
 []  Routine NARRATIVE Initial SW Assessment/Visit completed on today's date. Pt is currently inpatient at [facility] at [facility] level of care due to respiratory distress. Turn team had just re-positioned pt after music therapist completed her visit when MSW arrived. No family members were present in the home. Pt has a past history of dementia, parkinsons, debility, DM and dysphagia. Patient's eyes were open during visit but was non verbal during this visit. 
  
MSW reviewed pt's chart and observed that he has a DNR dated 9/21/15 that was signed by his wife. Pt has a General Joint Power of  on file identifying is son, Bob Hernández and Paz Heard as joint agents for decision making regarding legal and financial matters. Pt does not have an Advance Medical Directive or document identifying a healthcare agent/MPOA. MSW made phone contact with Paz Heard. Per Vinita Said, pt's wife has dementia and Vinita Said has been caring for both of her parents with the assistance of hired caregivers. Vinita Said feels that her father is \"finished fighting\" and is ready to be at peace. Vinita Said reports goal that pt is kept comfortable but not medicated to such an extent that he is sleeping constantly. Vinita Said reports that she has two older brothers. Vinita Said states that her middle brother is not involved/connected with the family and that she and her older brother, Anahi Lutz often disagree over their parents' care and health care decisions.  Vinita Said states that she had a nervous breakdown approximately 2 years ago and is under the care of a psychiatrist and outpatient therapist. Gurinder Rico denied SI when queried but admits to a history of SI. MSW provided emotional supported as Gina ventilated her feelings about the family dynamics, her parents health, demand of caregiver role and her history of depression. MSW encouraged Gurinder Rico to maintain contact with her psychiatrist and therapist and also advised her of pre-bereavement and aftercare services. SW services to continue to follow. Pt likely to remain at [facility] for end of life care. ADVANCE CARE PLANNING Code Status: DNR Durable DNR: Jason Kras  _ No 
Advance Care Planning 2018 Patient's Healthcare Decision Maker is: Legal Next of Kin Primary Decision Maker Name -  
Primary Decision Maker Relationship to Patient - Secondary Decision Maker Name - Secondary Decision Maker Relationship to Patient -  
Confirm Advance Directive Yes, on file Patient Would Like to Complete Advance Directive - Does the patient have other document types - Relationship Status: 
[]  Single    
[x]       
[]     
[]  Domestic Partner    
[]  / 
[]  Common Law 
[]   
[]  Unknown If in a relationship, name of partner/spouse: Wife has dementia Duration of relationship: 
 
Baptism: Baptism  Home: [facility] per [address] report. Resources Provided: MSW encouraged Gurinder Rico to maintain contact with her psychiatrist and outpatient therapist. Joelle De Jesus and after care support services discussed and offered. Social Work Initial Assessment Gender: 
male Race/Ethnicity: (radha all that apply) []  American [facility] or [place] Native 
[]   
[x]  Black or [place] American 
[]   or  
[]   or [place] 
[]  Sd Perish 
[]  Unknown 
  
 Service:   
[]  Yes  
[x]  No      
[]  Unknown Appropriate for Pinning Ceremony:  
[]  Yes []  No 
Is patient using VA benefits? []  Yes     
[]  No 
  
Primary Language: English 
[]   Needed 
[]   utilized during visit Ability to express thoughts/needs/feelings 
[]  Expressed thoughts/feelings/needs without difficulty 
[]  Requires extra time and cuing 
[]  Speech limited single words 
[]  Uses only gestures (eye, blinking eye or head movement/pointing) [x]  Unable to express thoughts/feelings/needs (speech unintelligible or inappropriate) []  Unresponsive Notes:  
  
Mental Status: 
[]  Alert-oriented to:   
 []  Person   
 []  Place   
 []  Time 
[]  Comatose-responds to:  
 []   Verbal stimuli  
 []  Tactile stimuli  
 []  Painful stimuli 
[]  Forgetful 
[]  Disoriented/Confused 
[]  Lethargic 
[]  Agitated 
[]  Other (specify):   
Notes:  
  
Patients description of Illness/Current Health Status:   
[x]  Patient unable to discuss 
[]  Patient unwilling to discuss 
[]  (Specify) Knowledge/Understanding of Disease Process Patient:  
 []  Demonstrates knowledge/understanding of disease process 
 []  Demonstrates knowledge/understanding of treatment plan 
 []  Demonstrates knowledge/understanding of prognosis []  Demonstrates acceptance of prognosis []  Demonstrates knowledge/understanding of resuscitation status 
 []  Other (specify) Caregiver: 
 [x]  Demonstrates knowledge/understanding of disease process [x]  Demonstrates knowledge/understanding of treatment plan 
 [x]  Demonstrates knowledge/understanding of prognosis [x]  Demonstrates acceptance of prognosis [x]  Demonstrates knowledge/understanding of resuscitation status 
 []  Other (specify) Notes:  
  
Patients living arrangement/care setting: 
Use the PRIOR COLUMN when the PATIENTS current health status necessitated a change in his/her primary residence. Prior Current Response [x]             []    Patients own home/residence []             []    Home of family member/friend []             []    Boarding home  
           []             []    Assisted living facility/MCC center []             []    Hospital/Acute care facility []             []    Skilled nursing facility []             []    Long term care facility/Nursing home  
           []             [x]    Hospice in Patient Primary Caregiver: 
[]  No Primary Caregiver Name of Primary Caregiver: [place] Relationship or Primary Caregiver:  
 []  Spouse/Significant other     
 [x]  Natural Children    
 []  Step child     
 []  Sibling 
 []  Parent 
 []  Friend/Neighbor 
 []  Community/[employer] Volunteer 
 []  Paid help 
 []  Other (specify):___________ Notes: Judy Barclay reports that she and her brother, Marcelina Alexandre often disagree on their parents care needs. A third child (son) is reported to not be involved.    
  
Family members/Significant others: 
Name: 
Relationship: 
Phone Number: Actively involved in care? []  Yes  []  No 
 
Name: 
Relationship: 
Phone Number: Actively involved in care? []  Yes  []  No 
 
Name: 
Relationship: 
Phone Number: Actively involved in care? []  Yes  []  No 
 
Social support systems: (select ONE best description) []  Excellent social support system which includes three or more family members or friends 
[x]  Good social support system which includes two or less members or friends 
[]  [address] support which includes one family member or friend 
[]  Poor social support; no family members or friends; basically ALONE Notes:  
  
Emotional Status: (radha all that apply) Patient Caregiver Response [x]                []    Mood/Affect stable and appropriate    
              []                []    Angry  
              []                []    Anxious []                []    Apprehensive []                []    Avoidant  
              []                []    Clinging  
              []                []    Depressed  
              []                []    Distraught  
              []                []    Elated []                []    Euphoric  
              []                []    Fearful  
              []                []    Flat Affect  
              []                []    Helpless []                []    Hostile []                []    Impulsive []                []    Irritable  
              []                []    Labile  
              []                []    Manic  
              []                []    Restlessness []                [x]    Sad  
              []                []    Suspicious []                []    Tearful  
              []                []    Withdrawn Notes: Daughter, Diann Moore reports that she had a nervous breakdown two years ago. Darrin Pollard states that she sees a psycniatrist and outpatient therapist regularly for depression. Suicidal ideation denied when queried by MSW but Darrin Pollard admits to hx of SI. Coping Skills (strengths/weakness):  
 Patient: Coping Skills (strength/weakness):  
 Family/caregiver (strength/weakness): 
  
[place] of care (radha all that apply):    
[]  No burden evident  
[]  Family must administer medications  
[]  Illness causing financial strain  
[x]  Family/Support feels overwhelmed  
[]  Family/Support sleep disturbed with patients care  
[x]  Patients care causes extra physical stress  of death 
[]  Illness causes changes in family lifestyle 
[]  Illness impacting family/support employment 
[]  Family experiencing increased time demands 
[]  Patients behavior endangers family 
[]  Denial of patients illness 
[]  Concern over outcome of illness/fear 
[]  Patients behavior embarrassing to family Notes:  Raina Hernandez reports that she had a nervous breakdown two years ago. Raheem Dominguez states that she sees a psycniatrist and outpatient therapist regularly for depression. Suicidal ideation denied when queried by MSW but Raheem Dominguez admits to hx of SI 
  
Risk Factors: (radha all that apply):   
[]  No burden evident  
[]  Alcohol abuse 
[]  Financial resources inadequate to meet basic needs (food/house/etc) []  Financial resources inadequate to meet health care needs (supplies/equipment/medications) 
[]  Food/nutrition resources inadequate 
[]  Home environment unsafe/inadequate for home care 
[]  Homicidal risk 
[]  Lives alone or without concerned relatives 
[]  Multiple medications/complex schedule 
[]  Physical limitations increase likelihood of falls 
[]  Plan of care/treatments complicated 
[]  Substance use/abuse 
[]  Suicidal risk 
[]  Visual impairment threatens safety/ability to perform self-care 
[x]  Other (specify):Raina Hernandez reports that she had a nervous breakdown two years ago. Raheem Dominguez states that she sees a psycniatrist and outpatient therapist regularly for depression. Suicidal ideation denied when queried by MSW but Raheem Dominguez admits to hx of SI 
  
Abuse/Neglect (actual/potential risks): 
[x]  No signs of abuse/neglect 
[]  History of abuse/neglect                 []  ABFFAMEX          []  Sexual 
[]  History of domestic violence 
[]  Lacks adequate physical care 
[]  Lacks emotional nurturing/support 
[]  Lacks appropriate stimulation/cognitive experiences 
[]  Left alone inappropriately 
[]  Lacks necessary supervision 
[]  Inadequate or delayed medical care 
[]  Unsafe environment (i.e guns/drug use/history of violence in the home/etc.) []  Bruising or other physical signs of injury present 
[]  Other (specify): 
Notes:  
[]  Refer to child/adult protective services Current Sources of Stress (in Addition to Current Illness):  
[]  None reported []  Bills/Debt   
[]  Career/Job change   
[]   (short term)   
[]   (long term)   
[]  Death of a child (recent)   
[]  Death of a parent (recent)  
[]  Death of a spouse (recent)  
[]  Employment status changed  
[]  Family discord   
[]  Financial loss/Inadequate inther (specify):come 
[]  Job loss 
[]  Legal issues unresolved 
[]  Lifestyle change 
[]  Marital discord 
[]  Marriage within the last year 
[]  Paperwork (insurance/legal/etc) overwhelming 
[]  Separation/Divorce [x]  Other (specify):Daughter, Rodney Diallo reports that she had a nervous breakdown two years ago. Ned Haas states that she sees a psycniatrist and outpatient therapist regularly for depression. Suicidal ideation denied when queried by MSW but Ned Haas admits to hx of SI. Patient's wife has dementia and require around the clock care and supervision in the home setting. Reported family strain/tension. Notes:  
  
Current [employer] Being Utilized 1. Ned Haas is connected with a psychiatrist and outpatient therapist.  
 
 
 
Interventions/Plan of Care 1. Assess social and emotional factors related to coping with end of life issues 2. Community resource planning/referral  
3. Relocation to different care setting if/when symptoms stabilize 4. Discharge Planning 1. Pt likely to remain at [facility] for end of life care. MSW to share concerns related to caregiver stress/burden with MD if discharge planning is necessary as MSW does not think that Ned Haas should assume care of her father due to her own fragile emotional and mental state that she reports. MSW Assessment Completed by: Bonita Joyner 12/04/18 Time In:12:25 Time Out:13:00

## 2018-12-04 NOTE — PROGRESS NOTES
Music Therapy Assessment Joel Ojeda [id number]  [id number]   
1940  69 y.o.  male Patient Telephone Number: [phone] (home) Faith Affiliation: [facility]  
Language: [place] Extended Emergency Contact Information Primary Emergency Contact: Malathi Desai Home Phone: [phone] Relation: Daughter Secondary Emergency Contact: Onofre Francois [address] [facility] Mobile Phone: [phone] Relation: Son  
Patient Active Problem List  
 Diagnosis Date Noted  Respiratory failure ([address] 12/03/2018  Acute encephalopathy 11/18/2018  Weight loss 11/18/2018  Anxiety and depression  Age-related physical debility  Malnutrition ([facility] [address] [address]  Type 2 diabetes mellitus without complication ([facility] [address] 27/97/4100  Parkinson disease ([facility] [address] 02/25/2018  Dysphagia 09/21/2015  Debility 09/21/2015  Dementia 09/20/2015  Failure to thrive (0-17) 09/19/2015  
 HTN (hypertension) 09/19/2015 Date: 12/4/2018 Mental Status:   [x] Alert [  ] Roderick Masker [  ]  Confused  [x] Minimally responsive Communication Status: [  ] Impaired Speech [  ] Nonverbal -N/A Physical Status:   [x] Oxygen in use  [  ] Hard of Hearing [  ] Vision Impaired [  ] Ambulatory  [  ] Ambulatory with assistance [  ] Non-ambulatory Music Preferences, Background: Pt's daughter shared that pt likes R&B, Yulee Horns, and [place]. Some favorite singers are An Edwards 74. 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 Clinical Problem addressed: Support relaxation and comfort, and family coping. Goal(s) met in session: 
Physical/Pain management (Scale of 1-10): Pre-session rating: N/A: Please see Session Observations below. Post-session rating: N/A: Please see Session Observations below. [x] Increased relaxation   [  ] Regulated breathing patterns [  ] Decreased muscle tension   [  ] Minimized physical distress Emotional/Psychological: 
[  ] Increased self-expression   [  ] Decreased aggressive behavior [  ] Decreased sadness   [  ] Discussed healthy coping skills [  ] Improved mood    [  ] Decreased withdrawn behavior Social: 
[  ] Decreased feelings of isolation/loneliness [  ] Positive social interaction  
[x] Provided support and/or comfort for family/friends Spiritual: 
[x] Spiritual support    [  ] Expressed peace [  ] Expressed mayda    [  ] Discussed beliefs Techniques Utilized (Check all that apply):  
[  ] Procedural support MT [x] Music for relaxation [x] Patient preferred music 
[  ] Lesli analysis  [  ] Song choice  [  ] Music for validation [  ] Entrainment  [  ] Progressive muscle relax. [  ] Guided visualization [  ] Cleophas Enter  [  ] Patient instrument playing [  ] Hollace Close writing [  ] Mendel Gatresa along   [  ] Improvisation  [x] Sensory stimulation [  ] Active Listening  [x] Music for spiritual support [  ] Making of CDs as gifts Session Observations:  Referral from Suzanne Barber, Hospice RN. This music therapist (MT) introduced self to patient's daughter Kim Began on the phone. MT offered music therapy for patient (pt) and pt's daughter said that the pt has always loved music. MT asked her about the pt's music preferences and music background and she shared these. She expressed gratitude for the call and for pt receiving music therapy. Pt was lying in bed with his eyes open and appeared to be breathing through his open mouth. MT gently touched pt's shoulder while greeting him, introducing self, and sharing about speaking on the phone with his daughter. Pt appeared minimally responsive in response to this. MT invited pt to listen and relax to the music.  MT sat at bedside and softly sang and played the Rashmi Benítez and Schuyler Lamar song Ain't No [employer] and then the [employer] I Say a Little Prayer for Alejandro Santana with guitar. MT sang these songs at a slower tempo than they are in their respective recorded versions to promote a relaxing environment for pt. Pt appeared minimally responsive during the first song, and to increase relaxation in response to the next, as evidenced by (AEB) closing his eyes. Pt's eyes remained closed as MT sang and played the [employer] with guitar. Pt's eyes opened when MT gently touched his shoulder while concluding the session. Will follow as able. ALBAN BrownBC (Music Therapist-Board Certified) Spiritual Care Department Referral-based service

## 2018-12-04 NOTE — PROGRESS NOTES
Bedside and Verbal shift change report given to Sidney Meng RN (oncoming nurse) by Kim Yee RN (offgoing nurse). Report included the following information SBAR, Kardex, Intake/Output, MAR, Recent Results and Med Rec Status.

## 2018-12-04 NOTE — PROGRESS NOTES
Bedside and Verbal shift change report given to Brynn RN (oncoming nurse) by Tessie Matthews RN (offgoing nurse). Report included the following information SBAR, Kardex, MAR, Accordion and Recent Results.

## 2018-12-04 NOTE — PROGRESS NOTES
[facility] Good Help to Those in Need 
[phone] [facility] Daily Nursing Note Patient Name: Immanuel Charles YOB: 1940 Age: 69 y.o. Date of Visit: 12/04/18 Facility of Care: [facility] Patient Room: 522/ Hospice Attending: Jackson Wheeler MD 
Hospice Diagnosis: Respiratory failure [facility]) [J96.90] Level of Care: GIP Current [facility] Symptoms 1. Admitted GIP yesterday for respiratory distress, suctioning as needed. Patient with fever overnight requiring IV toradol 2. Patient with audible secretions, lungs are very coarse, breathing is regular but with an increased effort, on 1.5 liters of O2 3. Patient is alert with eyes open, aphasic today. Patient febrile during the night with temp 100.4 ASSESSMENT & PLAN Must update Plan of Care including visit frequencies for IDT members 1. Continue with scheduled robinul for secretions, and suction as needed. 2. Schedule IV morphine 1mg every 6 hours for dyspnea, labored breathing. 3. Continue with toradol IV as needed for fever Spiritual Interventions: none at this time. Psych/ Social/ Emotional Interventions: none at this time. Care Coordination Needs: Spoke with Dr. Monserrat Fernández to schedule morphine. Care plan and New Orders discussed / approved with Dr. Errol Rodriguez MD. Description History and Chart Review If this is initial GIP note must document RN assessment/MD communication in previous setting. Specifically document nursing/medication needs in last 24 hours to support GIP care Narrative History of last 24 hours that demonstrates care cannot be provided in another setting: 
Patient requiring IV medications, suctioning for secretions. What has been done to control the patient's symptoms in the last 24 hours? Robinul scheduled, prn medications available IV for pain, dyspnea Does the patient currently require IV medications? yes Does the patient currently require scheduled medications?  No 
 Does the patient currently require a PCA? no 
 
List number of doses of PRN medications in last 24 hours: 
Medication 1: Toradol Number of doses: 1 Medication 2:  
Number of doses: 
 
Medication 3:  
Number of doses: Supporting documentation for GIP need for pain control: 
[x] Frequent evaluation by a doctor, nurse practitioner, nurse  
[x] Frequent medication adjustment   
[x] IVs that cannot be administered at home  
[] Aggressive pain management  
[] Complicated technical delivery of medications Supporting documentation for GIP need for symptom control: 
[x]  Sudden decline necessitating intensive nursing intervention 
[]  Uncontrolled / intractable nausea or vomiting  
[]  Pathological fractures 
[]  Advanced open wounds requiring frequent skilled care [x] Unmanageable respiratory distress 
[] New or worsening delirium  
[] Delirium with behavior issues: Is 24 hour caregiver present due to safety concerns with agitation? (yes/no) [] Imminent death  with skilled nursing needs documented above DISCHARGE PLANNING Daily discharge planning required for GIP 1. Discharge Plan: patient will likely pass at [facility], possible transfer to [facility] if stable 2. Patient/Family teaching: no family at bedside today, educated staff RN on meds 3. Response to patient/family teaching: confirmed understanding. ASSESSMENT   
KARNOFSKY: 20 Prognosis estimated based on 12/04/18 clinical assessment is:  
[x] Few to Many Hours [] Hours to Days  
[] Few to Many Days  
[] Days to Weeks  
[] Few to Many Weeks  
[] Weeks to Months  
[] Few to Many Months Quality Measure: Patient self-reports:  [] Yes    [x] No 
ESAS:  
Time of Assessment: 1000 Pain (1-10):6 Fatigue (1-10): 8 Shortness of breath (1-10):8 Nausea (1-10):5 Appetite (1-10): Anxiety: (1-10): Depression: (1-10): Well-being: (1-10): Constipation: _ Yes  x_ No 
LAST BM: 12/3/18 CLINICAL INFORMATION Patient Vitals for the past 12 hrs: Temp Pulse Resp BP SpO2  
12/04/18 0845 98.6 °F (37 °C) 67 16 151/80 99 % 12/04/18 0328 100.4 °F (38 °C) 74 16 116/77 98 % Currently this patient has: 
[] Supplemental O2 [x] IV [x] PICC [] PORT  
[] NG Tube   
[] PEG Tube  
[] Ostomy    
[] Logan draining _______ urine 
[] Other:  
 
SIGNS/PHYSICAL FINDINGS Skin (including wound): 
[] Warm, dry, supple, intact and color normal for race [x] Warm  
[x] Dry  
[] Cool    
[] Clammy      
[] Diaphoretic Turgor 
 [] Normal 
 [x] Decreased Color: 
 [] Pink 
 [] Pale 
 [] Cyanotic 
 [] Erythema 
 [] Jaundice [x] Normal for Race 
[x]  Wounds:sacral 
Neuro: 
[] Lethargy 
[] Restlessness / agitation 
[] Confusion / delirium 
[] Hallucinations [x] Responds to maximal stimulation 
[] Unresponsive 
[] Seizures Cardiac:[] Dyspnea on Exertion 
[] JVD [] Murmur 
[] Palpitations [] Hypotension 
[] Hypertension 
[x] Tachycardia [] Bradycardia 
[] Irregular HR [x] Pulses Decreased 
[] Pulses Absent 
[] Edema:       (Location, Grade and Pitting) [] Mottling:      (Location) Respiratory:Breath sounds:  
 [x] Diminished 
 [] Wheeze [x] Rhonchi 
 [] Rales  
[] Even and unlabored 
[x] Labored:     22      
[] Cough 
 [] Non Productive 
 [] Productive 
  [] Description:          
[] Deep suctioned  
[] O2 at _1.5__ LPM 
[] High flow oxygen greater than 10 LPM 
[] Bi-Pap GI [x] Abdomen (describe) soft flat 
[] Ascites 
[] Nausea 
[] Vomiting 
[] Incontinent of bowels [x] Bowel sounds (yes/no) [] Diarrhea 
[] Constipation (see above including last bowel movement) [] Checked for impaction 
[x] Last BM 12/3/18 Nutrition Diet:____NPO______ Appetite:  
[] Good  
[] Fair  
[] Poor  
[] Tube Feeding  
[] Voiding 
[x] Incontinent  
[] Logan Musculoskeletal 
[] Balance/[place] Unsteady [x] Weak Strength:  
 [] Normal  
 [] Limited [x] Decreasing Activities:  
 [] Up as tolerated 
 [x] Bedridden  
 [] Specify: 
 
SAFETY [] 24 hr. Caregiver [x] Side rails ? [x] Hospital bed  
[] Reviewed Falls & Safety ALLERGIES AND MEDICATIONS Allergies: Allergies Allergen Reactions  Seroquel [Quetiapine] Anaphylaxis  Haldol [Haloperidol Lactate] Other (comments) \"Comatose state\"  Lorazepam Other (comments) Alternated mental status to benzos per family,  Morphine Other (comments) Change in mental status Current Facility-Administered Medications Medication Dose Route Frequency  LORazepam (ATIVAN) injection 1 mg  1 mg IntraVENous Q15MIN PRN  
 ketorolac (TORADOL) injection 30 mg  30 mg IntraVENous Q8H PRN  
 scopolamine (TRANSDERM-SCOP) 1 mg over 3 days 1 Patch  1 Patch TransDERmal Q72H  
 glycopyrrolate (ROBINUL) injection 0.2 mg  0.2 mg IntraVENous Q4H  
 bisacodyl (DULCOLAX) suppository 10 mg  10 mg Rectal DAILY PRN  
 morphine injection 2 mg  2 mg Inhalation Q15MIN PRN  prochlorperazine (COMPAZINE) with saline injection 5 mg  5 mg IntraVENous Q6H PRN Visit Time In: 1000 Visit Time Out: 0314

## 2018-12-04 NOTE — H&P
[facility] Good Help to Those in Need 
[phone] Patient Name: Sabrina Valle YOB: 1940 Date of Provider Hospice Visit: 12/04/18 Level of Care:   [x] General Inpatient (GIP)    [] Routine   [] Respite Current Location of Care: 
[] [facility] [x] [facility] [] [id number] [facility] [] [facility] [] Hospice [facility] [place], patient referred from: 
[] [facility] [] [facility] [] [address] [facility] [] [facility] [] Home [] Other:  
 
Date of Original Hospice Admission: 12/3/18 Hospice Medical Director at time of admission: Denae Bah Principle Hospice Diagnosis: Respiratory failure Diagnoses RELATED to the terminal prognosis: aspiration pneumonia, severe protein malnutrition, parkinsons-end stage, dementia Other Diagnoses: Yissel Valle is a 69y.o. year old who was admitted to [facility]. Patient admitted to the hospital on 11/18/18 with pneumonia, sepsis, respiratory failure. Patient treated with IVF, abx and evaluated by speech therapy. Patient with dysphagia/aspiration and unable to swallow safely related to mental status and Parkinson's. Family did not want a PEG but wanted to attempt TPN for a brief period of time to see if he would recover. Unfortunately, he continued to decline with issues of aspiration/worsening CXR with right sided infiltrate. Patient not responsive enough to eat or interact. Increased secretions and patient transitioned to inpatient hospice after discussions with 2 children(son in [place] and daughter locally) The patient's principle diagnosis has resulted in respiratory failure/pneumonia Refer to LCD Functionally, the patient's Karnofsky and/or Palliative Performance Scale has declined over a period of weeks and is estimated at 10-20. The patient is dependent on the following ADLs: 
 
Objective information that support this patients limited prognosis includes: CXR IMPRESSION: Improved aeration airspace disease in the left midlung. 
  
 There is increasing right basilar atelectasis/airspace disease The patient/family chose comfort measures with the support of Hospice. HOSPICE DIAGNOSES Active Symptoms: 
1. SOB 2. Secretions 3. Agitation at times PLAN 1. Patient admitted to [facility] level of care for managing secretions 2. Scopolamine patch and robinul 0.2 mg IV every 4 hours 3. SOB worse this morning so started morphine 1 mg IV every 6 hours 4. Food for comfort per family 5. Comfort order set for pain, SOB, fever, agitation 6. Discussed with Dusty Watts from Hospice and bedside nurse. Will attempt to talk with children. Appreciate SW talking with daughter who is at high risk for post death bereavement. 7.  and SW to support family needs 8. Disposition: likely will pass in the hospital 
 
Prognosis estimated based on 12/04/18 clinical assessment is:  
[x] Hours to Days   
[] Days to Weeks   
[] Other: 
 
Communicated plan of care with: Hospice Case Manager; Hospice IDT; Care Team 
 
 GOALS OF CARE Resuscitation Status: DNR Durable DNR: [x] Yes [] No 
 
Primary Decision Maker ([address]): 3 children equally-discussed with Misa Winkler and Lawrence Watson. Third child not available. Spouse with dementia and can not make decisions Relationship to patient: 
Phone number: 
[] Named in a scanned document  
[x] Legal Next of Kin 
[] Guardian Secondary Decision Maker (First Alternate Health Care Agent):  
Relationship to patient: 
Phone number: 
[] Named in a scanned document  
[] Legal Next of Kin 
[] Guardian ACP documents you current have include: 
[] Advance Directive or Living Will 
[] Durable Do Not Resuscitate 
[] Physician Orders for Scope of Treatment (POST) [] Medical Power of  
[] Other HISTORY History obtained from: chart, family CHIEF COMPLAINT: sob The patient is:  
[] Verbal 
[] Nonverbal 
[x] Unresponsive HPI/SUBJECTIVE:  Patient is minimally responsive most of the time. Secretions noted 12/4-patient had just received IV morphine so appears comfortable right now. Had increased work of breathing this morning REVIEW OF SYSTEMS The following systems were: [x] reviewed  [] unable to be reviewed Positive ROS include: 
Constitutional: fatigue, weakness, short of breath Ears/nose/mouth/throat: increased airway secretions Respiratory:shortness of breath,  
Gastrointestinal:poor appetite, Neurologic:, weakness Adult Non-Verbal Pain Assessment Score: 3/10 Face 
[] 0   No particular expression or smile 
[x] 1   Occasional grimace, tearing, frowning, wrinkled forehead 
[] 2   Frequent grimace, tearing, frowning, wrinkled forehead Activity (movement) [] 0   Lying quietly, normal position 
[x] 1   Seeking attention through movement or slow, cautious movement 
[] 2   Restless, excessive activity and/or withdrawal reflexes Guarding 
[x] 0   Lying quietly, no positioning of hands over areas of body 
[] 1   Splinting areas of the body, tense 
[] 2   Rigid, stiff Physiology (vital signs) [x] 0   Stable vital signs [] 1   Change in any of the following: SBP > 20mm Hg; HR > 20/minute 
[] 2   Change in any of the following: SBP > 30mm Hg; HR > 25/minute Respiratory 
[] 0   Baseline RR/SpO2, compliant with ventilator 
[x] 1   RR > 10 above baseline, or 5% drop SpO2, mild asynchrony with ventilator 
[] 2   RR > 20 above baseline, or 10% drop SpO2, asynchrony with ventilator FUNCTIONAL ASSESSMENT Palliative Performance Scale (PPS):10-20 PSYCHOSOCIAL/SPIRITUAL ASSESSMENT Active Problems: 
  Respiratory failure ([address] (12/3/2018) Past Medical History:  
Diagnosis Date  Age-related physical debility  Anxiety and depression  Compression fracture of L3 lumbar vertebra (HCC)  Debilitated patient  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  Diabetes [address]  Dysphagia dementia related  History of vascular access device 11/26/2018  
 5 Fr triple PICC, 39 cm R brachial, TPN  Hypertension  Malnutrition ([facility]  Parkinson disease ([facility] Past Surgical History:  
Procedure Laterality Date  HX HEENT Social History Tobacco Use  Smoking status: Never Smoker  Smokeless tobacco: Never Used Substance Use Topics  Alcohol use: No  
 
Family History Problem Relation Age of Onset  Stroke Father Allergies Allergen Reactions  Seroquel [Quetiapine] Anaphylaxis  Haldol [Haloperidol Lactate] Other (comments) \"Comatose state\"  Lorazepam Other (comments) Alternated mental status to benzos per family,  Morphine Other (comments) Change in mental status Current Facility-Administered Medications Medication Dose Route Frequency  morphine injection 2 mg  2 mg IntraVENous Q15MIN PRN  
 morphine injection 1 mg  1 mg IntraVENous Q6H  
 sodium chloride (NS) flush 5 mL  5 mL IntraVENous PRN  
 LORazepam (ATIVAN) injection 1 mg  1 mg IntraVENous Q15MIN PRN  
 ketorolac (TORADOL) injection 30 mg  30 mg IntraVENous Q8H PRN  
 scopolamine (TRANSDERM-SCOP) 1 mg over 3 days 1 Patch  1 Patch TransDERmal Q72H  
 glycopyrrolate (ROBINUL) injection 0.2 mg  0.2 mg IntraVENous Q4H  
 bisacodyl (DULCOLAX) suppository 10 mg  10 mg Rectal DAILY PRN  prochlorperazine (COMPAZINE) with saline injection 5 mg  5 mg IntraVENous Q6H PRN PHYSICAL EXAM  
 
Wt Readings from Last 3 Encounters:  
12/03/18 122 lb 12.7 oz (55.7 kg) 08/27/18 132 lb (59.9 kg) 08/15/18 132 lb (59.9 kg) Visit Vitals /80 (BP 1 Location: Left arm, BP Patient Position: At rest) Pulse 67 Temp 98.6 °F (37 °C) Resp 16 SpO2 99% Supplemental O2  [x] Yes  [] NO Last bowel movement:  
 
Currently this patient has: 
[] Peripheral IV [x] PICC  [] PORT [] ICD   
 [] Logan Catheter [] NG Tube   [] PEG Tube   
[] Rectal Tube [] Drain 
[] Other:  
 
Constitutional: emaciated, minimally responsive Eyes: sunken ENMT:dry mucosa Cardiovascular: rrr Respiratory: secretions noted Gastrointestinal: thin, soft Musculoskeletal:muscle wasting Skin:bruising Neurologic:mininally responsive Psychiatric: not interactive Other:  
 
 
Pertinent Lab and or Imaging Tests: 
Lab Results Component Value Date/Time Sodium 140 12/02/2018 04:07 AM  
 Potassium 3.8 12/02/2018 04:07 AM  
 Chloride 104 12/02/2018 04:07 AM  
 CO2 30 12/02/2018 04:07 AM  
 Anion gap 6 12/02/2018 04:07 AM  
 Glucose 127 (H) 12/02/2018 04:07 AM  
 BUN 19 12/02/2018 04:07 AM  
 Creatinine 0.67 (L) 12/02/2018 04:07 AM  
 BUN/Creatinine ratio 28 (H) 12/02/2018 04:07 AM  
 GFR est AA >60 12/02/2018 04:07 AM  
 GFR est non-AA >60 12/02/2018 04:07 AM  
 Calcium 8.4 (L) 12/02/2018 04:07 AM  
 
Lab Results Component Value Date/Time Protein, total 5.7 (L) 12/02/2018 04:07 AM  
 Albumin 2.2 (L) 12/02/2018 04:07 AM  
 
   
 
Total time: 35 
Counseling / coordination time:  
> 50% counseling / coordination?:  
 
This patient meets Hospice General Inpatient (GIP) Level of Care. The precipitating event that resulted in the need for GIP was: pneumonia/respiratory failure The interventions tried that have been unsuccessful at controlling symptoms include: robinul and scopolamine patch Supporting documentation for GIP need for pain control: 
[x] Frequent evaluation by a doctor, nurse practitioner, nurse  
[x] Frequent medication adjustment   
[x] IVs that cannot be administered at home  
[] Aggressive pain management  
[] Complicated technical delivery of medications Supporting documentation for GIP need for symptom control: 
[]  Sudden decline necessitating intensive nursing intervention 
[]  Uncontrolled / intractable nausea or vomiting  
[]  Pathological fractures []  Advanced open wounds requiring frequent skilled care [x] Unmanageable respiratory distress 
[] New or worsening delirium  
[] Delirium with behavior issues 
[] Imminent death  with skilled nursing needs documented above

## 2018-12-05 NOTE — PROGRESS NOTES
0710- Bedside and Verbal shift change report given to [address] (oncoming nurse) by Sree Yu (offgoing nurse). Report included the following information SBAR, Kardex, Intake/Output, MAR and Recent Results. Pt observed in bed, sleeping, on 2L O2 NC, no signs of any pain or discomfort at this time. Will assess. 0740- Dr. Lexie Hawkins on unit and updated regarding patients fever and toradol only every 6 hours. Stated OK to give patient a dose of toradol now, and that he would change the frequency of medication administration for patient. Anticipate new orders. 1730- In patient room. Pt daughter present, visible upset, states that someone from the hospital called her and did not leave a message, RNs on floor did not call patient's daughter, will call Dr. Lexie Hawkins to see if anyone from his team called patient. Pt daughter stated that she would like to be updated on patient's care. She refused pt's scheduled morphine stating \"he said he was only getting pain meds when he is in pain, does he look in pain? \" Pt daughter educated on medications. Pt daughter still refused. Will call Dr. Lexie Hawkins. 5970- Dr. Lexie Hawkins on unit. Stated that he had called patient's daughter, as well as patient's son, regarding changes to patient care, she was unavailable to take call and he left a message for daughter. Stated that he updated and educated patient's daughter on medications. OK to give IV morphine at this time. 0020- Patient daughter at nurses station. RN clarified with daughter if OK to proceed with patient care plan and administer morphine to keep patient comfortable. Pt daughter stated OK to administer morphine at this time. 1924- Bedside and Verbal shift change report given to Sree Yu (oncoming nurse) by [address] (offgoing nurse). Report included the following information SBAR, Kardex, Intake/Output, MAR and Recent Results.

## 2018-12-05 NOTE — PROGRESS NOTES
Bedside and Verbal shift change report given to [address] [facility] [address] (oncoming nurse) by Yani Koroma RN (offgoing nurse). Report included the following information SBAR, Kardex, MAR, Accordion and Recent Results.

## 2018-12-05 NOTE — PROGRESS NOTES
[address] [place] [address] [facility] in [facility] 
[phone] Patient Name: Pelon Grayson YOB: 1940 Date of Provider Hospice Visit: 12/05/18 Level of Care:   [x] General Inpatient (GIP)    [] Routine   [] Respite Current Location of Care: 
[] [facility] [x] [facility] [] [facility] [] [facility] [] [facility], patient referred from: 
[] [facility] [] [facility] [] [facility] [] [facility] [] Home [] Other:  
 
Date of Original Hospice Admission: 12/3/18 Hospice Medical Director at time of admission: Vianey Bob Principle Hospice Diagnosis: Respiratory failure Diagnoses RELATED to the terminal prognosis: aspiration pneumonia, severe protein malnutrition, parkinsons-end stage, dementia Other Diagnoses: Verito  Pelon Grayson is a 69y.o. year old who was admitted to [address] [place] [address]. Patient admitted to the hospital on 11/18/18 with pneumonia, sepsis, respiratory failure. Patient treated with IVF, abx and evaluated by speech therapy. Patient with dysphagia/aspiration and unable to swallow safely related to mental status and Parkinson's. Family did not want a PEG but wanted to attempt TPN for a brief period of time to see if he would recover. Unfortunately, he continued to decline with issues of aspiration/worsening CXR with right sided infiltrate. Patient not responsive enough to eat or interact. Increased secretions and patient transitioned to inpatient hospice after discussions with 2 children(son in [place] and daughter locally) The patient's principle diagnosis has resulted in respiratory failure/pneumonia Refer to LCD Functionally, the patient's Karnofsky and/or Palliative Performance Scale has declined over a period of weeks and is estimated at 10-20. The patient is dependent on the following ADLs: 
 
Objective information that support this patients limited prognosis includes: CXR IMPRESSION: Improved aeration airspace disease in the left midlung. 
  
 There is increasing right basilar atelectasis/airspace disease The patient/family chose comfort measures with the support of Hospice. HOSPICE DIAGNOSES Active Symptoms: 
1. SOB 2. Secretions 3. Agitation at times PLAN 1. Patient admitted to GIP level of care for managing secretions and SOB. Did add scheduled morphine yesterday and appears more comfortable today. Likely can transition to routine level of care as no prn meds needed in last 24 hours. Not eating/drinking 2. Scopolamine patch and robinul 0.2 mg IV every 4 hours 3. SOB-continue scheduled morphine 1 mg IV every 6 hours 4. Food for comfort per family 5. Comfort order set for pain, SOB, fever, agitation 6. Discussed with Claudia Bangura from [facility] and bedside nurse. Will attempt to talk with children. Appreciate SW talking with daughter on 12/4 who is at high risk for post death bereavement. 7.  and SW to support family needs 8. Disposition: likely will pass in the hospital 
 
Prognosis estimated based on 12/05/18 clinical assessment is:  
[x] Hours to Days   
[] Days to Weeks   
[] Other: 
 
Communicated plan of care with: Hospice Case Manager; Hospice IDT; Care Team 
 
 GOALS OF CARE Resuscitation Status: DNR Durable DNR: [x] Yes [] No 
 
Primary Decision Maker ([address]): 3 children equally-discussed with Berta Lozano and Ajay Burnette. Third child not available. Spouse with dementia and can not make decisions Relationship to patient: 
Phone number: 
[] Named in a scanned document  
[x] Legal Next of Kin 
[] Guardian Secondary Decision Maker (First Alternate Health Care Agent):  
Relationship to patient: 
Phone number: 
[] Named in a scanned document  
[] Legal Next of Kin 
[] Guardian ACP documents you current have include: 
[] Advance Directive or Living Will 
[] Durable Do Not Resuscitate 
[] Physician Orders for Scope of Treatment (POST) [] Medical Power of  
[] Other HISTORY History obtained from: chart, family CHIEF COMPLAINT: sob The patient is:  
[] Verbal 
[] Nonverbal 
[x] Unresponsive HPI/SUBJECTIVE:  Patient is minimally responsive most of the time. Secretions noted 12/4-patient had just received IV morphine so appears comfortable right now. Had increased work of breathing this morning 12/5-minimally responsive. Not arousing now when talking with him REVIEW OF SYSTEMS The following systems were: [x] reviewed  [] unable to be reviewed Positive ROS include: 
Constitutional: fatigue, weakness, short of breath Ears/nose/mouth/throat: increased airway secretions Respiratory:shortness of breath,  
Gastrointestinal:poor appetite, Neurologic:, weakness Adult Non-Verbal Pain Assessment Score: 1/10 Face [x] 0   No particular expression or smile 
[] 1   Occasional grimace, tearing, frowning, wrinkled forehead 
[] 2   Frequent grimace, tearing, frowning, wrinkled forehead Activity (movement) [x] 0   Lying quietly, normal position 
[] 1   Seeking attention through movement or slow, cautious movement 
[] 2   Restless, excessive activity and/or withdrawal reflexes Guarding 
[x] 0   Lying quietly, no positioning of hands over areas of body 
[] 1   Splinting areas of the body, tense 
[] 2   Rigid, stiff Physiology (vital signs) [x] 0   Stable vital signs [] 1   Change in any of the following: SBP > 20mm Hg; HR > 20/minute 
[] 2   Change in any of the following: SBP > 30mm Hg; HR > 25/minute Respiratory 
[] 0   Baseline RR/SpO2, compliant with ventilator 
[x] 1   RR > 10 above baseline, or 5% drop SpO2, mild asynchrony with ventilator 
[] 2   RR > 20 above baseline, or 10% drop SpO2, asynchrony with ventilator FUNCTIONAL ASSESSMENT Palliative Performance Scale (PPS):10-20 PSYCHOSOCIAL/SPIRITUAL ASSESSMENT Active Problems: 
  Respiratory failure ([facility] [address] (12/3/2018) Past Medical History:  
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  
 5 Fr triple PICC, 39 cm R brachial, TPN  Hypertension  Malnutrition ([facility] [address]  Parkinson disease ([facility] [address] Past Surgical History:  
Procedure Laterality Date  HX HEENT Social History Tobacco Use  Smoking status: Never Smoker  Smokeless tobacco: Never Used Substance Use Topics  Alcohol use: No  
 
Family History Problem Relation Age of Onset  Stroke Father Allergies Allergen Reactions  Seroquel [Quetiapine] Anaphylaxis  Haldol [Haloperidol Lactate] Other (comments) \"Comatose state\"  Lorazepam Other (comments) Alternated mental status to benzos per family,  Morphine Other (comments) Change in mental status Current Facility-Administered Medications Medication Dose Route Frequency  morphine injection 2 mg  2 mg IntraVENous Q15MIN PRN  
 morphine injection 1 mg  1 mg IntraVENous Q6H  
 sodium chloride (NS) flush 5 mL  5 mL IntraVENous PRN  
 LORazepam (ATIVAN) injection 1 mg  1 mg IntraVENous Q15MIN PRN  
 ketorolac (TORADOL) injection 30 mg  30 mg IntraVENous Q8H PRN  
 scopolamine (TRANSDERM-SCOP) 1 mg over 3 days 1 Patch  1 Patch TransDERmal Q72H  
 glycopyrrolate (ROBINUL) injection 0.2 mg  0.2 mg IntraVENous Q4H  
 bisacodyl (DULCOLAX) suppository 10 mg  10 mg Rectal DAILY PRN  prochlorperazine (COMPAZINE) with saline injection 5 mg  5 mg IntraVENous Q6H PRN PHYSICAL EXAM  
 
Wt Readings from Last 3 Encounters:  
12/03/18 122 lb 12.7 oz (55.7 kg) 08/27/18 132 lb (59.9 kg) 08/15/18 132 lb (59.9 kg) Visit Vitals /81 (BP 1 Location: Left arm, BP Patient Position: At rest) Pulse 75 Temp (!) 101 °F (38.3 °C) Resp 20 SpO2 95% Supplemental O2  [x] Yes  [] NO Last bowel movement:  
 
Currently this patient has: 
[] Peripheral IV [x] PICC  [] PORT [] ICD [] Logan Catheter [] NG Tube   [] PEG Tube   
[] Rectal Tube [] Drain 
[] Other:  
 
Constitutional: emaciated, minimally responsive Eyes: sunken ENMT:dry mucosa Cardiovascular: rrr Respiratory: secretions less and breathing unlabored Gastrointestinal: thin, soft Musculoskeletal:muscle wasting Skin:bruising Neurologic:mininally responsive Psychiatric: not interactive Other:  
 
 
Pertinent Lab and or Imaging Tests: 
Lab Results Component Value Date/Time Sodium 140 12/02/2018 04:07 AM  
 Potassium 3.8 12/02/2018 04:07 AM  
 Chloride 104 12/02/2018 04:07 AM  
 CO2 30 12/02/2018 04:07 AM  
 Anion gap 6 12/02/2018 04:07 AM  
 Glucose 127 (H) 12/02/2018 04:07 AM  
 BUN 19 12/02/2018 04:07 AM  
 Creatinine 0.67 (L) 12/02/2018 04:07 AM  
 BUN/Creatinine ratio 28 (H) 12/02/2018 04:07 AM  
 GFR est AA >60 12/02/2018 04:07 AM  
 GFR est non-AA >60 12/02/2018 04:07 AM  
 Calcium 8.4 (L) 12/02/2018 04:07 AM  
 
Lab Results Component Value Date/Time Protein, total 5.7 (L) 12/02/2018 04:07 AM  
 Albumin 2.2 (L) 12/02/2018 04:07 AM  
 
   
 
Total time: 35 
Counseling / coordination time:  
> 50% counseling / coordination?:  
 
This patient meets Hospice General Inpatient (GIP) Level of Care. The precipitating event that resulted in the need for GIP was: pneumonia/respiratory failure The interventions tried that have been unsuccessful at controlling symptoms include: robinul and scopolamine patch Supporting documentation for GIP need for pain control: 
[x] Frequent evaluation by a doctor, nurse practitioner, nurse  
[x] Frequent medication adjustment   
[x] IVs that cannot be administered at home  
[] Aggressive pain management  
[] Complicated technical delivery of medications Supporting documentation for GIP need for symptom control: 
[]  Sudden decline necessitating intensive nursing intervention 
[]  Uncontrolled / intractable nausea or vomiting  
[]  Pathological fractures 
[]  Advanced open wounds requiring frequent skilled care [x] Unmanageable respiratory distress 
[] New or worsening delirium  
[] Delirium with behavior issues 
[] Imminent death  with skilled nursing needs documented above

## 2018-12-05 NOTE — PROGRESS NOTES
Family at bedside requested patient's temperature be taken. Patient's temperature found to be elevated at 101. Patient's primary nurse aware. Patient receives Toradol every 8 hours for fever. Primary nurse aware.

## 2018-12-05 NOTE — HOSPICE
[facility] in Need 
[phone] [facility] Daily Nursing Note Patient Name: Quynh Echeverria YOB: 1940 Age: 69 y.o. Date of Visit: 12/05/18 Facility of Care: [facility] Patient Room: 522/01 Hospice Attending: Abby Martin MD 
Hospice Diagnosis: Respiratory failure [facility]) [J96.90] Level of Care: [facility] Current [facility] Symptoms 1. Patient admitted GIP for respiratory distress. 2. Continues to have fever. 3. Continues with increasing secretions, audible in throat, Lungs with Rhonchi, coarse sounds, increasing effort to breathe. ASSESSMENT & PLAN Must update Plan of Care including visit frequencies for IDT members 1. Admit to [facility] level of care for dyspnea, secretions and agitation. 2.  Dyspnea- Morphine 2 mg IV q 15 minutes as needed for SOB,  
3. Agitation: Lorazepam 1mg IV every 15 minutes as needed 4. Secretions-Scopolomine Patch every 72 hrs, Robinul 0.2mg every 4 hrs PRN, suction as needed 5.  and MSW visits Spiritual Interventions:  to visit. Psych/ Social/ Emotional Interventions: MSW, Baldev Cisneros visited Care Coordination Needs:  Coordinated care with Dr Jake Angel. Care plan and New Orders discussed / approved with Laurent Berrios MD. Description History and Chart Review If this is initial GIP note must document RN assessment/MD communication in previous setting. Specifically document nursing/medication needs in last 24 hours to support [facility] care Narrative History of last 24 hours that demonstrates care cannot be provided in another setting: 
Patient is only responsive to painful stimuli. Continues to have increasing respiratory distress. Lungs with Rhonchi, coarse sounds, increasing effort to breathe. Continues to have fever. Utilizing Toradol. Increasing secretions, audible in throat. Does the patient currently require IV medications?  Yes 
 Does the patient currently require scheduled medications? Yes Does the patient currently require a PCA? No 
 
List number of doses of PRN medications in last 24 hours: 
 
Medication 1:  Toradol Number of doses:  2 Medication 2:  
Number of doses: 
 
Medication 3:  
Number of doses: Supporting documentation for GIP need for pain control: 
[x] Frequent evaluation by a doctor, nurse practitioner, nurse  
[x] Frequent medication adjustment   
[x] IVs that cannot be administered at home  
[] Aggressive pain management  
[] Complicated technical delivery of medications Supporting documentation for GIP need for symptom control: 
[x]  Sudden decline necessitating intensive nursing intervention 
[]  Uncontrolled / intractable nausea or vomiting  
[]  Pathological fractures 
[]  Advanced open wounds requiring frequent skilled care [x] Unmanageable respiratory distress 
[] New or worsening delirium  
[] Delirium with behavior issues: Is 24 hour caregiver present due to safety concerns with agitation? (yes/no) [] Imminent death  with skilled nursing needs documented above DISCHARGE PLANNING Daily discharge planning required for GIP 1. Discharge Plan: Pt will likely pass inpatient, possible transfer to [facility] if stable 2. Patient/Family teaching: No family present 3. Response to patient/family teaching: N/A 
 
ASSESSMENT   
KARNOFSKY:  20 Prognosis estimated based on 12/05/18 clinical assessment is:  
[] Few to Many Hours [x] Hours to Days  
[] Few to Many Days  
[] Days to Weeks  
[] Few to Many Weeks  
[] Weeks to Months  
[] Few to Many Months Quality Measure: Patient self-reports:  [] Yes    [x] No 
ESAS:  
Time of Assessment:  
Pain (1-10):  6 Fatigue (1-10): 8 Shortness of breath (1-10):  8 Nausea (1-10): Appetite (1-10): Anxiety: (1-10): Depression: (1-10): Well-being: (1-10): Constipation: _ Yes  _ No 
LAST BM: 12/3 CLINICAL INFORMATION  
 
 Patient Vitals for the past 12 hrs: 
 Temp Pulse Resp BP SpO2  
12/05/18 1319 (!) 101 °F (38.3 °C) 75 20 160/81 95 % 12/05/18 0729 100.2 °F (37.9 °C) 76 16 138/82 91 % Currently this patient has: 
[x] Supplemental O2 [x] IV [x] PICC [] PORT  
[] NG Tube   
[] PEG Tube  
[] Ostomy    
[] Logan draining _______ urine 
[] Other:  
 
SIGNS/PHYSICAL FINDINGS Skin (including wound): 
[] Warm, dry, supple, intact and color normal for race [x] Warm  
[x] Dry  
[] Cool    
[] Clammy      
[] Diaphoretic Turgor 
 [] Normal 
 [x] Decreased Color: 
 [] Pink 
 [] Pale 
 [] Cyanotic 
 [] Erythema 
 [] Jaundice [x] Normal for Race 
[]  Wounds: 
 
Neuro: 
[] Lethargy 
[] Restlessness / agitation 
[] Confusion / delirium 
[] Hallucinations [x] Responds to maximal stimulation 
[] Unresponsive 
[] Seizures Cardiac:[] Dyspnea on Exertion 
[] JVD [] Murmur 
[] Palpitations [] Hypotension 
[] Hypertension 
[] Tachycardia [] Bradycardia 
[] Irregular HR [x] Pulses Decreased 
[] Pulses Absent 
[] Edema:       (Location, Grade and Pitting) [] Mottling:      (Location) Respiratory:Breath sounds:  
 [] Diminished 
 [] Wheeze [x] Rhonchi 
 [] Rales  
[] Even and unlabored 
[] Labored:           
[] Cough 
 [] Non Productive 
 [] Productive 
  [] Description:          
[] Deep suctioned  
[x] O2 at _1.5__ LPM 
[] High flow oxygen greater than 10 LPM 
[] Bi-Pap GI [x] Abdomen (soft) [] Ascites 
[] Nausea 
[] Vomiting 
[] Incontinent of bowels [x] Bowel sounds (no) 
[] Diarrhea 
[] Constipation (see above including last bowel movement) [] Checked for impaction 
[] Last BM Nutrition Diet:____NPO______ Appetite:  
[] Good  
[] Fair  
[] Poor  
[] Tube Feeding  
[] Voiding 
[x] Incontinent  
[] Logan Musculoskeletal 
[] Balance/[place] Unsteady [x] Weak Strength:  
 [] Normal  
 [] Limited [x] Decreasing Activities:  
 [] Up as tolerated 
 [x] Bedridden  
 [] Specify: SAFETY [] 24 hr. Caregiver [x] Side rails ? [x] Hospital bed  
[] Reviewed Falls & Safety ALLERGIES AND MEDICATIONS Allergies: Allergies Allergen Reactions  Seroquel [Quetiapine] Anaphylaxis  Haldol [Haloperidol Lactate] Other (comments) \"Comatose state\"  Lorazepam Other (comments) Alternated mental status to benzos per family,  Morphine Other (comments) Change in mental status Current Facility-Administered Medications Medication Dose Route Frequency  ketorolac (TORADOL) injection 30 mg  30 mg IntraVENous Q6H PRN  
 morphine injection 2 mg  2 mg IntraVENous Q15MIN PRN  
 morphine injection 1 mg  1 mg IntraVENous Q6H  
 sodium chloride (NS) flush 5 mL  5 mL IntraVENous PRN  
 LORazepam (ATIVAN) injection 1 mg  1 mg IntraVENous Q15MIN PRN  
 scopolamine (TRANSDERM-SCOP) 1 mg over 3 days 1 Patch  1 Patch TransDERmal Q72H  
 glycopyrrolate (ROBINUL) injection 0.2 mg  0.2 mg IntraVENous Q4H  
 bisacodyl (DULCOLAX) suppository 10 mg  10 mg Rectal DAILY PRN  prochlorperazine (COMPAZINE) with saline injection 5 mg  5 mg IntraVENous Q6H PRN Visit Time In: 1400 Visit Time Out: 1430

## 2018-12-06 NOTE — [facility] IDG MASTER NOTE
[address] [facility] Date: 12/06/18 Time: 10:16 AM 
 
Patient admitted GIP on morphine scheduled every 6 hours. Daughter is a high bereavement risk. Having increased secretions and on meds he is unresponsive having fevers. Near the end of life. Daughter getting support from SW     
 
___________________ Diagnoses: 
Diagnoses of SOB (shortness of breath), Increased tracheal secretions, Dysphagia, unspecified type, Restlessness and agitation, and Debility were pertinent to this visit. Current Medications: 
 
Current Facility-Administered Medications:  
  ketorolac (TORADOL) injection 30 mg, 30 mg, IntraVENous, E7S PRN, Mumtaz Boothe MD, 30 mg at 12/06/18 9124   morphine injection 2 mg, 2 mg, IntraVENous, [id number] PRN, Mumtaz Boothe MD 
  morphine injection 1 mg, 1 mg, IntraVENous, A8U, Brad Almanzar MD, 1 mg at 12/06/18 3527 
  sodium chloride (NS) flush 5 mL, 5 mL, IntraVENous, PRN, rBad Almanzar MD 
  LORazepam (ATIVAN) injection 1 mg, 1 mg, IntraVENous, [id number] PRN, Mumtaz Boothe MD 
  scopolamine (TRANSDERM-SCOP) 1 mg over 3 days 1 Patch, 1 Patch, TransDERmal, M45I, Brad Almanzar MD, 1 Patch at 12/03/18 1900 
  glycopyrrolate (ROBINUL) injection 0.2 mg, 0.2 mg, IntraVENous, V7Z, Mumtaz Boothe MD, 0.2 mg at 12/06/18 1031   bisacodyl (DULCOLAX) suppository 10 mg, 10 mg, Rectal, DAILY PRN, Mumtaz Boothe MD, 10 mg at 12/06/18 4570   prochlorperazine (COMPAZINE) with saline injection 5 mg, 5 mg, IntraVENous, M8V PRN, Mumtaz Boothe MD 
 
Orders: 
Orders Placed This Encounter  MUSIC THERAPY Standing Status:   Standing Number of Occurrences:   1 Order Specific Question:   Reason for Consult: Answer:   end of life comfort  NURSING-MISCELLANEOUS: (see comments) 1. NO admission labs, x-rays or other diagnostic tests, unless pertinent to symptom control . 2. Discontinue ALL prior medications.   CONTINUOUS  
 1. NO admission labs, x-rays or other diagnostic tests, unless pertinent to symptom control . 2. Discontinue ALL prior medications. Standing Status:   Standing Number of Occurrences:   1 Order Specific Question:   Description of Order: Answer:   (see comments)  COMFORT MEASURES ONLY Standing Status:   Standing Number of Occurrences:   1 Antony Saldaña 54 Standing Status:   Standing Number of Occurrences:   1  
 NOTIFY PROVIDER: SPECIFY NOTIFY PROVIDER: FOR PAIN, DYSPNEA, AGITATION, OTHER DISTRESS OR NOT RESPONDING TO ORDERED INTERVENTIONS ONE TIME Routine Standing Status:   Standing Number of Occurrences:   1 Order Specific Question:   Please describe the test or procedure you would like to order. Answer:   NOTIFY PROVIDER: FOR PAIN, DYSPNEA, AGITATION, OTHER DISTRESS OR NOT RESPONDING TO ORDERED INTERVENTIONS  TURN & POSITION  
  TURN & POSITION EVERY 6 HOURS - PATIENT MAY REFUSE Standing Status:   Standing Number of Occurrences:   1  
 NURSING-MISCELLANEOUS: BITES AND SIPS FOR COMFORT CONTINUOUS Standing Status:   Standing Number of Occurrences:   1 Order Specific Question:   Description of Order: Answer:   BITES AND SIPS FOR COMFORT  
 BEDREST, COMPLETE Standing Status:   Standing Number of Occurrences:   1  
 ORAL CARE Keep mouth moisturized with sponge sticks/toothettes and tap water. Vaseline to lips and nares as needed. Standing Status:   Standing Number of Occurrences:   1  
 NURSING-MISCELLANEOUS: UTILIZATION OF CARE CHANNEL CONTINUOUS Standing Status:   Standing Number of Occurrences:   1 Order Specific Question:   Description of Order:   Answer:   UTILIZATION OF CARE CHANNEL  
 NURSING-MISCELLANEOUS: Admit to GIP level of care; SN visit daily X 7 with 5 visits PRN symptom control; SW visit 1 X weekly and 5 visits PRN family support,  visit 1 X weekly and 5 visits PRN spiritual support. CONTINUOUS Standing Status:   Standing Number of Occurrences:   1 Order Specific Question:   Description of Order: Answer:   Admit to GIP level of care; SN visit daily X 7 with 5 visits PRN symptom control; SW visit 1 X weekly and 5 visits PRN family support,  visit 1 X weekly and 5 visits PRN spiritual support.  NURSING-MISCELLANEOUS: call [facility] for death  [id number] Standing Status:   Standing Number of Occurrences:   1 Order Specific Question:   Description of Order: Answer:   call [facility] death   VITAL SIGNS PER UNIT ROUTINE Daily Standing Status:   Standing Number of Occurrences:   1  DO NOT RESUSCITATE Standing Status:   Standing Number of Occurrences:   1  DIET ONE TIME MESSAGE Courtesy cart for 4 adults Standing Status:   Standing Number of Occurrences:   1  
 OXYGEN CANNULA Liters per minute: 2; Indications for O2 therapy: OTHER PRN Routine Oxygen as needed. Adjust for comfort. Discontinue if not contributing to patient comfort. Standing Status:   Standing Number of Occurrences:   [id number] Order Specific Question:   Liters per minute: Answer:   2 Order Specific Question:   Indications for O2 therapy Answer:   BAL Galdamez, SPOT CHECK Standing Status:   Standing Number of Occurrences:   1  RT--WEAN PER RT OXYGEN PROTOCOL Standing Status:   Standing Number of Occurrences:   1  
 OXYGEN CANNULA Liters per minute: 2; Indications for O2 therapy: HYPOXIA CONTINUOUS Routine Standing Status:   Standing Number of Occurrences:   1 Order Specific Question:   Liters per minute: Answer:   2 Order Specific Question:   Indications for O2 therapy Answer:   HYPOXIA  SCANNED CARDIAC RHYTHM STRIP  
 FALL PRECAUTIONS Standing Status:   Standing Number of Occurrences:   1  LORazepam (ATIVAN) injection 1 mg  DISCONTD: ketorolac (TORADOL) injection 30 mg  
 scopolamine (TRANSDERM-SCOP) 1 mg over 3 days 1 Patch  
 glycopyrrolate (ROBINUL) injection 0.2 mg  
 bisacodyl (DULCOLAX) suppository 10 mg  
 DISCONTD: morphine injection 2 mg  DISCONTD: promethazine (PHENERGAN) 12.5 mg in NS 50 mL IVPB  prochlorperazine (COMPAZINE) with saline injection 5 mg  morphine injection 2 mg  morphine injection 1 mg  sodium chloride (NS) flush 5 mL  ketorolac (TORADOL) injection 30 mg  
 INITIAL PHYSICIAN ORDER: INPATIENT Inpatient Hospice; 3. Patient receiving treatment that can only be provided in an inpatient setting (further clarification in H&P documentation) Standing Status:   Standing Number of Occurrences:   1 Order Specific Question:   Status: Answer:   Burleigh Ganser Order Specific Question:   Type of Bed Answer:   Inpatient Hospice [5] Order Specific Question:   Inpatient Hospitalization Certified Necessary for the Following Reasons Answer:   3. Patient receiving treatment that can only be provided in an inpatient setting (further clarification in H&P documentation) Order Specific Question:   Admitting Diagnosis Answer:   Respiratory failure ([facility] [address] [id number] Order Specific Question:   Admitting Physician AnswerRomana Floras [[id number]] Order Specific Question:   Attending Physician AnswerRomana Floras [[id number]] Order Specific Question:   Estimated Length of Stay Answer:   3-4 Midnights Order Specific Question:   Discharge Plan: Answer: Other (Specify) Order Specific Question:   Comments Answer:   Discharge to home if improvement  IP CONSULT TO MOBILITY SERVICES [facility]) Standing Status:   Standing Number of Occurrences:   15 Order Specific Question:   Reason for Consult: Answer:   immobility Allergies: Allergies Allergen Reactions  Seroquel [Quetiapine] Anaphylaxis  Haldol [Haloperidol Lactate] Other (comments) \"Comatose state\"  Lorazepam Other (comments) Alternated mental status to benzos per family,  Morphine Other (comments) Change in mental status Care Plan: 
Multidisciplinary Problems (Active) Problem: Falls - Risk of   
 Dates: Start: 12/03/18 Description:   
 Disciplines: Interdisciplinary Goal: *Absence of Falls Dates: Start: 12/03/18 Description: Document Randy Levels Fall Risk and appropriate interventions in the flowsheet. Disciplines: Interdisciplinary Problem: Patient Education: Go to Patient Education Activity Dates: Start: 12/03/18 Description:   
 Disciplines: Interdisciplinary Goal: Patient/Family Education Dates: Start: 12/03/18 Description:   
 Disciplines: Interdisciplinary Problem: Patient Education: Go to Patient Education Activity Dates: Start: 12/03/18 Description:   
 Disciplines: Interdisciplinary Goal: Patient/Family Education Dates: Start: 12/03/18 Description:   
 Disciplines: Interdisciplinary Problem: Pressure Injury - Risk of   
 Dates: Start: 12/03/18 Description:   
 Disciplines: Interdisciplinary Goal: *Prevention of pressure injury Dates: Start: 12/03/18 Description: Document Mamadou Scale and appropriate interventions in the flowsheet. Disciplines: Interdisciplinary  
  
  
  
  
  
 
___________________ Care Team Notes POC/IDG Notes No notes of this type exist for this encounter.

## 2018-12-06 NOTE — PROGRESS NOTES
[address] [place] [address] [facility] 
[phone] Patient Name: Abdi Mercado YOB: 1940 Date of Provider Hospice Visit: 12/06/18 Level of Care:   [x] General Inpatient (GIP)    [] Routine   [] Respite Current Location of Care: 
[] [facility] [x] [facility] [] [facility] [] [facility] [] [facility], patient referred from: 
[] [facility] [] [facility] [] [facility] [] [facility] [] Home [] Other:  
 
Date of Original Hospice Admission: 12/3/18 Hospice Medical Director at time of admission: Ramesh Valladares Principle Hospice Diagnosis: Respiratory failure Diagnoses RELATED to the terminal prognosis: aspiration pneumonia, severe protein malnutrition, parkinsons-end stage, dementia Other Diagnoses: Seferino Izaguirre Abdi Mercado is a 69y.o. year old who was admitted to [address] [place] [address]. Patient admitted to the hospital on 11/18/18 with pneumonia, sepsis, respiratory failure. Patient treated with IVF, abx and evaluated by speech therapy. Patient with dysphagia/aspiration and unable to swallow safely related to mental status and Parkinson's. Family did not want a PEG but wanted to attempt TPN for a brief period of time to see if he would recover. Unfortunately, he continued to decline with issues of aspiration/worsening CXR with right sided infiltrate. Patient not responsive enough to eat or interact. Increased secretions and patient transitioned to inpatient hospice after discussions with 2 children(son in [place] and daughter locally) The patient's principle diagnosis has resulted in respiratory failure/pneumonia Refer to LCD Functionally, the patient's Karnofsky and/or Palliative Performance Scale has declined over a period of weeks and is estimated at 10-20. The patient is dependent on the following ADLs: 
 
Objective information that support this patients limited prognosis includes: CXR IMPRESSION: Improved aeration airspace disease in the left midlung. 
  
 There is increasing right basilar atelectasis/airspace disease The patient/family chose comfort measures with the support of Hospice. HOSPICE DIAGNOSES Active Symptoms: 
1. SOB 2. Secretions 3. Agitation at times 4. Fever PLAN 1. Patient transitioned to routine level of care. Symptom appear to be managed. Fever been issue today. 2. Secretions-Scopolamine patch and robinul 0.2 mg IV every 4 hours 3. SOB-continue scheduled morphine 1 mg IV every 6 hours-appears effective 4. Fever-continue toradol and prn tylenol 5. Food for comfort per family 6. Comfort order set for pain, SOB, fever, agitation 7. Discussed with Stephane Walden from [facility] and bedside nurse. Talked with children yesterday. Daughter still struggling and very emotional last night. Apparently has been fired by her Psychiatrist but did see her counselor yesterday. She will need ongoing support 8.  and SW to support family needs 9. Disposition: likely will pass in the hospital 
 
Prognosis estimated based on 12/06/18 clinical assessment is:  
[x] Hours to Days   
[] Days to Weeks   
[] Other: 
 
Communicated plan of care with: Hospice Case Manager; Hospice IDT; Care Team 
 
 GOALS OF CARE Resuscitation Status: DNR Durable DNR: [x] Yes [] No 
 
Primary Decision Maker ([address]): 3 children equally-discussed with Adebayo Alvarez and Yary Mccormack. Third child not available. Spouse with dementia and can not make decisions Relationship to patient: 
Phone number: 
[] Named in a scanned document  
[x] Legal Next of Kin 
[] Guardian Secondary Decision Maker (First Alternate Health Care Agent):  
Relationship to patient: 
Phone number: 
[] Named in a scanned document  
[] Legal Next of Kin 
[] Guardian ACP documents you current have include: 
[] Advance Directive or Living Will 
[] Durable Do Not Resuscitate 
[] Physician Orders for Scope of Treatment (POST) [] Medical Power of  
[] Other HISTORY History obtained from: chart, family CHIEF COMPLAINT: sob The patient is:  
[] Verbal 
[] Nonverbal 
[x] Unresponsive HPI/SUBJECTIVE:  Patient is minimally responsive most of the time. Secretions noted 12/4-patient had just received IV morphine so appears comfortable right now. Had increased work of breathing this morning 12/5-minimally responsive. Not arousing now when talking with him 
 
12/6-not responsive at this time. Breathing unlabored REVIEW OF SYSTEMS The following systems were: [x] reviewed  [] unable to be reviewed Positive ROS include: 
Constitutional: fatigue, weakness, short of breath Ears/nose/mouth/throat: increased airway secretions Respiratory:shortness of breath,  
Gastrointestinal:poor appetite, Neurologic:, weakness Adult Non-Verbal Pain Assessment Score: 1/10 Face [x] 0   No particular expression or smile 
[] 1   Occasional grimace, tearing, frowning, wrinkled forehead 
[] 2   Frequent grimace, tearing, frowning, wrinkled forehead Activity (movement) [x] 0   Lying quietly, normal position 
[] 1   Seeking attention through movement or slow, cautious movement 
[] 2   Restless, excessive activity and/or withdrawal reflexes Guarding 
[x] 0   Lying quietly, no positioning of hands over areas of body 
[] 1   Splinting areas of the body, tense 
[] 2   Rigid, stiff Physiology (vital signs) [x] 0   Stable vital signs [] 1   Change in any of the following: SBP > 20mm Hg; HR > 20/minute 
[] 2   Change in any of the following: SBP > 30mm Hg; HR > 25/minute Respiratory 
[] 0   Baseline RR/SpO2, compliant with ventilator 
[x] 1   RR > 10 above baseline, or 5% drop SpO2, mild asynchrony with ventilator 
[] 2   RR > 20 above baseline, or 10% drop SpO2, asynchrony with ventilator FUNCTIONAL ASSESSMENT Palliative Performance Scale (PPS):10-20 PSYCHOSOCIAL/SPIRITUAL ASSESSMENT Active Problems: 
  Respiratory failure ([address] (12/3/2018) Past Medical History:  
Diagnosis Date  Age-related physical debility  Anxiety and depression  Compression fracture of L3 lumbar vertebra (HCC)  Debilitated patient  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  Diabetes ([facility] [address]  Dysphagia dementia related  History of vascular access device 11/26/2018  
 5 Fr triple PICC, 39 cm R brachial, TPN  Hypertension  Malnutrition ([facility] [address]  Parkinson disease ([facility] [address] Past Surgical History:  
Procedure Laterality Date  HX HEENT Social History Tobacco Use  Smoking status: Never Smoker  Smokeless tobacco: Never Used Substance Use Topics  Alcohol use: No  
 
Family History Problem Relation Age of Onset  Stroke Father Allergies Allergen Reactions  Seroquel [Quetiapine] Anaphylaxis  Haldol [Haloperidol Lactate] Other (comments) \"Comatose state\"  Lorazepam Other (comments) Alternated mental status to benzos per family,  Morphine Other (comments) Change in mental status Current Facility-Administered Medications Medication Dose Route Frequency  ketorolac (TORADOL) injection 30 mg  30 mg IntraVENous Q6H PRN  
 morphine injection 2 mg  2 mg IntraVENous Q15MIN PRN  
 morphine injection 1 mg  1 mg IntraVENous Q6H  
 sodium chloride (NS) flush 5 mL  5 mL IntraVENous PRN  
 LORazepam (ATIVAN) injection 1 mg  1 mg IntraVENous Q15MIN PRN  
 scopolamine (TRANSDERM-SCOP) 1 mg over 3 days 1 Patch  1 Patch TransDERmal Q72H  
 glycopyrrolate (ROBINUL) injection 0.2 mg  0.2 mg IntraVENous Q4H  
 bisacodyl (DULCOLAX) suppository 10 mg  10 mg Rectal DAILY PRN  prochlorperazine (COMPAZINE) with saline injection 5 mg  5 mg IntraVENous Q6H PRN PHYSICAL EXAM  
 
Wt Readings from Last 3 Encounters:  
12/03/18 122 lb 12.7 oz (55.7 kg) 08/27/18 132 lb (59.9 kg) 08/15/18 132 lb (59.9 kg) Visit Vitals /57 Pulse 84 Temp 99.3 °F (37.4 °C) Resp 15 SpO2 (!) 85% Supplemental O2  [x] Yes  [] NO Last bowel movement:  
 
Currently this patient has: 
[] Peripheral IV [x] PICC  [] PORT [] ICD [] Logan Catheter [] NG Tube   [] PEG Tube   
[] Rectal Tube [] Drain 
[] Other:  
 
Constitutional: emaciated, minimally responsive Eyes: sunken ENMT:dry mucosa Cardiovascular: rrr Respiratory: secretions less and breathing unlabored Gastrointestinal: thin, soft Musculoskeletal:muscle wasting Skin:bruising Neurologic:mininally responsive Psychiatric: not interactive Other:  
 
 
Pertinent Lab and or Imaging Tests: 
Lab Results Component Value Date/Time Sodium 140 12/02/2018 04:07 AM  
 Potassium 3.8 12/02/2018 04:07 AM  
 Chloride 104 12/02/2018 04:07 AM  
 CO2 30 12/02/2018 04:07 AM  
 Anion gap 6 12/02/2018 04:07 AM  
 Glucose 127 (H) 12/02/2018 04:07 AM  
 BUN 19 12/02/2018 04:07 AM  
 Creatinine 0.67 (L) 12/02/2018 04:07 AM  
 BUN/Creatinine ratio 28 (H) 12/02/2018 04:07 AM  
 GFR est AA >60 12/02/2018 04:07 AM  
 GFR est non-AA >60 12/02/2018 04:07 AM  
 Calcium 8.4 (L) 12/02/2018 04:07 AM  
 
Lab Results Component Value Date/Time Protein, total 5.7 (L) 12/02/2018 04:07 AM  
 Albumin 2.2 (L) 12/02/2018 04:07 AM  
 
   
 
Total time: 35 
Counseling / coordination time:  
> 50% counseling / coordination?:  
 
This patient meets Hospice General Inpatient (GIP) Level of Care. The precipitating event that resulted in the need for GIP was: pneumonia/respiratory failure The interventions tried that have been unsuccessful at controlling symptoms include: robinul and scopolamine patch Supporting documentation for GIP need for pain control: 
[x] Frequent evaluation by a doctor, nurse practitioner, nurse  
[x] Frequent medication adjustment   
[x] IVs that cannot be administered at home  
[] Aggressive pain management  
[] Complicated technical delivery of medications Supporting documentation for GIP need for symptom control: 
[]  Sudden decline necessitating intensive nursing intervention 
[]  Uncontrolled / intractable nausea or vomiting  
[]  Pathological fractures 
[]  Advanced open wounds requiring frequent skilled care [x] Unmanageable respiratory distress 
[] New or worsening delirium  
[] Delirium with behavior issues 
[] Imminent death  with skilled nursing needs documented above

## 2018-12-06 NOTE — PROGRESS NOTES
I made an initial hospice chaplain's visit today. Mr. Tye Jett appeared to be resting comfortably. There were no family members present. I decided not to wake Mr. Tye Jett. I offered a silent prayer for both Mr. Tye Jett and his family. Visit frequency is 1 x week 1, 2 prn  
 
Goal of care is to provide spiritual support to both Mr. Tye Jett and his family through creating time for life review and praying for comfort and peace.

## 2018-12-06 NOTE — PROGRESS NOTES
7057- Bedside and Verbal shift change report given to [facility] (oncoming nurse) by Andressa De Leon (offgoing nurse). Report included the following information SBAR, Kardex, Intake/Output, MAR and Recent Results. Pt observed sleeping, in bed, with no signs of pain at this time. Night RN Gracie administering prn toradol, morphine, and robinul. Will assess. 1130- In patient room with patient's niece. Pt niece stated that pt felt warm. RN took axillary temp, 102.0. Toradol not available until 1:15pm. Will call Dr. Ariel Scherer. 1138- Call placed to Dr. Ariel Scherer regarding patient temp. Awaiting return call. 1147- Call from Dr. Ariel Scherer, stated OK to apply some cool compresses to patient while awaiting toradol, as long as patient seems comfortable, OK with current temperature, give toradol as soon as available. Stated that he would be in around 12:30 pm to see patient and visit with family. Will continue to monitor patient. 65- Pt daughter at nurses station, stated that she is concerned that her father Gen Goodwing been asleep every time I have come\", is concerned that he is being oversedated, requested to speak to Dr. Ariel Scherer, will call Dr. Ariel Scherer. 4825- Dr. Ariel Scherer called and transferred to patient room. Shivani Scherer called called, stated that patient does not want morphine every 6 hours and would like to extend the frequency. Dr. Ariel Scherer stated to change frequency from every 6 hours to every 8 hours, but if patient looks uncomfortable or is having difficulty breathing a prn morphine dose is still available. New orders. Will continue to monitor patient.

## 2018-12-07 NOTE — PROGRESS NOTES
Problem: Falls - Risk of 
Goal: *Absence of Falls Document Randy Levels Fall Risk and appropriate interventions in the flowsheet. Outcome: Progressing Towards Goal 
Fall Risk Interventions: 
Mobility Interventions: Bed/chair exit alarm Mentation Interventions: Adequate sleep, hydration, pain control Medication Interventions: Evaluate medications/consider consulting pharmacy Elimination Interventions: Bed/chair exit alarm History of Falls Interventions: Door open when patient unattended

## 2018-12-07 NOTE — PROGRESS NOTES
Bedside shift change report given to Rosario Patel (oncoming nurse) by Sha Lam (offgoing nurse). Report included the following information SBAR and Kardex.

## 2018-12-07 NOTE — HOSPICE
[facility] Good Help to Those in Need 
[phone] [facility] Daily Nursing Note Patient Name: Nikki Dasilva YOB: 1940 Age: 69 y.o. Date of Visit: 12/06/18 Facility of Care: [facility] Patient Room: 522/01 Hospice Attending: Jamin Garrett MD 
Hospice Diagnosis: Respiratory failure [facility]) [J96.90] Level of Care: [facility] Current [facility] Symptoms 1. Patient admitted [facility] for respiratory distress. 2. Continues to have fever. 3. Continues with excessive secretions. 4. Dyspnea at rest, R=26, Lungs with Rhonchi, coarse sounds, increasing effort to breathe. 5. Agitation, intermittently. ASSESSMENT & PLAN Must update Plan of Care including visit frequencies for IDT members 1. Admit to [facility] level of care for dyspnea, secretions and agitation. 2.  Dyspnea/pain- Morphine 1 mg IV every 8 hrs and q 15 minutes as needed for SOB,  
3. Agitation: Lorazepam 1mg IV every 15 minutes as needed 4. Secretions-Scopolomine Patch every 72 hrs, Robinul 0.2mg every 4 hrs and, suction as needed  
5. Fever-Toradol 30mg every 6 hrs PRN 6.  and MSW visits Spiritual Interventions:  Abram Lopes visited Psych/ Social/ Emotional Interventions:  MSW, Dunia Padron visited Care Coordination Needs: Coordinated care with Dr Kayleen Sanchez. Care plan and New Orders discussed / approved with Raina Starr MD. Description History and Chart Review If this is initial GIP note must document RN assessment/MD communication in previous setting. Specifically document nursing/medication needs in last 24 hours to support [facility] care Narrative History of last 24 hours that demonstrates care cannot be provided in another setting: 
Patient continues to be responsive to painful stimuli. Continues to have increasing respiratory distress. Dyspnea at rest, R=26, Lungs with Rhonchi, coarse sounds, increasing effort to breathe.   Continues to have fever.  Utilizing Toradol and cool compresses. Family requesting Morphine to be reduces to 1mg every 8 hrs. Does the patient currently require IV medications? Yes Does the patient currently require scheduled medications? Yes Does the patient currently require a PCA? No 
 
List number of doses of PRN medications in last 24 hours: 
 
Medication 1:  Toradol Number of doses:  4 Medication 2:  Bisacodyl Number of doses:  1 Medication 3:  
Number of doses: Supporting documentation for GIP need for pain control: 
[x] Frequent evaluation by a doctor, nurse practitioner, nurse  
[x] Frequent medication adjustment   
[x] IVs that cannot be administered at home  
[] Aggressive pain management  
[] Complicated technical delivery of medications Supporting documentation for GIP need for symptom control: 
[x]  Sudden decline necessitating intensive nursing intervention 
[]  Uncontrolled / intractable nausea or vomiting  
[]  Pathological fractures 
[]  Advanced open wounds requiring frequent skilled care [x] Unmanageable respiratory distress 
[] New or worsening delirium  
[] Delirium with behavior issues: Is 24 hour caregiver present due to safety concerns with agitation? (yes/no) [] Imminent death  with skilled nursing needs documented above DISCHARGE PLANNING Daily discharge planning required for GIP 1. Discharge Plan: Pt will likely pass inpatient, possible transfer to [facility] if stable 2. Patient/Family teaching: No family present 3. Response to patient/family teaching: N/A 
 
ASSESSMENT   
KARNOFSKY: 20 Prognosis estimated based on 12/06/18 clinical assessment is:  
[] Few to Many Hours [x] Hours to Days  
[] Few to Many Days  
[] Days to Weeks  
[] Few to Many Weeks  
[] Weeks to Months  
[] Few to Many Months Quality Measure: Patient self-reports:  [] Yes    [x] No 
ESAS:  
Time of Assessment:  
Pain (1-10):  6 Fatigue (1-10):  8 Shortness of breath (1-10): 8 Nausea (1-10):  
 Appetite (1-10): Anxiety: (1-10): Depression: (1-10): Well-being: (1-10): Constipation: _ Yes  _ No 
LAST BM: 12/4 CLINICAL INFORMATION Patient Vitals for the past 12 hrs: 
 Temp Pulse Resp BP SpO2  
12/06/18 1504 99.3 °F (37.4 °C) 84 15 118/57 (!) 85 % 12/06/18 1129 (!) 102 °F (38.9 °C)     Currently this patient has: 
[x] Supplemental O2 [x] IV [x] PICC [] PORT  
[] NG Tube   
[] PEG Tube  
[] Ostomy    
[] Logan draining _______ urine 
[] Other:  
 
SIGNS/PHYSICAL FINDINGS Skin (including wound): 
[] Warm, dry, supple, intact and color normal for race [x] Warm  
[x] Dry  
[] Cool    
[] Clammy      
[] Diaphoretic Turgor 
 [] Normal 
 [x] Decreased Color: 
 [] Pink 
 [] Pale 
 [] Cyanotic 
 [] Erythema 
 [] Jaundice [x] Normal for Race 
[]  Wounds: 
 
Neuro: 
[] Lethargy 
[] Restlessness / agitation 
[] Confusion / delirium 
[] Hallucinations [x] Responds to maximal stimulation 
[] Unresponsive 
[] Seizures Cardiac:[] Dyspnea on Exertion 
[] JVD [] Murmur 
[] Palpitations [] Hypotension 
[] Hypertension 
[] Tachycardia [] Bradycardia 
[] Irregular HR [x] Pulses Decreased 
[] Pulses Absent 
[] Edema:       (Location, Grade and Pitting) [] Mottling:      (Location) Respiratory:Breath sounds:  
 [x] Diminished 
 [] Wheeze [x] Rhonchi 
 [] Rales  
[] Even and unlabored 
[x] Labored:    R=26 [] Cough 
 [] Non Productive 
 [] Productive 
  [] Description:          
[] Deep suctioned  
[x] O2 at _1.5__ LPM 
[] High flow oxygen greater than 10 LPM 
[] Bi-Pap GI [x] Abdomen (soft) [] Ascites 
[] Nausea 
[] Vomiting 
[] Incontinent of bowels [x] Bowel sounds (no) 
[] Diarrhea 
[] Constipation (see above including last bowel movement) [] Checked for impaction 
[] Last BM Nutrition Diet:____NPO______ Appetite:  
[] Good  
[] Fair  
[] Poor  
[] Tube Feeding  
[] Voiding 
[x] Incontinent  
[] Logan Musculoskeletal 
 [] Balance/[place] Unsteady [x] Weak Strength:  
 [] Normal  
 [] Limited [x] Decreasing Activities:  
 [] Up as tolerated 
 [x] Bedridden  
 [] Specify: 
 
SAFETY [] 24 hr. Caregiver [x] Side rails ? [x] Hospital bed  
[] Reviewed Falls & Safety ALLERGIES AND MEDICATIONS Allergies: Allergies Allergen Reactions  Seroquel [Quetiapine] Anaphylaxis  Haldol [Haloperidol Lactate] Other (comments) \"Comatose state\"  Lorazepam Other (comments) Alternated mental status to benzos per family,  Morphine Other (comments) Change in mental status Current Facility-Administered Medications Medication Dose Route Frequency  morphine injection 1 mg  1 mg IntraVENous Q8H  
 ketorolac (TORADOL) injection 30 mg  30 mg IntraVENous Q6H PRN  
 morphine injection 2 mg  2 mg IntraVENous Q15MIN PRN  
 sodium chloride (NS) flush 5 mL  5 mL IntraVENous PRN  
 LORazepam (ATIVAN) injection 1 mg  1 mg IntraVENous Q15MIN PRN  
 scopolamine (TRANSDERM-SCOP) 1 mg over 3 days 1 Patch  1 Patch TransDERmal Q72H  
 glycopyrrolate (ROBINUL) injection 0.2 mg  0.2 mg IntraVENous Q4H  
 bisacodyl (DULCOLAX) suppository 10 mg  10 mg Rectal DAILY PRN  prochlorperazine (COMPAZINE) with saline injection 5 mg  5 mg IntraVENous Q6H PRN Visit Time In: 0148 Visit Time Out: 0270

## 2018-12-07 NOTE — PROGRESS NOTES
[facility] Good Help to Those in Need 
[phone] Patient Name: Courtney Bowden YOB: 1940 Date of Provider Hospice Visit: 12/07/18 Level of Care:   [x] General Inpatient (GIP)    [] Routine   [] Respite Current Location of Care: 
[] [facility] [x] [facility] [] [facility] [] [facility] [] Hospice [facility], patient referred from: 
[] [facility] [] [facility] [] [facility] [] [facility] [] Home [] Other:  
 
Date of Original Hospice Admission: 12/3/18 Hospice Medical Director at time of admission: Shonda Kessler Principle Hospice Diagnosis: Respiratory failure Diagnoses RELATED to the terminal prognosis: aspiration pneumonia, severe protein malnutrition, parkinsons-end stage, dementia Other Diagnoses: Fayrene Yolette Courtney Bowden is a 69y.o. year old who was admitted to [facility]. Patient admitted to the hospital on 11/18/18 with pneumonia, sepsis, respiratory failure. Patient treated with IVF, abx and evaluated by speech therapy. Patient with dysphagia/aspiration and unable to swallow safely related to mental status and Parkinson's. Family did not want a PEG but wanted to attempt TPN for a brief period of time to see if he would recover. Unfortunately, he continued to decline with issues of aspiration/worsening CXR with right sided infiltrate. Patient not responsive enough to eat or interact. Increased secretions and patient transitioned to inpatient hospice after discussions with 2 children(son in [place] and daughter locally) The patient's principle diagnosis has resulted in respiratory failure/pneumonia Refer to LCD Functionally, the patient's Karnofsky and/or Palliative Performance Scale has declined over a period of weeks and is estimated at 10-20. The patient is dependent on the following ADLs: 
 
Objective information that support this patients limited prognosis includes: CXR IMPRESSION: Improved aeration airspace disease in the left midlung. 
  
 There is increasing right basilar atelectasis/airspace disease The patient/family chose comfort measures with the support of Hospice. HOSPICE DIAGNOSES Active Symptoms: 
1. SOB 2. Secretions 3. Agitation at times 4. Fever PLAN 1. Patient transitioned to routine level of care. Symptom appear to be managed. 2. Secretions-Scopolamine patch and robinul 0.2 mg IV every 4 hours 3. SOB-continue scheduled morphine 1 mg IV every 8 hours. Spoke with daughter after I left the hospital and she was concerned that the morphine was making him sleepy. Reviewed again that he is on a very small dose and I think his lethargy more related to his transition closer to death. Negotiated to lower the frequency to every 8 hours but she is fine if he has increased symptoms to adjust the dose. Wanted her to focus on his comfort. 4. Fever-continue toradol and prn tylenol 5. Food for comfort per family 6. Comfort order set for pain, SOB, fever, agitation 7. Discussed with Nancy Zepeda from [facility] and bedside nurse. Will talk with children today. Son suppose to travel from [place] this weekend 8.  and SW to support family needs 9. Disposition: likely will pass in the hospital 
 
Prognosis estimated based on 12/07/18 clinical assessment is:  
[x] Hours to Days   
[] Days to Weeks   
[] Other: 
 
Communicated plan of care with: Hospice Case Manager; Hospice IDT; Care Team 
 
 GOALS OF CARE Resuscitation Status: DNR Durable DNR: [x] Yes [] No 
 
Primary Decision Maker ([address]): 3 children equally-discussed with Salma Lover and Zana Ch. Third child not available. Spouse with dementia and can not make decisions Relationship to patient: 
Phone number: 
[] Named in a scanned document  
[x] Legal Next of Kin 
[] Guardian Secondary Decision Maker ([address]):  
Relationship to patient: 
Phone number: 
[] Named in a scanned document  
[] Legal Next of Kin [] Guardian ACP documents you current have include: 
[] Advance Directive or Living Will 
[] Durable Do Not Resuscitate 
[] Physician Orders for Scope of Treatment (POST) [] Medical Power of  
[] Other HISTORY History obtained from: chart, family CHIEF COMPLAINT: sob The patient is:  
[] Verbal 
[] Nonverbal 
[x] Unresponsive HPI/SUBJECTIVE:  Patient is minimally responsive most of the time. Secretions noted 12/4-patient had just received IV morphine so appears comfortable right now. Had increased work of breathing this morning 12/5-minimally responsive. Not arousing now when talking with him 
 
12/6-not responsive at this time. Breathing unlabored 12/7-minimally responsive. Appears comfortable REVIEW OF SYSTEMS The following systems were: [x] reviewed  [] unable to be reviewed Positive ROS include: 
Constitutional: fatigue, weakness, short of breath Ears/nose/mouth/throat: increased airway secretions Respiratory:shortness of breath,  
Gastrointestinal:poor appetite, Neurologic:, weakness Adult Non-Verbal Pain Assessment Score: 1/10 Face [x] 0   No particular expression or smile 
[] 1   Occasional grimace, tearing, frowning, wrinkled forehead 
[] 2   Frequent grimace, tearing, frowning, wrinkled forehead Activity (movement) [x] 0   Lying quietly, normal position 
[] 1   Seeking attention through movement or slow, cautious movement 
[] 2   Restless, excessive activity and/or withdrawal reflexes Guarding 
[x] 0   Lying quietly, no positioning of hands over areas of body 
[] 1   Splinting areas of the body, tense 
[] 2   Rigid, stiff Physiology (vital signs) [x] 0   Stable vital signs [] 1   Change in any of the following: SBP > 20mm Hg; HR > 20/minute 
[] 2   Change in any of the following: SBP > 30mm Hg; HR > 25/minute Respiratory 
[] 0   Baseline RR/SpO2, compliant with ventilator 
[x] 1   RR > 10 above baseline, or 5% drop SpO2, mild asynchrony with ventilator 
[] 2   RR > 20 above baseline, or 10% drop SpO2, asynchrony with ventilator FUNCTIONAL ASSESSMENT Palliative Performance Scale (PPS):10-20 PSYCHOSOCIAL/SPIRITUAL ASSESSMENT Active Problems: 
  Respiratory failure ([facility] (12/3/2018) Past Medical History:  
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  
 5 Fr triple PICC, 39 cm R brachial, TPN  Hypertension  Malnutrition ([facility]  Parkinson disease ([facility] Past Surgical History:  
Procedure Laterality Date  HX HEENT Social History Tobacco Use  Smoking status: Never Smoker  Smokeless tobacco: Never Used Substance Use Topics  Alcohol use: No  
 
Family History Problem Relation Age of Onset  Stroke Father Allergies Allergen Reactions  Seroquel [Quetiapine] Anaphylaxis  Haldol [Haloperidol Lactate] Other (comments) \"Comatose state\"  Lorazepam Other (comments) Alternated mental status to benzos per family,  Morphine Other (comments) Change in mental status Current Facility-Administered Medications Medication Dose Route Frequency  morphine injection 1 mg  1 mg IntraVENous Q8H  
 ketorolac (TORADOL) injection 30 mg  30 mg IntraVENous Q6H PRN  
 morphine injection 2 mg  2 mg IntraVENous Q15MIN PRN  
 sodium chloride (NS) flush 5 mL  5 mL IntraVENous PRN  
 LORazepam (ATIVAN) injection 1 mg  1 mg IntraVENous Q15MIN PRN  
 scopolamine (TRANSDERM-SCOP) 1 mg over 3 days 1 Patch  1 Patch TransDERmal Q72H  
 glycopyrrolate (ROBINUL) injection 0.2 mg  0.2 mg IntraVENous Q4H  
 bisacodyl (DULCOLAX) suppository 10 mg  10 mg Rectal DAILY PRN  
  prochlorperazine (COMPAZINE) with saline injection 5 mg  5 mg IntraVENous Q6H PRN PHYSICAL EXAM  
 
Wt Readings from Last 3 Encounters:  
12/03/18 122 lb 12.7 oz (55.7 kg) 08/27/18 132 lb (59.9 kg) 08/15/18 132 lb (59.9 kg) Visit Vitals /57 (BP 1 Location: Left arm) Pulse 93 Temp 100 °F (37.8 °C) Resp 15 SpO2 91% Supplemental O2  [x] Yes  [] NO Last bowel movement:  
 
Currently this patient has: 
[] Peripheral IV [x] PICC  [] PORT [] ICD [] Logan Catheter [] NG Tube   [] PEG Tube   
[] Rectal Tube [] Drain 
[] Other:  
 
Constitutional: emaciated, minimally responsive Eyes: sunken ENMT:dry mucosa Cardiovascular: rrr Respiratory: secretions less and breathing unlabored Gastrointestinal: thin, soft Musculoskeletal:muscle wasting Skin:bruising Neurologic:mininally responsive Psychiatric: not interactive Other:  
 
 
Pertinent Lab and or Imaging Tests: 
Lab Results Component Value Date/Time Sodium 140 12/02/2018 04:07 AM  
 Potassium 3.8 12/02/2018 04:07 AM  
 Chloride 104 12/02/2018 04:07 AM  
 CO2 30 12/02/2018 04:07 AM  
 Anion gap 6 12/02/2018 04:07 AM  
 Glucose 127 (H) 12/02/2018 04:07 AM  
 BUN 19 12/02/2018 04:07 AM  
 Creatinine 0.67 (L) 12/02/2018 04:07 AM  
 BUN/Creatinine ratio 28 (H) 12/02/2018 04:07 AM  
 GFR est AA >60 12/02/2018 04:07 AM  
 GFR est non-AA >60 12/02/2018 04:07 AM  
 Calcium 8.4 (L) 12/02/2018 04:07 AM  
 
Lab Results Component Value Date/Time  Protein, total 5.7 (L) 12/02/2018 04:07 AM  
 Albumin 2.2 (L) 12/02/2018 04:07 AM  
 
   
 
Total time: 35 
Counseling / coordination time:  
> 50% counseling / coordination?:

## 2018-12-07 NOTE — HOSPICE
[facility] Good Help to Those in Need 
[phone] [facility] Daily Nursing Note Patient Name: Joel Ojeda YOB: 1940 Age: 69 y.o. Date of Visit: 12/07/18 Facility of Care: [facility] Patient Room: 522/01 Hospice Attending: Ajit Akhtar MD 
Hospice Diagnosis: Respiratory failure [facility]) [J96.90] Level of Care: [facility] Current [facility] Symptoms 1. Patient admitted [facility] for respiratory distress. 2. Continues to have fever. 3. Continues with excessive secretions. 4. Dyspnea at rest, Lungs with Rhonchi, coarse sounds, increasing effort to breathe. 5. Agitation, intermittently. ASSESSMENT & PLAN Must update Plan of Care including visit frequencies for IDT members 1. Admit to [facility] level of care for dyspnea, secretions and agitation. 2.  Dyspnea/pain- Morphine 1 mg IV every 8 hrs and q 15 minutes as needed for SOB,  
3. Agitation: Lorazepam 1mg IV every 15 minutes as needed 4. Secretions-Scopolomine Patch every 72 hrs, Robinul 0.2mg every 4 hrs and, suction as needed  
5. Fever-Toradol 30mg every 6 hrs PRN 6.  and MSW visits Spiritual Interventions: Nadine Daugherty visited Psych/ Social/ Emotional Interventions: MSW Chevy agee visited. Care Coordination Needs: Coordination of care with Dr Shari Silva. Care plan and New Orders discussed / approved with Yennifer Marinelli MD. Description History and Chart Review If this is initial [facility] note must document RN assessment/MD communication in previous setting. Specifically document nursing/medication needs in last 24 hours to support [facility] care Narrative History of last 24 hours that demonstrates care cannot be provided in another setting: 
Patient continues to be responsive to painful stimuli.  Continues to have increasing respiratory distress. Lungs with Rhonchi, bilaterally. Dyspnea at rest, Lungs with coarse sounds, increasing effort to breathe.  Continues to have fever.  Utilizing Toradol and cool compresses. Does the patient currently require IV medications? Yes Does the patient currently require scheduled medications? Yes Does the patient currently require a PCA? No 
 
List number of doses of PRN medications in last 24 hours: 
 
Medication 1:  Toradol Number of doses:  1 Medication 2:  
Number of doses: 
 
Medication 3:  
Number of doses: Supporting documentation for GIP need for pain control: 
[x] Frequent evaluation by a doctor, nurse practitioner, nurse  
[x] Frequent medication adjustment   
[x] IVs that cannot be administered at home  
[] Aggressive pain management  
[] Complicated technical delivery of medications Supporting documentation for GIP need for symptom control: 
[x]  Sudden decline necessitating intensive nursing intervention 
[]  Uncontrolled / intractable nausea or vomiting  
[]  Pathological fractures 
[]  Advanced open wounds requiring frequent skilled care [x] Unmanageable respiratory distress 
[] New or worsening delirium  
[] Delirium with behavior issues: Is 24 hour caregiver present due to safety concerns with agitation? (yes/no) [] Imminent death  with skilled nursing needs documented above DISCHARGE PLANNING Daily discharge planning required for GIP 1. Discharge Plan: Pt will likely pass inpatient, possible transfer to [facility] if stable 2. Patient/Family teaching: No family present 3. Response to patient/family teaching: N/A 
 
ASSESSMENT   
KARNOFSKY: 10 Prognosis estimated based on 12/07/18 clinical assessment is:  
[] Few to Many Hours [x] Hours to Days  
[] Few to Many Days  
[] Days to Weeks  
[] Few to Many Weeks  
[] Weeks to Months  
[] Few to Many Months Quality Measure: Patient self-reports:  [] Yes    [x] No 
ESAS:  
Time of Assessment:  
Pain (1-10): 6 Fatigue (1-10):  8 Shortness of breath (1-10): 7 Nausea (1-10): Appetite (1-10): Anxiety: (1-10): Depression: (1-10):  
 Well-being: (1-10): Constipation: _ Yes  _ No 
LAST BM: 12/4 CLINICAL INFORMATION Patient Vitals for the past 12 hrs: 
 Temp Pulse Resp BP SpO2  
12/07/18 0814 100 °F (37.8 °C) 93 15 116/57 91 % Currently this patient has: 
[x] Supplemental O2 [x] IV [x] PICC [] PORT  
[] NG Tube   
[] PEG Tube  
[] Ostomy    
[] Logan draining _______ urine 
[] Other:  
 
SIGNS/PHYSICAL FINDINGS Skin (including wound): 
[] Warm, dry, supple, intact and color normal for race [x] Warm  
[x] Dry  
[] Cool    
[] Clammy      
[] Diaphoretic Turgor 
 [] Normal 
 [x] Decreased Color: 
 [] Pink 
 [] Pale 
 [] Cyanotic 
 [] Erythema 
 [] Jaundice [x] Normal for Race 
[]  Wounds: 
 
Neuro: 
[] Lethargy 
[] Restlessness / agitation 
[] Confusion / delirium 
[] Hallucinations [x] Responds to maximal stimulation 
[] Unresponsive 
[] Seizures Cardiac:[] Dyspnea on Exertion 
[] JVD [] Murmur 
[] Palpitations [] Hypotension 
[] Hypertension 
[] Tachycardia [] Bradycardia 
[] Irregular HR [x] Pulses Decreased 
[] Pulses Absent 
[] Edema:       (Location, Grade and Pitting) [] Mottling:      (Location) Respiratory:Breath sounds:  
 [] Diminished 
 [] Wheeze [x] Rhonchi 
 [] Rales  
[] Even and unlabored 
[] Labored:           
[] Cough 
 [] Non Productive 
 [] Productive 
  [] Description:          
[] Deep suctioned  
[x] O2 at _1.5__ LPM 
[] High flow oxygen greater than 10 LPM 
[] Bi-Pap GI [x] Abdomen (soft) [] Ascites 
[] Nausea 
[] Vomiting 
[] Incontinent of bowels [x] Bowel sounds (yes) [] Diarrhea 
[] Constipation (see above including last bowel movement) [] Checked for impaction 
[] Nutrition Diet:____NPO______ Appetite:  
[] Good  
[] Fair  
[] Poor  
[] Tube Feeding  
[] Voiding 
[x] Incontinent  
[] Logan Musculoskeletal 
[] Balance/[place] Unsteady  
[] Weak Strength:  
 [] Normal  
 [] Limited [x] Decreasing Activities:  
 [] Up as tolerated [x] Bedridden  
 [] Specify: 
 
SAFETY [] 24 hr. Caregiver [x] Side rails ? [x] Hospital bed  
[] Reviewed Falls & Safety ALLERGIES AND MEDICATIONS Allergies: Allergies Allergen Reactions  Seroquel [Quetiapine] Anaphylaxis  Haldol [Haloperidol Lactate] Other (comments) \"Comatose state\"  Lorazepam Other (comments) Alternated mental status to benzos per family,  Morphine Other (comments) Change in mental status Current Facility-Administered Medications Medication Dose Route Frequency  morphine injection 1 mg  1 mg IntraVENous Q8H  
 ketorolac (TORADOL) injection 30 mg  30 mg IntraVENous Q6H PRN  
 morphine injection 2 mg  2 mg IntraVENous Q15MIN PRN  
 sodium chloride (NS) flush 5 mL  5 mL IntraVENous PRN  
 LORazepam (ATIVAN) injection 1 mg  1 mg IntraVENous Q15MIN PRN  
 scopolamine (TRANSDERM-SCOP) 1 mg over 3 days 1 Patch  1 Patch TransDERmal Q72H  
 glycopyrrolate (ROBINUL) injection 0.2 mg  0.2 mg IntraVENous Q4H  
 bisacodyl (DULCOLAX) suppository 10 mg  10 mg Rectal DAILY PRN  prochlorperazine (COMPAZINE) with saline injection 5 mg  5 mg IntraVENous Q6H PRN Visit Time In: 2925 Visit Time Out: 1600

## 2018-12-08 NOTE — PROGRESS NOTES
[employer] Good Help to Those in Need 
[phone] Routine Nursing Note Patient Name: Pelon Grayson YOB: 1940 Age: 69 y.o. Date of Visit: 12/08/18 Facility of Care: [facility] Patient Room: 2/ Hospice Attending: Ruthy Crigler, MD 
Hospice Diagnosis: Respiratory failure [facility]) [J96.90] Level of Care: Routine ASSESSMENT, PLAN AND INTERVENTIONS 1. Patient admitted to Routine Level of Care 2. Changed from GIP level of care today, patient is alert, non-verbal, skin is warm to touch, on 1 liter of O2, lungs are coarse, pulses felt, distant bowel sounds 3. Patient has had sporadic fevers, afebrile today, hypertensive with respirations of 13. Will reduce robinul to prn and add atropine drops. I did not find a scopolamine patch on patient, re-ordered so new patch could be placed. Atropine drops scheduled every 4 hours. 4. Patient is incontinent- placed ott order for end of life care/comfort. Turn and reposition patient every 4 hours, patient has comfort feeding order, mouth care as needed. Spiritual Interventions: none at this time Psych/ Social/ Emotional Interventions: Family has some discord, there is a list of visitors that have seen and spent time with patient on his closet door, patient has had many visitors. Care Coordination Needs: None at this time Care plan and New Orders discussed / approved with Dr. Inocencio Maria MD 
 
Description History and Chart Review List number of doses of PRN medications in last 24 hours: 
Medication 1: 
Number of doses: 
 
Medication 2:  
Number of doses: 
 
Medication 3:  
Number of doses: 
 
DISCHARGE PLANNING 1. Discharge Plan: ongoing, patient changed to routine level of care, may pass at [facility] or if stable can transfer to [facility] this coming week. 2. Patient/Family teaching: No family at bedside today, ongoing education on morphine.  
3. Response to patient/family teaching: family is hesitant to use morphine. ASSESSMENT   
KARNOFSKY: 20 Prognosis estimated based on 12/08/18 clinical assessment is:  
[] Few to Many Hours [x] Hours to Days  
[] Few to Many Days  
[] Days to Weeks  
[] Few to Many Weeks  
[] Weeks to Months  
[] Few to Many Months Quality Measure: Patient self-reports:  [] Yes    [x] No 
ESAS:  
Time of Assessment: 0785 Pain (1-10):5 Fatigue (1-10): 5 Shortness of breath (1-10):5 Nausea (1-10): 0 Appetite (1-10): Anxiety: (1-10): Depression: (1-10): Well-being: (1-10): Constipation: _ Yes  x_ No 
LAST BM: 12/7/18 CLINICAL INFORMATION Patient Vitals for the past 12 hrs: 
 Temp Pulse Resp BP SpO2  
12/08/18 0818 98.8 °F (37.1 °C) 60 13 141/73 98 % Currently this patient has: 
[x] Supplemental O2  
[] IV [x] PICC [] PORT  
[] NG Tube   
[] PEG Tube  
[] Ostomy    
[] Logan draining _______ urine 
[] Other:  
 
SIGNS/PHYSICAL FINDINGS Skin (including wound): 
[] Warm, dry, supple, intact and color normal for race [x] Warm  
[] Dry  
[] Cool    
[] Clammy      
[] Diaphoretic Turgor 
 [] Normal 
 [x] Decreased Color: 
 [] Pink 
 [] Pale 
 [] Cyanotic 
 [] Erythema 
 [] Jaundice [x] Normal for Race 
[x]  Wounds:sacral wound Neuro: 
[x] Lethargy 
[] Restlessness / agitation 
[] Confusion / delirium 
[] Hallucinations 
[] Responds to maximal stimulation 
[] Unresponsive 
[] Seizures Cardiac:[] Dyspnea on Exertion 
[] JVD [] Murmur 
[] Palpitations [] Hypotension 
[] Hypertension 
[] Tachycardia [] Bradycardia [x] Irregular HR 
[] Pulses Decreased 
[] Pulses Absent 
[] Edema:       (Location, Grade and Pitting) [] Mottling:      (Location) Respiratory:Breath sounds: coarse 
 [x] Diminished 
 [] Wheeze 
 [] Rhonchi 
 [] Rales [x] Even and unlabored 
[] Labored:           
[] Cough 
 [] Non Productive 
 [] Productive 
  [] Description:          
[] Deep suctioned  
[x] O2 at _1__ LPM 
[] High flow oxygen greater than 10 LPM 
[] Bi-Pap GI 
 [x] Abdomen (describe) soft 
[] Ascites 
[] Nausea 
[] Vomiting 
[] Incontinent of bowels [x] Bowel sounds (yes/no) [] Diarrhea 
[] Constipation (see above including last bowel movement) [] Checked for impaction 
[] Last BM 12/7/18 Nutrition Diet:__NPO________ Appetite:  
[] Good  
[] Fair  
[] Poor  
[] Tube Feeding  
[] Voiding 
[x] Incontinent  
[] Logan Musculoskeletal 
[] Balance/[place] Unsteady [x] Weak Strength:  
 [] Normal  
 [] Limited [x] Decreasing Activities:  
 [] Up as tolerated 
 [x] Bedridden  
 [] Specify: 
 
SAFETY [] 24 hr. Caregiver [x] Side rails ? [x] Hospital bed  
[] Reviewed Falls & Safety ALLERGIES AND MEDICATIONS Allergies: Allergies Allergen Reactions  Seroquel [Quetiapine] Anaphylaxis  Haldol [Haloperidol Lactate] Other (comments) \"Comatose state\"  Lorazepam Other (comments) Alternated mental status to benzos per family,  Morphine Other (comments) Change in mental status Current Facility-Administered Medications Medication Dose Route Frequency  morphine injection 1 mg  1 mg IntraVENous Q8H  
 ketorolac (TORADOL) injection 30 mg  30 mg IntraVENous Q6H PRN  
 morphine injection 2 mg  2 mg IntraVENous Q15MIN PRN  
 sodium chloride (NS) flush 5 mL  5 mL IntraVENous PRN  
 LORazepam (ATIVAN) injection 1 mg  1 mg IntraVENous Q15MIN PRN  
 scopolamine (TRANSDERM-SCOP) 1 mg over 3 days 1 Patch  1 Patch TransDERmal Q72H  
 glycopyrrolate (ROBINUL) injection 0.2 mg  0.2 mg IntraVENous Q4H  
 bisacodyl (DULCOLAX) suppository 10 mg  10 mg Rectal DAILY PRN  prochlorperazine (COMPAZINE) with saline injection 5 mg  5 mg IntraVENous Q6H PRN Visit Time In: 1965 Visit Time Out: 1110

## 2018-12-08 NOTE — PROGRESS NOTES
Problem: Pressure Injury - Risk of 
Goal: *Prevention of pressure injury Document Mamadou Scale and appropriate interventions in the flowsheet. Outcome: Progressing Towards Goal 
Pressure Injury Interventions: 
Sensory Interventions: Assess changes in LOC Moisture Interventions: Absorbent underpads Activity Interventions: Increase time out of bed Mobility Interventions: Assess need for specialty bed Nutrition Interventions: Document food/fluid/supplement intake Friction and Shear Interventions: Apply protective barrier, creams and emollients

## 2018-12-08 NOTE — PROGRESS NOTES
[address] [place] [address] Good Help to Those in Need 
[phone] Patient Name: Berna Bates YOB: 1940 Date of Provider Hospice Visit: 12/08/18 Level of Care:   [] General Inpatient (GIP)    [x] Routine   [] Respite Current Location of Care: 
[] [address] [place] [address] [x] [facility] [] [facility] [] [facility] [] Hospice [facility], patient referred from: 
[] [address] [place] [address] [] [facility] [] [facility] [] [facility] [] Home [] Other:  
 
Date of Original Hospice Admission: 12/3/18 Hospice Medical Director at time of admission: Heaven Campos Principle Hospice Diagnosis: Respiratory failure Diagnoses RELATED to the terminal prognosis: aspiration pneumonia, severe protein malnutrition, parkinsons-end stage, dementia Other Diagnoses: Garcia Bates is a 69y.o. year old who was admitted to [address] [place] [address]. Patient admitted to the hospital on 11/18/18 with pneumonia, sepsis, respiratory failure. Patient treated with IVF, abx and evaluated by speech therapy. Patient with dysphagia/aspiration and unable to swallow safely related to mental status and Parkinson's. Family did not want a PEG but wanted to attempt TPN for a brief period of time to see if he would recover. Unfortunately, he continued to decline with issues of aspiration/worsening CXR with right sided infiltrate. Patient not responsive enough to eat or interact. Increased secretions and patient transitioned to inpatient hospice after discussions with 2 children(son in [place] and daughter locally) The patient's principle diagnosis has resulted in respiratory failure/pneumonia Refer to LCD Functionally, the patient's Karnofsky and/or Palliative Performance Scale has declined over a period of weeks and is estimated at 10-20. The patient is dependent on the following ADLs: 
 
Objective information that support this patients limited prognosis includes: CXR IMPRESSION: Improved aeration airspace disease in the left midlung. 
  
 There is increasing right basilar atelectasis/airspace disease The patient/family chose comfort measures with the support of Hospice. HOSPICE DIAGNOSES Active Symptoms: 
1. SOB 2. Secretions: increased, coarse 3. Agitation at times 4. Fever 5. Lethargy/fatigue/weakness PLAN 1. Continue routine level of care. Symptom appear to be managed. 2. Add Scopolamine patch: likely had fell off; not seen on exam. 
3. Add atropine drops 3 drops schedueld every 4 hours to help with secretions and change robinul 0.2 mg IV every 4 hours as needed 4. SOB-continue scheduled morphine 1 mg IV every 8 hours. Family is very hesitant to use morphine. 5. Fever-continue toradol and prn tylenol 6. Discussed with [facility] from Hospice and bedside nurse. 7.  and SW to support family needs: son supposed to come for visit this weekend from [place] 
8. Disposition: likely will pass in the hospital 
 
Prognosis estimated based on 12/08/18 clinical assessment is:  
[x] Hours to Days   
[] Days to Weeks   
[] Other: 
 
Communicated plan of care with: Hospice Case Manager; Hospice IDT; Care Team 
 
 GOALS OF CARE Resuscitation Status: DNR Durable DNR: [x] Yes [] No 
 
Primary Decision Maker ([address]): 3 children equally-discussed with Bakari Strickland and Jazmin Mayer. Third child not available. Spouse with dementia and can not make decisions Relationship to patient: 
Phone number: 
[] Named in a scanned document  
[x] Legal Next of Kin 
[] Guardian Secondary Decision Maker (First Alternate Health Care Agent):  
Relationship to patient: 
Phone number: 
[] Named in a scanned document  
[] Legal Next of Kin 
[] Guardian ACP documents you current have include: 
[] Advance Directive or Living Will 
[] Durable Do Not Resuscitate 
[] Physician Orders for Scope of Treatment (POST) [] Medical Power of  
[] Other HISTORY History obtained from: chart, hospice staff CHIEF COMPLAINT: N/A The patient is:  
[] Verbal 
[] Nonverbal 
[x] Unresponsive HPI/SUBJECTIVE:  Patient is minimally responsive most of the time. Coarse Secretions noted. Pt grimaces when moved. Slightly short of breath. Received all scheduled meds; no prn's REVIEW OF SYSTEMS The following systems were: [] reviewed  [x] unable to be reviewed Adult Non-Verbal Pain Assessment Score:  
2 /10 Face 
[] 0   No particular expression or smile 
[x] 1   Occasional grimace, tearing, frowning, wrinkled forehead 
[] 2   Frequent grimace, tearing, frowning, wrinkled forehead Activity (movement) [x] 0   Lying quietly, normal position 
[] 1   Seeking attention through movement or slow, cautious movement 
[] 2   Restless, excessive activity and/or withdrawal reflexes Guarding 
[] 0   Lying quietly, no positioning of hands over areas of body [x] 1   Splinting areas of the body, tense 
[] 2   Rigid, stiff Physiology (vital signs) [x] 0   Stable vital signs [] 1   Change in any of the following: SBP > 20mm Hg; HR > 20/minute 
[] 2   Change in any of the following: SBP > 30mm Hg; HR > 25/minute Respiratory [x] 0   Baseline RR/SpO2, compliant with ventilator 
[] 1   RR > 10 above baseline, or 5% drop SpO2, mild asynchrony with ventilator 
[] 2   RR > 20 above baseline, or 10% drop SpO2, asynchrony with ventilator FUNCTIONAL ASSESSMENT Palliative Performance Scale (PPS):10-20 PSYCHOSOCIAL/SPIRITUAL ASSESSMENT Active Problems: 
  Respiratory failure ([address] (12/3/2018) Past Medical History:  
Diagnosis Date  Age-related physical debility  Anxiety and depression  Compression fracture of L3 lumbar vertebra (HCC)  Debilitated patient  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  Diabetes ([address]  Dysphagia dementia related  History of vascular access device 11/26/2018  
 5 Fr triple PICC, 39 cm R brachial, TPN  Hypertension  Malnutrition [facility]  Parkinson disease ([facility] Past Surgical History:  
Procedure Laterality Date  HX HEENT Social History Tobacco Use  Smoking status: Never Smoker  Smokeless tobacco: Never Used Substance Use Topics  Alcohol use: No  
 
Family History Problem Relation Age of Onset  Stroke Father Allergies Allergen Reactions  Seroquel [Quetiapine] Anaphylaxis  Haldol [Haloperidol Lactate] Other (comments) \"Comatose state\"  Lorazepam Other (comments) Alternated mental status to benzos per family,  Morphine Other (comments) Change in mental status Current Facility-Administered Medications Medication Dose Route Frequency  glycopyrrolate (ROBINUL) injection 0.1 mg  0.1 mg IntraVENous Q4H PRN  
 atropine 1 % ophthalmic solution 2 Drop  2 Drop SubLINGual Q4H  
 scopolamine (TRANSDERM-SCOP) 1 mg over 3 days 1 Patch  1 Patch TransDERmal Q72H  morphine injection 1 mg  1 mg IntraVENous Q8H  
 ketorolac (TORADOL) injection 30 mg  30 mg IntraVENous Q6H PRN  
 morphine injection 2 mg  2 mg IntraVENous Q15MIN PRN  
 sodium chloride (NS) flush 5 mL  5 mL IntraVENous PRN  
 LORazepam (ATIVAN) injection 1 mg  1 mg IntraVENous Q15MIN PRN  
 scopolamine (TRANSDERM-SCOP) 1 mg over 3 days 1 Patch  1 Patch TransDERmal Q72H  bisacodyl (DULCOLAX) suppository 10 mg  10 mg Rectal DAILY PRN  prochlorperazine (COMPAZINE) with saline injection 5 mg  5 mg IntraVENous Q6H PRN PHYSICAL EXAM  
 
Wt Readings from Last 3 Encounters:  
12/03/18 55.7 kg (122 lb 12.7 oz) 08/27/18 59.9 kg (132 lb) 08/15/18 59.9 kg (132 lb) Visit Vitals /73 (BP 1 Location: Left arm, BP Patient Position: At rest) Pulse 60 Temp 98.8 °F (37.1 °C) Resp 13 SpO2 98% Supplemental O2  [x] Yes  [] NO Last bowel movement:  
 
Currently this patient has: 
[] Peripheral IV [x] PICC  [] PORT [] ICD [] Logan Catheter [] NG Tube   [] PEG Tube [] Rectal Tube [] Drain 
[] Other:  
 
Constitutional: emaciated, minimally responsive Eyes: sunken ENMT:coarse airway secretions Cardiovascular: rrr Respiratory: secretions coarse and slightly labored breathing Gastrointestinal: thin, soft Musculoskeletal:muscle wasting Skin:bruising, warm Neurologic:mininally responsive Psychiatric: not interactive Other:  
 
 
Pertinent Lab and or Imaging Tests: 
Lab Results Component Value Date/Time Sodium 140 12/02/2018 04:07 AM  
 Potassium 3.8 12/02/2018 04:07 AM  
 Chloride 104 12/02/2018 04:07 AM  
 CO2 30 12/02/2018 04:07 AM  
 Anion gap 6 12/02/2018 04:07 AM  
 Glucose 127 (H) 12/02/2018 04:07 AM  
 BUN 19 12/02/2018 04:07 AM  
 Creatinine 0.67 (L) 12/02/2018 04:07 AM  
 BUN/Creatinine ratio 28 (H) 12/02/2018 04:07 AM  
 GFR est AA >60 12/02/2018 04:07 AM  
 GFR est non-AA >60 12/02/2018 04:07 AM  
 Calcium 8.4 (L) 12/02/2018 04:07 AM  
 
Lab Results Component Value Date/Time  Protein, total 5.7 (L) 12/02/2018 04:07 AM  
 Albumin 2.2 (L) 12/02/2018 04:07 AM  
 
   
 
Total time: 35 
Counseling / coordination time:  
> 50% counseling / coordination?:

## 2018-12-08 NOTE — PROGRESS NOTES
Problem: Falls - Risk of 
Goal: *Absence of Falls Document Judi Bob Fall Risk and appropriate interventions in the flowsheet. Outcome: Progressing Towards Goal 
Fall Risk Interventions: 
Mobility Interventions: Bed/chair exit alarm Mentation Interventions: Adequate sleep, hydration, pain control Medication Interventions: Evaluate medications/consider consulting pharmacy Elimination Interventions: Bed/chair exit alarm History of Falls Interventions: Door open when patient unattended

## 2018-12-08 NOTE — PROGRESS NOTES
Follow up visit with Mr. Francois on 5 Med Surg. His son from [place] had arrived. Mr. Debi Del Valle eyes were open and he appeared to look around. His son requested some time with his father Provided spiritual presence and assurance of continued  prayer. Visited by: Viki Salmon MS., [address] ([id number])

## 2018-12-08 NOTE — PROGRESS NOTES
Bedside shift change report given to Rosario Modoc Jorge (oncoming nurse) by Amandeep Quach (offgoing nurse). Report included the following information SBAR and Kardex.

## 2018-12-08 NOTE — PROGRESS NOTES
Bedside and Verbal shift change report given to Claudean Scriver rn  (oncoming nurse) by Jony Mcnair  (offgoing nurse). Report included the following information SBAR and Kardex.

## 2018-12-08 NOTE — HOSPICE
[facility] Call Hospice MSW visited pt. Pt remains at GIP level of care at this time due to uncontrolled symptoms of secretions, respiratory distress and fever. A member of the turn team was repositioning pt when MSW entered the room. Pt observed to grimace when repositioned. MSW sat with pt and provided supportive presence via tactile and verbal stimulation until pt fell asleep. Pt likely will remain at [facility] for end of life care. SW to continue to follow per POC.

## 2018-12-09 NOTE — PROGRESS NOTES
Bedside and Verbal shift change report given to Pippa Neff  (oncoming nurse) by Di Coleman  (offgoing nurse). Report included the following information SBAR and Kardex.

## 2018-12-09 NOTE — PROGRESS NOTES
[facility] Good Help to Those in Need 
[phone] Routine Nursing Note Patient Name: Sabrina Valle YOB: 1940 Age: 69 y.o. Date of Visit: 12/09/18 Facility of Care: [facility] Patient Room: 522/01 Hospice Attending: Harpal Schreiber MD 
Hospice Diagnosis: Respiratory failure [facility]) [J96.90] Level of Care: Routine ASSESSMENT, PLAN AND INTERVENTIONS 1. Patient admitted to Routine Level of Care 2. Patient resting in bed, hypertensive today with bradycardia. Will ask staff RN to use prn medications, morphine may help if it is pain induced hypertension. Patient is alert, eyes open, non verbal. Lungs are very coarse, breathing is labored. Respiratory rate of 16 on 1 liter of O2.  
3.  Son from [place] came and visited patient yesterday. Bowel sounds are distant but abdomen is soft. Skin is warm to touch. Patient receiving scheduled 1mg IV morphine every 8 hours. Family hesitant to use morphine. 4. Patient really should be NPO but family insists on feeding him applesauce, they are aware that he may aspirate. Patient may be experiencing constipation please use suppository. Placed ott yesterday for end of life care and to avoid skin breakdown due to lack of mobility. Spiritual Interventions: None at this time. Psych/ Social/ Emotional Interventions: None at this time. Care Coordination Needs: No changes today, encourage use of prn medications. Care plan and New Orders discussed / approved with Dr. Gabbi Velásquez. Description History and Chart Review List number of doses of PRN medications in last 24 hours: 
Medication 1: 
Number of doses: 
 
Medication 2:  
Number of doses: 
 
Medication 3:  
Number of doses: 
 
DISCHARGE PLANNING 1. Discharge Plan:Patient is declining and will likely pass at [facility]. 2. Patient/Family teaching: no family at bedside today, continue to educate on morphine. 3. Response to patient/family teaching: Family wants to limit use of medications even after education. ASSESSMENT   
KARNOFSKY: 20 Prognosis estimated based on 12/09/18 clinical assessment is:  
[] Few to Many Hours [] Hours to Days [x] Few to Many Days  
[] Days to Weeks  
[] Few to Many Weeks  
[] Weeks to Months  
[] Few to Many Months Quality Measure: Patient self-reports:  [] Yes    [x] No 
ESAS:  
Time of Assessment: 0845 Pain (1-10):6 Fatigue (1-10): 6 Shortness of breath (1-10):8 Nausea (1-10): 5 Appetite (1-10): Anxiety: (1-10): Depression: (1-10): Well-being: (1-10): Constipation: x_ Yes  _ No 
LAST BM: 12/4/18 CLINICAL INFORMATION Patient Vitals for the past 12 hrs: 
 Temp Pulse Resp BP SpO2  
12/09/18 0750 97.3 °F (36.3 °C) (!) 53 10 191/87 96 % 12/08/18 2338 97 °F (36.1 °C) (!) 58 15 144/77 96 % Currently this patient has: 
[x] Supplemental O2  
[] IV [x] PICC [] PORT  
[] NG Tube   
[] PEG Tube  
[] Ostomy    
[x] Logan draining __yellow_____ urine 
[] Other:  
 
SIGNS/PHYSICAL FINDINGS Skin (including wound): 
[] Warm, dry, supple, intact and color normal for race [x] Warm  
[] Dry  
[] Cool    
[] Clammy      
[] Diaphoretic Turgor 
 [] Normal 
 [x] Decreased Color: 
 [] Pink 
 [] Pale 
 [] Cyanotic 
 [] Erythema 
 [] Jaundice [x] Normal for Race 
[x]  Wounds:sacral wound Neuro: 
[] Lethargy 
[] Restlessness / agitation 
[] Confusion / delirium 
[] Hallucinations [x] Responds to maximal stimulation 
[] Unresponsive 
[] Seizures Cardiac:[] Dyspnea on Exertion 
[] JVD [] Murmur 
[] Palpitations [] Hypotension 
[x] Hypertension 
[] Tachycardia [x] Bradycardia [x] Irregular HR 
[] Pulses Decreased 
[] Pulses Absent 
[] Edema:       (Location, Grade and Pitting) [] Mottling:      (Location) Respiratory:Breath sounds: very coarse 
 [x] Diminished 
 [] Wheeze 
 [] Rhonchi 
 [] Rales  
[] Even and unlabored 
[] Labored:    16 [] Cough 
 [] Non Productive 
 [] Productive 
  [] Description:          
[] Deep suctioned  
[x] O2 at __1_ LPM 
[] High flow oxygen greater than 10 LPM 
[] Bi-Pap GI [x] Abdomen (describe) soft 
[] Ascites 
[] Nausea 
[] Vomiting 
[] Incontinent of bowels [x] Bowel sounds (yes/no)distant 
[] Diarrhea [x] Constipation (see above including last bowel movement) [] Checked for impaction 
[x] Last BM 12/4/18 Nutrition Diet:__NPO-family still feeding________ Appetite:  
[] Good  
[] Fair  
[] Poor  
[] Tube Feeding  
[] Voiding 
[x] Incontinent [x] Logan Musculoskeletal 
[] Balance/[place] Unsteady [x] Weak Strength:  
 [] Normal  
 [] Limited [x] Decreasing Activities:  
 [] Up as tolerated 
 [x] Bedridden  
 [] Specify: 
 
SAFETY [] 24 hr. Caregiver [x] Side rails ? [x] Hospital bed  
[] Reviewed Falls & Safety ALLERGIES AND MEDICATIONS Allergies: Allergies Allergen Reactions  Seroquel [Quetiapine] Anaphylaxis  Haldol [Haloperidol Lactate] Other (comments) \"Comatose state\"  Lorazepam Other (comments) Alternated mental status to benzos per family,  Morphine Other (comments) Change in mental status Current Facility-Administered Medications Medication Dose Route Frequency  glycopyrrolate (ROBINUL) injection 0.1 mg  0.1 mg IntraVENous Q4H PRN  
 atropine 1 % ophthalmic solution 2 Drop  2 Drop SubLINGual Q4H  
 scopolamine (TRANSDERM-SCOP) 1 mg over 3 days 1 Patch  1 Patch TransDERmal Q72H  morphine injection 1 mg  1 mg IntraVENous Q8H  
 morphine injection 2 mg  2 mg IntraVENous Q15MIN PRN  
 sodium chloride (NS) flush 5 mL  5 mL IntraVENous PRN  
 LORazepam (ATIVAN) injection 1 mg  1 mg IntraVENous Q15MIN PRN  
 scopolamine (TRANSDERM-SCOP) 1 mg over 3 days 1 Patch  1 Patch TransDERmal Q72H  bisacodyl (DULCOLAX) suppository 10 mg  10 mg Rectal DAILY PRN  prochlorperazine (COMPAZINE) with saline injection 5 mg  5 mg IntraVENous Q6H PRN Visit Time IS:0231 Visit Time Out: 6578

## 2018-12-10 NOTE — PROGRESS NOTES
[facility] Help to Those in Need 
[phone] Patient Name: Cassie Rollins YOB: 1940 Date of Provider Hospice Visit: 12/10/18 Level of Care:   [] General Inpatient (GIP)    [x] Routine   [] Respite Current Location of Care: 
[] [facility] [x] [facility] [] [id number] [facility] [] [facility] [] Hospice [facility], patient referred from: 
[] [facility] [] [facility] [] [id number] [facility] [] [facility] [] Home [] Other:  
 
Date of Original Hospice Admission: 12/3/18 Hospice Medical Director at time of admission: Geo Gotti Principle Hospice Diagnosis: Respiratory failure Diagnoses RELATED to the terminal prognosis: aspiration pneumonia, severe protein malnutrition, parkinsons-end stage, dementia Other Diagnoses: Tracee Zaman Cassie Rollins is a 69y.o. year old who was admitted to [facility]. Patient admitted to the hospital on 11/18/18 with pneumonia, sepsis, respiratory failure. Patient treated with IVF, abx and evaluated by speech therapy. Patient with dysphagia/aspiration and unable to swallow safely related to mental status and Parkinson's. Family did not want a PEG but wanted to attempt TPN for a brief period of time to see if he would recover. Unfortunately, he continued to decline with issues of aspiration/worsening CXR with right sided infiltrate. Patient not responsive enough to eat or interact. Increased secretions and patient transitioned to inpatient hospice after discussions with 2 children(son in [place] and daughter locally) The patient's principle diagnosis has resulted in respiratory failure/pneumonia Refer to LCD Functionally, the patient's Karnofsky and/or Palliative Performance Scale has declined over a period of weeks and is estimated at 10-20. The patient is dependent on the following ADLs: 
 
Objective information that support this patients limited prognosis includes: CXR IMPRESSION: Improved aeration airspace disease in the left midlung. 
  
 There is increasing right basilar atelectasis/airspace disease The patient/family chose comfort measures with the support of Hospice. HOSPICE DIAGNOSES Active Symptoms: 
1. SOB 2. Secretions 3. Agitation at times 4. Fever PLAN 1. Patient minimally responsive this morning but events noted from the weekend. Family apparently did visit [facility] weekend. 2. Secretions-Scopolamine patch and robinul 0.2 mg IV every 4 hours 3. SOB-will increase morphine to 2 mg IV every 6 hours scheduled. Has required 2 doses of 2 mg morphine over the last 12 hours. 4. Fever-continue toradol and prn tylenol 5. Food for comfort per family 6. Comfort order set for pain, SOB, fever, agitation 7. Discussed with Margarita from Hospice and bedside nurse. No children at the bedside 8.  and SW to support family needs 9. Disposition: likely will pass in the hospital 
 
Prognosis estimated based on 12/10/18 clinical assessment is:  
[x] Hours to Days   
[] Days to Weeks   
[] Other: 
 
Communicated plan of care with: Hospice Case Manager; Hospice IDT; Care Team 
 
 GOALS OF CARE Resuscitation Status: DNR Durable DNR: [x] Yes [] No 
 
Primary Decision Maker ([address]): 3 children equally-discussed with Hayden Munoz and Nora Oconnor. Third child not available. Spouse with dementia and can not make decisions Relationship to patient: 
Phone number: 
[] Named in a scanned document  
[x] Legal Next of Kin 
[] Guardian Secondary Decision Maker ([employer] Alternate Health Care Agent):  
Relationship to patient: 
Phone number: 
[] Named in a scanned document  
[] Legal Next of Kin 
[] Guardian ACP documents you current have include: 
[] Advance Directive or Living Will 
[] Durable Do Not Resuscitate 
[] Physician Orders for Scope of Treatment (POST) [] Medical Power of  
[] Other HISTORY History obtained from: chart, family CHIEF COMPLAINT: sob The patient is:  
[] Verbal 
 [] Nonverbal 
[x] Unresponsive HPI/SUBJECTIVE:  Patient is minimally responsive most of the time. Secretions noted 12/4-patient had just received IV morphine so appears comfortable right now. Had increased work of breathing this morning 12/5-minimally responsive. Not arousing now when talking with him 
 
12/6-not responsive at this time. Breathing unlabored 12/7-minimally responsive. Appears comfortable 12/10-patient minimally responsive. Less secretions noted REVIEW OF SYSTEMS The following systems were: [x] reviewed  [] unable to be reviewed Positive ROS include: 
Constitutional: fatigue, weakness, short of breath Ears/nose/mouth/throat: increased airway secretions Respiratory:shortness of breath,  
Gastrointestinal:poor appetite, Neurologic:, weakness Adult Non-Verbal Pain Assessment Score: 1/10 Face [x] 0   No particular expression or smile 
[] 1   Occasional grimace, tearing, frowning, wrinkled forehead 
[] 2   Frequent grimace, tearing, frowning, wrinkled forehead Activity (movement) [x] 0   Lying quietly, normal position 
[] 1   Seeking attention through movement or slow, cautious movement 
[] 2   Restless, excessive activity and/or withdrawal reflexes Guarding 
[x] 0   Lying quietly, no positioning of hands over areas of body 
[] 1   Splinting areas of the body, tense 
[] 2   Rigid, stiff Physiology (vital signs) [x] 0   Stable vital signs [] 1   Change in any of the following: SBP > 20mm Hg; HR > 20/minute 
[] 2   Change in any of the following: SBP > 30mm Hg; HR > 25/minute Respiratory 
[] 0   Baseline RR/SpO2, compliant with ventilator 
[x] 1   RR > 10 above baseline, or 5% drop SpO2, mild asynchrony with ventilator 
[] 2   RR > 20 above baseline, or 10% drop SpO2, asynchrony with ventilator FUNCTIONAL ASSESSMENT Palliative Performance Scale (PPS):10-20 PSYCHOSOCIAL/SPIRITUAL ASSESSMENT Active Problems: Respiratory failure ([facility] (12/3/2018) Past Medical History:  
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  
 5 Fr triple PICC, 39 cm R brachial, TPN  Hypertension  Malnutrition ([facility]  Parkinson disease ([facility] [facility] Past Surgical History:  
Procedure Laterality Date  HX HEENT Social History Tobacco Use  Smoking status: Never Smoker  Smokeless tobacco: Never Used Substance Use Topics  Alcohol use: No  
 
Family History Problem Relation Age of Onset  Stroke Father Allergies Allergen Reactions  Seroquel [Quetiapine] Anaphylaxis  Haldol [Haloperidol Lactate] Other (comments) \"Comatose state\"  Lorazepam Other (comments) Alternated mental status to benzos per family,  Morphine Other (comments) Change in mental status Current Facility-Administered Medications Medication Dose Route Frequency  morphine injection 2 mg  2 mg IntraVENous Q6H  
 glycopyrrolate (ROBINUL) injection 0.1 mg  0.1 mg IntraVENous Q4H PRN  
 atropine 1 % ophthalmic solution 2 Drop  2 Drop SubLINGual Q4H  
 scopolamine (TRANSDERM-SCOP) 1 mg over 3 days 1 Patch  1 Patch TransDERmal Q72H  morphine injection 2 mg  2 mg IntraVENous Q15MIN PRN  
 sodium chloride (NS) flush 5 mL  5 mL IntraVENous PRN  
 LORazepam (ATIVAN) injection 1 mg  1 mg IntraVENous Q15MIN PRN  
 scopolamine (TRANSDERM-SCOP) 1 mg over 3 days 1 Patch  1 Patch TransDERmal Q72H  bisacodyl (DULCOLAX) suppository 10 mg  10 mg Rectal DAILY PRN  prochlorperazine (COMPAZINE) with saline injection 5 mg  5 mg IntraVENous Q6H PRN PHYSICAL EXAM  
 
Wt Readings from Last 3 Encounters: 12/03/18 122 lb 12.7 oz (55.7 kg) 08/27/18 132 lb (59.9 kg) 08/15/18 132 lb (59.9 kg) Visit Vitals /71 (BP 1 Location: Left arm, BP Patient Position: At rest) Pulse 79 Temp 99.6 °F (37.6 °C) Resp 16 SpO2 (!) 79% Supplemental O2  [x] Yes  [] NO Last bowel movement:  
 
Currently this patient has: 
[] Peripheral IV [x] PICC  [] PORT [] ICD [] Logan Catheter [] NG Tube   [] PEG Tube   
[] Rectal Tube [] Drain 
[] Other:  
 
Constitutional: emaciated, minimally responsive Eyes: sunken ENMT:dry mucosa Cardiovascular: rrr Respiratory: secretions less and breathing unlabored Gastrointestinal: thin, soft Musculoskeletal:muscle wasting Skin:bruising Neurologic:mininally responsive Psychiatric: not interactive Other:  
 
 
Pertinent Lab and or Imaging Tests: 
Lab Results Component Value Date/Time Sodium 140 12/02/2018 04:07 AM  
 Potassium 3.8 12/02/2018 04:07 AM  
 Chloride 104 12/02/2018 04:07 AM  
 CO2 30 12/02/2018 04:07 AM  
 Anion gap 6 12/02/2018 04:07 AM  
 Glucose 127 (H) 12/02/2018 04:07 AM  
 BUN 19 12/02/2018 04:07 AM  
 Creatinine 0.67 (L) 12/02/2018 04:07 AM  
 BUN/Creatinine ratio 28 (H) 12/02/2018 04:07 AM  
 GFR est AA >60 12/02/2018 04:07 AM  
 GFR est non-AA >60 12/02/2018 04:07 AM  
 Calcium 8.4 (L) 12/02/2018 04:07 AM  
 
Lab Results Component Value Date/Time  Protein, total 5.7 (L) 12/02/2018 04:07 AM  
 Albumin 2.2 (L) 12/02/2018 04:07 AM  
 
   
 
Total time: 25 
Counseling / coordination time:  
> 50% counseling / coordination?:

## 2018-12-10 NOTE — PROGRESS NOTES
Bedside and Verbal shift change report given to [facility] rn  (oncoming nurse) by Shanda Singer  (offgoing nurse). Report included the following information SBAR and Kardex.

## 2018-12-10 NOTE — PROGRESS NOTES
Bedside shift change report given to Elam Bloch, RN (oncoming nurse) by Germán Camilo RN (offgoing nurse). Report included the following information SBAR, Kardex and MAR.

## 2018-12-11 NOTE — ROUTINE PROCESS
Bedside and Verbal shift change report given to Jeovanny Jaimes RN (oncoming nurse) by Lam Garcia (offgoing nurse). Report included the following information SBAR and Kardex.

## 2018-12-11 NOTE — PROGRESS NOTES
Bedside shift change report given to April Chery RN (oncoming nurse) by Zulma Aquino RN (offgoing nurse). Report included the following information SBAR, Kardex and MAR.

## 2018-12-11 NOTE — PROGRESS NOTES
[address] [facility] Help to Those in Need  [phone]    Patient Name: Jen Bobby  YOB: 1940    Date of Provider Hospice Visit: 12/11/18    Level of Care:   [] General Inpatient (GIP)    [x] Routine   [] Respite    Current Location of Care:  [] [facility] [x] [facility] [] [facility] [] [facility] [] [facility], patient referred from:  [] [facility] [] [facility] [] [facility] [] [facility] [] Home [] Other:     Date of [address] [facility] Admission: 12/3/18  Hospice Medical Director at time of admission: [email or web address] Diagnosis: Respiratory failure  Diagnoses RELATED to the terminal prognosis: aspiration pneumonia, severe protein malnutrition, parkinsons-end stage, dementia  Other Diagnoses:      HOSPICE SUMMARY     Jen Bobby is a 69y.o. year old who was admitted to [facility]. Patient admitted to the hospital on 11/18/18 with pneumonia, sepsis, respiratory failure. Patient treated with IVF, abx and evaluated by speech therapy. Patient with dysphagia/aspiration and unable to swallow safely related to mental status and Parkinson's. Family did not want a PEG but wanted to attempt TPN for a brief period of time to see if he would recover. Unfortunately, he continued to decline with issues of aspiration/worsening CXR with right sided infiltrate. Patient not responsive enough to eat or interact. Increased secretions and patient transitioned to inpatient hospice after discussions with 2 children(son in [place] and daughter locally)     The patient's principle diagnosis has resulted in respiratory failure/pneumonia  Refer to LCD     Functionally, the patient's Karnofsky and/or Palliative Performance Scale has declined over a period of weeks and is estimated at 10-20.  The patient is dependent on the following ADLs:    Objective information that support this patients limited prognosis includes:    CXR  IMPRESSION: Improved aeration airspace disease in the left midlung.     There is increasing right basilar atelectasis/airspace disease        The patient/family chose comfort measures with the support of Hospice. HOSPICE DIAGNOSES   Active Symptoms:  1. SOB  2. Secretions  3. Agitation at times  4. Fever     PLAN   1. Patient not responsive this afternoon and did not require prn morphine doses with adjustment yesterday. Daughter had left before I could see yesterday but here today to talk. 2. Secretions-Scopolamine patch and robinul 0.2 mg IV every 4 hours  3. SOB-will continue morphine to 2 mg IV every 6 hours scheduled. Will continue to attempt to educate oscar on the benefits of morphine and why we using it(this has been done everyday of his hospitalization)  4. Fever-continue toradol and prn tylenol  5. Food for comfort per family-not able to eat. 6. Comfort order set for pain, SOB, fever, agitation  7. Discussed with bedside nurse. 8.  and SW to support family needs  9. Disposition: likely will pass in the hospital    Prognosis estimated based on 12/11/18 clinical assessment is:   [x] Hours to Days    [] Days to Weeks    [] Other:    Communicated plan of care with: Hospice Case Manager; Hospice IDT; Care Team     GOALS OF CARE     Resuscitation Status: DNR  Durable DNR: [x] Yes [] No    Primary Decision Maker ([address]): 3 children equally-discussed with Maynor Perry and Joseph Venegas. Third child not available.  Spouse with dementia and can not make decisions  Relationship to patient:  Phone number:  [] Named in a scanned document   [x] Legal Next of Kin  [] Guardian    Secondary Decision Maker ([address]):   Relationship to patient:  Phone number:  [] Named in a scanned document   [] Legal Next of Kin  [] Guardian    ACP documents you current have include:  [] Advance Directive or Living Will  [] Durable Do Not Resuscitate  [] Physician Orders for Scope of Treatment (POST)  [] Medical Power of   [] Other    HISTORY     History obtained from: chart, family    CHIEF COMPLAINT: sob  The patient is:   [] Verbal  [] Nonverbal  [x] Unresponsive    HPI/SUBJECTIVE:  Patient is minimally responsive most of the time. Secretions noted    12/4-patient had just received IV morphine so appears comfortable right now. Had increased work of breathing this morning   12/5-minimally responsive. Not arousing now when talking with him    12/6-not responsive at this time. Breathing unlabored    12/7-minimally responsive. Appears comfortable    12/10-patient minimally responsive.  Less secretions noted    12/11-much more comfortable today     REVIEW OF SYSTEMS     The following systems were: [x] reviewed  [] unable to be reviewed    Positive ROS include:  Constitutional: fatigue, weakness, short of breath  Ears/nose/mouth/throat: increased airway secretions  Respiratory:shortness of breath,   Gastrointestinal:poor appetite,   Neurologic:, weakness  Adult Non-Verbal Pain Assessment Score: 1/10  Face  [x] 0   No particular expression or smile  [] 1   Occasional grimace, tearing, frowning, wrinkled forehead  [] 2   Frequent grimace, tearing, frowning, wrinkled forehead    Activity (movement)  [x] 0   Lying quietly, normal position  [] 1   Seeking attention through movement or slow, cautious movement  [] 2   Restless, excessive activity and/or withdrawal reflexes    Guarding  [x] 0   Lying quietly, no positioning of hands over areas of body  [] 1   Splinting areas of the body, tense  [] 2   Rigid, stiff    Physiology (vital signs)  [x] 0   Stable vital signs  [] 1   Change in any of the following: SBP > 20mm Hg; HR > 20/minute  [] 2   Change in any of the following: SBP > 30mm Hg; HR > 25/minute    Respiratory  [] 0   Baseline RR/SpO2, compliant with ventilator  [x] 1   RR > 10 above baseline, or 5% drop SpO2, mild asynchrony with ventilator  [] 2   RR > 20 above baseline, or 10% drop SpO2, asynchrony with ventilator     FUNCTIONAL ASSESSMENT     Palliative Performance Scale (PPS):10-20     PSYCHOSOCIAL/SPIRITUAL ASSESSMENT     Active Problems:    Respiratory failure ([facility] [address] (12/3/2018)      Past Medical History:   Diagnosis Date    Age-related physical debility     Anxiety and depression     Compression fracture of L3 lumbar vertebra ([address]     Debilitated patient     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     Diabetes ([facility] [address]     Dysphagia dementia related    History of vascular access device 11/26/2018    5 Fr triple PICC, 39 cm R brachial, TPN    Hypertension     Malnutrition ([address]     Parkinson disease ([facility] [address]       Past Surgical History:   Procedure Laterality Date    HX HEENT        Social History     Tobacco Use    Smoking status: Never Smoker    Smokeless tobacco: Never Used   Substance Use Topics    Alcohol use: No     Family History   Problem Relation Age of Onset    Stroke Father       Allergies   Allergen Reactions    Seroquel [Quetiapine] Anaphylaxis    Haldol [Haloperidol Lactate] Other (comments)     \"Comatose state\"    Lorazepam Other (comments)     Alternated mental status to benzos per family,     Morphine Other (comments)     Change in mental status       Current Facility-Administered Medications   Medication Dose Route Frequency    ketorolac (TORADOL) injection 30 mg  30 mg IntraVENous Q6H PRN    morphine injection 2 mg  2 mg IntraVENous Q6H    acetaminophen (TYLENOL) suppository 650 mg  650 mg Rectal Q4H PRN    glycopyrrolate (ROBINUL) injection 0.1 mg  0.1 mg IntraVENous Q4H PRN    atropine 1 % ophthalmic solution 2 Drop  2 Drop SubLINGual Q4H    scopolamine (TRANSDERM-SCOP) 1 mg over 3 days 1 Patch  1 Patch TransDERmal Q72H    morphine injection 2 mg  2 mg IntraVENous Q15MIN PRN    sodium chloride (NS) flush 5 mL  5 mL IntraVENous PRN    LORazepam (ATIVAN) injection 1 mg  1 mg IntraVENous Q15MIN PRN    scopolamine (TRANSDERM-SCOP) 1 mg over 3 days 1 Patch  1 Patch TransDERmal Q72H    bisacodyl (DULCOLAX) suppository 10 mg  10 mg Rectal DAILY PRN    prochlorperazine (COMPAZINE) with saline injection 5 mg  5 mg IntraVENous Q6H PRN        PHYSICAL EXAM     Wt Readings from Last 3 Encounters:   12/03/18 122 lb 12.7 oz (55.7 kg)   08/27/18 132 lb (59.9 kg)   08/15/18 132 lb (59.9 kg)       Visit Vitals  BP 93/53 (BP 1 Location: Left arm, BP Patient Position: At rest)   Pulse 72   Temp 98.7 °F (37.1 °C)   Resp 14   SpO2 (!) 89%       Supplemental O2  [x] Yes  [] NO  Last bowel movement:     Currently this patient has:  [] Peripheral IV [x] PICC  [] PORT [] ICD    [] Logan Catheter [] NG Tube   [] PEG Tube    [] Rectal Tube [] Drain  [] Other:     Constitutional: emaciated, minimally responsive  Eyes: sunken  ENMT:dry mucosa   Cardiovascular: rrr  Respiratory: secretions less and breathing unlabored  Gastrointestinal: thin, soft  Musculoskeletal:muscle wasting  Skin:bruising  Neurologic:mininally responsive  Psychiatric: not interactive  Other:       Pertinent Lab and or Imaging Tests:  Lab Results   Component Value Date/Time    Sodium 140 12/02/2018 04:07 AM    Potassium 3.8 12/02/2018 04:07 AM    Chloride 104 12/02/2018 04:07 AM    CO2 30 12/02/2018 04:07 AM    Anion gap 6 12/02/2018 04:07 AM    Glucose 127 (H) 12/02/2018 04:07 AM    BUN 19 12/02/2018 04:07 AM    Creatinine 0.67 (L) 12/02/2018 04:07 AM    BUN/Creatinine ratio 28 (H) 12/02/2018 04:07 AM    GFR est AA >60 12/02/2018 04:07 AM    GFR est non-AA >60 12/02/2018 04:07 AM    Calcium 8.4 (L) 12/02/2018 04:07 AM     Lab Results   Component Value Date/Time    Protein, total 5.7 (L) 12/02/2018 04:07 AM    Albumin 2.2 (L) 12/02/2018 04:07 AM           Total time: 25  Counseling / coordination time:   > 50% counseling / coordination?:

## 2018-12-11 NOTE — PROGRESS NOTES
0725- Bedside and Verbal shift change report given to [facility] (oncoming nurse) by Francisca Singletary (offgoing nurse). Report included the following information SBAR, Kardex, Intake/Output, MAR and Recent Results. Pt observed in bed, sleeping, on room air, no signs of any pain at this tme. Will assess. 1947- Bedside and Verbal shift change report given to Mookie Romero (oncoming nurse) by [facility] (offgoing nurse). Report included the following information SBAR, Kardex, Intake/Output, MAR and Recent Results.

## 2018-12-12 NOTE — HOSPICE
[facility] RN note:  Lengthy conversation with daughter Analilia Diaz who is hopeful that pt won't die before benita. Broke down in tears stating that benita is \"their holiday. \"  \"I just can't see him that way before benita. \"  Communicated bereavement concerns with hospice social worker Canelo Rivera for follow up with daughter. Pt appears comfortable with recent adjustments to medications.

## 2018-12-12 NOTE — HOSPICE
[facility]  [phone]     Routine Nursing Note   Patient Name: Lisa Caldera  YOB: 1940  Age: 69 y.o.     Date of Visit: 12/011/18  Facility of Care: [facility]  Patient Room: 522/01     Hospice Attending: Leah Sparks MD  Hospice Diagnosis: Respiratory failure ([address] [J96.90]     Level of Care: Routine     ASSESSMENT, PLAN AND INTERVENTIONS      1. Patient continues at Routine Level of Care  2.  pt responded well to increase in scheduled morphine to 2mg IV every 6 hrs. Daughter reluctant to increase dose. Dr Jose Torres to discuss  3. Pt declining, bp and 02 sats trending down. 4. Emotional support to daughter Makenzie Villalta. Very tearful at bedside       Spiritual Interventions: daughter expressed reliance on fail for support.       Psych/ Social/ Emotional Interventions: daughter Makenzie Villalta more accepting of use of morphine for comfort.   Care Coordination Needs: No changes today, encourage use of prn medications.      Care plan and New Orders discussed / approved with Dr. Florence Cummings.     Description History and Chart Review      List number of doses of PRN medications in last 24 hours:  No PRN doses needed last 24 hrs after increase of scheduled morphine  Medication 1:  Number of doses:     Medication 2:   Number of doses:     Medication 3:   Number of doses:     DISCHARGE PLANNING      1. Discharge Plan:transfer to [facility] should pt stavilize  2. Patient/Family teaching: no family present at time of visit  3.  Response to patient/family teaching:   ASSESSMENT    KARNOFSKY: 10     Prognosis estimated based on 12/09/18 clinical assessment is:   [] Few to Many Hours  [x] Hours to Days   [] Few to Many Days   [] Days to Weeks   [] Few to Many Weeks   [] Weeks to Months   [] Few to Many Months     Quality Measure:       Patient self-reports:  [] Yes       [x] No     ESAS:   Time of Assessment: 11:00  Pain (1-10):6  Fatigue (1-10): 6  Shortness of breath (1-10):4  Nausea (1-10): 5  Appetite (1-10):    Anxiety: (1-10):   Depression: (1-10):   Well-being: (1-10):   Constipation: x_ Yes  _ No  LAST BM: 12/4/18     CLINICAL INFORMATION      Visit Vitals  BP 93/53 (BP 1 Location: Left arm, BP Patient Position: At rest)   Pulse 72   Temp 98.7 °F (37.1 °C)   Resp 14   SpO2 (!) 89%      Currently this patient has:  [x] Supplemental O2         [] IV                      [x] PICC                 [] PORT   [] NG Tube          [] PEG Tube        [] Ostomy     [x] Logan draining __yellow_____ urine  [] Other:      SIGNS/PHYSICAL FINDINGS      Skin (including wound):  [] Warm, dry, supple, intact and color normal for race  [x] Warm   [] Dry   [] Cool     [] Clammy       [] Diaphoretic    Turgor              [] Normal              [x] Decreased  Color:              [] Pink              [] Pale              [] Cyanotic              [] Erythema              [] Jaundice              [x] Normal for Race  [x]  Wounds:sacral wound     Neuro:  [] Lethargy  [] Restlessness / agitation  [] Confusion / delirium  [] Hallucinations  [x] Responds to maximal stimulation  [] Unresponsive  [] Seizures      Cardiac:  [] Dyspnea on Exertion  [] JVD  [] Murmur  [] Palpitations  [] Hypotension  [x] Hypertension  [] Tachycardia  [x] Bradycardia  [x] Irregular HR  [] Pulses Decreased  [] Pulses Absent  [] Edema:       (Location, Grade and Pitting)  [] Mottling:      (Location)     Respiratory:  Breath sounds: very coarse              [x] Diminished              [] Wheeze              [] Rhonchi              [] Rales   [] Even and unlabored  [] Labored:    16        [] Cough              [] Non Productive              [] Productive                          [] Description:           [] Deep suctioned   [x] O2 at __1_ LPM  [] High flow oxygen greater than 10 LPM  [] Bi-Pap     GI  [x] Abdomen (describe) soft  [] Ascites  [] Nausea  [] Vomiting  [] Incontinent of bowels  [x] Bowel sounds (yes/no)distant  [] Diarrhea  [x] Constipation (see above including last bowel movement)  [] Checked for impaction  [x] Last BM 12/4/18     Nutrition  Diet:__NPO-family still feeding________  Appetite:   [] Good   [] Fair   [] Poor   [] Tube Feeding        [] Voiding  [x] Incontinent   [x] Logan     Musculoskeletal  [] Balance/[place] Unsteady   [x] Weak   Strength:               [] Normal               [] Limited               [x] Decreasing   Activities:               [] Up as tolerated              [x] Bedridden               [] Specify:     SAFETY  [] 24 hr. Caregiver   [x] Side rails ? [x] Hospital bed   [] Reviewed Falls & Safety      ALLERGIES AND MEDICATIONS      Allergies:          Allergies   Allergen Reactions    Seroquel [Quetiapine] Anaphylaxis    Haldol [Haloperidol Lactate] Other (comments)       \"Comatose state\"    Lorazepam Other (comments)       Alternated mental status to benzos per family,     Morphine Other (comments)       Change in mental status                 Current Facility-Administered Medications   Medication Dose Route Frequency    glycopyrrolate (ROBINUL) injection 0.1 mg  0.1 mg IntraVENous Q4H PRN    atropine 1 % ophthalmic solution 2 Drop  2 Drop SubLINGual Q4H    scopolamine (TRANSDERM-SCOP) 1 mg over 3 days 1 Patch  1 Patch TransDERmal Q72H    morphine injection 1 mg  1 mg IntraVENous Q8H    morphine injection 2 mg  2 mg IntraVENous Q15MIN PRN    sodium chloride (NS) flush 5 mL  5 mL IntraVENous PRN    LORazepam (ATIVAN) injection 1 mg  1 mg IntraVENous Q15MIN PRN    scopolamine (TRANSDERM-SCOP) 1 mg over 3 days 1 Patch  1 Patch TransDERmal Q72H    bisacodyl (DULCOLAX) suppository 10 mg  10 mg Rectal DAILY PRN    prochlorperazine (COMPAZINE) with saline injection 5 mg  5 mg IntraVENous Q6H PRN             Visit Time In  11:00  Visit Time Out: 12:00

## 2018-12-12 NOTE — PROGRESS NOTES
0730- Bedside and Verbal shift change report given to [facility] (oncoming nurse) by Judy Dial (offgoing nurse). Report included the following information SBAR, Kardex, Intake/Output, MAR and Recent Results. Pt observed in bed, resting quietly, on 2L NC, pt drooling, suctioned, moderate amount of yellow/tan sputum suctioned, pt breath sounds congested, with increased work of breathing. Will medicate. Will assess. 1925- Bedside and Verbal shift change report given to DANIELE Recinos (oncoming nurse) by Criselda Ugalde (offgoing nurse). Report included the following information SBAR, Kardex, Intake/Output, MAR and Recent Results.

## 2018-12-12 NOTE — PROGRESS NOTES
[facility]  [phone]    Patient Name: Lisa Bowman  YOB: 1940    Date of Provider Hospice Visit: 12/12/18    Level of Care:   [] General Inpatient (GIP)    [x] Routine   [] Respite    Current Location of Care:  [] [facility] [x] [facility] [] [address] [] [facility] [] Hospice House [facility] patient referred from:  [] [facility] [] [facility] [] [address] [] [facility] [] Home [] Other:     Date of [address] Admission: 12/3/18  Hospice Medical Director at time of admission: [address] Diagnosis: Respiratory failure  Diagnoses RELATED to the terminal prognosis: aspiration pneumonia, severe protein malnutrition, parkinsons-end stage, dementia  Other Diagnoses:      HOSPICE SUMMARY     Lisa Bowman is a 69y.o. year old who was admitted to [facility]. Patient admitted to the hospital on 11/18/18 with pneumonia, sepsis, respiratory failure. Patient treated with IVF, abx and evaluated by speech therapy. Patient with dysphagia/aspiration and unable to swallow safely related to mental status and Parkinson's. Family did not want a PEG but wanted to attempt TPN for a brief period of time to see if he would recover. Unfortunately, he continued to decline with issues of aspiration/worsening CXR with right sided infiltrate. Patient not responsive enough to eat or interact. Increased secretions and patient transitioned to inpatient hospice after discussions with 2 children(son in [place] and daughter locally)     The patient's principle diagnosis has resulted in respiratory failure/pneumonia  Refer to LCD     Functionally, the patient's Karnofsky and/or Palliative Performance Scale has declined over a period of weeks and is estimated at 10-20.  The patient is dependent on the following ADLs:    Objective information that support this patients limited prognosis includes:    CXR  IMPRESSION: Improved aeration airspace disease in the left midlung.     There is increasing right basilar atelectasis/airspace disease        The patient/family chose comfort measures with the support of Hospice. HOSPICE DIAGNOSES   Active Symptoms:  1. SOB  2. Secretions  3. Agitation at times  4. Fever     PLAN   1. Patient not responsive. Appears comfortable. 2. Secretions-Scopolamine patch and will add back robinul scheduled since required multiple doses over the alst 24 hours  3. SOB-will continue morphine to 2 mg IV every 6 hours scheduled. Educated daughter again last night and she appeared comfortable with continuing the morphine. Did require 2 prn doses in last 24 hours. 4. Fever-continue toradol and prn tylenol  5. Food for comfort per family-not able to eat. 6. Comfort order set for pain, SOB, fever, agitation  7. Discussed with bedside nurse. 8.  and SW to support family needs  9. Disposition: likely will pass in the hospital    Prognosis estimated based on 12/12/18 clinical assessment is:   [x] Hours to Days    [] Days to Weeks    [] Other:    Communicated plan of care with: Hospice Case Manager; Hospice IDT; Care Team     GOALS OF CARE     Resuscitation Status: DNR  Durable DNR: [x] Yes [] No    Primary Decision Maker ([address]): 3 children equally-discussed with Bob Hernández and Paz Heard. Third child not available.  Spouse with dementia and can not make decisions  Relationship to patient:  Phone number:  [] Named in a scanned document   [x] Legal Next of Kin  [] Guardian    Secondary Decision Maker ([address]):   Relationship to patient:  Phone number:  [] Named in a scanned document   [] Legal Next of Kin  [] Guardian    ACP documents you current have include:  [] Advance Directive or Living Will  [] Durable Do Not Resuscitate  [] Physician Orders for Scope of Treatment (POST)  [] Medical Power of   [] Other    HISTORY     History obtained from: chart, family    CHIEF COMPLAINT: sob  The patient is:   [] Verbal  [] Nonverbal  [x] Unresponsive    HPI/SUBJECTIVE:  Patient is minimally responsive most of the time. Secretions noted    12/4-patient had just received IV morphine so appears comfortable right now. Had increased work of breathing this morning   12/5-minimally responsive. Not arousing now when talking with him    12/6-not responsive at this time. Breathing unlabored    12/7-minimally responsive. Appears comfortable    12/10-patient minimally responsive.  Less secretions noted    12/11-much more comfortable today    12/12-appears unlabored in his breathing     REVIEW OF SYSTEMS     The following systems were: [x] reviewed  [] unable to be reviewed    Positive ROS include:  Constitutional: fatigue, weakness, short of breath  Ears/nose/mouth/throat: increased airway secretions  Respiratory:shortness of breath,   Gastrointestinal:poor appetite,   Neurologic:, weakness  Adult Non-Verbal Pain Assessment Score: 0/10  Face  [x] 0   No particular expression or smile  [] 1   Occasional grimace, tearing, frowning, wrinkled forehead  [] 2   Frequent grimace, tearing, frowning, wrinkled forehead    Activity (movement)  [x] 0   Lying quietly, normal position  [] 1   Seeking attention through movement or slow, cautious movement  [] 2   Restless, excessive activity and/or withdrawal reflexes    Guarding  [x] 0   Lying quietly, no positioning of hands over areas of body  [] 1   Splinting areas of the body, tense  [] 2   Rigid, stiff    Physiology (vital signs)  [x] 0   Stable vital signs  [] 1   Change in any of the following: SBP > 20mm Hg; HR > 20/minute  [] 2   Change in any of the following: SBP > 30mm Hg; HR > 25/minute    Respiratory  [x] 0   Baseline RR/SpO2, compliant with ventilator  [] 1   RR > 10 above baseline, or 5% drop SpO2, mild asynchrony with ventilator  [] 2   RR > 20 above baseline, or 10% drop SpO2, asynchrony with ventilator     FUNCTIONAL ASSESSMENT     Palliative Performance Scale (PPS):10-20 PSYCHOSOCIAL/SPIRITUAL ASSESSMENT     Active Problems:    Respiratory failure ([facility] (12/3/2018)      Past Medical History:   Diagnosis Date    Age-related physical debility     Anxiety and depression     Compression fracture of L3 lumbar vertebra ([facility]     Debilitated patient     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     Diabetes ([facility]     Dysphagia dementia related    History of vascular access device 11/26/2018    5 Fr triple PICC, 39 cm R brachial, TPN    Hypertension     Malnutrition ([facility]     Parkinson disease ([facility]       Past Surgical History:   Procedure Laterality Date    HX HEENT        Social History     Tobacco Use    Smoking status: Never Smoker    Smokeless tobacco: Never Used   Substance Use Topics    Alcohol use: No     Family History   Problem Relation Age of Onset    Stroke Father       Allergies   Allergen Reactions    Seroquel [Quetiapine] Anaphylaxis    Haldol [Haloperidol Lactate] Other (comments)     \"Comatose state\"    Lorazepam Other (comments)     Alternated mental status to benzos per family,     Morphine Other (comments)     Change in mental status       Current Facility-Administered Medications   Medication Dose Route Frequency    ketorolac (TORADOL) injection 30 mg  30 mg IntraVENous Q6H PRN    morphine injection 2 mg  2 mg IntraVENous Q6H    acetaminophen (TYLENOL) suppository 650 mg  650 mg Rectal Q4H PRN    glycopyrrolate (ROBINUL) injection 0.1 mg  0.1 mg IntraVENous Q4H PRN    atropine 1 % ophthalmic solution 2 Drop  2 Drop SubLINGual Q4H    scopolamine (TRANSDERM-SCOP) 1 mg over 3 days 1 Patch  1 Patch TransDERmal Q72H    morphine injection 2 mg  2 mg IntraVENous Q15MIN PRN    sodium chloride (NS) flush 5 mL  5 mL IntraVENous PRN    LORazepam (ATIVAN) injection 1 mg  1 mg IntraVENous Q15MIN PRN    bisacodyl (DULCOLAX) suppository 10 mg  10 mg Rectal DAILY PRN    prochlorperazine (COMPAZINE) with saline injection 5 mg  5 mg IntraVENous Q6H PRN        PHYSICAL EXAM     Wt Readings from Last 3 Encounters:   12/03/18 122 lb 12.7 oz (55.7 kg)   08/27/18 132 lb (59.9 kg)   08/15/18 132 lb (59.9 kg)       Visit Vitals  /81 (BP 1 Location: Right arm, BP Patient Position: Sitting)   Pulse 98   Temp (!) 101.2 °F (38.4 °C)   Resp 18   SpO2 98%       Supplemental O2  [x] Yes  [] NO  Last bowel movement:     Currently this patient has:  [] Peripheral IV [x] PICC  [] PORT [] ICD    [] Logan Catheter [] NG Tube   [] PEG Tube    [] Rectal Tube [] Drain  [] Other:     Constitutional: emaciated, not responsive  Eyes: sunken  ENMT:dry mucosa   Cardiovascular: rrr  Respiratory: secretions less and breathing unlabored  Gastrointestinal: thin, soft  Musculoskeletal:muscle wasting  Skin:bruising  Neurologic:mininally responsive  Psychiatric: not interactive  Other:       Pertinent Lab and or Imaging Tests:  Lab Results   Component Value Date/Time    Sodium 140 12/02/2018 04:07 AM    Potassium 3.8 12/02/2018 04:07 AM    Chloride 104 12/02/2018 04:07 AM    CO2 30 12/02/2018 04:07 AM    Anion gap 6 12/02/2018 04:07 AM    Glucose 127 (H) 12/02/2018 04:07 AM    BUN 19 12/02/2018 04:07 AM    Creatinine 0.67 (L) 12/02/2018 04:07 AM    BUN/Creatinine ratio 28 (H) 12/02/2018 04:07 AM    GFR est AA >60 12/02/2018 04:07 AM    GFR est non-AA >60 12/02/2018 04:07 AM    Calcium 8.4 (L) 12/02/2018 04:07 AM     Lab Results   Component Value Date/Time    Protein, total 5.7 (L) 12/02/2018 04:07 AM    Albumin 2.2 (L) 12/02/2018 04:07 AM           Total time: 25  Counseling / coordination time:   > 50% counseling / coordination?:

## 2018-12-12 NOTE — ROUTINE PROCESS
Bedside shift change report given to Theron (oncoming nurse) by Steph Cornejo (offgoing nurse). Report included the following information SBAR, Kardex, Intake/Output, MAR, Med Rec Status and Quality Measures      Continued with comfort care. CHG bath done. No new issues noted.

## 2018-12-13 NOTE — HOSPICE
[facility]  [facility]  [phone]     Routine Nursing Note   Patient Name: Jonah Rocha  Date of Birth: 1940  Age: 78 y.o.     Date of Visit: 12/13/18  Facility of [facility]  Patient Charley Courtney Attending: Cathleen Almanzar MD  Hospice Diagnosis: Respiratory failure [facility]) [J96.90]     Level of Care: Routine     ASSESSMENT, PLAN AND INTERVENTIONS      1.  Patient continues at Routine Level of Care  2.  pt continuing to decline, BP trending down 85/51  3.  a-febrile post IV toradol  4. resp distress managed with scheduled morphine   5. Secretions:  Managed with scheduled robinul and sl atropine. 6. Emotional support to daughter Romero Nam. hospice chaplain and  involved with daughter for extra support through holiday season. Mod bereavement risk.      Spiritual Interventions: daughter expressed reliance on fail for support. Hospice chaplain in contact with daughter for additional spiritual support      Psych/ Social/ Emotional Interventions: daughter wanting unrealistically for pt to be alive at [address]. Hospice social worker and  to increase support, anticipate moderate bereavement risk through holiday season. Care Coordination Needs: No changes today, encourage use of prn medications.      Care plan and New Orders discussed / approved with Dr. Courtney Beverly.     Description History and Chart Review      List number of doses of PRN medications in last 24 hours:  No PRN doses needed last 24 hrs after increase of scheduled morphine  Medication 1:  ativan  Number of doses: 0     Medication 2: toradol  Number of doses: 1     Medication 3:   Number of doses:     DISCHARGE PLANNING      1. Discharge Plan:transfer to [facility] should pt stavilize  2. Patient/Family teaching: no family present at time of visit  3.  Response to patient/family teaching:   ASSESSMENT    KARNOFSKY: 10     Prognosis estimated based on 12/09/18 clinical assessment is: [] Few to Many Hours  [x] Hours to Days   [] Few to Many Days   [] Days to Weeks   [] Few to Many Weeks   [] Weeks to Months   [] Few to Many Months     Quality Measure:       Patient self-reports:  [] Yes       [x] No     ESAS:   Time of Assessment: 11:00  Pain (1-10):2  Fatigue (1-10): 6  Shortness of breath (1-10):2  Nausea (1-10): 5  Appetite (1-10):    Anxiety: (1-10):   Depression: (1-10):   Well-being: (1-10):   Constipation: x_ Yes  _ No  LAST BM: 12/4/18     CLINICAL INFORMATION      Visit Vitals  Visit Vitals  BP (!) 85/51 (BP 1 Location: Left arm, BP Patient Position: At rest)   Pulse 70   Temp 99 °F (37.2 °C)   Resp 18   SpO2 93%        Currently this patient has:  [x] Supplemental N7         [] IV                      [x] PICC                 [] PORT   [] NG Tube          [] PEG Tube        [] Ostomy     [x] Logan draining __dark amber_____ urine  [] Other:      SIGNS/PHYSICAL FINDINGS      Skin (including wound):  [] Warm, dry, supple, intact and color normal for race  [x] Warm   [] Dry   [] Cool     [] Clammy       [] Diaphoretic    Turgor              [] Normal              [x] Decreased  Color:              [] Pink              [] Pale              [] Cyanotic              [] Erythema              [] Jaundice              [x] Normal for Race  [x]  Wounds:sacral wound     Neuro:  [] Lethargy  [] Restlessness / agitation  [] Confusion / delirium  [] Hallucinations  [x] Responds to maximal stimulation  [] Unresponsive  [] Seizures      Cardiac:  [] Dyspnea on Exertion  [] JVD  [] Murmur  [] Palpitations  [] Hypotension  [x] Hypertension  [] Tachycardia  [x] Bradycardia  [x] Irregular HR  [] Pulses Decreased  [] Pulses Absent  [] Edema:       (Location, Grade and Pitting)  [] Mottling:      (Location)     Respiratory:  Breath sounds: very coarse              [x] Diminished              [] Wheeze              [] Rhonchi              [] Rales   [] Even and unlabored  [] Labored:            [] Cough              [] Non Productive              [] Productive                          [] Description:           [] Deep suctioned   [x] O2 at __1_ LPM  [] High flow oxygen greater than 10 LPM  [] Bi-Pap     GI  [x] Abdomen (describe) soft  [] Ascites  [] Nausea  [] Vomiting  [] Incontinent of bowels  [x] Bowel sounds (yes/no)distant  [] Diarrhea  [x] Constipation (see above including last bowel movement)  [] Checked for impaction  [x] Last BM 12/4/18     Nutrition  Diet:__NPO_______  Appetite:   [] Good   [] Fair   [] Poor   [] Tube Feeding        [] Voiding  [x] Incontinent   [x] Logan     Musculoskeletal  [] Balance/[place] Unsteady   [x] Weak   Strength:               [] Normal               [] Limited               [x] Decreasing   Activities:               [] Up as tolerated              [x] Bedridden               [] Specify:     SAFETY  [] 24 hr. Caregiver   [x] Side rails ?    [x] Hospital bed   [] Reviewed Falls & Safety      ALLERGIES AND MEDICATIONS      Allergies:             Allergies   Allergen Reactions    Seroquel [Quetiapine] Anaphylaxis    Haldol [Haloperidol Lactate] Other (comments)       \"Comatose state\"    Lorazepam Other (comments)       Alternated mental status to benzos per family,     Morphine Other (comments)       Change in mental status                      Current Facility-Administered Medications   Medication Dose Route Frequency    glycopyrrolate (ROBINUL) injection 0.1 mg  0.1 mg IntraVENous Q4H PRN    atropine 1 % ophthalmic solution 2 Drop  2 Drop SubLINGual Q4H    scopolamine (TRANSDERM-SCOP) 1 mg over 3 days 1 Patch  1 Patch TransDERmal Q72H    morphine injection 1 mg  1 mg IntraVENous Q8H    morphine injection 2 mg  2 mg IntraVENous Q15MIN PRN    sodium chloride (NS) flush 5 mL  5 mL IntraVENous PRN    LORazepam (ATIVAN) injection 1 mg  1 mg IntraVENous Q15MIN PRN    scopolamine (TRANSDERM-SCOP) 1 mg over 3 days 1 Patch  1 Patch TransDERmal Q72H    bisacodyl (DULCOLAX) suppository 10 mg  10 mg Rectal DAILY PRN    prochlorperazine (COMPAZINE) with saline injection 5 mg  5 mg IntraVENous Q6H PRN             Visit Time In  13:00  Visit Time Out: 14:00

## 2018-12-13 NOTE — PROGRESS NOTES
I spoke to [place] after visiting her father. Mr. Tye Jett appeared to be resting comfortably. Moreover, I didn't notice any signs of physical pain such as facial grimaces. Gina expressed feelings of sadness relating to her father's decline. Gina's goal for her father is for him to live long enough to celebrate [place]. I validated her sadness from watching her father decline. I suggested that Tania Livingston could celebrate [place] early with her father and family. Gina plans to meet her daughter who is finishing her college exams. Tania Livingston is looking forward to spending time with her daughter. I encouraged Tania Livingston to reach out to the hospice team for additional emotional and spiritual support. Tania Livingston was appreciative of the phone call and support that was offered.

## 2018-12-13 NOTE — ROUTINE PROCESS
Bedside and Verbal shift change report given to Lawanda Ridley RN (oncoming nurse) by Edna Whalen RN (offgoing nurse). Report included the following information SBAR, Kardex and MAR.

## 2018-12-13 NOTE — PROGRESS NOTES
[facility] Help to Those in Need  [phone]    Patient Name: Josefa Singh  YOB: 1940    Date of Provider Hospice Visit: 12/13/18    Level of Care:   [] General Inpatient (GIP)    [x] Routine   [] Respite    Current Location of Care:  [] [facility] [x] [facility] [] [id number] [] [facility] [] [facility]ospice House [facility])    IF [facility], patient referred from:  [] [facility] [] [facility] [] [address] [] [facility] [] Home [] Other:     Date of [address] [facility] [address] Admission: 12/3/18  Hospice Medical Director at time of admission: [employer] Diagnosis: Respiratory failure  Diagnoses RELATED to the terminal prognosis: aspiration pneumonia, severe protein malnutrition, parkinsons-end stage, dementia  Other Diagnoses:      HOSPICE SUMMARY     Josefa Singh is a 69y.o. year old who was admitted to [facility]. Patient admitted to the hospital on 11/18/18 with pneumonia, sepsis, respiratory failure. Patient treated with IVF, abx and evaluated by speech therapy. Patient with dysphagia/aspiration and unable to swallow safely related to mental status and Parkinson's. Family did not want a PEG but wanted to attempt TPN for a brief period of time to see if he would recover. Unfortunately, he continued to decline with issues of aspiration/worsening CXR with right sided infiltrate. Patient not responsive enough to eat or interact. Increased secretions and patient transitioned to inpatient hospice after discussions with 2 children(son in [place] and daughter locally)     The patient's principle diagnosis has resulted in respiratory failure/pneumonia  Refer to LCD     Functionally, the patient's Karnofsky and/or Palliative Performance Scale has declined over a period of weeks and is estimated at 10-20.  The patient is dependent on the following ADLs:    Objective information that support this patients limited prognosis includes:    CXR  IMPRESSION: Improved aeration airspace disease in the left midlung.     There is increasing right basilar atelectasis/airspace disease        The patient/family chose comfort measures with the support of Hospice. HOSPICE DIAGNOSES   Active Symptoms:  1. SOB  2. Secretions  3. Agitation at times  4. Fever     PLAN   1. Patient not responsive. Appears comfortable today. 2. Secretions-Scopolamine patch and will add back robinul scheduled since required multiple doses over the alst 24 hours. No prn needs needed  3. SOB-will continue morphine to 2 mg IV every 6 hours scheduled. No prn meds needed. Appears comfortable   4. Fever-continue toradol and prn tylenol  5. Food for comfort per family-not able to eat. 6. Comfort order set for pain, SOB, fever, agitation  7. Discussed with bedside nurse and [address] D[address]on[address]. Appreciate [place] and hospice team reaching out to daughter    8.  and SW to support family needs  9. Disposition: likely will pass in the hospital    Prognosis estimated based on 12/13/18 clinical assessment is:   [x] Hours to Days    [] Days to Weeks    [] Other:    Communicated plan of care with: Hospice Case Manager; Hospice IDT; Care Team     GOALS OF CARE     Resuscitation Status: DNR  Durable DNR: [x] Yes [] No    Primary Decision Maker ([address]): 3 children equally-discussed with Erwin Lee and David Perry. Third child not available.  Spouse with dementia and can not make decisions  Relationship to patient:  Phone number:  [] Named in a scanned document   [x] Legal Next of Kin  [] Guardian    Secondary Decision Maker ([address]):   Relationship to patient:  Phone number:  [] Named in a scanned document   [] Legal Next of Kin  [] Guardian    ACP documents you current have include:  [] Advance Directive or Living Will  [] Durable Do Not Resuscitate  [] Physician Orders for Scope of Treatment (POST)  [] Medical Power of   [] Other    HISTORY     History obtained from: chart, family    CHIEF COMPLAINT: sob  The patient is:   [] Verbal  [] Nonverbal  [x] Unresponsive    HPI/SUBJECTIVE:  Patient is minimally responsive most of the time. Secretions noted    12/4-patient had just received IV morphine so appears comfortable right now. Had increased work of breathing this morning   12/5-minimally responsive. Not arousing now when talking with him    12/6-not responsive at this time. Breathing unlabored    12/7-minimally responsive. Appears comfortable    12/10-patient minimally responsive.  Less secretions noted    12/11-much more comfortable today    12/12-appears unlabored in his breathing    12/13-comfortable, no labored breathing     REVIEW OF SYSTEMS     The following systems were: [x] reviewed  [] unable to be reviewed    Positive ROS include:  Constitutional: fatigue, weakness, short of breath  Ears/nose/mouth/throat: increased airway secretions  Respiratory:shortness of breath,   Gastrointestinal:poor appetite,   Neurologic:, weakness  Adult Non-Verbal Pain Assessment Score: 0/10  Face  [x] 0   No particular expression or smile  [] 1   Occasional grimace, tearing, frowning, wrinkled forehead  [] 2   Frequent grimace, tearing, frowning, wrinkled forehead    Activity (movement)  [x] 0   Lying quietly, normal position  [] 1   Seeking attention through movement or slow, cautious movement  [] 2   Restless, excessive activity and/or withdrawal reflexes    Guarding  [x] 0   Lying quietly, no positioning of hands over areas of body  [] 1   Splinting areas of the body, tense  [] 2   Rigid, stiff    Physiology (vital signs)  [x] 0   Stable vital signs  [] 1   Change in any of the following: SBP > 20mm Hg; HR > 20/minute  [] 2   Change in any of the following: SBP > 30mm Hg; HR > 25/minute    Respiratory  [x] 0   Baseline RR/SpO2, compliant with ventilator  [] 1   RR > 10 above baseline, or 5% drop SpO2, mild asynchrony with ventilator  [] 2   RR > 20 above baseline, or 10% drop SpO2, asynchrony with ventilator     FUNCTIONAL ASSESSMENT     Palliative Performance Scale (PPS):10-20     PSYCHOSOCIAL/SPIRITUAL ASSESSMENT     Active Problems:    Respiratory failure ([facility] (12/3/2018)      Past Medical History:   Diagnosis Date    Age-related physical debility     Anxiety and depression     Compression fracture of L3 lumbar vertebra (HCC)     Debilitated patient     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     Diabetes ([facility]     Dysphagia dementia related    History of vascular access device 11/26/2018    5 Fr triple PICC, 39 cm R brachial, TPN    Hypertension     Malnutrition ([facility]     Parkinson disease ([facility]       Past Surgical History:   Procedure Laterality Date    HX HEENT        Social History     Tobacco Use    Smoking status: Never Smoker    Smokeless tobacco: Never Used   Substance Use Topics    Alcohol use: No     Family History   Problem Relation Age of Onset    Stroke Father       Allergies   Allergen Reactions    Seroquel [Quetiapine] Anaphylaxis    Haldol [Haloperidol Lactate] Other (comments)     \"Comatose state\"    Lorazepam Other (comments)     Alternated mental status to benzos per family,     Morphine Other (comments)     Change in mental status       Current Facility-Administered Medications   Medication Dose Route Frequency    glycopyrrolate (ROBINUL) injection 0.2 mg  0.2 mg IntraVENous Q4H    ketorolac (TORADOL) injection 30 mg  30 mg IntraVENous Q6H PRN    morphine injection 2 mg  2 mg IntraVENous Q6H    acetaminophen (TYLENOL) suppository 650 mg  650 mg Rectal Q4H PRN    glycopyrrolate (ROBINUL) injection 0.1 mg  0.1 mg IntraVENous Q4H PRN    atropine 1 % ophthalmic solution 2 Drop  2 Drop SubLINGual Q4H    scopolamine (TRANSDERM-SCOP) 1 mg over 3 days 1 Patch  1 Patch TransDERmal Q72H    morphine injection 2 mg  2 mg IntraVENous Q15MIN PRN    sodium chloride (NS) flush 5 mL  5 mL IntraVENous PRN    LORazepam (ATIVAN) injection 1 mg  1 mg IntraVENous Q15MIN PRN    bisacodyl (DULCOLAX) suppository 10 mg  10 mg Rectal DAILY PRN    prochlorperazine (COMPAZINE) with saline injection 5 mg  5 mg IntraVENous Q6H PRN        PHYSICAL EXAM     Wt Readings from Last 3 Encounters:   12/03/18 122 lb 12.7 oz (55.7 kg)   08/27/18 132 lb (59.9 kg)   08/15/18 132 lb (59.9 kg)       Visit Vitals  BP (!) 85/51 (BP 1 Location: Left arm, BP Patient Position: At rest)   Pulse 70   Temp 99 °F (37.2 °C)   Resp 18   SpO2 93%       Supplemental O2  [x] Yes  [] NO  Last bowel movement:     Currently this patient has:  [] Peripheral IV [x] PICC  [] PORT [] ICD    [] Logan Catheter [] NG Tube   [] PEG Tube    [] Rectal Tube [] Drain  [] Other:     Constitutional: emaciated, not responsive  Eyes: sunken  ENMT:dry mucosa   Cardiovascular: rrr  Respiratory: secretions less and breathing unlabored  Gastrointestinal: thin, soft  Musculoskeletal:muscle wasting  Skin:bruising  Neurologic:mininally responsive  Psychiatric: not interactive  Other:       Pertinent Lab and or Imaging Tests:  Lab Results   Component Value Date/Time    Sodium 140 12/02/2018 04:07 AM    Potassium 3.8 12/02/2018 04:07 AM    Chloride 104 12/02/2018 04:07 AM    CO2 30 12/02/2018 04:07 AM    Anion gap 6 12/02/2018 04:07 AM    Glucose 127 (H) 12/02/2018 04:07 AM    BUN 19 12/02/2018 04:07 AM    Creatinine 0.67 (L) 12/02/2018 04:07 AM    BUN/Creatinine ratio 28 (H) 12/02/2018 04:07 AM    GFR est AA >60 12/02/2018 04:07 AM    GFR est non-AA >60 12/02/2018 04:07 AM    Calcium 8.4 (L) 12/02/2018 04:07 AM     Lab Results   Component Value Date/Time    Protein, total 5.7 (L) 12/02/2018 04:07 AM    Albumin 2.2 (L) 12/02/2018 04:07 AM           Total time: 15  Counseling / coordination time:   > 50% counseling / coordination?:

## 2018-12-13 NOTE — HOSPICE
Msw left message for daughter Darrin Pollard regarding visit today. Awaiting return call to schedule a time.

## 2018-12-14 NOTE — PROGRESS NOTES
[facility] Help to Those in Need  [phone]    Patient Name: Jeerd Rubin  YOB: 1940    Date of Provider Hospice Visit: 12/14/18    Level of Care:   [] General Inpatient (GIP)    [x] Routine   [] Respite    Current Location of Care:  [] [facility] [x] [facility] [] [facility] [] [facility] [] Hospice House [facility], patient referred from:  [] [facility] [] [facility] [] [facility] [] [facility] [] Home [] Other:     Date of [address] [facility] [address] Admission: 12/3/18  Hospice Medical Director at time of admission: [employer] Diagnosis: Respiratory failure  Diagnoses RELATED to the terminal prognosis: aspiration pneumonia, severe protein malnutrition, parkinsons-end stage, dementia  Other Diagnoses:      HOSPICE SUMMARY     Jered Rubin is a 69y.o. year old who was admitted to [facility]. Patient admitted to the hospital on 11/18/18 with pneumonia, sepsis, respiratory failure. Patient treated with IVF, abx and evaluated by speech therapy. Patient with dysphagia/aspiration and unable to swallow safely related to mental status and Parkinson's. Family did not want a PEG but wanted to attempt TPN for a brief period of time to see if he would recover. Unfortunately, he continued to decline with issues of aspiration/worsening CXR with right sided infiltrate. Patient not responsive enough to eat or interact. Increased secretions and patient transitioned to inpatient hospice after discussions with 2 children(son in [place] and daughter locally)     The patient's principle diagnosis has resulted in respiratory failure/pneumonia  Refer to LCD     Functionally, the patient's Karnofsky and/or Palliative Performance Scale has declined over a period of weeks and is estimated at 10-20.  The patient is dependent on the following ADLs:    Objective information that support this patients limited prognosis includes:    CXR  IMPRESSION: Improved aeration airspace disease in the left midlung.     There is increasing right basilar atelectasis/airspace disease        The patient/family chose comfort measures with the support of Hospice. HOSPICE DIAGNOSES   Active Symptoms:  1. SOB  2. Secretions  3. Agitation at times  4. Fever     PLAN   1. Patient remains unresponsive and comfortable. No additional prn medications needed. BP in the 70s  2. Secretions-Scopolamine patch and continue robinul scheduled. No prn needs needed  3. SOB-will continue morphine to 2 mg IV every 6 hours scheduled. No prn meds needed. Appears comfortable   4. Fever-continue toradol and prn tylenol  5. Food for comfort per family-not able to eat. 6. Comfort order set for pain, SOB, fever, agitation  7. Discussed with bedside nurse and Stephane Walden. Daughter not at bedside today     8.  and SW to support family needs  9. Disposition: likely will pass in the hospital    Prognosis estimated based on 12/14/18 clinical assessment is:   [x] Hours to Days    [] Days to Weeks    [] Other:    Communicated plan of care with: Hospice Case Manager; Hospice IDT; Care Team     GOALS OF CARE     Resuscitation Status: DNR  Durable DNR: [x] Yes [] No    Primary Decision Maker ([address]): 3 children equally-discussed with Adebayo Alvarez and Yary Mccormack. Third child not available.  Spouse with dementia and can not make decisions  Relationship to patient:  Phone number:  [] Named in a scanned document   [x] Legal Next of Kin  [] Guardian    Secondary Decision Maker ([address]):   Relationship to patient:  Phone number:  [] Named in a scanned document   [] Legal Next of Kin  [] Guardian    ACP documents you current have include:  [] Advance Directive or Living Will  [] Durable Do Not Resuscitate  [] Physician Orders for Scope of Treatment (POST)  [] Medical Power of   [] Other    HISTORY     History obtained from: chart, family    CHIEF COMPLAINT: sob  The patient is:   [] Verbal  [] Nonverbal  [x] Unresponsive    HPI/SUBJECTIVE: Patient is minimally responsive most of the time. Secretions noted    12/4-patient had just received IV morphine so appears comfortable right now. Had increased work of breathing this morning   12/5-minimally responsive. Not arousing now when talking with him    12/6-not responsive at this time. Breathing unlabored    12/7-minimally responsive. Appears comfortable    12/10-patient minimally responsive. Less secretions noted    12/11-much more comfortable today    12/12-appears unlabored in his breathing    12/13-comfortable, no labored breathing    12/14-much less responsive.  Ashen today, cool extremities     REVIEW OF SYSTEMS     The following systems were: [x] reviewed  [] unable to be reviewed    Positive ROS include:  Constitutional: fatigue, weakness, short of breath  Ears/nose/mouth/throat: increased airway secretions  Respiratory:shortness of breath,   Gastrointestinal:poor appetite,   Neurologic:, weakness  Adult Non-Verbal Pain Assessment Score: 0/10  Face  [x] 0   No particular expression or smile  [] 1   Occasional grimace, tearing, frowning, wrinkled forehead  [] 2   Frequent grimace, tearing, frowning, wrinkled forehead    Activity (movement)  [x] 0   Lying quietly, normal position  [] 1   Seeking attention through movement or slow, cautious movement  [] 2   Restless, excessive activity and/or withdrawal reflexes    Guarding  [x] 0   Lying quietly, no positioning of hands over areas of body  [] 1   Splinting areas of the body, tense  [] 2   Rigid, stiff    Physiology (vital signs)  [x] 0   Stable vital signs  [] 1   Change in any of the following: SBP > 20mm Hg; HR > 20/minute  [] 2   Change in any of the following: SBP > 30mm Hg; HR > 25/minute    Respiratory  [x] 0   Baseline RR/SpO2, compliant with ventilator  [] 1   RR > 10 above baseline, or 5% drop SpO2, mild asynchrony with ventilator  [] 2   RR > 20 above baseline, or 10% drop SpO2, asynchrony with ventilator     FUNCTIONAL ASSESSMENT Palliative Performance Scale (PPS):10-20     PSYCHOSOCIAL/SPIRITUAL ASSESSMENT     Active Problems:    Respiratory failure ([facility] [address] (12/3/2018)      Past Medical History:   Diagnosis Date    Age-related physical debility     Anxiety and depression     Compression fracture of L3 lumbar vertebra (HCC)     Debilitated patient     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     Diabetes ([facility] [address] [facility]     Dysphagia dementia related    History of vascular access device 11/26/2018    5 Fr triple PICC, 39 cm R brachial, TPN    Hypertension     Malnutrition ([facility] [address] [facility]     Parkinson disease ([facility] [address] [facility]       Past Surgical History:   Procedure Laterality Date    HX HEENT        Social History     Tobacco Use    Smoking status: Never Smoker    Smokeless tobacco: Never Used   Substance Use Topics    Alcohol use: No     Family History   Problem Relation Age of Onset    Stroke Father       Allergies   Allergen Reactions    Seroquel [Quetiapine] Anaphylaxis    Haldol [Haloperidol Lactate] Other (comments)     \"Comatose state\"    Lorazepam Other (comments)     Alternated mental status to benzos per family,     Morphine Other (comments)     Change in mental status       Current Facility-Administered Medications   Medication Dose Route Frequency    glycopyrrolate (ROBINUL) injection 0.2 mg  0.2 mg IntraVENous Q4H    ketorolac (TORADOL) injection 30 mg  30 mg IntraVENous Q6H PRN    morphine injection 2 mg  2 mg IntraVENous Q6H    acetaminophen (TYLENOL) suppository 650 mg  650 mg Rectal Q4H PRN    glycopyrrolate (ROBINUL) injection 0.1 mg  0.1 mg IntraVENous Q4H PRN    atropine 1 % ophthalmic solution 2 Drop  2 Drop SubLINGual Q4H    scopolamine (TRANSDERM-SCOP) 1 mg over 3 days 1 Patch  1 Patch TransDERmal Q72H    morphine injection 2 mg  2 mg IntraVENous Q15MIN PRN    sodium chloride (NS) flush 5 mL  5 mL IntraVENous PRN    LORazepam (ATIVAN) injection 1 mg  1 mg IntraVENous Q15MIN PRN    bisacodyl (DULCOLAX) suppository 10 mg  10 mg Rectal DAILY PRN    prochlorperazine (COMPAZINE) with saline injection 5 mg  5 mg IntraVENous Q6H PRN        PHYSICAL EXAM     Wt Readings from Last 3 Encounters:   12/03/18 122 lb 12.7 oz (55.7 kg)   08/27/18 132 lb (59.9 kg)   08/15/18 132 lb (59.9 kg)       Visit Vitals  BP (!) 78/48 (BP 1 Location: Left arm, BP Patient Position: At rest)   Pulse 70   Temp 99.2 °F (37.3 °C)   Resp 19   SpO2 90%       Supplemental O2  [x] Yes  [] NO  Last bowel movement:     Currently this patient has:  [] Peripheral IV [x] PICC  [] PORT [] ICD    [] Logan Catheter [] NG Tube   [] PEG Tube    [] Rectal Tube [] Drain  [] Other:     Constitutional: emaciated, not responsive  Eyes: sunken  ENMT:dry mucosa   Cardiovascular: rrr  Respiratory: secretions less and breathing unlabored  Gastrointestinal: thin, soft  Musculoskeletal:muscle wasting  Skin:bruising  Neurologic:mininally responsive  Psychiatric: not interactive  Other:       Pertinent Lab and or Imaging Tests:  Lab Results   Component Value Date/Time    Sodium 140 12/02/2018 04:07 AM    Potassium 3.8 12/02/2018 04:07 AM    Chloride 104 12/02/2018 04:07 AM    CO2 30 12/02/2018 04:07 AM    Anion gap 6 12/02/2018 04:07 AM    Glucose 127 (H) 12/02/2018 04:07 AM    BUN 19 12/02/2018 04:07 AM    Creatinine 0.67 (L) 12/02/2018 04:07 AM    BUN/Creatinine ratio 28 (H) 12/02/2018 04:07 AM    GFR est AA >60 12/02/2018 04:07 AM    GFR est non-AA >60 12/02/2018 04:07 AM    Calcium 8.4 (L) 12/02/2018 04:07 AM     Lab Results   Component Value Date/Time    Protein, total 5.7 (L) 12/02/2018 04:07 AM    Albumin 2.2 (L) 12/02/2018 04:07 AM           Total time: 15  Counseling / coordination time:   > 50% counseling / coordination?:

## 2018-12-14 NOTE — PROGRESS NOTES
Bedside and Verbal shift change report given to Merary Villarreal RN (oncoming nurse) by Yue Crane RN (offgoing nurse). Report included the following information SBAR, Kardex, Intake/Output, MAR, Accordion and Recent Results.

## 2018-12-14 NOTE — PROGRESS NOTES
18:00  Patient's daughter at bedside. Wondering about IV fluids and if her father could get any. Daughter worried about lack of IV fluids \"taking him faster than he should\". Education was provided on how IV fluids could actually prolong life longer than necessary in a hospice patient. Daughter still concerned and states \"I just want to be with him for [place]\". Patient's daughter then concerned about her own mental health and discussed personal issues related to her own psychiatric care. Bedside and Verbal shift change report given to [employer] RN (oncoming nurse) by Elsie Miller RN (offgoing nurse). Report included the following information SBAR, Kardex, Intake/Output, MAR and Recent Results.

## 2018-12-15 NOTE — HOSPICE
[facility]  [phone]    Routine Nursing Note   Patient Name: Lalo Da Silva  YOB: 1940  Age: 69 y.o. Date of Visit: 12/15/18  Facility of Care: [facility]  Patient Room: 522/     Hospice Attending: Nataliya Love MD  Hospice Diagnosis: Respiratory failure [facility]) [J96.90]    Level of Care: Routine    ASSESSMENT, PLAN AND INTERVENTIONS     1. Patient continues at Routine Level of Care for respiratory distress. 2. Declining status, BP trending down, 63/35   3. Continues to have fever, 100.4 despite IV Toradol  3. Excessive secretions managed with scheduled Robinul, Scopolamine Patch, and Atropine drops. 4. Dyspnea at rest and with personal care, Lungs with coarse sounds, shallow, increasing effort to breathe. 5. Agitation, intermittently. Spiritual Interventions:  visited    Psych/ Social/ Emotional Interventions: Patient's daughter inquired about IV fluids with unit RN, [facility].. [facility]. provided education related to symptom management and contraindications related to IV fluids at this time. Patient's daughter states, \"I just want to be with him for Kuldeep\". MSW, Griselda Ober continues to support. Care Coordination Needs: Care needs discussed with RN, Chris Padilla.  Encouraged to utilize PRN meds. Care plan and New Orders discussed / approved with Ric Bianchi MD.    Description History and Chart Review     List number of doses of PRN medications in last 24 hours:      Medication 1:  Tylenol Supp  Number of doses:  1    Medication 2:   Number of doses:    Medication 3:   Number of doses:    DISCHARGE PLANNING     1. Discharge Plan: Patient will likely pass at [facility], Review disposition if patient is stable for transport to [facility]  2. Patient/Family teaching: No family present during assessment  3.  Response to patient/family teaching: N/A    ASSESSMENT    KARNOFSKY: 10    Prognosis estimated based on 12/15/18 clinical assessment is:   [] Few to Many Hours  [x] Hours to Days   [] Few to Many Days   [] Days to Weeks   [] Few to Many Weeks   [] Weeks to Months   [] Few to Many Months    Quality Measure: Patient self-reports:  [] Yes    [x] No    ESAS:   Time of Assessment:   Pain (1-10):1  Fatigue (1-10): 6  Shortness of breath (1-10):4  Nausea (1-10): Appetite (1-10):    Anxiety: (1-10):   Depression: (1-10):   Well-being: (1-10):   Constipation: __ Yes  _X_ No  LAST BM: 12/10/18    CLINICAL INFORMATION     Patient Vitals for the past 12 hrs:   Temp Pulse Resp BP SpO2   12/15/18 0818 100.4 °F (38 °C) 74 17 (!) 63/35 95 %       Currently this patient has:  [x] Supplemental O2   [] IV    [x] PICC      [] PORT   [] NG Tube    [] PEG Tube   [] Ostomy     [x] Logan draining ___decreasing amt of amber____ urine  [] Other:     SIGNS/PHYSICAL FINDINGS     Skin (including wound):  [] Warm, dry, supple, intact and color normal for race  [x] Warm   [] Dry   [] Cool     [] Clammy       [] Diaphoretic    Turgor   [] Normal   [x] Decreased  Color:   [] Pink   [x] Pale   [] Cyanotic   [] Erythema   [] Jaundice   [] Normal for Race  []  Wounds:    Neuro:  [] Lethargy  [] Restlessness / agitation  [] Confusion / delirium  [] Hallucinations  [x] Responds to maximal stimulation  [] Unresponsive  [] Seizures     Cardiac:  [] Dyspnea on Exertion  [] JVD  [] Murmur  [] Palpitations  [x] Hypotension  [] Hypertension  [] Tachycardia  [] Bradycardia  [x] Irregular HR  [] Pulses Decreased  [] Pulses Absent  [] Edema:       (Location, Grade and Pitting)  [] Mottling:      (Location)    Respiratory:  Breath sounds:    [x] Diminished   [] Wheeze   [x] Rhonchi   [] Rales   [] Even and unlabored  [x] Labored:     shallow       [] Cough   [] Non Productive   [] Productive    [] Description:           [] Deep suctioned   [x] O2 at _4__ LPM  [] High flow oxygen greater than 10 LPM  [] Bi-Pap    GI  [x] Abdomen (soft)   [] Ascites  [] Nausea  [] Vomiting  [] Incontinent of bowels  [] Bowel sounds (yes/no)  [] Diarrhea  [] Constipation (see above including last bowel movement)  [] Checked for impaction  []     Nutrition  Diet:____NPO______  Appetite:   [] Good   [] Fair   [] Poor   [] Tube Feeding       [] Voiding  [x] Incontinent   [x] Logan    Musculoskeletal  [] Balance/[place] Unsteady   [] Weak   Strength:    [] Normal    [] Limited    [x] Decreasing   Activities:    [] Up as tolerated   [x] Bedridden    [] Specify:    SAFETY  [] 24 hr. Caregiver   [x] Side rails ? [x] Hospital bed   [] Reviewed Falls & Safety     ALLERGIES AND MEDICATIONS     Allergies:    Allergies   Allergen Reactions    Seroquel [Quetiapine] Anaphylaxis    Haldol [Haloperidol Lactate] Other (comments)     \"Comatose state\"    Lorazepam Other (comments)     Alternated mental status to benzos per family,     Morphine Other (comments)     Change in mental status        Current Facility-Administered Medications   Medication Dose Route Frequency    glycopyrrolate (ROBINUL) injection 0.2 mg  0.2 mg IntraVENous Q4H    ketorolac (TORADOL) injection 30 mg  30 mg IntraVENous Q6H PRN    morphine injection 2 mg  2 mg IntraVENous Q6H    acetaminophen (TYLENOL) suppository 650 mg  650 mg Rectal Q4H PRN    glycopyrrolate (ROBINUL) injection 0.1 mg  0.1 mg IntraVENous Q4H PRN    atropine 1 % ophthalmic solution 2 Drop  2 Drop SubLINGual Q4H    scopolamine (TRANSDERM-SCOP) 1 mg over 3 days 1 Patch  1 Patch TransDERmal Q72H    morphine injection 2 mg  2 mg IntraVENous Q15MIN PRN    sodium chloride (NS) flush 5 mL  5 mL IntraVENous PRN    LORazepam (ATIVAN) injection 1 mg  1 mg IntraVENous Q15MIN PRN    bisacodyl (DULCOLAX) suppository 10 mg  10 mg Rectal DAILY PRN    prochlorperazine (COMPAZINE) with saline injection 5 mg  5 mg IntraVENous Q6H PRN          Visit Time In: 0900  Visit Time Out: 0930

## 2018-12-15 NOTE — PROGRESS NOTES
Bedside shift change report given to Tabby-RN (oncoming nurse) by Tabby-RN (offgoing nurse). Report included the following information SBAR, Kardex, Intake/Output, MAR, Accordion and Recent Results.

## 2018-12-16 NOTE — ROUTINE PROCESS
Bedside and Verbal shift change report given to Roland Cristina (oncoming nurse) by Ayush Bill (offgoing nurse). Report included the following information SBAR and Kardex.

## 2018-12-16 NOTE — HOSPICE
[facility]  [phone]    Nursing Note   Patient Name: Quynh Echeverria  YOB: 1940  Age: 69 y.o. [facility] COLETTE RN Note:  Chart reviewed. Plan of care discussed with patient's nurse, [address] [place] [address]  Provider Dr Cheyenne Galvan. Nursing staff was concerned with patient's clinical status and declining BP; therefore, scheduled Morphine 2mg was held x4 administrations during the last 24 hrs. Dr Cheyenne Galvan was consulted. Order adjusted to Morphine 2mg every 8 hrs, with a goal of maintaining patient's comfort during the End of Life process. Additionally, Atropine 1% SL drops was adjusted to PRN for excessive secretions. All changes discussed with Nata Miller RN. Thank you for the opportunity to care for this patient and family. Please contact ADOLFO COM COLETTE at [phone] with any questions or concerns.

## 2018-12-17 NOTE — ROUTINE PROCESS
Bedside and Verbal shift change report given to Pieter Keen RN (oncoming nurse) by Neil Quintanilla RN (offgoing nurse). Report included the following information SBAR and Kardex.

## 2018-12-17 NOTE — PROGRESS NOTES
[address] [facility] Those in Need  [phone]    Patient Name: Lisa Bowman  YOB: 1940    Date of Provider Hospice Visit: 12/17/18    Level of Care:   [] General Inpatient (GIP)    [x] Routine   [] Respite    Current Location of Care:  [] [facility] [x] [facility] [] [facility] [] [facility] [] [facility], patient referred from:  [] [facility] [] [facility] [] [facility] [] [facility] [] Home [] Other:     Date of [address] [facility] Admission: 12/3/18  Hospice Medical Director at time of admission: [address] Diagnosis: Respiratory failure  Diagnoses RELATED to the terminal prognosis: aspiration pneumonia, severe protein malnutrition, parkinsons-end stage, dementia  Other Diagnoses:      HOSPICE SUMMARY     Lisa Bowman is a 69y.o. year old who was admitted to [facility]. Patient admitted to the hospital on 11/18/18 with pneumonia, sepsis, respiratory failure. Patient treated with IVF, abx and evaluated by speech therapy. Patient with dysphagia/aspiration and unable to swallow safely related to mental status and Parkinson's. Family did not want a PEG but wanted to attempt TPN for a brief period of time to see if he would recover. Unfortunately, he continued to decline with issues of aspiration/worsening CXR with right sided infiltrate. Patient not responsive enough to eat or interact. Increased secretions and patient transitioned to inpatient hospice after discussions with 2 children(son in [place] and daughter locally)     The patient's principle diagnosis has resulted in respiratory failure/pneumonia  Refer to LCD     Functionally, the patient's Karnofsky and/or Palliative Performance Scale has declined over a period of weeks and is estimated at 10-20.  The patient is dependent on the following ADLs:    Objective information that support this patients limited prognosis includes:    CXR  IMPRESSION: Improved aeration airspace disease in the left midlung.     There is increasing right basilar atelectasis/airspace disease        The patient/family chose comfort measures with the support of Hospice. HOSPICE DIAGNOSES   Active Symptoms:  1. SOB  2. Secretions  3. Agitation at times  4. Fever     PLAN   1. Patient continues to survive against all odds as it has now been 2 weeks without food/water. Comfortable this afternoon but meds had been adjusted/stopped this weekend. Discussed with Margarita and bedside nurses and medications have been restarted  2. Secretions-Scopolamine patch and continue robinul scheduled. No prn needs needed  3. SOB-will adjust morphine to 2 mg IV every 4 hours and every 15 minutes prn  4. Fever-continue toradol and prn tylenol  5. Food for comfort per family-not able to eat. 6. Comfort order set for pain, SOB, fever, agitation  Discussed with bedside nurse and Margarita. 7.  and SW to support family needs  8. Disposition: likely will pass in the hospital    Prognosis estimated based on 12/17/18 clinical assessment is:   [x] Hours to Days    [] Days to Weeks    [] Other:    Communicated plan of care with: Hospice Case Manager; Hospice IDT; Care Team     GOALS OF CARE     Resuscitation Status: DNR  Durable DNR: [x] Yes [] No    Primary Decision Maker ([address]): 3 children equally-discussed with Gilbert Abernathy and Aylin Benavides. Third child not available.  Spouse with dementia and can not make decisions  Relationship to patient:  Phone number:  [] Named in a scanned document   [x] Legal Next of Kin  [] Guardian    Secondary Decision Maker ([address]):   Relationship to patient:  Phone number:  [] Named in a scanned document   [] Legal Next of Kin  [] Guardian    ACP documents you current have include:  [] Advance Directive or Living Will  [] Durable Do Not Resuscitate  [] Physician Orders for Scope of Treatment (POST)  [] Medical Power of   [] Other    HISTORY     History obtained from: chart, family    CHIEF COMPLAINT: sob  The patient is:   [] Verbal  [] Nonverbal  [x] Unresponsive    HPI/SUBJECTIVE:  Patient is minimally responsive most of the time. Secretions noted    12/4-patient had just received IV morphine so appears comfortable right now. Had increased work of breathing this morning   12/5-minimally responsive. Not arousing now when talking with him    12/6-not responsive at this time. Breathing unlabored    12/7-minimally responsive. Appears comfortable    12/10-patient minimally responsive. Less secretions noted    12/11-much more comfortable today    12/12-appears unlabored in his breathing    12/13-comfortable, no labored breathing    12/14-much less responsive.  Ashen today, cool extremities    12/17-not responsive this afternoon     REVIEW OF SYSTEMS     The following systems were: [x] reviewed  [] unable to be reviewed    Positive ROS include:  Constitutional: fatigue, weakness, short of breath  Ears/nose/mouth/throat: increased airway secretions  Respiratory:shortness of breath,   Gastrointestinal:poor appetite,   Neurologic:, weakness  Adult Non-Verbal Pain Assessment Score: 0/10  Face  [x] 0   No particular expression or smile  [] 1   Occasional grimace, tearing, frowning, wrinkled forehead  [] 2   Frequent grimace, tearing, frowning, wrinkled forehead    Activity (movement)  [x] 0   Lying quietly, normal position  [] 1   Seeking attention through movement or slow, cautious movement  [] 2   Restless, excessive activity and/or withdrawal reflexes    Guarding  [x] 0   Lying quietly, no positioning of hands over areas of body  [] 1   Splinting areas of the body, tense  [] 2   Rigid, stiff    Physiology (vital signs)  [x] 0   Stable vital signs  [] 1   Change in any of the following: SBP > 20mm Hg; HR > 20/minute  [] 2   Change in any of the following: SBP > 30mm Hg; HR > 25/minute    Respiratory  [x] 0   Baseline RR/SpO2, compliant with ventilator  [] 1   RR > 10 above baseline, or 5% drop SpO2, mild asynchrony with ventilator  [] 2   RR > 20 above baseline, or 10% drop SpO2, asynchrony with ventilator     FUNCTIONAL ASSESSMENT     Palliative Performance Scale (PPS):10-20     PSYCHOSOCIAL/SPIRITUAL ASSESSMENT     Active Problems:    Respiratory failure ([facility] [address] (12/3/2018)      Past Medical History:   Diagnosis Date    Age-related physical debility     Anxiety and depression     Compression fracture of L3 lumbar vertebra (HCC)     Debilitated patient     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     Diabetes ([facility] [address]     Dysphagia dementia related    History of vascular access device 11/26/2018    5 Fr triple PICC, 39 cm R brachial, TPN    Hypertension     Malnutrition ([facility] [address]     Parkinson disease ([address]       Past Surgical History:   Procedure Laterality Date    HX HEENT        Social History     Tobacco Use    Smoking status: Never Smoker    Smokeless tobacco: Never Used   Substance Use Topics    Alcohol use: No     Family History   Problem Relation Age of Onset    Stroke Father       Allergies   Allergen Reactions    Seroquel [Quetiapine] Anaphylaxis    Haldol [Haloperidol Lactate] Other (comments)     \"Comatose state\"    Lorazepam Other (comments)     Alternated mental status to benzos per family,     Morphine Other (comments)     Change in mental status       Current Facility-Administered Medications   Medication Dose Route Frequency    morphine injection 2 mg  2 mg IntraVENous Q6H    atropine 1 % ophthalmic solution 2 Drop  2 Drop SubLINGual Q4H PRN    glycopyrrolate (ROBINUL) injection 0.2 mg  0.2 mg IntraVENous Q4H    acetaminophen (TYLENOL) suppository 650 mg  650 mg Rectal Q4H PRN    glycopyrrolate (ROBINUL) injection 0.1 mg  0.1 mg IntraVENous Q4H PRN    scopolamine (TRANSDERM-SCOP) 1 mg over 3 days 1 Patch  1 Patch TransDERmal Q72H    morphine injection 2 mg  2 mg IntraVENous Q15MIN PRN    sodium chloride (NS) flush 5 mL  5 mL IntraVENous PRN    LORazepam (ATIVAN) injection 1 mg  1 mg IntraVENous Q15MIN PRN    bisacodyl (DULCOLAX) suppository 10 mg  10 mg Rectal DAILY PRN    prochlorperazine (COMPAZINE) with saline injection 5 mg  5 mg IntraVENous Q6H PRN        PHYSICAL EXAM     Wt Readings from Last 3 Encounters:   12/03/18 122 lb 12.7 oz (55.7 kg)   08/27/18 132 lb (59.9 kg)   08/15/18 132 lb (59.9 kg)       Visit Vitals  BP (!) 71/45 (BP 1 Location: Left arm, BP Patient Position: At rest)   Pulse 69   Temp 97.6 °F (36.4 °C)   Resp 14   SpO2 93%       Supplemental O2  [x] Yes  [] NO  Last bowel movement:     Currently this patient has:  [] Peripheral IV [x] PICC  [] PORT [] ICD    [] Logan Catheter [] NG Tube   [] PEG Tube    [] Rectal Tube [] Drain  [] Other:     Constitutional: emaciated, not responsive, BP in the 14U-19P systolic  Eyes: sunken  ENMT:dry mucosa   Cardiovascular: rrr  Respiratory: secretions less and breathing unlabored  Gastrointestinal: thin, soft  Musculoskeletal:muscle wasting  Skin:bruising  Neurologic:mininally responsive  Psychiatric: not interactive  Other:       Pertinent Lab and or Imaging Tests:  Lab Results   Component Value Date/Time    Sodium 140 12/02/2018 04:07 AM    Potassium 3.8 12/02/2018 04:07 AM    Chloride 104 12/02/2018 04:07 AM    CO2 30 12/02/2018 04:07 AM    Anion gap 6 12/02/2018 04:07 AM    Glucose 127 (H) 12/02/2018 04:07 AM    BUN 19 12/02/2018 04:07 AM    Creatinine 0.67 (L) 12/02/2018 04:07 AM    BUN/Creatinine ratio 28 (H) 12/02/2018 04:07 AM    GFR est AA >60 12/02/2018 04:07 AM    GFR est non-AA >60 12/02/2018 04:07 AM    Calcium 8.4 (L) 12/02/2018 04:07 AM     Lab Results   Component Value Date/Time    Protein, total 5.7 (L) 12/02/2018 04:07 AM    Albumin 2.2 (L) 12/02/2018 04:07 AM           Total time: 15  Counseling / coordination time:   > 50% counseling / coordination?:

## 2018-12-17 NOTE — HOSPICE
[facility]  [phone]    Routine Nursing Note   Patient Name: Maria Esther Aguilar  YOB: 1940  Age: 69 y.o. Date of Visit: 12/17/18  Facility of Care: [facility]  Patient Room: 522/01     Hospice Attending: Marita Tolentino MD  Hospice Diagnosis: Respiratory failure [facility]) [J96.90]    Level of Care: Routine    ASSESSMENT, PLAN AND INTERVENTIONS     1. Patient continued at Routine Level of Care for Respiratory Failure  2. Declining status  3. BP remains low in 95H systolic. Afebrile this AM. Pt noted to be utilizing accessory muscles to breathe and noted to be struggling. Rattling noted. RN discussed with primary RN of PRN medications and course overnight. Hospice RN updated to medication regimen changes, Hospice RN encouraged use of PRN medications. 4. Pt noted to have minimal response to interaction or noxious stimuli. 5. Dr. Lisa Reed updated and notified. Morphine order changes from Q8 hours to Q6 hours. Spiritual Interventions:     Psych/ Social/ Emotional Interventions: Daughter not present at time of visit    Care Coordination Needs: Pt care needs discussed with primary RN RONNIE, Encouraged use of PRN medications    Care plan and New Orders discussed / approved with Izzy Coon MD.    Description History and Chart Review     List number of doses of PRN medications in last 24 hours:  Medication 1: Morphine   Number of doses: 2; increased resp distress. Scheduled dose given early and PRN dose given    Medication 2:   Number of doses:    Medication 3:   Number of doses:    DISCHARGE PLANNING     1. Discharge Plan: Should pt stabilize, will discuss alternate level of care  2. Patient/Family teaching:  No family @ bedside at time of assessment  3.  Response to patient/family teaching: N/A    ASSESSMENT    KARNOFSKY: 10    Prognosis estimated based on 12/17/18 clinical assessment is:   [] Few to Many Hours  [x] Hours to Days   [] Few to Many Days   [] Days to Weeks   [] Few to Many Weeks   [] Weeks to Months   [] Few to Many Months    Quality Measure: Patient self-reports:  [] Yes    [x] No    ESAS:   Time of Assessment: 1130  Pain (1-10):1  Fatigue (1-10): 1  Shortness of breath (1-10):6  Nausea (1-10): 1  Appetite (1-10):    Anxiety: (1-10):   Depression: (1-10):   Well-being: (1-10):   Constipation: _ Yes  _x No  LAST BM: 12/10/18    CLINICAL INFORMATION     Patient Vitals for the past 12 hrs:   Temp Pulse Resp BP SpO2   12/17/18 0750 97.6 °F (36.4 °C) 69 14 (!) 71/45 93 %   12/17/18 0413  75  91/54        Currently this patient has:  [x] Supplemental O2   [] IV    [x] PICC      [] PORT   [] NG Tube    [] PEG Tube   [] Ostomy     [x] Logan draining __45_____ urine  [] Other:     SIGNS/PHYSICAL FINDINGS     Skin (including wound):  [] Warm, dry, supple, intact and color normal for race  [x] Warm   [] Dry   [] Cool     [] Clammy       [] Diaphoretic    Turgor   [] Normal   [x] Decreased  Color:   [] Pink   [x] Pale   [] Cyanotic   [] Erythema   [] Jaundice   [] Normal for Race  []  Wounds:    Neuro:  [] Lethargy  [] Restlessness / agitation  [] Confusion / delirium  [] Hallucinations  [] Responds to maximal stimulation  [x] Unresponsive  [] Seizures     Cardiac:  [] Dyspnea on Exertion  [] JVD  [] Murmur  [] Palpitations  [x] Hypotension  [] Hypertension  [] Tachycardia  [] Bradycardia  [] Irregular HR  [x] Pulses Decreased  [] Pulses Absent  [] Edema:       (Location, Grade and Pitting)  [] Mottling:      (Location)    Respiratory:  Breath sounds:    [x] Diminished   [] Wheeze   [x] Rhonchi   [] Rales   [] Even and unlabored  [x] Labored:   Shallow & tachypnic        [] Cough   [] Non Productive   [] Productive    [] Description:           [] Deep suctioned   [x] O2 at _4__ LPM  [] High flow oxygen greater than 10 LPM  [] Bi-Pap    GI  [x] Abdomen (soft, non-tender)   [] Ascites  [] Nausea  [] Vomiting  [] Incontinent of bowels  [x] Bowel sounds (NO)  [] Diarrhea  [] Constipation (see above including last bowel movement)  [] Checked for impaction      Nutrition  Diet:__NPO________  Appetite:   [] Good   [] Fair   [] Poor   [] Tube Feeding       [] Voiding  [x] Incontinent   [x] Logan    Musculoskeletal  [] Balance/[place] Unsteady   [] Weak   Strength:    [] Normal    [] Limited    [x] Decreasing   Activities:    [] Up as tolerated   [x] Bedridden    [] Specify:    SAFETY  [x] 24 hr. Caregiver   [x] Side rails ? [x] Hospital bed   [x] Reviewed Falls & Safety     ALLERGIES AND MEDICATIONS     Allergies:    Allergies   Allergen Reactions    Seroquel [Quetiapine] Anaphylaxis    Haldol [Haloperidol Lactate] Other (comments)     \"Comatose state\"    Lorazepam Other (comments)     Alternated mental status to benzos per family,     Morphine Other (comments)     Change in mental status        Current Facility-Administered Medications   Medication Dose Route Frequency    atropine 1 % ophthalmic solution 2 Drop  2 Drop SubLINGual Q4H PRN    morphine injection 2 mg  2 mg IntraVENous Q8H    glycopyrrolate (ROBINUL) injection 0.2 mg  0.2 mg IntraVENous Q4H    acetaminophen (TYLENOL) suppository 650 mg  650 mg Rectal Q4H PRN    glycopyrrolate (ROBINUL) injection 0.1 mg  0.1 mg IntraVENous Q4H PRN    scopolamine (TRANSDERM-SCOP) 1 mg over 3 days 1 Patch  1 Patch TransDERmal Q72H    morphine injection 2 mg  2 mg IntraVENous Q15MIN PRN    sodium chloride (NS) flush 5 mL  5 mL IntraVENous PRN    LORazepam (ATIVAN) injection 1 mg  1 mg IntraVENous Q15MIN PRN    bisacodyl (DULCOLAX) suppository 10 mg  10 mg Rectal DAILY PRN    prochlorperazine (COMPAZINE) with saline injection 5 mg  5 mg IntraVENous Q6H PRN          Visit Time In: 7730  Visit Time Out: 0148

## 2018-12-18 VITALS
TEMPERATURE: 97.8 F | HEART RATE: 66 BPM | OXYGEN SATURATION: 94 % | DIASTOLIC BLOOD PRESSURE: 69 MMHG | SYSTOLIC BLOOD PRESSURE: 113 MMHG | RESPIRATION RATE: 18 BRPM

## 2018-12-18 PROCEDURE — 94760 N-INVAS EAR/PLS OXIMETRY 1: CPT

## 2018-12-18 NOTE — HOSPICE
Received e-mail from Dr. Denae Bah regarding his concern for patient's daughter Estefania Patrick. Msw spoke with daughter who relayed she was \"upset\" and \"unhappy\" with her father's passing. She expressed concerns especially about father not having IV fluids. She didn't understand why he couldn't have them. Msw explained the body shutting down and preparing itself for death - fluids not given as person unable to tolerate and only causing buildup in the lungs and body. Msw offered to send her a [employer] [facility] but she declined. She also stated she wasn't thinking straight now and didn't know if she wanted to follow up with bereavement services. She requested Dr. Denae Bah reach out to her. Msw followed up and sent him an e-mail.

## 2018-12-18 NOTE — PROGRESS NOTES
0000: Family still at bedside. 2212: Contacted [employer] coordinator; [employer] approves release. 2155: Contacted patient's daughter; family will be coming to bedside; paged  and informed Nursing supervisor. 2140: Upon entering room, patient without pulse and respirations; contacted Dr. Helen Tolentino.

## 2018-12-18 NOTE — HOSPICE
[facility]  [phone]    Discharge/Death Nursing Note   Patient Name: Maria Esther Aguilar  YOB: 1940  Age: 69 y.o. Date of Death: 18  Admitted Date: 12/3/2018  Time of Death: 9:50pm    Facility of Care: [facility]  Level of Care: routine  Patient Room: [facility]     Hospice Attending: No att. providers found  Hospice Diagnosis: Respiratory failure ([address] [J96.90]    Death Pronouncement   Pronouncement of death completed by: Dr Domonique Robertson staff was not present at the time of death    At the time of death the patient was documented as without signs of life. The pt  within [facility]    The following were notified of the patient's death: family, hospice team, hospital staff    Medications were disposed of per facility protocol     Discharge Summary   Discharge Reason: Death    Summary of Care Provided:    [x] Post mortem care provided by staff RN  [x] Notification of  home by nursing supervisor  [] Referrals/Community resources provided:   [x] Goals completed  [] Durable Medical Equipment vendor notified     Disciplines involved: [x] RN [x] SW [x]  [] ESTRELLA [] Vol [] PT [] OT [] ST [] BC    [] IDT communication/notification    Attending Physician, Dr. Lisa Reed, notified of death    Bereaved   Daughter Bubba Dixon moderate risk.   Was unrealistically expecting pt to live through [address] [place] [address] 2018   [address] [facility] is: Legal Next of Kin   Primary Decision Maker Name -   Primary Decision Maker Relationship to Patient -   Secondary Decision [address] Name -   Secondary Decision Maker Relationship to Patient -   Confirm Advance Directive Yes, on file   Patient Would Like to Complete Advance Directive -   Does the patient have other document types -

## 2018-12-18 NOTE — PROGRESS NOTES
I was paged after Mr. Clemencia Castañeda had . I provided support to his daughter Alexandra Vaughan and one of his long-time aids. I said a prayer of commendation and offered words of comfort and support as they shared about his life and personality. They will be using [address] [facility]. in [place]. Spiritual Care remains available as needed. Rev. Holli Geiger M.Div, [facility]

## 2018-12-18 NOTE — HOSPICE
Routine visit to patient's room. He was lying quietly in bed. No response to name or light touch. No family present.

## 2018-12-18 NOTE — PROGRESS NOTES
Bedside and Verbal shift change report given to [facility] (oncoming nurse) by Fern Burnette RN (offgoing nurse). Report included the following information SBAR, Kardex, MAR, Accordion and Recent Results.

## 2018-12-18 NOTE — PROGRESS NOTES
Patient unresponsive. Pupils fixed. No spontaneous respirations. No heart sounds auscultated.     Death pronounced at 9:50PM

## 2018-12-18 NOTE — DISCHARGE SUMMARY
Physician Discharge Summary     Patient ID:  Marilyn Patel  [id number]  35 y.o.  1940    Admit date: 11/18/2018    Discharge date and time: 12/17/2018    Admission Diagnoses: Respiratory failure [facility]) [J96.90]    Discharge Diagnoses/Hospital Course   Mr. Monique Skelton is a 68 yo male with multiple medical problems including advanced dementia, Parkinson's admitted with acute encephalopathy, failure to thrive, acute hypoxic respiratory failure due to recurrent aspiration. By problem:     Acute encephalopathy / Dementia / Anxiety and depression - Appreciate neurology input. Unremarkable multiple CTA/CT/MRI brain, EEG with slowing, B12 adequate. Palliative, hospice and primary team had multiple discussions with family and pt was transitioned to hospice.      Acute hypoxic respiratory failure - POA, and persistent due to continued aspiration. Scopolamine, glycopyrrolate. Likely continuing to aspirate secretions. Family asking for comfort feeds. This is appropriate but risk of aspiration discussed with daughter.      Aspiration pneumonia - POA, with CT showing multilobar consolidation, CXR on 11/23 with new DEVONTE ASDZ, CXR 11/29 with new RLL ASDZ. On zosyn while in house. This was stopped once transitioned to hospice. High risk for recurrence and possibly even development of pneumonitis.     Parkinson disease / Dysphagia / Debility / Failure to thrive / Malnutrition / Weight loss - No role for PEG or TPN. Extremely poor prognosis. Pt was briefly on TPN per family request but transitioned to hospice and to comfort feeds     Rest per prior notes     PCP: Kalee Patterson MD     Consults: Neurology, palliative    Discharge Exam:  Visit Vitals  /65 (BP 1 Location: Left arm, BP Patient Position: At rest)   Pulse 75   Temp 98.9 °F (37.2 °C)   Resp 16   Ht 5' 10\" (1.778 m)   Wt 55.7 kg (122 lb 12.7 oz)   SpO2 96%   BMI 17.62 kg/m²     Gen: Cachetic male. Non-verbal. Contracted.    HEENT:  Pink conjunctivae, PERRL, hearing may be intact to voice, dry mucous membranes  Neck:  Supple, without masses, thyroid non-tender  Resp: Upper airway secretions with poor clearance. Lungs coarse   Card:  No murmurs, normal S1, S2 without thrills, bruits or peripheral edema  Abd:  Soft, non-tender, non-distended, reduced bowel sounds are present, no mass  Lymph:  No cervical or inguinal adenopathy  Musc:  No cyanosis or clubbing  Skin:  Sacral wound, skin turgor is reduced  Neuro: Diffuse weakness. Contracted. LUE > RUE. Bilaterally in LE.  Non-verbal  Psych: Poor insight    Disposition: Hospice     Patient Instructions:   Discharge Medication List as of 12/3/2018  6:26 PM        Approximate time spent in patient care on day of discharge: 34 minutes     Signed:  Maximiliano Null MD  12/17/2018  8:36 PM

## 2018-12-19 ENCOUNTER — HOME CARE VISIT (OUTPATIENT)
Dept: HOSPICE | Facility: HOSPICE | Age: 78
End: 2018-12-19
Payer: MEDICARE

## 2018-12-20 ENCOUNTER — HOME CARE VISIT (OUTPATIENT)
Dept: HOSPICE | Facility: HOSPICE | Age: 78
End: 2018-12-20
Payer: MEDICARE

## 2018-12-20 NOTE — DISCHARGE SUMMARY
Hospice Discharge Summary    [facility]  [facility]        Date of Admission: 12/3/2018  Date of Discharge: 12/17/2018    Jose Luis Ríos is a 69y.o. year old who was admitted to [facility] at [facility] with a Hospice diagnosis of Respiratory failure ([facility] [address] [J96.90]. The patient's care was focused on comfort and the patient passed away on 12/17/2018.

## 2021-07-02 NOTE — PROCEDURES
Name   Joel Ojeda MRN   [id number]    1940 Procedure:   Routine EEG Location:   [place] [address] Date of Recordin18 Date of Interpretation:    18 Requesting provider:   Payton Goldman MD  
Interpreting physician: Taylor Alonzo MD 
 
Introduction: This is a 69 y.o. male with waxing-waning encephalopathy. Current medications: has a current medication list which includes the following prescription(s): carbidopa-levodopa, carbidopa-levodopa, pravastatin, amlodipine, aspirin delayed-release, carbidopa-levodopa, lisinopril, metformin, donepezil, and timolol, and the following Facility-Administered Medications: hydralazine, enoxaparin, albuterol-ipratropium, piperacillin-tazobactam (ZOSYN) 3.375 g in 0.9% sodium chloride (MBP/ADV) 100 mL, dextrose 5 % - 0.45% nacl, acetaminophen, timolol, ondansetron, diphenhydramine, glucose, dextrose, glucagon, and insulin lispro. Technical:  
Digital EEG recorded in 10-20 international placement system, multiple montages Interpretation:  
Pt described as eye open, picking at gown. Background is symmetric low amplitude. PDR ranges between 3-5 Hz but low amplitude. Drowsiness and sleep were not recorded. Single lead EKG: no abnormalities. There were no areas of focal slowing, seizure discharges, or subclinical seizure. Impression: Abnormal awake EEG recording consistent with a moderate to severe encephalopathic process. No seizure discharges or ongoing/ subclinical seizures were recorded. Clinical correlation is necessary. Taylor Alonzo MD 
[facility] 02-Apr-2021

## 2021-11-06 NOTE — DISCHARGE INSTRUCTIONS
We hope that we have addressed all of your medical concerns. The examination and treatment you received in the Emergency Department were for an emergent problem and were not intended as complete care. It is important that you follow up with your healthcare provider(s) for ongoing care. If your symptoms worsen or do not improve as expected, and you are unable to reach your usual health care provider(s), you should return to the Emergency Department. Today's healthcare is undergoing tremendous change, and patient satisfaction surveys are one of the many tools to assess the quality of medical care. You may receive a survey from the [employer] organization regarding your experience in the Emergency Department. I hope that your experience has been completely positive, particularly the medical care that I provided. As such, please participate in the survey; anything less than excellent does not meet my expectations or intentions. Formerly [facility] and 8 [facility] participate in nationally recognized quality of care measures. If your blood pressure is greater than 120/80, as reported below, we urge that you seek medical care to address the potential of high blood pressure, commonly known as hypertension. Hypertension can be hereditary or can be caused by certain medical conditions, pain, stress, or \"white coat syndrome. \"       Please make an appointment with your health care provider(s) for follow up of your Emergency Department visit.        VITALS:   Patient Vitals for the past 8 hrs:   Temp Pulse Resp BP SpO2   07/01/18 1515 - 71 13 145/87 96 %   07/01/18 1514 - 75 17 - 96 %   07/01/18 1513 - 79 15 - 99 %   07/01/18 1512 - 78 20 - 100 %   07/01/18 1511 - 77 20 - 91 %   07/01/18 1500 - 68 14 142/88 96 %   07/01/18 1445 - 74 15 135/84 97 %   07/01/18 1430 - 65 10 123/65 96 %   07/01/18 1415 - 64 11 - 97 %   07/01/18 1400 - 62 11 135/79 96 %   07/01/18 1340 97.6 °F (36.4 °C) 62 14 120/78 95 %          Thank you for allowing us to provide you with medical care today. We realize that you have many choices for your emergency care needs. Please choose us in the future for any continued health care needs. Wallace Mesa MD    [facility], [facility].   Office: [phone]            Recent Results (from the past 24 hour(s))   CBC WITH AUTOMATED DIFF    Collection Time: 07/01/18  2:26 PM   Result Value Ref Range    WBC 10.5 4.1 - 11.1 K/uL    RBC 4.59 4. 10 - 5.70 M/uL    HGB 14.1 12.1 - 17.0 g/dL    HCT 44.2 36.6 - 50.3 %    MCV 96.3 80.0 - 99.0 FL    MCH 30.7 26.0 - 34.0 PG    MCHC 31.9 30.0 - 36.5 g/dL    RDW 13.2 11.5 - 14.5 %    PLATELET 993 938 - 035 K/uL    MPV 10.5 8.9 - 12.9 FL    NRBC 0.0 0  WBC    ABSOLUTE NRBC 0.00 0.00 - 0.01 K/uL    NEUTROPHILS 84 (H) 32 - 75 %    LYMPHOCYTES 10 (L) 12 - 49 %    MONOCYTES 5 5 - 13 %    EOSINOPHILS 1 0 - 7 %    BASOPHILS 0 0 - 1 %    IMMATURE GRANULOCYTES 0 0.0 - 0.5 %    ABS. NEUTROPHILS 8.8 (H) 1.8 - 8.0 K/UL    ABS. LYMPHOCYTES 1.1 0.8 - 3.5 K/UL    ABS. MONOCYTES 0.5 0.0 - 1.0 K/UL    ABS. EOSINOPHILS 0.1 0.0 - 0.4 K/UL    ABS. BASOPHILS 0.0 0.0 - 0.1 K/UL    ABS. IMM. GRANS. 0.0 0.00 - 0.04 K/UL    DF AUTOMATED      RBC COMMENTS NORMOCYTIC, NORMOCHROMIC     METABOLIC PANEL, COMPREHENSIVE    Collection Time: 07/01/18  2:26 PM   Result Value Ref Range    Sodium 144 136 - 145 mmol/L    Potassium 3.8 3.5 - 5.1 mmol/L    Chloride 106 97 - 108 mmol/L    CO2 31 21 - 32 mmol/L    Anion gap 7 5 - 15 mmol/L    Glucose 173 (H) 65 - 100 mg/dL    BUN 15 6 - 20 MG/DL    Creatinine 0.85 0.70 - 1.30 MG/DL    BUN/Creatinine ratio 18 12 - 20      GFR est AA >60 >60 ml/min/1.73m2    GFR est non-AA >60 >60 ml/min/1.73m2    Calcium 8.8 8.5 - 10.1 MG/DL    Bilirubin, total 0.6 0.2 - 1.0 MG/DL    ALT (SGPT) 7 (L) 12 - 78 U/L    AST (SGOT) 13 (L) 15 - 37 U/L    Alk.  phosphatase 88 45 - 117 U/L    Protein, 97.8 total 7.0 6.4 - 8.2 g/dL    Albumin 3.3 (L) 3.5 - 5.0 g/dL    Globulin 3.7 2.0 - 4.0 g/dL    A-G Ratio 0.9 (L) 1.1 - 2.2     AMMONIA    Collection Time: 07/01/18  2:26 PM   Result Value Ref Range    Ammonia 16 <32 UMOL/L   TROPONIN I    Collection Time: 07/01/18  2:26 PM   Result Value Ref Range    Troponin-I, Qt. 0.06 (H) <0.05 ng/mL   URINALYSIS W/MICROSCOPIC    Collection Time: 07/01/18  2:41 PM   Result Value Ref Range    Color DARK YELLOW      Appearance CLEAR CLEAR      Specific gravity 1.022 1.003 - 1.030      pH (UA) 6.0 5.0 - 8.0      Protein TRACE (A) NEG mg/dL    Glucose NEGATIVE  NEG mg/dL    Ketone TRACE (A) NEG mg/dL    Blood NEGATIVE  NEG      Urobilinogen 1.0 0.2 - 1.0 EU/dL    Nitrites NEGATIVE  NEG      Leukocyte Esterase SMALL (A) NEG      WBC 10-20 0 - 4 /hpf    RBC 0-5 0 - 5 /hpf    Epithelial cells FEW FEW /lpf    Bacteria 1+ (A) NEG /hpf    Mucus 1+ (A) NEG /lpf    Hyaline cast 2-5 0 - 5 /lpf   URINE CULTURE HOLD SAMPLE    Collection Time: 07/01/18  2:41 PM   Result Value Ref Range    Urine culture hold        URINE ON HOLD IN MICROBIOLOGY DEPT FOR 3 DAYS. IF UNPRESERVED URINE IS SUBMITTED, IT CANNOT BE USED FOR ADDITIONAL TESTING AFTER 24 HRS, RECOLLECTION WILL BE REQUIRED. BILIRUBIN, CONFIRM    Collection Time: 07/01/18  2:41 PM   Result Value Ref Range    Bilirubin UA, confirm NEGATIVE  NEG     TROPONIN I    Collection Time: 07/01/18  4:39 PM   Result Value Ref Range    Troponin-I, Qt. 0.05 (H) <0.05 ng/mL       Ct Head Wo Cont    Result Date: 7/1/2018  EXAM:  CT HEAD WO CONT INDICATION:   Status post fall with confusion. COMPARISON: CT 6/10/2018. CONTRAST:  None. TECHNIQUE: Unenhanced CT of the head was performed using 5 mm images. Brain and bone windows were generated. CT dose reduction was achieved through use of a standardized protocol tailored for this examination and automatic exposure control for dose modulation.   FINDINGS: Mild bilateral lateral ventricular prominence is unchanged consistent with mild central atrophy. Mild bilateral periventricular diminished attenuation in the cerebrum is again shown consistent with chronic small vessel ischemic disease the white matter. There is no intracranial hemorrhage, extra-axial collection, mass, mass effect or midline shift. The basilar cisterns are open. No acute infarct is identified. The bone windows demonstrate no abnormalities. The visualized portions of the paranasal sinuses and mastoid air cells are clear. IMPRESSION: Atrophy and chronic small vessel ischemic disease the white matter. No acute findings. Xr Chest Port    Result Date: 7/1/2018  EXAM:  XR CHEST PORT INDICATION:  History of aspiration with confusion. COMPARISON:  4/11/2018 FINDINGS: A portable AP radiograph of the chest was obtained at 1340 hours. There is no pneumothorax or pleural effusion. The lungs are clear. Cardiac, mediastinal and hilar contours are stable. Mild elevation of the right hemidiaphragm is again shown. The bones are moderately osteopenic with vertebroplasty cement in one of the upper lumbar vertebral segments again shown. Philevert Clemens IMPRESSION: No acute findings. Learning About the Symptoms of Dementia  What is dementia? We all forget things as we get older. Many older people have a slight loss of memory that does not affect their daily lives. But memory loss that gets worse may mean that you have dementia. Dementia is a loss of mental skills that affects your daily life. It can cause problems with memory, problem-solving, and learning. It also can cause problems with thinking and planning. Dementia usually gets worse over time. But how quickly it gets worse is different for each person. Some people stay the same for years. Others lose skills quickly. Your chances of having dementia rise as you get older. But this doesn't mean that everyone will get it. What causes dementia?   Dementia is caused by damage to or changes in the brain. Alzheimer's disease is the most common kind of dementia. Alzheimer's causes a steady loss of memory and how well you can speak, think, and do your daily activities. The second most common kind of dementia is caused by a series of strokes. It's called vascular dementia. It often gets worse step by step. With each new stroke, more mental skills are lost.  Serious head injuries in the past can sometimes lead to dementia, too. What are the symptoms? Usually the first symptom of dementia is memory loss. Often the person who has the memory problem doesn't notice it, but family and friends do. People who have dementia may have increasing trouble with:  · Recalling recent events. They may forget appointments or lose objects. · Recognizing people and places. · Keeping up with conversations and activity. · Finding their way around familiar places, or driving to and from places they know well. · Keeping up personal care such as grooming or bathing. · Planning and carrying out routine tasks. They may have trouble following a recipe or writing a letter or email. What are some next steps? If you are worried about memory loss, see your doctor soon. He or she can do a physical exam and ask questions about recent and past illnesses and life events. Think about bringing someone to your doctor's appointment to take notes for you. Your doctor may suggest a series of tests to measure memory changes over time. These tests give the doctor an overall picture of how well your brain is working. The doctor can use the results to decide the best treatment program and help make your life safer and easier. It is important to know that memory loss can be caused by things other than dementia. These things can include health problems such as depression, a low amount of thyroid hormone, and some infections. When those things are treated, your ability to remember can get better.   Follow-up care is a key part of your treatment and safety. Be sure to make and go to all appointments, and call your doctor if you are having problems. It's also a good idea to know your test results and keep a list of the medicines you take. Where can you learn more? Go to http://lenny-brady.info/. Enter [id number] [phone] in the search box to learn more about \"Learning About the Symptoms of Dementia. \"  Current as of: May 12, 2017  Content Version: 11.4  © 7190-0444 [employer]. Care instructions adapted under license by [employer] (which disclaims liability or warranty for this information). If you have questions about a medical condition or this instruction, always ask your healthcare professional. [place] [address] any warranty or liability for your use of this information. Dehydration: Care Instructions  Your Care Instructions  Dehydration happens when your body loses too much fluid. This might happen when you do not drink enough water or you lose large amounts of fluids from your body because of diarrhea, vomiting, or sweating. Severe dehydration can be life-threatening. Water and minerals called electrolytes help put your body fluids back in balance. Learn the early signs of fluid loss, and drink more fluids to prevent dehydration. Follow-up care is a key part of your treatment and safety. Be sure to make and go to all appointments, and call your doctor if you are having problems. It's also a good idea to know your test results and keep a list of the medicines you take. How can you care for yourself at home? · To prevent dehydration, drink plenty of fluids, enough so that your urine is light yellow or clear like water. Choose water and other caffeine-free clear liquids until you feel better. If you have kidney, heart, or liver disease and have to limit fluids, talk with your doctor before you increase the amount of fluids you drink.   · If you do not feel like eating or drinking, try taking small sips of water, sports drinks, or other rehydration drinks. · Get plenty of rest.  To prevent dehydration  · Add more fluids to your diet and daily routine, unless your doctor has told you not to. · During hot weather, drink more fluids. Drink even more fluids if you exercise a lot. Stay away from drinks with alcohol or caffeine. · Watch for the symptoms of dehydration. These include:  ¨ A dry, sticky mouth. ¨ Dark yellow urine, and not much of it. ¨ Dry and sunken eyes. ¨ Feeling very tired. · Learn what problems can lead to dehydration. These include:  ¨ Diarrhea, fever, and vomiting. ¨ Any illness with a fever, such as pneumonia or the flu. ¨ Activities that cause heavy sweating, such as endurance races and heavy outdoor work in hot or humid weather. ¨ Alcohol or drug abuse or withdrawal.  ¨ Certain medicines, such as cold and allergy pills (antihistamines), diet pills (diuretics), and laxatives. ¨ Certain diseases, such as diabetes, cancer, and heart or kidney disease. When should you call for help? Call 911 anytime you think you may need emergency care. For example, call if:  ? · You passed out (lost consciousness). ?Call your doctor now or seek immediate medical care if:  ? · You are confused and cannot think clearly. ? · You are dizzy or lightheaded, or you feel like you may faint. ? · You have signs of needing more fluids. You have sunken eyes and a dry mouth, and you pass only a little dark urine. ? · You cannot keep fluids down. ? Watch closely for changes in your health, and be sure to contact your doctor if:  ? · You are not making tears. ? · Your skin is very dry and sags slowly back into place after you pinch it. ? · Your mouth and eyes are very dry. Where can you learn more? Go to http://lenny-brady.info/. Enter D718 in the search box to learn more about \"Dehydration: Care Instructions. \"  Current as of: March 20, 2017  Content Version: 11.4  © 8755-0425 [employer]. Care instructions adapted under license by [employer] (which disclaims liability or warranty for this information). If you have questions about a medical condition or this instruction, always ask your healthcare professional. [address] any warranty or liability for your use of this information. Urinary Tract Infections in Men: Care Instructions  Your Care Instructions    A urinary tract infection, or UTI, is a general term for an infection anywhere between the kidneys and the tip of the penis. UTIs can also be a result of a prostate problem. Most cause pain or burning when you urinate. Most UTIs are caused by bacteria and can be cured with antibiotics. It is important to complete your treatment so that the infection does not get worse. Follow-up care is a key part of your treatment and safety. Be sure to make and go to all appointments, and call your doctor if you are having problems. It's also a good idea to know your test results and keep a list of the medicines you take. How can you care for yourself at home? · Take your antibiotics as prescribed. Do not stop taking them just because you feel better. You need to take the full course of antibiotics. · Take your medicines exactly as prescribed. Your doctor may have prescribed a medicine, such as phenazopyridine (Pyridium), to help relieve pain when you urinate. This turns your urine orange. You may stop taking it when your symptoms get better. But be sure to take all of your antibiotics, which treat the infection. · Drink extra water for the next day or two. This will help make the urine less concentrated and help wash out the bacteria causing the infection. (If you have kidney, heart, or liver disease and have to limit your fluids, talk with your doctor before you increase your fluid intake.)  · Avoid drinks that are carbonated or have caffeine.  They can irritate the bladder. · Urinate often. Try to empty your bladder each time. · To relieve pain, take a hot bath or lay a heating pad (set on low) over your lower belly or genital area. Never go to sleep with a heating pad in place. To help prevent UTIs  · Drink plenty of fluids, enough so that your urine is light yellow or clear like water. If you have kidney, heart, or liver disease and have to limit fluids, talk with your doctor before you increase the amount of fluids you drink. · Urinate when you have the urge. Do not hold your urine for a long time. Urinate before you go to sleep. · Keep your penis clean. Catheter care  If you have a drainage tube (catheter) in place, the following steps will help you care for it. · Always wash your hands before and after touching your catheter. · Check the area around the urethra for inflammation or signs of infection. Signs of infection include irritated, swollen, red, or tender skin, or pus around the catheter. · Clean the area around the catheter with soap and water two times a day. Dry with a clean towel afterward. · Do not apply powder or lotion to the skin around the catheter. To empty the urine collection bag  · Wash your hands with soap and water. · Without touching the drain spout, remove the spout from its sleeve at the bottom of the collection bag. Open the valve on the spout. · Let the urine flow out of the bag and into the toilet or a container. Do not let the tubing or drain spout touch anything. · After you empty the bag, clean the end of the drain spout with tissue and water. Close the valve and put the drain spout back into its sleeve at the bottom of the collection bag. · Wash your hands with soap and water. When should you call for help? Call your doctor now or seek immediate medical care if:  ? · Symptoms such as a fever, chills, nausea, or vomiting get worse or happen for the first time. ? · You have new pain in your back just below your rib cage. This is called flank pain. ? · There is new blood or pus in your urine. ? · You are not able to take or keep down your antibiotics. ? Watch closely for changes in your health, and be sure to contact your doctor if:  ? · You are not getting better after taking an antibiotic for 2 days. ? · Your symptoms go away but then come back. Where can you learn more? Go to http://lenny-brady.info/. Enter X236 in the search box to learn more about \"Urinary Tract Infections in Men: Care Instructions. \"  Current as of: May 12, 2017  Content Version: 11.4  © 7371-4537 [employer]. Care instructions adapted under license by [employer] (which disclaims liability or warranty for this information). If you have questions about a medical condition or this instruction, always ask your healthcare professional. [address] any warranty or liability for your use of this information.

## 2023-07-25 NOTE — ROUTINE PROCESS
Bedside and Verbal shift change report given to Genevieve Marcelino (oncoming nurse) by Lyubov Mcguire RN (offgoing nurse). Report included the following information SBAR, Kardex, Intake/Output, MAR, Accordion and Recent Results. Called and spoke with patient . Patient is coming in for blood work to be done tomorrow and will be called if appointment opens up for the end of this week.

## 2024-10-07 NOTE — HOSPICE
[facility]  [phone]    Discharge/Death Nursing Note   Patient Name: Berna Bates  YOB: 1940  Age: 69 y.o. Date of Death: 18   Admitted Date: 12/3/2018  Time of Death: 09:50 pm    Facility of Care:   [facility]  Level of Care: ROUTINE  Patient Room: [facility]     Hospice Attending  Dr. Jim Grief Diagnosis: Respiratory failure [facility]) [J96.90]    Death Pronouncement   Pronouncement of death completed by: Dr. Felisha Hahn staff was not  present at the time of death    At the time of death the patient was documented as   patient unresponsive. Pupils fixed. No spontaneous respirations.   No heart sounds auscultated.     Death pronounced at 9:50PM    The pt  within  [facility]    The following were notified of the patient's death: [facility]  hospice interdisciplinary team.      Medications were disposed of per facility protocol     Discharge Summary   Discharge Reason: Death    Summary of Care Provided:    [x] Post mortem care provided by STAFF  [x] Notification of  home by STAFF  [x][] Goals completed    Disciplines involved: [x] RN [x] SW [x]   [x] [facility]    [x] IDT communication/notification    Attending Physician, Lion Calixto , notified of death    Bereaved   Daughter Zana Dima   [phone]  [id number]  Medication: SPIRONOLACTONE 25MG TABLETS   Last office visit date: 10/31/2023  Medication Refill Protocol Failed.  Failed criteria: See failed criteria below. Sent to clinician to review.     Diuretics Refill Protocol - 12 Month Protocol Wdzxyd21/07/2024 09:35 AM   Protocol Details eGFR resulted within last 12 months -- IF CRITERIA FAILED REFER TO PROTOCOL DETAILS    Normal Potassium within last 12 months looking at last value -- IF CRITERIA FAILED REFER TO PROTOCOL DETAILS    Normal Sodium within last 12 months looking at last value -- IF CRITERIA FAILED REFER TO PROTOCOL DETAILS    eGFR greater than 29 within last 12 months looking at last value